# Patient Record
Sex: MALE | Race: WHITE | NOT HISPANIC OR LATINO | Employment: OTHER | URBAN - METROPOLITAN AREA
[De-identification: names, ages, dates, MRNs, and addresses within clinical notes are randomized per-mention and may not be internally consistent; named-entity substitution may affect disease eponyms.]

---

## 2017-01-09 ENCOUNTER — APPOINTMENT (OUTPATIENT)
Dept: MRI IMAGING | Facility: HOSPITAL | Age: 82
End: 2017-01-09

## 2017-01-09 ENCOUNTER — HOSPITAL ENCOUNTER (EMERGENCY)
Facility: HOSPITAL | Age: 82
Discharge: HOME | End: 2017-01-09
Attending: EMERGENCY MEDICINE | Admitting: EMERGENCY MEDICINE

## 2017-01-09 ENCOUNTER — APPOINTMENT (OUTPATIENT)
Dept: GENERAL RADIOLOGY | Facility: HOSPITAL | Age: 82
End: 2017-01-09

## 2017-01-09 VITALS
HEART RATE: 73 BPM | TEMPERATURE: 97.4 F | BODY MASS INDEX: 24.5 KG/M2 | OXYGEN SATURATION: 98 % | SYSTOLIC BLOOD PRESSURE: 161 MMHG | HEIGHT: 71 IN | RESPIRATION RATE: 18 BRPM | DIASTOLIC BLOOD PRESSURE: 90 MMHG | WEIGHT: 175 LBS

## 2017-01-09 DIAGNOSIS — R42 DIZZINESS: Primary | ICD-10-CM

## 2017-01-09 LAB
ALBUMIN SERPL-MCNC: 4.1 G/DL (ref 3.2–4.8)
ALBUMIN/GLOB SERPL: 1.2 G/DL (ref 1.5–2.5)
ALP SERPL-CCNC: 67 U/L (ref 25–100)
ALT SERPL W P-5'-P-CCNC: 15 U/L (ref 7–40)
ANION GAP SERPL CALCULATED.3IONS-SCNC: 11 MMOL/L (ref 3–11)
AST SERPL-CCNC: 16 U/L (ref 0–33)
BACTERIA UR QL AUTO: ABNORMAL /HPF
BASOPHILS # BLD AUTO: 0.04 10*3/MM3 (ref 0–0.2)
BASOPHILS NFR BLD AUTO: 0.5 % (ref 0–1)
BILIRUB SERPL-MCNC: 0.5 MG/DL (ref 0.3–1.2)
BILIRUB UR QL STRIP: NEGATIVE
BUN BLD-MCNC: 18 MG/DL (ref 9–23)
BUN/CREAT SERPL: 13.8 (ref 7–25)
CALCIUM SPEC-SCNC: 10.3 MG/DL (ref 8.7–10.4)
CHLORIDE SERPL-SCNC: 105 MMOL/L (ref 99–109)
CLARITY UR: CLEAR
CO2 SERPL-SCNC: 26 MMOL/L (ref 20–31)
COLOR UR: YELLOW
CREAT BLD-MCNC: 1.3 MG/DL (ref 0.6–1.3)
DEPRECATED RDW RBC AUTO: 47.8 FL (ref 37–54)
EOSINOPHIL # BLD AUTO: 0.14 10*3/MM3 (ref 0.1–0.3)
EOSINOPHIL NFR BLD AUTO: 1.7 % (ref 0–3)
ERYTHROCYTE [DISTWIDTH] IN BLOOD BY AUTOMATED COUNT: 13.4 % (ref 11.3–14.5)
GFR SERPL CREATININE-BSD FRML MDRD: 53 ML/MIN/1.73
GLOBULIN UR ELPH-MCNC: 3.4 GM/DL
GLUCOSE BLD-MCNC: 117 MG/DL (ref 70–100)
GLUCOSE UR STRIP-MCNC: NEGATIVE MG/DL
HCT VFR BLD AUTO: 48.3 % (ref 38.9–50.9)
HGB BLD-MCNC: 16 G/DL (ref 13.1–17.5)
HGB UR QL STRIP.AUTO: ABNORMAL
HOLD SPECIMEN: NORMAL
HOLD SPECIMEN: NORMAL
HYALINE CASTS UR QL AUTO: ABNORMAL /LPF
IMM GRANULOCYTES # BLD: 0.02 10*3/MM3 (ref 0–0.03)
IMM GRANULOCYTES NFR BLD: 0.2 % (ref 0–0.6)
KETONES UR QL STRIP: NEGATIVE
LEUKOCYTE ESTERASE UR QL STRIP.AUTO: NEGATIVE
LYMPHOCYTES # BLD AUTO: 1.5 10*3/MM3 (ref 0.6–4.8)
LYMPHOCYTES NFR BLD AUTO: 18.1 % (ref 24–44)
MAGNESIUM SERPL-MCNC: 2 MG/DL (ref 1.3–2.7)
MCH RBC QN AUTO: 32.5 PG (ref 27–31)
MCHC RBC AUTO-ENTMCNC: 33.1 G/DL (ref 32–36)
MCV RBC AUTO: 98 FL (ref 80–99)
MONOCYTES # BLD AUTO: 0.8 10*3/MM3 (ref 0–1)
MONOCYTES NFR BLD AUTO: 9.6 % (ref 0–12)
NEUTROPHILS # BLD AUTO: 5.8 10*3/MM3 (ref 1.5–8.3)
NEUTROPHILS NFR BLD AUTO: 69.9 % (ref 41–71)
NITRITE UR QL STRIP: NEGATIVE
PH UR STRIP.AUTO: 5.5 [PH] (ref 5–8)
PLATELET # BLD AUTO: 223 10*3/MM3 (ref 150–450)
PMV BLD AUTO: 9.4 FL (ref 6–12)
POTASSIUM BLD-SCNC: 3.9 MMOL/L (ref 3.5–5.5)
PROT SERPL-MCNC: 7.5 G/DL (ref 5.7–8.2)
PROT UR QL STRIP: NEGATIVE
RBC # BLD AUTO: 4.93 10*6/MM3 (ref 4.2–5.76)
RBC # UR: ABNORMAL /HPF
REF LAB TEST METHOD: ABNORMAL
SODIUM BLD-SCNC: 142 MMOL/L (ref 132–146)
SP GR UR STRIP: 1.01 (ref 1–1.03)
SQUAMOUS #/AREA URNS HPF: ABNORMAL /HPF
TROPONIN I SERPL-MCNC: 0 NG/ML (ref 0–0.07)
TROPONIN I SERPL-MCNC: 0 NG/ML (ref 0–0.07)
UROBILINOGEN UR QL STRIP: ABNORMAL
WBC NRBC COR # BLD: 8.3 10*3/MM3 (ref 3.5–10.8)
WBC UR QL AUTO: ABNORMAL /HPF
WHOLE BLOOD HOLD SPECIMEN: NORMAL
WHOLE BLOOD HOLD SPECIMEN: NORMAL

## 2017-01-09 PROCEDURE — 85025 COMPLETE CBC W/AUTO DIFF WBC: CPT | Performed by: EMERGENCY MEDICINE

## 2017-01-09 PROCEDURE — 83735 ASSAY OF MAGNESIUM: CPT | Performed by: EMERGENCY MEDICINE

## 2017-01-09 PROCEDURE — 71010 HC CHEST PA OR AP: CPT

## 2017-01-09 PROCEDURE — 99284 EMERGENCY DEPT VISIT MOD MDM: CPT

## 2017-01-09 PROCEDURE — 36415 COLL VENOUS BLD VENIPUNCTURE: CPT

## 2017-01-09 PROCEDURE — 84484 ASSAY OF TROPONIN QUANT: CPT

## 2017-01-09 PROCEDURE — 70551 MRI BRAIN STEM W/O DYE: CPT

## 2017-01-09 PROCEDURE — 80053 COMPREHEN METABOLIC PANEL: CPT | Performed by: EMERGENCY MEDICINE

## 2017-01-09 PROCEDURE — 81001 URINALYSIS AUTO W/SCOPE: CPT | Performed by: EMERGENCY MEDICINE

## 2017-01-09 PROCEDURE — 93005 ELECTROCARDIOGRAM TRACING: CPT

## 2017-01-09 RX ORDER — LISINOPRIL 40 MG/1
40 TABLET ORAL NIGHTLY
COMMUNITY
End: 2018-10-04 | Stop reason: SDUPTHER

## 2017-01-09 RX ORDER — SODIUM CHLORIDE 0.9 % (FLUSH) 0.9 %
10 SYRINGE (ML) INJECTION AS NEEDED
Status: DISCONTINUED | OUTPATIENT
Start: 2017-01-09 | End: 2017-01-09 | Stop reason: HOSPADM

## 2017-01-09 RX ORDER — MECLIZINE HYDROCHLORIDE 25 MG/1
25 TABLET ORAL EVERY 6 HOURS PRN
Qty: 24 TABLET | Refills: 0 | Status: SHIPPED | OUTPATIENT
Start: 2017-01-09 | End: 2017-03-01

## 2017-01-09 RX ORDER — SIMVASTATIN 40 MG
40 TABLET ORAL NIGHTLY
COMMUNITY
End: 2017-10-02 | Stop reason: SDUPTHER

## 2017-01-09 RX ORDER — ASPIRIN 81 MG/1
81 TABLET ORAL NIGHTLY
Status: ON HOLD | COMMUNITY

## 2017-01-10 NOTE — DISCHARGE INSTRUCTIONS
Rest.  Meclizine for dizziness.  Adequate fluids.  Call Dr. Gage for follow up appointment.  Go slow when going from sitting to standing.

## 2017-01-10 NOTE — ED PROVIDER NOTES
Subjective   HPI Comments: 82-year-old male complains of progressive dizziness and recurrent falls for the past year.  The patient states that his dizziness is now affected his ability to walk.  The dizziness is present primarily when he stands.  He has no dizziness on sitting or lying.  The dizziness is not affected by head movement. He's had no palpitations.  The patient was seen by his PCP, Dr. Eliseo Rodriguez, in [place] today and advised to go to the emergency department for further evaluation and imaging.  The patient has a past medical history of hypertension.      Patient is a 82 y.o. male presenting with dizziness.   History provided by:  Patient  Dizziness   Quality:  Imbalance  Severity:  Moderate  Onset quality:  Gradual  Duration: Over the past year.  Timing:  Intermittent  Progression:  Worsening  Chronicity:  Chronic  Context: standing up    Relieved by: Relieved with sitting down or lying down.  Worsened by:  Standing up  Ineffective treatments:  None tried  Associated symptoms: no blood in stool, no chest pain, no diarrhea, no headaches, no hearing loss, no nausea, no palpitations, no shortness of breath, no syncope, no tinnitus, no vision changes, no vomiting and no weakness        Review of Systems   Constitutional: Negative for chills and fever.   HENT: Negative for congestion, ear pain, hearing loss, nosebleeds, rhinorrhea, sinus pressure, sore throat and tinnitus.    Eyes: Negative for pain, discharge and visual disturbance.   Respiratory: Negative for shortness of breath and wheezing.    Cardiovascular: Negative for chest pain, palpitations, leg swelling and syncope.   Gastrointestinal: Negative for abdominal pain, blood in stool, diarrhea, nausea and vomiting.   Endocrine: Negative.    Genitourinary: Negative for dysuria, hematuria and urgency.   Musculoskeletal: Positive for gait problem (secondary to dizziness). Negative for arthralgias and back pain.   Skin: Negative for pallor and rash.    Allergic/Immunologic: Negative for immunocompromised state.   Neurological: Positive for dizziness and light-headedness. Negative for seizures, syncope, speech difficulty, weakness and headaches.   Hematological: Negative for adenopathy. Does not bruise/bleed easily.   Psychiatric/Behavioral: Negative.  Negative for confusion.       Past Medical History   Diagnosis Date   • Hypertension        No Known Allergies    Past Surgical History   Procedure Laterality Date   • Cholecystectomy         History reviewed. No pertinent family history.    Social History     Social History   • Marital status:      Spouse name: N/A   • Number of children: N/A   • Years of education: N/A     Social History Main Topics   • Smoking status: Never Smoker   • Smokeless tobacco: None   • Alcohol use No   • Drug use: No   • Sexual activity: Not Asked     Other Topics Concern   • None     Social History Narrative   • None           Objective   Physical Exam   Constitutional: He is oriented to person, place, and time. He appears well-developed and well-nourished. No distress.   HENT:   Head: Normocephalic and atraumatic.   Nose: Nose normal.   Mouth/Throat: Oropharynx is clear and moist.   Normal TMs   Eyes: EOM are normal. Pupils are equal, round, and reactive to light. Left eye exhibits no discharge. No scleral icterus.   No nystagmus   Neck: Normal range of motion. Neck supple.   No bruits   Cardiovascular: Normal rate, regular rhythm, normal heart sounds and intact distal pulses.    No murmur heard.  Pulmonary/Chest: Effort normal and breath sounds normal. No respiratory distress. He has no wheezes. He has no rales. He exhibits no tenderness.   Abdominal: Soft. Bowel sounds are normal. There is no tenderness.   Musculoskeletal: Normal range of motion. He exhibits no edema or tenderness.   Neurological: He is alert and oriented to person, place, and time.   Normal facial symmetry.  Normal speech.  Equal  bilaterally.  No  drift.   Skin: Skin is warm and dry. No rash noted. He is not diaphoretic.   Psychiatric: He has a normal mood and affect.   Nursing note and vitals reviewed.      Procedures         ED Course  ED Course    9:20 PM  MRI of the brain shows no acute abnormality.  There is some microvascular disease present.  There are no concerning labs.  The patient is quite upset with the amount of time that he's been here.  He wants discharge.  I spoke to the patient about his results andlab findings.  I will refer him to neurology for follow-up.  I'll also give him a prescription for meclizine for dizziness.    Course of Care      Lab Results (last 24 hours)     Procedure Component Value Units Date/Time    CBC & Differential [[id number]] Collected:  01/09/17 1354    Specimen:  Blood Updated:  01/09/17 1501    Narrative:       The following orders were created for panel order CBC & Differential.  Procedure                               Abnormality         Status                     ---------                               -----------         ------                     CBC Auto Differential[[id number]]         Abnormal            Final result                 Please view results for these tests on the individual orders.    Comprehensive Metabolic Panel [[id number]]  (Abnormal) Collected:  01/09/17 1354    Specimen:  Blood from Arm, Right Updated:  01/09/17 1437     Glucose 117 (H) mg/dL      BUN 18 mg/dL      Creatinine 1.30 mg/dL      Sodium 142 mmol/L      Potassium 3.9 mmol/L      Chloride 105 mmol/L      CO2 26.0 mmol/L      Calcium 10.3 mg/dL      Total Protein 7.5 g/dL      Albumin 4.10 g/dL      ALT (SGPT) 15 U/L      AST (SGOT) 16 U/L      Alkaline Phosphatase 67 U/L      Total Bilirubin 0.5 mg/dL      eGFR Non African Amer 53 (L) mL/min/1.73      Globulin 3.4 gm/dL      A/G Ratio 1.2 (L) g/dL      BUN/Creatinine Ratio 13.8      Anion Gap 11.0 mmol/L     Narrative:       [employer] Guidelines    Stage                            Description                             GFR                      1                               Normal or High                          90+  2                               Mild decrease                            60-89  3                               Moderate decrease                   30-59  4                               Severe decrease                       15-29  5                               Kidney failure                             <15    Magnesium [[id number]]  (Normal) Collected:  01/09/17 1354    Specimen:  Blood from Arm, Right Updated:  01/09/17 1437     Magnesium 2.0 mg/dL     CBC Auto Differential [[id number]]  (Abnormal) Collected:  01/09/17 1354    Specimen:  Blood from Arm, Right Updated:  01/09/17 1501     WBC 8.30 10*3/mm3      RBC 4.93 10*6/mm3      Hemoglobin 16.0 g/dL      Hematocrit 48.3 %      MCV 98.0 fL      MCH 32.5 (H) pg      MCHC 33.1 g/dL      RDW 13.4 %      RDW-SD 47.8 fl      MPV 9.4 fL      Platelets 223 10*3/mm3      Neutrophil % 69.9 %      Lymphocyte % 18.1 (L) %      Monocyte % 9.6 %      Eosinophil % 1.7 %      Basophil % 0.5 %      Immature Grans % 0.2 %      Neutrophils, Absolute 5.80 10*3/mm3      Lymphocytes, Absolute 1.50 10*3/mm3      Monocytes, Absolute 0.80 10*3/mm3      Eosinophils, Absolute 0.14 10*3/mm3      Basophils, Absolute 0.04 10*3/mm3      Immature Grans, Absolute 0.02 10*3/mm3     POC Troponin, Rapid [[id number]]  (Normal) Collected:  01/09/17 1406    Specimen:  Blood Updated:  01/09/17 1425     Troponin I 0.00 ng/mL       Serial Number: [id number]    : [id number]       Urinalysis With / Culture If Indicated [[id number]]  (Abnormal) Collected:  01/09/17 1503    Specimen:  Urine from Urine, Clean Catch Updated:  01/09/17 1534     Color, UA Yellow      Appearance, UA Clear      pH, UA 5.5      Specific Gravity, UA 1.010      Glucose, UA Negative      Ketones, UA Negative      Bilirubin, UA Negative      Blood, UA Small (1+) (A)      Protein, UA  Negative      Leuk Esterase, UA Negative      Nitrite, UA Negative      Urobilinogen, UA 0.2 E.U./dL     Urinalysis, Microscopic Only [[id number]]  (Abnormal) Collected:  01/09/17 1503    Specimen:  Urine from Urine, Clean Catch Updated:  01/09/17 1534     RBC, UA 3-6 (A) /HPF      WBC, UA 0-2 (A) /HPF      Bacteria, UA None Seen /HPF      Squamous Epithelial Cells, UA None Seen /HPF      Hyaline Casts, UA 0-6 /LPF      Methodology Automated Microscopy     POC Troponin, Rapid [[id number]]  (Normal) Collected:  01/09/17 1651    Specimen:  Blood Updated:  01/09/17 1705     Troponin I 0.00 ng/mL       Serial Number: [id number]    : [id number]             Note: In addition to lab results from this visit, the labs listed above may include labs taken at another facility or during a different encounter within the last 24 hours. Please correlate lab times with ED admission and discharge times for further clarification of the services performed during this visit.    MRI Brain Without Contrast   Final Result   Abnormal     No acute intracranial process is demonstrated.  Probable chronic small vessel    ischemic disease.         THIS DOCUMENT HAS BEEN ELECTRONICALLY SIGNED BY LAYNE GARIBAY MD      XR Chest 1 View    (Results Pending)       Vitals:    01/09/17 1945 01/09/17 1947 01/09/17 1949 01/09/17 2057   BP: (!) 161/117 160/96 158/89 161/90   BP Location: Left arm Left arm Left arm    Patient Position: Lying Sitting Standing    Pulse: 70 72 80 73   Resp:       Temp:       TempSrc:       SpO2: 98%   98%   Weight:       Height:           Medications   sodium chloride 0.9 % flush 10 mL (not administered)       ECG/EMG Results (last 24 hours)     Procedure Component Value Units Date/Time    ECG 12 Lead [[id number]] Collected:  01/09/17 1348     Updated:  01/09/17 1746    Narrative:       Test Reason : Weak/Dizzy/AMS protocol  Blood Pressure : **/** mmHG  Vent. Rate : 074 BPM     Atrial Rate : 074 BPM     P-R Int : 182 ms          QRS  Dur : 094 ms      QT Int : 418 ms       P-R-T Axes : 034 -41 046 degrees     QTc Int : 463 ms    Sinus rhythm  Left axis deviation  Abnormal ECG  When compared with ECG of 23-APR-2012 13:29,  Ectopy has resolved  Confirmed by JOHN MATOS MD ([id number]) on 1/9/2017 5:46:38 PM    Referred By:  NATALY           Confirmed By:JOHN MATOS MD    ECG 12 Lead [[id number]] Collected:  01/09/17 1635     Updated:  01/09/17 1951    Narrative:       Test Reason : CP  Blood Pressure : **/** mmHG  Vent. Rate : 070 BPM     Atrial Rate : 070 BPM     P-R Int : 176 ms          QRS Dur : 086 ms      QT Int : 400 ms       P-R-T Axes : -04 -38 002 degrees     QTc Int : 432 ms    Sinus rhythm with premature atrial complexes  Left axis deviation  Abnormal ECG  When compared with ECG of  No significant change was found    Confirmed by JOHN MATOS MD ([id number]) on 1/9/2017 7:51:12 PM    Referred By:  MOISÉS HUYNH           Confirmed By:JOHN MATOS MD                      [facility]    Final diagnoses:   Dizziness            LINDA Lala  01/09/17 2120

## 2017-03-01 ENCOUNTER — OFFICE VISIT (OUTPATIENT)
Dept: NEUROLOGY | Facility: CLINIC | Age: 82
End: 2017-03-01

## 2017-03-01 VITALS
HEIGHT: 71 IN | HEART RATE: 56 BPM | OXYGEN SATURATION: 98 % | WEIGHT: 177 LBS | RESPIRATION RATE: 16 BRPM | SYSTOLIC BLOOD PRESSURE: 118 MMHG | DIASTOLIC BLOOD PRESSURE: 64 MMHG | BODY MASS INDEX: 24.78 KG/M2

## 2017-03-01 DIAGNOSIS — H81.02 MENIERE DISEASE, LEFT: Primary | ICD-10-CM

## 2017-03-01 PROCEDURE — 99203 OFFICE O/P NEW LOW 30 MIN: CPT | Performed by: PSYCHIATRY & NEUROLOGY

## 2017-03-01 RX ORDER — OMEPRAZOLE 20 MG/1
20 CAPSULE, DELAYED RELEASE ORAL NIGHTLY
COMMUNITY
End: 2021-09-14 | Stop reason: ALTCHOICE

## 2017-03-01 NOTE — PROGRESS NOTES
[facility] NEUROLOGY [place] CONSULTATION   History of Present Illness     Date: 3/1/2017    Patient Identification  Darren Mccracken is a 82 y.o. male.    Patient information was obtained from patient.  History/Exam limitations: none.    CONSULTATION requested by: Eliseo Mitchell MD      Chief Complaint   Dizziness (Patient is in the office today for dizziness and states it started about a year ago.  He states it is getting worse to where he is not stable on his feet.) and Establish Care      Dizziness   This is a chronic problem. Episode onset: One year ago. The problem occurs intermittently. The problem has been gradually worsening (Patient reported to symptoms associated with ringing in ear on the left decrease in hearing on the left). Associated symptoms include vertigo. Pertinent negatives include no abdominal pain, chest pain, chills, congestion, coughing, fever, headaches, joint swelling, myalgias, nausea, neck pain, numbness, rash, sore throat, vomiting or weakness. The symptoms are aggravated by walking and standing. The treatment provided no relief.      Patient does admit that his wife to use a lot of salt with her cooking.  He should also put additional salt in his food.      PMH:   Past Medical History   Diagnosis Date   • Glaucoma    • Hyperlipidemia    • Hypertension        Past Surgical History:   Past Surgical History   Procedure Laterality Date   • Cholecystectomy     • Eye surgery Right        Family Hisotry: History reviewed. No pertinent family history.    Social History:   Social History     Social History   • Marital status:      Spouse name: N/A   • Number of children: N/A   • Years of education: N/A     Occupational History   • Not on file.     Social History Main Topics   • Smoking status: Never Smoker   • Smokeless tobacco: Not on file   • Alcohol use No   • Drug use: No   • Sexual activity: Defer     Other Topics Concern   • Not on file     Social History Narrative        Medications:   Current Outpatient Prescriptions   Medication Sig Dispense Refill   • omeprazole (priLOSEC) 20 MG capsule Take 20 mg by mouth Daily.     • aspirin 81 MG EC tablet Take 81 mg by mouth Daily.     • lisinopril (PRINIVIL,ZESTRIL) 40 MG tablet Take 40 mg by mouth Daily.     • simvastatin (ZOCOR) 40 MG tablet Take 20 mg by mouth Every Night.       No current facility-administered medications for this visit.        Allergy: No Known Allergies    Review of Systems:  Review of Systems   Constitutional: Negative for chills and fever.   HENT: Negative for congestion, ear pain, hearing loss, rhinorrhea and sore throat.         Hearing loss and tinnitus in his left ear   Eyes: Negative for pain, discharge and redness.   Respiratory: Negative for cough, shortness of breath, wheezing and stridor.    Cardiovascular: Negative for chest pain, palpitations and leg swelling.   Gastrointestinal: Negative for abdominal pain, constipation, nausea and vomiting.   Endocrine: Negative for cold intolerance, heat intolerance and polyphagia.   Genitourinary: Negative for dysuria, flank pain, frequency and urgency.   Musculoskeletal: Negative for joint swelling, myalgias, neck pain and neck stiffness.   Skin: Negative for pallor, rash and wound.   Allergic/Immunologic: Negative for environmental allergies.   Neurological: Positive for dizziness, vertigo and light-headedness. Negative for tremors, seizures, syncope, facial asymmetry, speech difficulty, weakness, numbness and headaches.   Hematological: Negative for adenopathy.   Psychiatric/Behavioral: Negative for confusion and hallucinations. The patient is not nervous/anxious.        Physical Exam     Vitals:    03/01/17 1541 03/01/17 1542 03/01/17 1543 03/01/17 1544   BP: 122/66 120/60 116/66 118/64   BP Location: Left arm Right arm Left arm Right arm   Patient Position: Sitting Sitting Standing Standing   Pulse:       Resp:       SpO2:       Weight:       Height:          GENERAL: Patient is pleasant, cooperative, appears to be stated age.  Body habitus is endomorphic.  SKIN AND EXTREMITIES:  No skin rashes or lesions are noted.  No cyanosis, clubbing or edema of the extremities.    HEAD:  Head is normocephalic and atraumatic.    NECK: Neck are non-tender without thyromegaly or adenopathy.  Carotic upstrokes are 1+/4.  No cranial or cervical bruits.  The neck is supple with a full range of motion.   ENT: palate elevate symmetrically, no evidence of high arch palate, tongue midline erythema in posterior pharynx, Mallampati Classification Class III   CARDIOVASCULAR:  Regular rate and rhythm with normal S1 and S2 without rub or gallop.  RESPIRATORY:  Clear to auscultation without wheezes or crackle   ABDOMEN:  Soft and non-tender, positive bowel sound without hepatosplenomegaly  BACK:  Back is straight without midline defect.    PSYCH:  Higher cortical function/mental status:  The patient is alert.  She is oriented x3 to time, place and person.  Recent and the remote memory appear normal.  The patient has a good fund of knowledge.  There is no visual or auditory hallucination or suicidal or homicidal ideation.  SPEECH:There is no gross evidence of aphasia, dysarthria or agnosia.      CRANIAL NERVES: Decreased hearing on his left ear.   Pupils are 4mm, equal round reactive to light, reacting briskly to 2mm without afferent pupillary defect.  Visual fields are intact to confrontation testing.  Fundoscopic examination reveals sharp disk margins with normal vasculature.  No papilledema, hemorrhages or exudates.  Extraocular movements are full and smooth with normal pursuits and saccades.  No nystagmus noted.  The face is symmetric. palate elevate symmetrically, Tongue midline, positive gag reflex. The remainder of the cranial nerves are intact and symmetrical.    MOTOR: Strength is 5/5 throughout with normal tone and bulk with the following exceptions, 4/5 intrinsic muscles of the hands  and feet.  No involuntary movements noted.    Deep Tendon Reflexes: are 2/4 and symmetrical in the upper extremities, 2/4 and symmetrical at the knees and 1/4 and symmetrical at the Achilles tendon.  Plantar responses were down-going bilaterally.    SENSATION:  Intact to pinprick, light touch, vibration and proprioception.  Coordination:  The patient normally performs finger-nose-finger, heel-to-knee-to-shin and rapid alternating movements in symmetrical fashion.    COORDINATION AND GAIT:  The patient walks with a narrow-based gait.  MUSCULOSKELETAL: Range of motion normal, no clubbing, cyanosis, or edema.   joint swelling noted in both hands.            Studies: I have personally reviewed the following and discussed with the patient.  Results for orders placed or performed during the hospital encounter of 01/09/17   Comprehensive Metabolic Panel   Result Value Ref Range    Glucose 117 (H) 70 - 100 mg/dL    BUN 18 9 - 23 mg/dL    Creatinine 1.30 0.60 - 1.30 mg/dL    Sodium 142 132 - 146 mmol/L    Potassium 3.9 3.5 - 5.5 mmol/L    Chloride 105 99 - 109 mmol/L    CO2 26.0 20.0 - 31.0 mmol/L    Calcium 10.3 8.7 - 10.4 mg/dL    Total Protein 7.5 5.7 - 8.2 g/dL    Albumin 4.10 3.20 - 4.80 g/dL    ALT (SGPT) 15 7 - 40 U/L    AST (SGOT) 16 0 - 33 U/L    Alkaline Phosphatase 67 25 - 100 U/L    Total Bilirubin 0.5 0.3 - 1.2 mg/dL    eGFR Non African Amer 53 (L) >60 mL/min/1.73    Globulin 3.4 gm/dL    A/G Ratio 1.2 (L) 1.5 - 2.5 g/dL    BUN/Creatinine Ratio 13.8 7.0 - 25.0    Anion Gap 11.0 3.0 - 11.0 mmol/L   Magnesium   Result Value Ref Range    Magnesium 2.0 1.3 - 2.7 mg/dL   Urinalysis With / Culture If Indicated   Result Value Ref Range    Color, UA Yellow Yellow, Straw    Appearance, UA Clear Clear    pH, UA 5.5 5.0 - 8.0    Specific Gravity, UA 1.010 1.005 - 1.030    Glucose, UA Negative Negative    Ketones, UA Negative Negative    Bilirubin, UA Negative Negative    Blood, UA Small (1+) (A) Negative    Protein, UA  Negative Negative    Leuk Esterase, UA Negative Negative    Nitrite, UA Negative Negative    Urobilinogen, UA 0.2 E.U./dL 0.2 - 1.0 E.U./dL   CBC Auto Differential   Result Value Ref Range    WBC 8.30 3.50 - 10.80 10*3/mm3    RBC 4.93 4.20 - 5.76 10*6/mm3    Hemoglobin 16.0 13.1 - 17.5 g/dL    Hematocrit 48.3 38.9 - 50.9 %    MCV 98.0 80.0 - 99.0 fL    MCH 32.5 (H) 27.0 - 31.0 pg    MCHC 33.1 32.0 - 36.0 g/dL    RDW 13.4 11.3 - 14.5 %    RDW-SD 47.8 37.0 - 54.0 fl    MPV 9.4 6.0 - 12.0 fL    Platelets 223 150 - 450 10*3/mm3    Neutrophil % 69.9 41.0 - 71.0 %    Lymphocyte % 18.1 (L) 24.0 - 44.0 %    Monocyte % 9.6 0.0 - 12.0 %    Eosinophil % 1.7 0.0 - 3.0 %    Basophil % 0.5 0.0 - 1.0 %    Immature Grans % 0.2 0.0 - 0.6 %    Neutrophils, Absolute 5.80 1.50 - 8.30 10*3/mm3    Lymphocytes, Absolute 1.50 0.60 - 4.80 10*3/mm3    Monocytes, Absolute 0.80 0.00 - 1.00 10*3/mm3    Eosinophils, Absolute 0.14 0.10 - 0.30 10*3/mm3    Basophils, Absolute 0.04 0.00 - 0.20 10*3/mm3    Immature Grans, Absolute 0.02 0.00 - 0.03 10*3/mm3   Urinalysis, Microscopic Only   Result Value Ref Range    RBC, UA 3-6 (A) None Seen, 0-2 /HPF    WBC, UA 0-2 (A) None Seen /HPF    Bacteria, UA None Seen None Seen, Trace /HPF    Squamous Epithelial Cells, UA None Seen None Seen, 0-2 /HPF    Hyaline Casts, UA 0-6 0 - 6 /LPF    Methodology Automated Microscopy    POC Troponin, Rapid   Result Value Ref Range    Troponin I 0.00 0.00 - 0.07 ng/mL   POC Troponin, Rapid   Result Value Ref Range    Troponin I 0.00 0.00 - 0.07 ng/mL   Light Blue Top   Result Value Ref Range    Extra Tube hold for add-on    Green Top (Gel)   Result Value Ref Range    Extra Tube Hold for add-ons.    Lavender Top   Result Value Ref Range    Extra Tube hold for add-on    Gold Top - SST   Result Value Ref Range    Extra Tube Hold for add-ons.        Records Reviewed: I have personally reviewed his previous medical record.    Darren was seen today for dizziness and establish  care.    Diagnoses and all orders for this visit:    Meniere disease, left      Treatments:  1.  I've counseled patient extensively about the pathophysiology of Ménière disease  2.  I've advised patient should reduce salt intake to no more than 2 g a day    This Document is signed by Eligio Cool MD, FAAN, FAASM March 1, 20179:05 PM

## 2017-03-22 PROBLEM — I10 HTN (HYPERTENSION): Status: ACTIVE | Noted: 2017-03-22

## 2017-03-22 PROBLEM — E78.5 HLD (HYPERLIPIDEMIA): Status: ACTIVE | Noted: 2017-03-22

## 2017-03-22 PROBLEM — K21.9 GERD (GASTROESOPHAGEAL REFLUX DISEASE): Status: ACTIVE | Noted: 2017-03-22

## 2017-03-22 PROBLEM — H40.9 GLAUCOMA: Status: ACTIVE | Noted: 2017-03-22

## 2017-03-22 PROBLEM — N28.9 RENAL INSUFFICIENCY: Status: ACTIVE | Noted: 2017-03-22

## 2017-03-22 PROBLEM — I25.10 CAD (CORONARY ARTERY DISEASE): Status: ACTIVE | Noted: 2017-03-22

## 2017-04-26 ENCOUNTER — APPOINTMENT (OUTPATIENT)
Dept: CT IMAGING | Facility: HOSPITAL | Age: 82
End: 2017-04-26

## 2017-04-26 ENCOUNTER — APPOINTMENT (OUTPATIENT)
Dept: CARDIOLOGY | Facility: HOSPITAL | Age: 82
End: 2017-04-26
Attending: INTERNAL MEDICINE

## 2017-04-26 ENCOUNTER — APPOINTMENT (OUTPATIENT)
Dept: GENERAL RADIOLOGY | Facility: HOSPITAL | Age: 82
End: 2017-04-26

## 2017-04-26 ENCOUNTER — HOSPITAL ENCOUNTER (EMERGENCY)
Facility: HOSPITAL | Age: 82
Discharge: HOME | End: 2017-04-26
Attending: EMERGENCY MEDICINE | Admitting: EMERGENCY MEDICINE

## 2017-04-26 VITALS
BODY MASS INDEX: 24.5 KG/M2 | TEMPERATURE: 97.7 F | HEIGHT: 71 IN | HEART RATE: 74 BPM | WEIGHT: 175 LBS | DIASTOLIC BLOOD PRESSURE: 92 MMHG | OXYGEN SATURATION: 98 % | RESPIRATION RATE: 17 BRPM | SYSTOLIC BLOOD PRESSURE: 163 MMHG

## 2017-04-26 DIAGNOSIS — R07.9 CHEST PAIN, UNSPECIFIED TYPE: Primary | ICD-10-CM

## 2017-04-26 LAB
ALBUMIN SERPL-MCNC: 3.9 G/DL (ref 3.2–4.8)
ALBUMIN/GLOB SERPL: 1.2 G/DL (ref 1.5–2.5)
ALP SERPL-CCNC: 55 U/L (ref 25–100)
ALT SERPL W P-5'-P-CCNC: 16 U/L (ref 7–40)
ANION GAP SERPL CALCULATED.3IONS-SCNC: -1 MMOL/L (ref 3–11)
AST SERPL-CCNC: 16 U/L (ref 0–33)
BASOPHILS # BLD AUTO: 0.05 10*3/MM3 (ref 0–0.2)
BASOPHILS NFR BLD AUTO: 0.4 % (ref 0–1)
BILIRUB SERPL-MCNC: 0.7 MG/DL (ref 0.3–1.2)
BNP SERPL-MCNC: 17 PG/ML (ref 0–100)
BUN BLD-MCNC: 20 MG/DL (ref 9–23)
BUN/CREAT SERPL: 15.4 (ref 7–25)
CALCIUM SPEC-SCNC: 9.7 MG/DL (ref 8.7–10.4)
CHLORIDE SERPL-SCNC: 106 MMOL/L (ref 99–109)
CO2 SERPL-SCNC: 32 MMOL/L (ref 20–31)
CREAT BLD-MCNC: 1.3 MG/DL (ref 0.6–1.3)
DEPRECATED RDW RBC AUTO: 49.9 FL (ref 37–54)
EOSINOPHIL # BLD AUTO: 0.36 10*3/MM3 (ref 0.1–0.3)
EOSINOPHIL NFR BLD AUTO: 3 % (ref 0–3)
ERYTHROCYTE [DISTWIDTH] IN BLOOD BY AUTOMATED COUNT: 13.6 % (ref 11.3–14.5)
GFR SERPL CREATININE-BSD FRML MDRD: 53 ML/MIN/1.73
GLOBULIN UR ELPH-MCNC: 3.3 GM/DL
GLUCOSE BLD-MCNC: 109 MG/DL (ref 70–100)
HCT VFR BLD AUTO: 46.3 % (ref 38.9–50.9)
HGB BLD-MCNC: 15.1 G/DL (ref 13.1–17.5)
HOLD SPECIMEN: NORMAL
HOLD SPECIMEN: NORMAL
IMM GRANULOCYTES # BLD: 0.02 10*3/MM3 (ref 0–0.03)
IMM GRANULOCYTES NFR BLD: 0.2 % (ref 0–0.6)
LIPASE SERPL-CCNC: 27 U/L (ref 6–51)
LV EF NUC BP: 62 %
LYMPHOCYTES # BLD AUTO: 1.74 10*3/MM3 (ref 0.6–4.8)
LYMPHOCYTES NFR BLD AUTO: 14.4 % (ref 24–44)
MCH RBC QN AUTO: 32.5 PG (ref 27–31)
MCHC RBC AUTO-ENTMCNC: 32.6 G/DL (ref 32–36)
MCV RBC AUTO: 99.8 FL (ref 80–99)
MONOCYTES # BLD AUTO: 0.94 10*3/MM3 (ref 0–1)
MONOCYTES NFR BLD AUTO: 7.8 % (ref 0–12)
NEUTROPHILS # BLD AUTO: 8.99 10*3/MM3 (ref 1.5–8.3)
NEUTROPHILS NFR BLD AUTO: 74.2 % (ref 41–71)
PLATELET # BLD AUTO: 191 10*3/MM3 (ref 150–450)
PMV BLD AUTO: 10.2 FL (ref 6–12)
POTASSIUM BLD-SCNC: 4.6 MMOL/L (ref 3.5–5.5)
PROT SERPL-MCNC: 7.2 G/DL (ref 5.7–8.2)
RBC # BLD AUTO: 4.64 10*6/MM3 (ref 4.2–5.76)
SODIUM BLD-SCNC: 137 MMOL/L (ref 132–146)
TROPONIN I SERPL-MCNC: 0 NG/ML (ref 0–0.07)
TROPONIN I SERPL-MCNC: 0 NG/ML (ref 0–0.07)
WBC NRBC COR # BLD: 12.1 10*3/MM3 (ref 3.5–10.8)
WHOLE BLOOD HOLD SPECIMEN: NORMAL
WHOLE BLOOD HOLD SPECIMEN: NORMAL

## 2017-04-26 PROCEDURE — 0 RUBIDIUM CHLORIDE: Performed by: EMERGENCY MEDICINE

## 2017-04-26 PROCEDURE — 83690 ASSAY OF LIPASE: CPT | Performed by: EMERGENCY MEDICINE

## 2017-04-26 PROCEDURE — 71275 CT ANGIOGRAPHY CHEST: CPT

## 2017-04-26 PROCEDURE — 93017 CV STRESS TEST TRACING ONLY: CPT

## 2017-04-26 PROCEDURE — A9555 RB82 RUBIDIUM: HCPCS | Performed by: EMERGENCY MEDICINE

## 2017-04-26 PROCEDURE — 99283 EMERGENCY DEPT VISIT LOW MDM: CPT | Performed by: INTERNAL MEDICINE

## 2017-04-26 PROCEDURE — 71010 HC CHEST PA OR AP: CPT

## 2017-04-26 PROCEDURE — 78492 MYOCRD IMG PET MLT RST&STRS: CPT | Performed by: INTERNAL MEDICINE

## 2017-04-26 PROCEDURE — 80053 COMPREHEN METABOLIC PANEL: CPT | Performed by: EMERGENCY MEDICINE

## 2017-04-26 PROCEDURE — 25010000002 REGADENOSON 0.4 MG/5ML SOLUTION: Performed by: INTERNAL MEDICINE

## 2017-04-26 PROCEDURE — 78491 MYOCRD IMG PET 1STD RST/STRS: CPT

## 2017-04-26 PROCEDURE — 93005 ELECTROCARDIOGRAM TRACING: CPT | Performed by: EMERGENCY MEDICINE

## 2017-04-26 PROCEDURE — 85025 COMPLETE CBC W/AUTO DIFF WBC: CPT | Performed by: EMERGENCY MEDICINE

## 2017-04-26 PROCEDURE — 0 IOPAMIDOL PER 1 ML: Performed by: EMERGENCY MEDICINE

## 2017-04-26 PROCEDURE — 83880 ASSAY OF NATRIURETIC PEPTIDE: CPT | Performed by: EMERGENCY MEDICINE

## 2017-04-26 PROCEDURE — 36415 COLL VENOUS BLD VENIPUNCTURE: CPT

## 2017-04-26 PROCEDURE — 93018 CV STRESS TEST I&R ONLY: CPT | Performed by: INTERNAL MEDICINE

## 2017-04-26 PROCEDURE — 84484 ASSAY OF TROPONIN QUANT: CPT

## 2017-04-26 PROCEDURE — 99284 EMERGENCY DEPT VISIT MOD MDM: CPT

## 2017-04-26 RX ORDER — SODIUM CHLORIDE 0.9 % (FLUSH) 0.9 %
10 SYRINGE (ML) INJECTION AS NEEDED
Status: DISCONTINUED | OUTPATIENT
Start: 2017-04-26 | End: 2017-04-26 | Stop reason: HOSPADM

## 2017-04-26 RX ORDER — AMLODIPINE BESYLATE 5 MG/1
5 TABLET ORAL DAILY
Qty: 30 TABLET | Refills: 11 | Status: ON HOLD | OUTPATIENT
Start: 2017-04-26 | End: 2017-06-21

## 2017-04-26 RX ORDER — ASPIRIN 81 MG/1
324 TABLET, CHEWABLE ORAL ONCE
Status: DISCONTINUED | OUTPATIENT
Start: 2017-04-26 | End: 2017-04-26 | Stop reason: HOSPADM

## 2017-04-26 RX ADMIN — IOPAMIDOL 65 ML: 755 INJECTION, SOLUTION INTRAVENOUS at 10:55

## 2017-04-26 RX ADMIN — REGADENOSON 0.4 MG: 0.08 INJECTION, SOLUTION INTRAVENOUS at 13:48

## 2017-04-26 RX ADMIN — NITROGLYCERIN 0.5 INCH: 20 OINTMENT TOPICAL at 09:28

## 2017-04-26 RX ADMIN — RUBIDIUM CHLORIDE RB-82 1 DOSE: 150 INJECTION, SOLUTION INTRAVENOUS at 13:35

## 2017-04-26 RX ADMIN — RUBIDIUM CHLORIDE RB-82 1 DOSE: 150 INJECTION, SOLUTION INTRAVENOUS at 13:50

## 2017-04-26 NOTE — ED PROVIDER NOTES
Subjective   HPI Comments: 82-year-old white male with history of CAD status post stent installation ×3 complaining of anterior chest pain that woke him up this morning at 0 5:30.  Patient describes pain in his anterior chest pressure radiating to both shoulders.  He denies any referred pain otherwise and denies any nausea or vomiting.  He states that he is short of breath but he has been having shortness of breath episodes that is approximately the same for the past 6 months.  It is much worse on exertion.  He feels like the shortness of breath he's having today is no different from any other day for the last 6 months.  Patient denies abdominal pain or other complaints.    Patient is a 82 y.o. male presenting with chest pain.   History provided by:  Patient  Chest Pain   Pain location:  Substernal area  Pain quality: dull    Pain radiates to:  Does not radiate  Onset quality:  Gradual  Timing:  Constant  Progression:  Worsening  Chronicity:  New  Context: not breathing    Relieved by:  Nothing  Worsened by:  Nothing  Ineffective treatments:  None tried  Associated symptoms: no abdominal pain, no back pain, no fever, no headache and no syncope        Review of Systems   Constitutional: Negative for fever.   Cardiovascular: Positive for chest pain. Negative for syncope.   Gastrointestinal: Negative for abdominal pain.   Musculoskeletal: Negative for back pain.   Neurological: Negative for headaches.   All other systems reviewed and are negative.      Past Medical History:   Diagnosis Date   • Glaucoma    • Hyperlipidemia    • Hypertension    • Pneumonia     right upper lobe        Allergies   Allergen Reactions   • Lipitor [Atorvastatin]      Patient staTES THAT HE IS NOT ALLERGIC TO THIS       Past Surgical History:   Procedure Laterality Date   • CHOLECYSTECTOMY  2010   • CORONARY ANGIOPLASTY WITH STENT PLACEMENT     • EYE SURGERY Right    • FOOT SURGERY  1980       History reviewed. No pertinent family  history.    Social History     Social History   • Marital status:      Spouse name: N/A   • Number of children: N/A   • Years of education: N/A     Social History Main Topics   • Smoking status: Never Smoker   • Smokeless tobacco: None   • Alcohol use No   • Drug use: No   • Sexual activity: Defer     Other Topics Concern   • None     Social History Narrative    Lives with his wife           Objective   Physical Exam   Constitutional: He appears well-developed and well-nourished.   HENT:   Head: Normocephalic and atraumatic.   Eyes: Conjunctivae are normal.   Neck: Normal range of motion. Neck supple.   Cardiovascular: Normal rate, regular rhythm and normal heart sounds.  Exam reveals no friction rub.    No murmur heard.  Pulmonary/Chest: Effort normal and breath sounds normal.   Musculoskeletal: Normal range of motion. He exhibits no edema.   Skin: Skin is warm and dry.   Psychiatric: He has a normal mood and affect. His behavior is normal. Judgment and thought content normal.   Nursing note and vitals reviewed.      Procedures         ED Course  ED Course   Comment By Time   Spoke with [facility]. Cardiology. Consult in. LINDA Michelle 04/26 3774   Dr. WEBB called back and stated that the stress test was normal.  He called in some antihypertensive medication for him and wants him to follow-up in 2 weeks with him.  Patient agreed with this plan and had no other concerns.  Jennifer Cavazos RN in spoke with patient LINDA Michelle 04/26 8563          Recent Results (from the past 24 hour(s))   BNP    Collection Time: 04/26/17  9:14 AM   Result Value Ref Range    BNP 17.0 0.0 - 100.0 pg/mL   Light Blue Top    Collection Time: 04/26/17  9:14 AM   Result Value Ref Range    Extra Tube hold for add-on    Lavender Top    Collection Time: 04/26/17  9:14 AM   Result Value Ref Range    Extra Tube hold for add-on    CBC Auto Differential    Collection Time: 04/26/17  9:14 AM   Result Value Ref Range    WBC 12.10 (H) 3.50 -  10.80 10*3/mm3    RBC 4.64 4.20 - 5.76 10*6/mm3    Hemoglobin 15.1 13.1 - 17.5 g/dL    Hematocrit 46.3 38.9 - 50.9 %    MCV 99.8 (H) 80.0 - 99.0 fL    MCH 32.5 (H) 27.0 - 31.0 pg    MCHC 32.6 32.0 - 36.0 g/dL    RDW 13.6 11.3 - 14.5 %    RDW-SD 49.9 37.0 - 54.0 fl    MPV 10.2 6.0 - 12.0 fL    Platelets 191 150 - 450 10*3/mm3    Neutrophil % 74.2 (H) 41.0 - 71.0 %    Lymphocyte % 14.4 (L) 24.0 - 44.0 %    Monocyte % 7.8 0.0 - 12.0 %    Eosinophil % 3.0 0.0 - 3.0 %    Basophil % 0.4 0.0 - 1.0 %    Immature Grans % 0.2 0.0 - 0.6 %    Neutrophils, Absolute 8.99 (H) 1.50 - 8.30 10*3/mm3    Lymphocytes, Absolute 1.74 0.60 - 4.80 10*3/mm3    Monocytes, Absolute 0.94 0.00 - 1.00 10*3/mm3    Eosinophils, Absolute 0.36 (H) 0.10 - 0.30 10*3/mm3    Basophils, Absolute 0.05 0.00 - 0.20 10*3/mm3    Immature Grans, Absolute 0.02 0.00 - 0.03 10*3/mm3   POC Troponin, Rapid    Collection Time: 04/26/17  9:16 AM   Result Value Ref Range    Troponin I 0.00 0.00 - 0.07 ng/mL   Comprehensive Metabolic Panel    Collection Time: 04/26/17  9:54 AM   Result Value Ref Range    Glucose 109 (H) 70 - 100 mg/dL    BUN 20 9 - 23 mg/dL    Creatinine 1.30 0.60 - 1.30 mg/dL    Sodium 137 132 - 146 mmol/L    Potassium 4.6 3.5 - 5.5 mmol/L    Chloride 106 99 - 109 mmol/L    CO2 32.0 (H) 20.0 - 31.0 mmol/L    Calcium 9.7 8.7 - 10.4 mg/dL    Total Protein 7.2 5.7 - 8.2 g/dL    Albumin 3.90 3.20 - 4.80 g/dL    ALT (SGPT) 16 7 - 40 U/L    AST (SGOT) 16 0 - 33 U/L    Alkaline Phosphatase 55 25 - 100 U/L    Total Bilirubin 0.7 0.3 - 1.2 mg/dL    eGFR Non African Amer 53 (L) >60 mL/min/1.73    Globulin 3.3 gm/dL    A/G Ratio 1.2 (L) 1.5 - 2.5 g/dL    BUN/Creatinine Ratio 15.4 7.0 - 25.0    Anion Gap -1.0 (L) 3.0 - 11.0 mmol/L   Lipase    Collection Time: 04/26/17  9:54 AM   Result Value Ref Range    Lipase 27 6 - 51 U/L   Green Top (Gel)    Collection Time: 04/26/17  9:54 AM   Result Value Ref Range    Extra Tube Hold for add-ons.    Gold Top - SST     Collection Time: 04/26/17  9:54 AM   Result Value Ref Range    Extra Tube Hold for add-ons.    POC Troponin, Rapid    Collection Time: 04/26/17 12:08 PM   Result Value Ref Range    Troponin I 0.00 0.00 - 0.07 ng/mL   Stress Test With Pet Myocardial Perfusion    Collection Time: 04/26/17  1:52 PM   Result Value Ref Range    Nuc Stress EF 62 %     Note: In addition to lab results from this visit, the labs listed above may include labs taken at another facility or during a different encounter within the last 24 hours. Please correlate lab times with ED admission and discharge times for further clarification of the services performed during this visit.    CT Angiogram Chest With & Without Contrast   Final Result   There is no evidence of pulmonary embolus. There are chronic   appearing pulmonary changes.       D:  04/26/2017   E:  04/26/2017               This report was finalized on 4/26/2017 11:42 AM by Dr. Reji Mclaughlin MD.          XR Chest 1 View   Final Result   Stable chronic pulmonary findings. There are no acute   abnormalities.         D:  04/26/2017   E:  04/26/2017       This report was finalized on 4/26/2017 11:19 AM by Dr. Reji Mclaughlin MD.            Vitals:    04/26/17 1007 04/26/17 1030 04/26/17 1200 04/26/17 1600   BP:  132/74 157/87 163/87   BP Location:       Patient Position:       Pulse: 67 58 69 70   Resp:       Temp:       TempSrc:       SpO2: 98% 96% 98% 95%   Weight:       Height:         Medications   sodium chloride 0.9 % flush 10 mL (not administered)   aspirin chewable tablet 324 mg (324 mg Oral Not Given 4/26/17 0905)   nitroglycerin (NITROSTAT) ointment 0.5 inch (0.5 inches Topical Given 4/26/17 0928)   iopamidol (ISOVUE-370) 76 % injection 100 mL (65 mL Intravenous Given 4/26/17 1055)   regadenoson (LEXISCAN) injection 0.4 mg (0.4 mg Intravenous Given 4/26/17 1348)   rubidium chloride (CARDIOGEN) injection 1 dose (1 dose Intravenous Given 4/26/17 1350)   rubidium chloride (CARDIOGEN)  injection 1 dose (1 dose Intravenous Given 4/26/17 1335)     ECG/EMG Results (last 24 hours)     Procedure Component Value Units Date/Time    ECG 12 Lead [[id number]] Collected:  04/26/17 0857     Updated:  04/26/17 0905    ECG 12 Lead [[id number]] Collected:  04/26/17 1202     Updated:  04/26/17 1215              MDM    Final diagnoses:   Chest pain, unspecified type            LINDA Michelle  04/26/17 3658

## 2017-04-26 NOTE — H&P
HISTORY AND PHYSICAL                                          CC:Chest pain    Patient Care Team:  No Known Provider as PCP - General   Referring Provider:[facility] ER    Patient Active Problem List   Diagnosis   • HTN (hypertension)   • HLD (hyperlipidemia)   • CAD (coronary artery disease)   • GERD (gastroesophageal reflux disease)   • Renal insufficiency   • Glaucoma   • Chest pain       Active Ambulatory Problems     Diagnosis Date Noted   • HTN (hypertension) 03/22/2017   • HLD (hyperlipidemia) 03/22/2017   • CAD (coronary artery disease) 03/22/2017   • GERD (gastroesophageal reflux disease) 03/22/2017   • Renal insufficiency 03/22/2017   • Glaucoma 03/22/2017     Resolved Ambulatory Problems     Diagnosis Date Noted   • No Resolved Ambulatory Problems     Past Medical History:   Diagnosis Date   • Glaucoma    • Hyperlipidemia    • Hypertension    • Pneumonia        (Not in a hospital admission)    Subjective .     History of present illness:   The patient is seen in consultation in the ER at the request of Dr. Avina regarding episode of chest pain.  The patient has a known history of coronary disease including drug-eluting stents in the RCA in 2005, Taxus drug eluding stent in the LAD and most recently a first LAD diagonal drug-eluting stent in 2011.  The patient does spend a great deal time in [place] and also sees a cardiologist there but does not recall having any heart cath procedures they're in [place].  He thinks he has stress test about 5 years ago which she states was normal.  He has been in his usual state of health recently although he has noticed more shortness of exertion worse than usual.  Yesterday he did a lot of yard work.  This morning he awakened about 5 AM with onset of chest discomfort described as a pressure and tightness radiating across his chest from one shoulder to the other.  This was considered severe it  "was not associated with nausea, vomiting, diaphoresis, or weakness.  He got up out of bed and try to walk around to see this improved the symptoms persisted.  He then alert his wife and as his symptoms progressed she decided present to [facility] ER.  In the emergency room he is continued have the pain and nitroglycerin patch was placed on the patient.  This seemed to relieve the pain immediately.  An EKG revealed no acute ST-T changes.  His cardiac markers have remained negative ×2 sets.  He did have a CT scan of the chest as remotely he had a PE but the CT scan emergency room was also negative.  He is now proceeding with a pet myocardial perfusion stress test to rule out ischemia.  He is currently chest pain-free and resting comfortably.    Social History     Social History   • Marital status:      Spouse name: N/A   • Number of children: N/A   • Years of education: N/A     Occupational History   • Not on file.     Social History Main Topics   • Smoking status: Never Smoker   • Smokeless tobacco: Not on file   • Alcohol use No   • Drug use: No   • Sexual activity: Defer     Other Topics Concern   • Not on file     Social History Narrative    Lives with his wife     History reviewed. No pertinent family history.  Past Surgical History:   Procedure Laterality Date   • CHOLECYSTECTOMY  2010   • EYE SURGERY Right    • FOOT SURGERY  1980       Review of Systems:   Pertinent items are noted in HPI, all other systems reviewed and negative    Objective     Vitals:  Blood pressure 157/87, pulse 69, temperature 97.6 °F (36.4 °C), temperature source Oral, resp. rate 16, height 71\" (180.3 cm), weight 175 lb (79.4 kg), SpO2 98 %.   No intake or output data in the 24 hours ending 04/26/17 1315       Physical Exam:     General Appearance:    Alert, cooperative, in no acute distress   Head:    Normocephalic, without obvious abnormality, atraumatic   Eyes:            Lids and lashes normal, conjunctivae and sclerae " normal, no   icterus, no pallor, corneas clear, PERRLA   Ears:    Ears appear intact with no abnormalities noted   Throat:   No oral lesions, no thrush, oral mucosa moist   Neck:   No adenopathy, supple, trachea midline, no thyromegaly, no   carotid bruit, no JVD   Back:     No kyphosis present, no scoliosis present, no skin lesions,      erythema or scars, no tenderness to percussion or                   palpation,   range of motion normal   Lungs:     Clear to auscultation,respirations regular, even and                  unlabored    Heart:    Regular rhythm and normal rate, normal S1 and S2, no            murmur, no gallop, no rub, no click   Chest Wall:    No abnormalities observed   Abdomen:     Normal bowel sounds, no masses, no organomegaly, soft        non-tender, non-distended, no guarding, no rebound                tenderness   Rectal:     Deferred   Extremities:   Moves all extremities well, no edema, no cyanosis, no             redness   Pulses:   Pulses palpable and equal bilaterally   Skin:   No bleeding, bruising or rash   Lymph nodes:   No palpable adenopathy   Neurologic:   Cranial nerves 2 - 12 grossly intact, sensation intact, DTR       present and equal bilaterally        Results Review:     I reviewed the patient's new clinical results.      Results from last 7 days  Lab Units 04/26/17  0914   WBC 10*3/mm3 12.10*   HEMOGLOBIN g/dL 15.1   HEMATOCRIT % 46.3   PLATELETS 10*3/mm3 191       Results from last 7 days  Lab Units 04/26/17  0954   SODIUM mmol/L 137   POTASSIUM mmol/L 4.6   CHLORIDE mmol/L 106   TOTAL CO2 mmol/L 32.0*   BUN mg/dL 20   CREATININE mg/dL 1.30   CALCIUM mg/dL 9.7   BILIRUBIN mg/dL 0.7   ALK PHOS U/L 55   ALT (SGPT) U/L 16   AST (SGOT) U/L 16   GLUCOSE mg/dL 109*       Results from last 7 days  Lab Units 04/26/17  0954   SODIUM mmol/L 137   POTASSIUM mmol/L 4.6   CHLORIDE mmol/L 106   TOTAL CO2 mmol/L 32.0*   BUN mg/dL 20   CREATININE mg/dL 1.30   GLUCOSE mg/dL 109*   CALCIUM  mg/dL 9.7         No components found for: TROPONIN            Invalid input(s): TOTAL LDL    Results from last 7 days  Lab Units 04/26/17  0914   BNP pg/mL 17.0     Tele:  Sinus alfred    ASSESSMENT:  Hospital Problem List     * (Principal)Chest pain    Overview Signed 4/26/2017  1:01 PM by LINDA Hebert     · Admit to [facility] ER with Chest pain  · Negative markers, negative EKG         CAD (coronary artery disease)    Overview Signed 3/22/2017  9:24 AM by Twyla Benson     A. Unstable Angina: (01/07/05).   i. LHC: Dr. Magdaleno   Ii. JOSE CARLOS to RCA. Normal LVEF  B. Echo: 06/07: LVEF 50%.  C. MPS: 11/07: Normal   D. LHC: 01/07, in [place]   I. Taxus JOSE CARLOS to LAD   Ii. LVEF 35%  E. Echo: 07/26/10   I. Mild TR   Ii. LVEF 50-60%  F. LHC: unstable angina (09/817/11):   I. LVEF 30%; diffuse LV hypokinesis.   Ii. Atom 2.25 x 60 mm JOSE CARLOS to lonf 1st LAD diagonal.       No other obstructive disease  G. Echocardiogram, April 22, 2012:   I. LVEF: 65%   Ii. LV diastolic impaired relaxation.    Iii. RSVP 38mmHg; mild AR                 PLAN:  · PET stress test negative for ischemia; low risk study; continue current cardiac medications; add amlodipine 5mg daily for BP   · Follow up in cardiology clinic in 4-6 weeks  · No further cardiac testing recommended at this time    Scribe for LINDA Perla  04/26/17   1:15 PM    IToy MD, personally performed the services as scribed by the above named individual. I have made any necessary edits and it is both accurate and complete.     Toy Queen MD, MSc, FACC  Interventional Cardiology  [facility]

## 2017-06-15 ENCOUNTER — APPOINTMENT (OUTPATIENT)
Dept: GENERAL RADIOLOGY | Facility: HOSPITAL | Age: 82
End: 2017-06-15

## 2017-06-15 ENCOUNTER — HOSPITAL ENCOUNTER (EMERGENCY)
Facility: HOSPITAL | Age: 82
Discharge: HOME | End: 2017-06-15
Attending: EMERGENCY MEDICINE | Admitting: EMERGENCY MEDICINE

## 2017-06-15 VITALS
WEIGHT: 175 LBS | DIASTOLIC BLOOD PRESSURE: 102 MMHG | TEMPERATURE: 98 F | RESPIRATION RATE: 16 BRPM | BODY MASS INDEX: 24.5 KG/M2 | OXYGEN SATURATION: 96 % | SYSTOLIC BLOOD PRESSURE: 153 MMHG | HEART RATE: 66 BPM | HEIGHT: 71 IN

## 2017-06-15 DIAGNOSIS — I10 ESSENTIAL HYPERTENSION: ICD-10-CM

## 2017-06-15 DIAGNOSIS — I20.9 ANGINA PECTORIS (HCC): Primary | ICD-10-CM

## 2017-06-15 LAB
ALBUMIN SERPL-MCNC: 4.2 G/DL (ref 3.2–4.8)
ALBUMIN/GLOB SERPL: 1.2 G/DL (ref 1.5–2.5)
ALP SERPL-CCNC: 59 U/L (ref 25–100)
ALT SERPL W P-5'-P-CCNC: 16 U/L (ref 7–40)
ANION GAP SERPL CALCULATED.3IONS-SCNC: 4 MMOL/L (ref 3–11)
AST SERPL-CCNC: 15 U/L (ref 0–33)
BASOPHILS # BLD AUTO: 0.06 10*3/MM3 (ref 0–0.2)
BASOPHILS NFR BLD AUTO: 0.7 % (ref 0–1)
BILIRUB SERPL-MCNC: 0.7 MG/DL (ref 0.3–1.2)
BNP SERPL-MCNC: 67 PG/ML (ref 0–100)
BUN BLD-MCNC: 16 MG/DL (ref 9–23)
BUN/CREAT SERPL: 13.3 (ref 7–25)
CALCIUM SPEC-SCNC: 10.2 MG/DL (ref 8.7–10.4)
CHLORIDE SERPL-SCNC: 111 MMOL/L (ref 99–109)
CO2 SERPL-SCNC: 24 MMOL/L (ref 20–31)
CREAT BLD-MCNC: 1.2 MG/DL (ref 0.6–1.3)
DEPRECATED RDW RBC AUTO: 49 FL (ref 37–54)
EOSINOPHIL # BLD AUTO: 0.23 10*3/MM3 (ref 0.1–0.3)
EOSINOPHIL NFR BLD AUTO: 2.8 % (ref 0–3)
ERYTHROCYTE [DISTWIDTH] IN BLOOD BY AUTOMATED COUNT: 13.8 % (ref 11.3–14.5)
GFR SERPL CREATININE-BSD FRML MDRD: 58 ML/MIN/1.73
GLOBULIN UR ELPH-MCNC: 3.4 GM/DL
GLUCOSE BLD-MCNC: 95 MG/DL (ref 70–100)
HCT VFR BLD AUTO: 46 % (ref 38.9–50.9)
HGB BLD-MCNC: 14.9 G/DL (ref 13.1–17.5)
HOLD SPECIMEN: NORMAL
HOLD SPECIMEN: NORMAL
IMM GRANULOCYTES # BLD: 0.03 10*3/MM3 (ref 0–0.03)
IMM GRANULOCYTES NFR BLD: 0.4 % (ref 0–0.6)
LIPASE SERPL-CCNC: 30 U/L (ref 6–51)
LYMPHOCYTES # BLD AUTO: 1.71 10*3/MM3 (ref 0.6–4.8)
LYMPHOCYTES NFR BLD AUTO: 21.1 % (ref 24–44)
MCH RBC QN AUTO: 32 PG (ref 27–31)
MCHC RBC AUTO-ENTMCNC: 32.4 G/DL (ref 32–36)
MCV RBC AUTO: 98.7 FL (ref 80–99)
MONOCYTES # BLD AUTO: 0.9 10*3/MM3 (ref 0–1)
MONOCYTES NFR BLD AUTO: 11.1 % (ref 0–12)
NEUTROPHILS # BLD AUTO: 5.19 10*3/MM3 (ref 1.5–8.3)
NEUTROPHILS NFR BLD AUTO: 63.9 % (ref 41–71)
PLATELET # BLD AUTO: 186 10*3/MM3 (ref 150–450)
PMV BLD AUTO: 9.8 FL (ref 6–12)
POTASSIUM BLD-SCNC: 4.1 MMOL/L (ref 3.5–5.5)
PROT SERPL-MCNC: 7.6 G/DL (ref 5.7–8.2)
RBC # BLD AUTO: 4.66 10*6/MM3 (ref 4.2–5.76)
SODIUM BLD-SCNC: 139 MMOL/L (ref 132–146)
TROPONIN I SERPL-MCNC: 0 NG/ML (ref 0–0.07)
TROPONIN I SERPL-MCNC: 0 NG/ML (ref 0–0.07)
WBC NRBC COR # BLD: 8.12 10*3/MM3 (ref 3.5–10.8)
WHOLE BLOOD HOLD SPECIMEN: NORMAL
WHOLE BLOOD HOLD SPECIMEN: NORMAL

## 2017-06-15 PROCEDURE — 80053 COMPREHEN METABOLIC PANEL: CPT | Performed by: EMERGENCY MEDICINE

## 2017-06-15 PROCEDURE — 83880 ASSAY OF NATRIURETIC PEPTIDE: CPT | Performed by: EMERGENCY MEDICINE

## 2017-06-15 PROCEDURE — 93005 ELECTROCARDIOGRAM TRACING: CPT | Performed by: EMERGENCY MEDICINE

## 2017-06-15 PROCEDURE — 71020 HC CHEST PA AND LATERAL: CPT

## 2017-06-15 PROCEDURE — 83690 ASSAY OF LIPASE: CPT | Performed by: EMERGENCY MEDICINE

## 2017-06-15 PROCEDURE — 99285 EMERGENCY DEPT VISIT HI MDM: CPT

## 2017-06-15 PROCEDURE — 93005 ELECTROCARDIOGRAM TRACING: CPT

## 2017-06-15 PROCEDURE — 84484 ASSAY OF TROPONIN QUANT: CPT

## 2017-06-15 PROCEDURE — 99284 EMERGENCY DEPT VISIT MOD MDM: CPT | Performed by: INTERNAL MEDICINE

## 2017-06-15 PROCEDURE — 85025 COMPLETE CBC W/AUTO DIFF WBC: CPT | Performed by: EMERGENCY MEDICINE

## 2017-06-15 RX ORDER — ASPIRIN 81 MG/1
324 TABLET, CHEWABLE ORAL ONCE
Status: COMPLETED | OUTPATIENT
Start: 2017-06-15 | End: 2017-06-15

## 2017-06-15 RX ORDER — SODIUM CHLORIDE 0.9 % (FLUSH) 0.9 %
10 SYRINGE (ML) INJECTION AS NEEDED
Status: DISCONTINUED | OUTPATIENT
Start: 2017-06-15 | End: 2017-06-15 | Stop reason: HOSPADM

## 2017-06-15 RX ADMIN — ASPIRIN 81 MG 324 MG: 81 TABLET ORAL at 13:30

## 2017-06-15 RX ADMIN — NITROGLYCERIN 1 INCH: 20 OINTMENT TOPICAL at 15:11

## 2017-06-15 NOTE — H&P
[facility] Consult Note      Referring Provider: No ref. provider found  Primary Provider:  [unfilled]  Reason for Consultation: SOA    Patient Care Team:  No Known Provider as PCP - General    Chief complaint:  SOA    Identification:  82 year old white male; retired resident of [facility]    Problem list:    1.  Coronary Artery Disease   A.  [facility] 2005:  Stent to RCA with [place]   B.  [facility] 2011:  Stent to 95% LAD with [place]   ARGELIA.  Cardiac PET 4/26/2017:  EF 62%; normal myocardial perfusion study per imaging without evidence of ischemia  2.  Hypertension  3.  Hyperlipidemia  4.  GERD  5.  Renal insufficiency  6.  Glaucoma    Allergies:  Lipitor [atorvastatin]    Home/Current Medications:    Aspirin 81 mg daily  Amlodipine 5 mg 1 by mouth by mouth daily  Lisinopril 40 mg 1 by mouth daily  Simvastatin  40 mg 1 by mouth daily at bedtime  Prilosec 20 mg daily      History of present illness:  Patient is a pleasant 82-year-old white male presented to [facility] emergency department with complaints of shortness of breath and dizziness upon wakening this morning.  He states that once he got up and moving around that his symptoms did somewhat improve.  He denies any associated symptoms such as nausea vomiting or diaphoresis.  He states this is the same similar symptoms that he's had prior to stent placement in 2005 in 2011; as well as having similar symptoms when he was seen April of this year with a normal cardiac PET.  His cardiac markers have remained negative and there are no EKG changes noted.  He states that he has mild generalized chest pain and has been no dizziness for the past couple weeks.  However he knows that this is not a pain that is associated with his heart catheter in the past.  He has noticed that there is been an increase in shortness of breath with climbing on an incline.  Once he stops the activity his symptoms resolve almost immediately.  Denies having any recent fever  "association with an ill patient.  He suddenly underwent a cardiac PET scan on April 26, 2017 which was normal without evidence of ischemia per cardiac imaging.  He states that he has been pain-free for quite some time while sitting in the emergency room.  He prefers to go home and come back as an outpatient for a heart catheter with Dr. Toy Landers considering that his symptoms mimic previous unstable angina symptoms prior to stent placement twice.    Cardiac Risk Factors:  Hypertension, hyperlipidemia, personal history of coronary artery disease, family history of pre-CAD, advanced age male gender    Social History:  Social History     Social History   • Marital status:      Spouse name: N/A   • Number of children: N/A   • Years of education: N/A     Occupational History   • Not on file.     Social History Main Topics   • Smoking status: Never Smoker   • Smokeless tobacco: Not on file   • Alcohol use No   • Drug use: No   • Sexual activity: Defer     Other Topics Concern   • Not on file     Social History Narrative    Lives with his wife     Family History:  History reviewed. No pertinent family history.     Review of Systems  Pertinent positives are listed in the HPI.  All other systems reviewed are negative.       Objective     Vital Sign Min/Max for last 24 hours  Temp  Min: 97.7 °F (36.5 °C)  Max: 97.7 °F (36.5 °C)   BP  Min: 151/98  Max: 170/95   Pulse  Min: 54  Max: 68   Resp  Min: 16  Max: 20   SpO2  Min: 92 %  Max: 97 %   No Data Recorded   Weight  Min: 175 lb (79.4 kg)  Max: 175 lb (79.4 kg)     Flowsheet Rows         First Filed Value    Admission Height  71\" (180.3 cm) Documented at 06/15/2017 1147    Admission Weight  175 lb (79.4 kg) Documented at 06/15/2017 1147          Physical Exam:    GENERAL: well-developed, well-nourished; in no acute distress.   NECK:  There is no jugular venous distention at 30°.  Carotid upstrokes are 2+ and  symmetrical without bruits.   LUNGS: Clear to auscultation " bilaterally without wheezing, rhonchi, or rales noted.   CARDIOVASCULAR: The heart has a regular rate with a normal S1 and S2. There is no murmur, gallop, rub, or click appreciated. The PMI is nondisplaced.   ABDOMEN: Soft and nontender  NEUROLOGICAL: Nonfocal; Alert and oriented  PERIPHERAL VASCULAR:  Posterior tibial and dorsalis pedis pulses are 2+ and symmetrical. There is no peripheral edema.  Strong bilateral femoral pulses noted.  Strong right radial, weak left radial pulse  MUSCULOSKELETAL: Normal range of motion  SKIN: Warm and dry  PSYCHIATRIC: Normal mood and affect; behavior appropriate     EKG:  NSR    Labs:      Results from last 7 days  Lab Units 06/15/17  1205   SODIUM mmol/L 139   POTASSIUM mmol/L 4.1   CHLORIDE mmol/L 111*   TOTAL CO2 mmol/L 24.0   BUN mg/dL 16   CREATININE mg/dL 1.20   CALCIUM mg/dL 10.2   BILIRUBIN mg/dL 0.7   ALK PHOS U/L 59   ALT (SGPT) U/L 16   AST (SGOT) U/L 15   GLUCOSE mg/dL 95       Lab Results (last 24 hours)     Procedure Component Value Units Date/Time    CBC Auto Differential [[id number]]  (Abnormal) Collected:  06/15/17 1205    Specimen:  Blood Updated:  06/15/17 1222     WBC 8.12 10*3/mm3      RBC 4.66 10*6/mm3      Hemoglobin 14.9 g/dL      Hematocrit 46.0 %      MCV 98.7 fL      MCH 32.0 (H) pg      MCHC 32.4 g/dL      RDW 13.8 %      RDW-SD 49.0 fl      MPV 9.8 fL      Platelets 186 10*3/mm3      Neutrophil % 63.9 %      Lymphocyte % 21.1 (L) %      Monocyte % 11.1 %      Eosinophil % 2.8 %      Basophil % 0.7 %      Immature Grans % 0.4 %      Neutrophils, Absolute 5.19 10*3/mm3      Lymphocytes, Absolute 1.71 10*3/mm3      Monocytes, Absolute 0.90 10*3/mm3      Eosinophils, Absolute 0.23 10*3/mm3      Basophils, Absolute 0.06 10*3/mm3      Immature Grans, Absolute 0.03 10*3/mm3     POC Troponin, Rapid [[id number]]  (Normal) Collected:  06/15/17 1212    Specimen:  Blood Updated:  06/15/17 1230     Troponin I 0.00 ng/mL       Serial Number: [id number]    :  [id number]       Lipase [[id number]]  (Normal) Collected:  06/15/17 1205    Specimen:  Blood Updated:  06/15/17 1237     Lipase 30 U/L     BNP [[id number]]  (Normal) Collected:  06/15/17 1205    Specimen:  Blood Updated:  06/15/17 1243     BNP 67.0 pg/mL     POC Troponin, Rapid [[id number]]  (Normal) Collected:  06/15/17 1500    Specimen:  Blood Updated:  06/15/17 1515     Troponin I 0.00 ng/mL       Serial Number: [id number]    : [id number]              Ejection Fraction: Normal cardiac PET      Results Review:  I reviewed the patients new clinical results.      Assessment:  1.  Unstable angina with negative troponins ×2 and no EKG changes.  Similar to previous unstable angina with stent placement in the past; currently patient is pain-free at this time  2.  Hypertension   -Normal blood pressure 136 systolic on average patient has not had any of his blood pressure medications today.  3.  Hyperlipidemia    -on statin        Plan:  Okay to discharge home from a cardiac standpoint.  Patient is going to be set up for an outpatient left heart catheterization preferably with Dr. Toy Landers.  We will notify our office and have them call the patient to arrange.  Our office number has been given to the family so they can call to follow-up if they have not heard anything to light tomorrow afternoon.  He has strong right radial pulse and strong bilateral femoral pulses noted.  His radial pulse on the left is very difficult to find due to history of trauma to that wrist.  The risks benefits and potential complications.  And discussed with the patient and he is agreeable to proceed.  Continue all current medications as directed.    I discussed the patients findings and my recommendations with patient.    Scribed for Elizabeth Hidalgo MD by EAGLE Catalan on Katelyn 15, 2017 at 6:00 PM      EAGLE Ojeda  06/15/17  5:28 PM     The patient presents with his usual anginal symptoms despite recent negative PET.  His usual  symptoms are atypical being dyspnea and dizziness.  I think cardiac catheterization is reasonable.  He has requested that Dr. Landers do his procedure.  We will arrange following discussion with Dr. Landers.    I Elizabeth Hidalgo MD personally performed the services described in this documentation as scribed by the above individual in my presence, and it is both accurate and complete.    Elizabeth Hidalgo MD, FACC

## 2017-06-15 NOTE — DISCHARGE INSTRUCTIONS
You should receive a call from Dr. BRANDY Landers' office in the next few days to schedule your heart cath.  If you have not heard from his office staff by Monday, 6/19, please call, ask to speak with his nurse, and find out what is happening with the scheduling.    Please be sure to return to the ER if you have progressive problems with chest pain, tightness, or any other worrisome symptoms.

## 2017-06-15 NOTE — ED PROVIDER NOTES
Subjective   HPI Comments: Darren Mccracken is a 82 y.o.male with a history of stents, HLD, HTN, and PNA who presents to the ED with c/o generalized chest pain. He states that he has been feeling chest discomfort for the last couple weeks, however, this morning he awoke from sleep with a tightness in his chest. He reports that the chest pain is intermittent and radiates down his arms bilaterally. His pain worsens with exertion. Additionally he c/o SOA and dizziness but denies diaphoresis, nausea, cough, congestion, chills, post nasal drip, abdominal pain, lower extremity swelling, or any other acute complaints at this time. He was recently seen at [facility] and had a low risk perfusion study performed on 4/26/17.     Patient is a 82 y.o. male presenting with chest pain.   History provided by:  Patient  Chest Pain   Chest pain location: generalized.  Pain quality: tightness    Pain radiates to:  R arm and L arm  Pain severity:  Moderate  Onset quality:  Sudden  Duration:  2 weeks  Timing:  Intermittent  Progression:  Worsening  Chronicity:  New  Context comment:  Awoke from sleep secondary to chest discomfort  Relieved by:  Nothing  Worsened by:  Exertion  Ineffective treatments:  None tried  Associated symptoms: dizziness and shortness of breath    Associated symptoms: no abdominal pain and no cough    Risk factors: high cholesterol and hypertension        Review of Systems   Constitutional: Negative for chills.   HENT: Negative for congestion, rhinorrhea and sore throat.    Respiratory: Positive for shortness of breath. Negative for cough.    Cardiovascular: Positive for chest pain (radiating to arms bilaterally). Negative for leg swelling.   Gastrointestinal: Negative for abdominal pain.   Neurological: Positive for dizziness.   All other systems reviewed and are negative.      Past Medical History:   Diagnosis Date   • Glaucoma    • Hyperlipidemia    • Hypertension    • Pneumonia     right upper lobe        Allergies    Allergen Reactions   • Lipitor [Atorvastatin]      Patient staTES THAT HE IS NOT ALLERGIC TO THIS       Past Surgical History:   Procedure Laterality Date   • CHOLECYSTECTOMY  2010   • CORONARY ANGIOPLASTY WITH STENT PLACEMENT     • EYE SURGERY Right    • FOOT SURGERY  1980       History reviewed. No pertinent family history.    Social History     Social History   • Marital status:      Spouse name: N/A   • Number of children: N/A   • Years of education: N/A     Social History Main Topics   • Smoking status: Never Smoker   • Smokeless tobacco: None   • Alcohol use No   • Drug use: No   • Sexual activity: Defer     Other Topics Concern   • None     Social History Narrative    Lives with his wife         Objective   Physical Exam   Constitutional: He is oriented to person, place, and time. He appears well-developed and well-nourished. No distress.   HENT:   Head: Normocephalic and atraumatic.   Nose: Nose normal.   Mouth/Throat: Oropharynx is clear and moist.   Airway patent. Pharynx benign.    Eyes: Conjunctivae and EOM are normal. No scleral icterus.   Neck: Normal range of motion. Neck supple. No JVD present.   Cardiovascular: Normal rate, regular rhythm, normal heart sounds and intact distal pulses.    No murmur heard.  Pulmonary/Chest: Effort normal and breath sounds normal. No respiratory distress. He exhibits no tenderness.   Abdominal: Soft. Bowel sounds are normal. There is no tenderness. There is no rebound and no guarding.   Musculoskeletal: Normal range of motion. He exhibits no edema.   Lymphadenopathy:     He has no cervical adenopathy.   Neurological: He is alert and oriented to person, place, and time.   Skin: Skin is warm and dry.   Psychiatric: He has a normal mood and affect. His behavior is normal.   Nursing note and vitals reviewed.      Procedures         ED Course  ED Course   Comment By Time   Discussed with Dr. Queen who will evaluate him regarding need for a heart cath. Bhavin ZIEGLER  MD Reed 06/15 1510   Seen by Dr. Hidalgo with plan to go home with cardiac cath to be performed by Dr. BRANDY Landers as outpatient. Bhavin Mcbride MD 06/15 1032     Recent Results (from the past 24 hour(s))   Comprehensive Metabolic Panel    Collection Time: 06/15/17 12:05 PM   Result Value Ref Range    Glucose 95 70 - 100 mg/dL    BUN 16 9 - 23 mg/dL    Creatinine 1.20 0.60 - 1.30 mg/dL    Sodium 139 132 - 146 mmol/L    Potassium 4.1 3.5 - 5.5 mmol/L    Chloride 111 (H) 99 - 109 mmol/L    CO2 24.0 20.0 - 31.0 mmol/L    Calcium 10.2 8.7 - 10.4 mg/dL    Total Protein 7.6 5.7 - 8.2 g/dL    Albumin 4.20 3.20 - 4.80 g/dL    ALT (SGPT) 16 7 - 40 U/L    AST (SGOT) 15 0 - 33 U/L    Alkaline Phosphatase 59 25 - 100 U/L    Total Bilirubin 0.7 0.3 - 1.2 mg/dL    eGFR Non African Amer 58 (L) >60 mL/min/1.73    Globulin 3.4 gm/dL    A/G Ratio 1.2 (L) 1.5 - 2.5 g/dL    BUN/Creatinine Ratio 13.3 7.0 - 25.0    Anion Gap 4.0 3.0 - 11.0 mmol/L   Lipase    Collection Time: 06/15/17 12:05 PM   Result Value Ref Range    Lipase 30 6 - 51 U/L   BNP    Collection Time: 06/15/17 12:05 PM   Result Value Ref Range    BNP 67.0 0.0 - 100.0 pg/mL   Green Top (Gel)    Collection Time: 06/15/17 12:05 PM   Result Value Ref Range    Extra Tube Hold for add-ons.    Lavender Top    Collection Time: 06/15/17 12:05 PM   Result Value Ref Range    Extra Tube hold for add-on    Gold Top - SST    Collection Time: 06/15/17 12:05 PM   Result Value Ref Range    Extra Tube Hold for add-ons.    CBC Auto Differential    Collection Time: 06/15/17 12:05 PM   Result Value Ref Range    WBC 8.12 3.50 - 10.80 10*3/mm3    RBC 4.66 4.20 - 5.76 10*6/mm3    Hemoglobin 14.9 13.1 - 17.5 g/dL    Hematocrit 46.0 38.9 - 50.9 %    MCV 98.7 80.0 - 99.0 fL    MCH 32.0 (H) 27.0 - 31.0 pg    MCHC 32.4 32.0 - 36.0 g/dL    RDW 13.8 11.3 - 14.5 %    RDW-SD 49.0 37.0 - 54.0 fl    MPV 9.8 6.0 - 12.0 fL    Platelets 186 150 - 450 10*3/mm3    Neutrophil % 63.9 41.0 - 71.0 %     Lymphocyte % 21.1 (L) 24.0 - 44.0 %    Monocyte % 11.1 0.0 - 12.0 %    Eosinophil % 2.8 0.0 - 3.0 %    Basophil % 0.7 0.0 - 1.0 %    Immature Grans % 0.4 0.0 - 0.6 %    Neutrophils, Absolute 5.19 1.50 - 8.30 10*3/mm3    Lymphocytes, Absolute 1.71 0.60 - 4.80 10*3/mm3    Monocytes, Absolute 0.90 0.00 - 1.00 10*3/mm3    Eosinophils, Absolute 0.23 0.10 - 0.30 10*3/mm3    Basophils, Absolute 0.06 0.00 - 0.20 10*3/mm3    Immature Grans, Absolute 0.03 0.00 - 0.03 10*3/mm3   Light Blue Top    Collection Time: 06/15/17 12:06 PM   Result Value Ref Range    Extra Tube hold for add-on    POC Troponin, Rapid    Collection Time: 06/15/17 12:12 PM   Result Value Ref Range    Troponin I 0.00 0.00 - 0.07 ng/mL   POC Troponin, Rapid    Collection Time: 06/15/17  3:00 PM   Result Value Ref Range    Troponin I 0.00 0.00 - 0.07 ng/mL     Note: In addition to lab results from this visit, the labs listed above may include labs taken at another facility or during a different encounter within the last 24 hours. Please correlate lab times with ED admission and discharge times for further clarification of the services performed during this visit.    XR Chest 2 View   Final Result   No new chest disease.       D:  06/15/2017   E:  06/15/2017       This report was finalized on 6/15/2017 11:00 PM by DR. Qamar Truong MD.            Vitals:    06/15/17 1700 06/15/17 1702 06/15/17 1730 06/15/17 1817   BP: 151/98  (!) 159/107 (!) 153/102   BP Location:       Patient Position:       Pulse: 68 67 66 66   Resp: 16   16   Temp:    98 °F (36.7 °C)   TempSrc:       SpO2: 93% 92% 92% 96%   Weight:       Height:         Medications   aspirin chewable tablet 324 mg (324 mg Oral Given 6/15/17 1330)   nitroglycerin (NITROSTAT) ointment 1 inch (1 inch Topical Given 6/15/17 9710)     ECG/EMG Results (last 24 hours)     Procedure Component Value Units Date/Time    ECG 12 Lead [[id number]] Collected:  06/15/17 1153     Updated:  06/15/17 1308    Narrative:       Test  Reason : CHEST PAIN  Blood Pressure : **/** mmHG  Vent. Rate : 054 BPM     Atrial Rate : 054 BPM     P-R Int : 172 ms          QRS Dur : 090 ms      QT Int : 422 ms       P-R-T Axes : -14 -35 001 degrees     QTc Int : 400 ms    Sinus bradycardia  Left axis deviation  Minimal voltage criteria for LVH, may be normal variant  Abnormal ECG  When compared with ECG of 26-APR-2017 12:02,  No significant change was found  Confirmed by BHAVIN MCBRIDE MD ([id number]) on 6/15/2017 1:08:12 PM    Referred By:  MARQUITA           Confirmed By:BHAVIN MCBRIDE MD                       [facility]    Final diagnoses:   Angina pectoris   Essential hypertension       Documentation assistance provided by francisco j Ridley.  Information recorded by the scribe was done at my direction and has been verified and validated by me.     Cheryle Ridley  06/15/17 1350       Cheryle Ridley  06/15/17 1598       Bhavin Mcbride MD  06/16/17 [id number]

## 2017-06-16 DIAGNOSIS — R06.02 SHORTNESS OF BREATH: Primary | ICD-10-CM

## 2017-06-16 DIAGNOSIS — I25.119 CORONARY ARTERY DISEASE INVOLVING NATIVE CORONARY ARTERY OF NATIVE HEART WITH ANGINA PECTORIS (HCC): ICD-10-CM

## 2017-06-19 ENCOUNTER — PREP FOR SURGERY (OUTPATIENT)
Dept: OTHER | Facility: HOSPITAL | Age: 82
End: 2017-06-19

## 2017-06-19 DIAGNOSIS — I20.9 ANGINA, CLASS IV (HCC): Primary | ICD-10-CM

## 2017-06-19 RX ORDER — ASPIRIN 325 MG
325 TABLET ORAL ONCE
Status: CANCELLED | OUTPATIENT
Start: 2017-06-19 | End: 2017-06-19

## 2017-06-19 RX ORDER — ASPIRIN 81 MG/1
325 TABLET ORAL DAILY
Status: CANCELLED | OUTPATIENT
Start: 2017-06-20

## 2017-06-19 RX ORDER — ACETAMINOPHEN 325 MG/1
650 TABLET ORAL EVERY 4 HOURS PRN
Status: CANCELLED | OUTPATIENT
Start: 2017-06-19

## 2017-06-19 RX ORDER — SODIUM CHLORIDE 0.9 % (FLUSH) 0.9 %
1-10 SYRINGE (ML) INJECTION AS NEEDED
Status: CANCELLED | OUTPATIENT
Start: 2017-06-19

## 2017-06-19 RX ORDER — NITROGLYCERIN 0.4 MG/1
0.4 TABLET SUBLINGUAL
Status: CANCELLED | OUTPATIENT
Start: 2017-06-19

## 2017-06-19 RX ORDER — CLOPIDOGREL BISULFATE 75 MG/1
75 TABLET ORAL DAILY
Status: CANCELLED | OUTPATIENT
Start: 2017-06-20

## 2017-06-19 RX ORDER — CLOPIDOGREL BISULFATE 75 MG/1
600 TABLET ORAL ONCE
Status: CANCELLED | OUTPATIENT
Start: 2017-06-19 | End: 2017-06-19

## 2017-06-19 RX ORDER — ONDANSETRON 2 MG/ML
4 INJECTION INTRAMUSCULAR; INTRAVENOUS EVERY 6 HOURS PRN
Status: CANCELLED | OUTPATIENT
Start: 2017-06-19

## 2017-06-21 ENCOUNTER — HOSPITAL ENCOUNTER (OUTPATIENT)
Facility: HOSPITAL | Age: 82
Discharge: HOME | End: 2017-06-21
Attending: INTERNAL MEDICINE | Admitting: INTERNAL MEDICINE

## 2017-06-21 VITALS
DIASTOLIC BLOOD PRESSURE: 108 MMHG | TEMPERATURE: 97 F | HEIGHT: 71 IN | SYSTOLIC BLOOD PRESSURE: 165 MMHG | OXYGEN SATURATION: 96 % | RESPIRATION RATE: 16 BRPM | WEIGHT: 173.94 LBS | BODY MASS INDEX: 24.35 KG/M2 | HEART RATE: 66 BPM

## 2017-06-21 DIAGNOSIS — I25.119 CORONARY ARTERY DISEASE INVOLVING NATIVE CORONARY ARTERY OF NATIVE HEART WITH ANGINA PECTORIS (HCC): ICD-10-CM

## 2017-06-21 DIAGNOSIS — R06.02 SHORTNESS OF BREATH: ICD-10-CM

## 2017-06-21 LAB
ACT BLD: 235 SECONDS (ref 82–152)
ACT BLD: 268 SECONDS (ref 82–152)
ACT BLD: 279 SECONDS (ref 82–152)
ACT BLD: 290 SECONDS (ref 82–152)
ARTICHOKE IGE QN: 89 MG/DL (ref 0–130)
CHOLEST SERPL-MCNC: 148 MG/DL (ref 0–200)
HBA1C MFR BLD: 6.2 % (ref 4.8–5.6)
HDLC SERPL-MCNC: 43 MG/DL (ref 40–60)
TRIGL SERPL-MCNC: 87 MG/DL (ref 0–150)

## 2017-06-21 PROCEDURE — C1769 GUIDE WIRE: HCPCS | Performed by: INTERNAL MEDICINE

## 2017-06-21 PROCEDURE — 85347 COAGULATION TIME ACTIVATED: CPT

## 2017-06-21 PROCEDURE — 0 IOPAMIDOL PER 1 ML: Performed by: INTERNAL MEDICINE

## 2017-06-21 PROCEDURE — 25010000002 HEPARIN (PORCINE) PER 1000 UNITS: Performed by: INTERNAL MEDICINE

## 2017-06-21 PROCEDURE — C1894 INTRO/SHEATH, NON-LASER: HCPCS | Performed by: INTERNAL MEDICINE

## 2017-06-21 PROCEDURE — C1725 CATH, TRANSLUMIN NON-LASER: HCPCS | Performed by: INTERNAL MEDICINE

## 2017-06-21 PROCEDURE — 80061 LIPID PANEL: CPT | Performed by: PHYSICIAN ASSISTANT

## 2017-06-21 PROCEDURE — C1887 CATHETER, GUIDING: HCPCS | Performed by: INTERNAL MEDICINE

## 2017-06-21 PROCEDURE — 92921: CPT | Performed by: INTERNAL MEDICINE

## 2017-06-21 PROCEDURE — 36415 COLL VENOUS BLD VENIPUNCTURE: CPT

## 2017-06-21 PROCEDURE — C9600 PERC DRUG-EL COR STENT SING: HCPCS | Performed by: INTERNAL MEDICINE

## 2017-06-21 PROCEDURE — C1760 CLOSURE DEV, VASC: HCPCS | Performed by: INTERNAL MEDICINE

## 2017-06-21 PROCEDURE — C1874 STENT, COATED/COV W/DEL SYS: HCPCS | Performed by: INTERNAL MEDICINE

## 2017-06-21 PROCEDURE — 93458 L HRT ARTERY/VENTRICLE ANGIO: CPT | Performed by: INTERNAL MEDICINE

## 2017-06-21 PROCEDURE — 83036 HEMOGLOBIN GLYCOSYLATED A1C: CPT | Performed by: PHYSICIAN ASSISTANT

## 2017-06-21 PROCEDURE — G0378 HOSPITAL OBSERVATION PER HR: HCPCS

## 2017-06-21 PROCEDURE — 92928 PRQ TCAT PLMT NTRAC ST 1 LES: CPT | Performed by: INTERNAL MEDICINE

## 2017-06-21 DEVICE — IMPLANTABLE DEVICE: Type: IMPLANTABLE DEVICE | Status: FUNCTIONAL

## 2017-06-21 RX ORDER — ONDANSETRON 2 MG/ML
4 INJECTION INTRAMUSCULAR; INTRAVENOUS EVERY 6 HOURS PRN
Status: DISCONTINUED | OUTPATIENT
Start: 2017-06-21 | End: 2017-06-21 | Stop reason: HOSPADM

## 2017-06-21 RX ORDER — NITROGLYCERIN 0.4 MG/1
0.4 TABLET SUBLINGUAL
Status: DISCONTINUED | OUTPATIENT
Start: 2017-06-21 | End: 2017-06-21 | Stop reason: HOSPADM

## 2017-06-21 RX ORDER — LISINOPRIL 40 MG/1
40 TABLET ORAL NIGHTLY
Status: DISCONTINUED | OUTPATIENT
Start: 2017-06-21 | End: 2017-06-21 | Stop reason: HOSPADM

## 2017-06-21 RX ORDER — ASPIRIN 81 MG/1
81 TABLET ORAL NIGHTLY
Status: DISCONTINUED | OUTPATIENT
Start: 2017-06-21 | End: 2017-06-21 | Stop reason: HOSPADM

## 2017-06-21 RX ORDER — CLOPIDOGREL BISULFATE 75 MG/1
600 TABLET ORAL ONCE
Status: COMPLETED | OUTPATIENT
Start: 2017-06-21 | End: 2017-06-21

## 2017-06-21 RX ORDER — SODIUM CHLORIDE 0.9 % (FLUSH) 0.9 %
1-10 SYRINGE (ML) INJECTION AS NEEDED
Status: DISCONTINUED | OUTPATIENT
Start: 2017-06-21 | End: 2017-06-21 | Stop reason: HOSPADM

## 2017-06-21 RX ORDER — NITROGLYCERIN 5 MG/ML
INJECTION, SOLUTION INTRAVENOUS AS NEEDED
Status: DISCONTINUED | OUTPATIENT
Start: 2017-06-21 | End: 2017-06-21 | Stop reason: HOSPADM

## 2017-06-21 RX ORDER — LISINOPRIL 40 MG/1
40 TABLET ORAL DAILY
Status: DISCONTINUED | OUTPATIENT
Start: 2017-06-22 | End: 2017-06-21 | Stop reason: HOSPADM

## 2017-06-21 RX ORDER — CLOPIDOGREL BISULFATE 75 MG/1
75 TABLET ORAL DAILY
Status: DISCONTINUED | OUTPATIENT
Start: 2017-06-22 | End: 2017-06-21 | Stop reason: HOSPADM

## 2017-06-21 RX ORDER — ASPIRIN 325 MG
325 TABLET, DELAYED RELEASE (ENTERIC COATED) ORAL DAILY
Status: DISCONTINUED | OUTPATIENT
Start: 2017-06-22 | End: 2017-06-21

## 2017-06-21 RX ORDER — LIDOCAINE HYDROCHLORIDE 10 MG/ML
INJECTION, SOLUTION INFILTRATION; PERINEURAL AS NEEDED
Status: DISCONTINUED | OUTPATIENT
Start: 2017-06-21 | End: 2017-06-21 | Stop reason: HOSPADM

## 2017-06-21 RX ORDER — ACETAMINOPHEN 325 MG/1
650 TABLET ORAL EVERY 4 HOURS PRN
Status: DISCONTINUED | OUTPATIENT
Start: 2017-06-21 | End: 2017-06-21 | Stop reason: HOSPADM

## 2017-06-21 RX ORDER — ASPIRIN 325 MG
325 TABLET ORAL ONCE
Status: COMPLETED | OUTPATIENT
Start: 2017-06-21 | End: 2017-06-21

## 2017-06-21 RX ORDER — CLOPIDOGREL BISULFATE 75 MG/1
75 TABLET ORAL DAILY
Qty: 30 TABLET | Refills: 12 | Status: SHIPPED | OUTPATIENT
Start: 2017-06-22 | End: 2018-05-24 | Stop reason: SDUPTHER

## 2017-06-21 RX ORDER — NITROGLYCERIN 20 MG/100ML
INJECTION INTRAVENOUS CONTINUOUS PRN
Status: DISCONTINUED | OUTPATIENT
Start: 2017-06-21 | End: 2017-06-21 | Stop reason: HOSPADM

## 2017-06-21 RX ORDER — HEPARIN SODIUM 1000 [USP'U]/ML
INJECTION, SOLUTION INTRAVENOUS; SUBCUTANEOUS AS NEEDED
Status: DISCONTINUED | OUTPATIENT
Start: 2017-06-21 | End: 2017-06-21 | Stop reason: HOSPADM

## 2017-06-21 RX ADMIN — LISINOPRIL 40 MG: 40 TABLET ORAL at 19:30

## 2017-06-21 RX ADMIN — ASPIRIN 325 MG ORAL TABLET 325 MG: 325 PILL ORAL at 09:15

## 2017-06-21 RX ADMIN — CLOPIDOGREL BISULFATE 600 MG: 75 TABLET ORAL at 09:15

## 2017-06-21 NOTE — PLAN OF CARE
Problem: Patient Care Overview (Adult)  Goal: Plan of Care Review  Outcome: Ongoing (interventions implemented as appropriate)    06/21/17 0845   Coping/Psychosocial Response Interventions   Plan Of Care Reviewed With patient;spouse   Patient Care Overview   Progress no change   Outcome Evaluation   Outcome Summary/Follow up Plan PT HAVE LHC WITH DR. ROACH TODAY.

## 2017-06-21 NOTE — H&P
"9:59 AM                               History and Physical Update        CC: CAD    Subjective:  No chest pain, no sob      Vitals:    06/21/17 0845   BP: 163/91   BP Location: Left arm   Patient Position: Lying   Pulse: 54   Resp: 16   Temp: 97 °F (36.1 °C)   TempSrc: Temporal Artery    SpO2: 98%   Weight: 173 lb 15.1 oz (78.9 kg)   Height: 71\" (180.3 cm)        No intake or output data in the 24 hours ending 06/21/17 1006                               Neck:No JVD,Trachea midline, no bruits    Lungs: CTA no wheezing, equal air entry bilaterally    Cor: RRR, physiologic S12, no rubs, gallops or murmurs    Ext: No edema    Prior to Admission medications    Medication Sig Start Date End Date Taking? Authorizing Provider   aspirin 81 MG EC tablet Take 81 mg by mouth Every Night.   Yes Historical Provider, MD   lisinopril (PRINIVIL,ZESTRIL) 40 MG tablet Take 40 mg by mouth Every Night.   Yes Historical Provider, MD   omeprazole (priLOSEC) 20 MG capsule Take 20 mg by mouth Daily.   Yes Historical Provider, MD   simvastatin (ZOCOR) 40 MG tablet Take 40 mg by mouth Every Night.   Yes Historical Provider, MD   amLODIPine (NORVASC) 5 MG tablet Take 1 tablet by mouth Daily. 4/26/17 6/21/17  LINDA Hebert           Results from last 7 days  Lab Units 06/15/17  1205   WBC 10*3/mm3 8.12   HEMOGLOBIN g/dL 14.9   HEMATOCRIT % 46.0   PLATELETS 10*3/mm3 186       Results from last 7 days  Lab Units 06/15/17  1205   SODIUM mmol/L 139   POTASSIUM mmol/L 4.1   CHLORIDE mmol/L 111*   TOTAL CO2 mmol/L 24.0   BUN mg/dL 16   CREATININE mg/dL 1.20   CALCIUM mg/dL 10.2   BILIRUBIN mg/dL 0.7   ALK PHOS U/L 59   ALT (SGPT) U/L 16   AST (SGOT) U/L 15   GLUCOSE mg/dL 95       Results from last 7 days  Lab Units 06/15/17  1205   SODIUM mmol/L 139   POTASSIUM mmol/L 4.1   CHLORIDE mmol/L 111*   TOTAL CO2 mmol/L 24.0   BUN mg/dL 16   CREATININE mg/dL 1.20   GLUCOSE mg/dL 95   CALCIUM mg/dL 10.2         No results found for: TROPONINI        "     Results from last 7 days  Lab Units 06/21/17  0845   CHOLESTEROL mg/dL 148   TRIGLYCERIDES mg/dL 87   HDL CHOL mg/dL 43       Results from last 7 days  Lab Units 06/15/17  1205   BNP pg/mL 67.0         TELE: Sinus rhtym     Impression       Hospital Problem List     * (Principal)Chest pain    Overview Signed 4/26/2017  1:01 PM by LINDA Hebert     · Admit to [facility] ER with Chest pain  · Negative markers, negative EKG         CAD (coronary artery disease)    Overview Addendum 6/21/2017 10:06 AM by LINDA Hebert     · A. [facility] 2005: Stent to RCA with Rasta  · B. [facility] 2011: Stent to 95% LAD with Boliek  · C. Cardiac PET 4/26/2017: EF 62%; normal myocardial perfusion study per imaging without evidence of ischemia  ·          HTN (hypertension)    HLD (hyperlipidemia)        Plan      [facility] with possible CBI. Risk and benefits explained in detail and patient is agreeable to proceed.   David Cr PA-C  9:59 AM        H&P  Date of Service: 6/15/2017 5:28 PM  Elizabeth Hidalgo MD   Cardiology   Expand All Collapse All    []Hide copied text  []Hover for attribution information  [facility] Consult Note        Referring Provider: No ref. provider found  Primary Provider: [unfilled]  Reason for Consultation: SOA     Patient Care Team:  No Known Provider as PCP - General     Chief complaint: SOA     Identification: 82 year old white male; retired resident of [facility]     Problem list:   1. Coronary Artery Disease  A. [facility] 2005: Stent to RCA with Rasta  B. [facility] 2011: Stent to 95% LAD with Boliek  C. Cardiac PET 4/26/2017: EF 62%; normal myocardial perfusion study per imaging without evidence of ischemia  2. Hypertension  3. Hyperlipidemia  4. GERD  5. Renal insufficiency  6. Glaucoma     Allergies: Lipitor [atorvastatin]     Home/Current Medications:   Aspirin 81 mg daily  Amlodipine 5 mg 1 by mouth by mouth daily  Lisinopril 40 mg 1 by mouth daily  Simvastatin 40 mg 1 by mouth daily at  bedtime  Prilosec 20 mg daily        History of present illness: Patient is a pleasant 82-year-old white male presented to [facility] emergency department with complaints of shortness of breath and dizziness upon wakening this morning. He states that once he got up and moving around that his symptoms did somewhat improve. He denies any associated symptoms such as nausea vomiting or diaphoresis. He states this is the same similar symptoms that he's had prior to stent placement in 2005 in 2011; as well as having similar symptoms when he was seen April of this year with a normal cardiac PET. His cardiac markers have remained negative and there are no EKG changes noted. He states that he has mild generalized chest pain and has been no dizziness for the past couple weeks. However he knows that this is not a pain that is associated with his heart catheter in the past. He has noticed that there is been an increase in shortness of breath with climbing on an incline. Once he stops the activity his symptoms resolve almost immediately. Denies having any recent fever association with an ill patient. He suddenly underwent a cardiac PET scan on April 26, 2017 which was normal without evidence of ischemia per cardiac imaging. He states that he has been pain-free for quite some time while sitting in the emergency room. He prefers to go home and come back as an outpatient for a heart catheter with Dr. Toy Landers considering that his symptoms mimic previous unstable angina symptoms prior to stent placement twice.     Cardiac Risk Factors: Hypertension, hyperlipidemia, personal history of coronary artery disease, family history of pre-CAD, advanced age male gender     Social History:   Social History    Social History            Social History   • Marital status:        Spouse name: N/A   • Number of children: N/A   • Years of education: N/A          Occupational History   • Not on file.           Social  "History Main Topics   • Smoking status: Never Smoker   • Smokeless tobacco: Not on file   • Alcohol use No   • Drug use: No   • Sexual activity: Defer           Other Topics Concern   • Not on file          Social History Narrative     Lives with his wife         Family History:  History reviewed. No pertinent family history.      Review of Systems  Pertinent positives are listed in the HPI. All other systems reviewed are negative.            Objective      Vital Sign Min/Max for last 24 hours  Temp Min: 97.7 °F (36.5 °C) Max: 97.7 °F (36.5 °C)   BP Min: 151/98 Max: 170/95   Pulse Min: 54 Max: 68   Resp Min: 16 Max: 20   SpO2 Min: 92 % Max: 97 %   No Data Recorded   Weight Min: 175 lb (79.4 kg) Max: 175 lb (79.4 kg)      Flowsheet Rows           First Filed Value     Admission Height   71\" (180.3 cm) Documented at 06/15/2017 1147     Admission Weight   175 lb (79.4 kg) Documented at 06/15/2017 1147      Physical Exam:     GENERAL: well-developed, well-nourished; in no acute distress.   NECK: There is no jugular venous distention at 30°. Carotid upstrokes are 2+ and symmetrical without bruits.   LUNGS: Clear to auscultation bilaterally without wheezing, rhonchi, or rales noted.   CARDIOVASCULAR: The heart has a regular rate with a normal S1 and S2. There is no murmur, gallop, rub, or click appreciated. The PMI is nondisplaced.   ABDOMEN: Soft and nontender  NEUROLOGICAL: Nonfocal; Alert and oriented  PERIPHERAL VASCULAR: Posterior tibial and dorsalis pedis pulses are 2+ and symmetrical. There is no peripheral edema.  Strong bilateral femoral pulses noted.  Strong right radial, weak left radial pulse  MUSCULOSKELETAL: Normal range of motion  SKIN: Warm and dry  PSYCHIATRIC: Normal mood and affect; behavior appropriate  EKG: NSR     Labs:    Results from last 7 days  Lab Units 06/15/17  1205   SODIUM mmol/L 139   POTASSIUM mmol/L 4.1   CHLORIDE mmol/L 111*   TOTAL CO2 mmol/L 24.0   BUN mg/dL 16   CREATININE mg/dL 1.20 "   CALCIUM mg/dL 10.2   BILIRUBIN mg/dL 0.7   ALK PHOS U/L 59   ALT (SGPT) U/L 16   AST (SGOT) U/L 15   GLUCOSE mg/dL 95         Lab Results (last 24 hours)              Procedure Component Value Units Date/Time              CBC Auto Differential [[id number]] (Abnormal) Collected: 06/15/17 1205     Specimen: Blood Updated: 06/15/17 1222       WBC 8.12 10*3/mm3         RBC 4.66 10*6/mm3         Hemoglobin 14.9 g/dL         Hematocrit 46.0 %         MCV 98.7 fL         MCH 32.0 (H) pg         MCHC 32.4 g/dL         RDW 13.8 %         RDW-SD 49.0 fl         MPV 9.8 fL         Platelets 186 10*3/mm3         Neutrophil % 63.9 %         Lymphocyte % 21.1 (L) %         Monocyte % 11.1 %         Eosinophil % 2.8 %         Basophil % 0.7 %         Immature Grans % 0.4 %         Neutrophils, Absolute 5.19 10*3/mm3         Lymphocytes, Absolute 1.71 10*3/mm3         Monocytes, Absolute 0.90 10*3/mm3         Eosinophils, Absolute 0.23 10*3/mm3         Basophils, Absolute 0.06 10*3/mm3         Immature Grans, Absolute 0.03 10*3/mm3       POC Troponin, Rapid [[id number]] (Normal) Collected: 06/15/17 1212     Specimen: Blood Updated: 06/15/17 1230       Troponin I 0.00 ng/mL           Serial Number: [id number] : [id number]         Lipase [[id number]] (Normal) Collected: 06/15/17 1205     Specimen: Blood Updated: 06/15/17 1237       Lipase 30 U/L       BNP [[id number]] (Normal) Collected: 06/15/17 1205     Specimen: Blood Updated: 06/15/17 1243       BNP 67.0 pg/mL       POC Troponin, Rapid [[id number]] (Normal) Collected: 06/15/17 1500     Specimen: Blood Updated: 06/15/17 1515       Troponin I 0.00 ng/mL           Serial Number: [id number] : [id number]              Ejection Fraction: Normal cardiac PET        Results Review:  I reviewed the patients new clinical results.        Assessment:  1. Unstable angina with negative troponins ×2 and no EKG changes. Similar to previous unstable angina with stent placement in the past;  currently patient is pain-free at this time  2. Hypertension  -Normal blood pressure 136 systolic on average patient has not had any of his blood pressure medications today.  3. Hyperlipidemia   -on statin           Plan: Okay to discharge home from a cardiac standpoint. Patient is going to be set up for an outpatient left heart catheterization preferably with Dr. Toy Landers. We will notify our office and have them call the patient to arrange. Our office number has been given to the family so they can call to follow-up if they have not heard anything to light tomorrow afternoon. He has strong right radial pulse and strong bilateral femoral pulses noted. His radial pulse on the left is very difficult to find due to history of trauma to that wrist. The risks benefits and potential complications. And discussed with the patient and he is agreeable to proceed.  Continue all current medications as directed.     I discussed the patients findings and my recommendations with patient.     Scribed for Elizabeth Hidalgo MD by EAGLE Catalan on Katelyn 15, 2017 at 6:00 PM        EAGLE Ojeda  06/15/17  5:28 PM      The patient presents with his usual anginal symptoms despite recent negative PET. His usual symptoms are atypical being dyspnea and dizziness. I think cardiac catheterization is reasonable. He has requested that Dr. Landers do his procedure. We will arrange following discussion with Dr. Landers.     I Elizabeth Hidalgo MD personally performed the services described in this documentation as scribed by the above individual in my presence, and it is both accurate and complete.     Elizabeth Hidalgo MD, [facility]                       ED on 6/15/2017              Revision History              Detailed Report

## 2017-06-21 NOTE — PROGRESS NOTES
"I have interviewed and examined the patient.  I have reviewed his recent medical records.  The patient comes in today for the evaluation of symptoms consistent with NYHA class IV angina following a recent hospitalization admission to \"rule out MI\" (it was) and a nuclear myocardial perfusion study that was normal.  Diagnostic catheterization with intervention standby is elected in this patient with known coronary artery disease.  There are no other relevant changes in the history or physical on this patient.  "

## 2017-06-22 NOTE — PLAN OF CARE
Problem: Patient Care Overview (Adult)  Goal: Plan of Care Review  Outcome: Ongoing (interventions implemented as appropriate)    06/21/17 2030   Coping/Psychosocial Response Interventions   Plan Of Care Reviewed With patient;spouse   Patient Care Overview   Progress improving   Outcome Evaluation   Outcome Summary/Follow up Plan pt and family able to verbalize all dc instructions. no questions at this time.        Goal: Discharge Needs Assessment  Outcome: Ongoing (interventions implemented as appropriate)    Problem: Cardiac Catheterization with/without PCI (Adult)  Goal: Signs and Symptoms of Listed Potential Problems Will be Absent or Manageable (Cardiac Catheterization with/without PCI)  Outcome: Ongoing (interventions implemented as appropriate)    06/21/17 2030   Cardiac Catheterization with/without PCI   Problems Assessed (Cardiac Catheterization) all   Problems Present (Cardiac Catheterization) none

## 2017-07-10 ENCOUNTER — DOCUMENTATION (OUTPATIENT)
Dept: CARDIAC REHAB | Facility: HOSPITAL | Age: 82
End: 2017-07-10

## 2017-07-10 NOTE — PROGRESS NOTES
Pt qualifies for Phase II Cardiac Rehab related to diagnosis.  Staff left detailed voicemail instructing patient to call back for program information and scheduling.

## 2017-10-02 ENCOUNTER — OFFICE VISIT (OUTPATIENT)
Dept: CARDIOLOGY | Facility: CLINIC | Age: 82
End: 2017-10-02

## 2017-10-02 VITALS
DIASTOLIC BLOOD PRESSURE: 85 MMHG | WEIGHT: 175 LBS | SYSTOLIC BLOOD PRESSURE: 151 MMHG | BODY MASS INDEX: 24.5 KG/M2 | HEIGHT: 71 IN | HEART RATE: 73 BPM

## 2017-10-02 DIAGNOSIS — I25.10 CORONARY ARTERY DISEASE INVOLVING NATIVE CORONARY ARTERY OF NATIVE HEART WITHOUT ANGINA PECTORIS: Primary | ICD-10-CM

## 2017-10-02 DIAGNOSIS — I10 ESSENTIAL HYPERTENSION: ICD-10-CM

## 2017-10-02 DIAGNOSIS — E78.2 MIXED HYPERLIPIDEMIA: ICD-10-CM

## 2017-10-02 PROCEDURE — 99214 OFFICE O/P EST MOD 30 MIN: CPT | Performed by: INTERNAL MEDICINE

## 2017-10-02 RX ORDER — CHLORTHALIDONE 25 MG/1
12.5 TABLET ORAL DAILY
Qty: 30 TABLET | Refills: 11 | Status: SHIPPED | OUTPATIENT
Start: 2017-10-02 | End: 2018-07-31

## 2017-10-02 RX ORDER — SIMVASTATIN 80 MG
80 TABLET ORAL NIGHTLY
Qty: 30 TABLET | Refills: 11 | Status: SHIPPED | OUTPATIENT
Start: 2017-10-02 | End: 2018-10-24 | Stop reason: SDUPTHER

## 2017-10-02 NOTE — PROGRESS NOTES
"  OFFICE FOLLOW UP     Date of Encounter:10/02/2017     Name: Darren Mccracken  : 1934  Address: [address] [place] [address]  [place] [address]  Phone: [phone]    PCP: Venita Vora MD  [address] [place] [id number]    Darren Mccracken is an 83 y.o. male.    Chief Complaint: Hospital follow up of CAD, HTN    Problem List:   1.  Coronary Artery Disease                        A.  [facility] :  Stent to RCA with Rasta                        B.  [facility] :  Stent to 95% LAD with Boliek                        C.  Cardiac PET 2017:  EF 62%; normal myocardial perfusion study per imaging without evidence of ischemia              D.  NYHA Class IV angina, 17     I. Normal LV function.     II. Single vessel disease, JOSE CARLOS to circumflex.  2.  Hypertension  3.  Hyperlipidemia  4.  GERD  5.  Renal insufficiency  6.  Glaucoma    Allergies   Allergen Reactions   • Lipitor [Atorvastatin] Myalgia     Current Medications:  •  aspirin 81 MG EC by mouth Every Night.  •  clopidogrel 75 MG by mouth Daily  •  lisinopril 40 MG by mouth Every Night.  •  omeprazole 20 MG by mouth Daily   •  simvastatin 40 MG by mouth Every Night.    History of Present Illness:           Mr. Mccracken presents today for follow-up post-cath. He notes his symptoms are now resolved, and denies any recurring chest pain. He checks his blood pressure once a week at home and notes it ranges 130-140's mmHg systolic. He denies any palpitations, syncope or near syncope. No CHF symptoms. He remains busy and fairly active.     The following portions of the patient's history were reviewed and updated as appropriate: allergies, current medications and problem list.    HPI: Pertinent positives as listed in the HPI.  All other systems reviewed and negative.    Objective:  Vitals:    10/02/17 1344 10/02/17 1345   BP: 173/100 151/85   BP Location: Left arm Left arm   Patient Position: Sitting Standing   Pulse: 68 73   Weight: 175 lb (79.4 kg)    Height: 71\" (180.3 cm)  "       Physical Exam:  GENERAL: Alert, cooperative, in no acute distress.   HEENT: Fundoscopic deferred, otherwise unremarkable.  NECK: No Jugular venous distention, adenopathy, or thyromegaly noted.   HEART: Regular rhythm, normal rate, and no murmurs, gallops, or rubs.   LUNGS: Clear to auscultation bilaterally. No wheezing, rales or ronchi.  ABDOMEN: Flat without evidence of organomegaly, masses, or tenderness.  NEUROLOGIC: No focal abnormalities involving strength or sensation are noted.   EXTREMITIES: No clubbing, cyanosis, or edema noted.     Diagnostic Data:    Lab Results   Component Value Date    WBC 8.12 06/15/2017    HGB 14.9 06/15/2017    HCT 46.0 06/15/2017    MCV 98.7 06/15/2017     06/15/2017     Lab Results   Component Value Date    GLUCOSE 95 06/15/2017    CALCIUM 10.2 06/15/2017     06/15/2017    K 4.1 06/15/2017    CO2 24.0 06/15/2017     (H) 06/15/2017    BUN 16 06/15/2017    CREATININE 1.20 06/15/2017    EGFRIFNONA 58 (L) 06/15/2017    BCR 13.3 06/15/2017    ANIONGAP 4.0 06/15/2017       Lab Results   Component Value Date    HGBA1C 6.20 (H) 06/21/2017 6/21/17  Total Cholesterol 0 - 200 mg/dL 148   Triglycerides 0 - 150 mg/dL 87   HDL Cholesterol 40 - 60 mg/dL 43   LDL Cholesterol  0 - 130 mg/dL 89     Procedures: N/A      Assessment and Plan:       1. CAD: stable and asymptomatic, s/p EES to circumflex in June. Continue DAPT with ASA and Plavix for at least 1 year.  2. HTN: elevated today and historically at home. Will add Chlorthalidone 12.5mg daily  3. HLD: LDL 89, will increase Simvastatin to 80 mg nightly to target LDL <70.  4. Continue current medical regimen and follow up in 1 year or sooner as needed.       Scribed for Toy Landers MD by Caity Garcia PA-C. 10/2/2017  2:42 PM     I, Toy Landers MD, [facility], [facility], personally performed the services described in this documentation as scribed by the above named individual in my presence, and it is both  accurate and complete. At 3:11 PM on 10/02/2017

## 2018-01-08 ENCOUNTER — HOSPITAL ENCOUNTER (EMERGENCY)
Facility: HOSPITAL | Age: 83
Discharge: HOME | End: 2018-01-08
Attending: EMERGENCY MEDICINE | Admitting: EMERGENCY MEDICINE

## 2018-01-08 ENCOUNTER — APPOINTMENT (OUTPATIENT)
Dept: GENERAL RADIOLOGY | Facility: HOSPITAL | Age: 83
End: 2018-01-08

## 2018-01-08 ENCOUNTER — APPOINTMENT (OUTPATIENT)
Dept: CT IMAGING | Facility: HOSPITAL | Age: 83
End: 2018-01-08

## 2018-01-08 VITALS
WEIGHT: 175 LBS | SYSTOLIC BLOOD PRESSURE: 144 MMHG | TEMPERATURE: 97.3 F | BODY MASS INDEX: 24.5 KG/M2 | HEIGHT: 71 IN | DIASTOLIC BLOOD PRESSURE: 86 MMHG | RESPIRATION RATE: 18 BRPM | HEART RATE: 61 BPM | OXYGEN SATURATION: 94 %

## 2018-01-08 DIAGNOSIS — R51.9 FACIAL PAIN: ICD-10-CM

## 2018-01-08 DIAGNOSIS — M79.602 PAIN OF LEFT UPPER EXTREMITY: Primary | ICD-10-CM

## 2018-01-08 DIAGNOSIS — I10 HYPERTENSION, UNSPECIFIED TYPE: ICD-10-CM

## 2018-01-08 LAB
ALBUMIN SERPL-MCNC: 4.3 G/DL (ref 3.5–5)
ALBUMIN/GLOB SERPL: 1.3 G/DL (ref 1–2)
ALP SERPL-CCNC: 55 U/L (ref 38–126)
ALT SERPL W P-5'-P-CCNC: 32 U/L (ref 13–69)
ANION GAP SERPL CALCULATED.3IONS-SCNC: 12.8 MMOL/L
AST SERPL-CCNC: 27 U/L (ref 15–46)
BASOPHILS # BLD AUTO: 0.06 10*3/MM3 (ref 0–0.2)
BASOPHILS NFR BLD AUTO: 0.8 % (ref 0–2.5)
BILIRUB SERPL-MCNC: 0.6 MG/DL (ref 0.2–1.3)
BUN BLD-MCNC: 16 MG/DL (ref 7–20)
BUN/CREAT SERPL: 12.3 (ref 6.3–21.9)
CALCIUM SPEC-SCNC: 9.8 MG/DL (ref 8.4–10.2)
CHLORIDE SERPL-SCNC: 105 MMOL/L (ref 98–107)
CO2 SERPL-SCNC: 25 MMOL/L (ref 26–30)
CREAT BLD-MCNC: 1.3 MG/DL (ref 0.6–1.3)
DEPRECATED RDW RBC AUTO: 49.3 FL (ref 37–54)
EOSINOPHIL # BLD AUTO: 0.2 10*3/MM3 (ref 0–0.7)
EOSINOPHIL NFR BLD AUTO: 2.7 % (ref 0–7)
ERYTHROCYTE [DISTWIDTH] IN BLOOD BY AUTOMATED COUNT: 13.6 % (ref 11.5–14.5)
GFR SERPL CREATININE-BSD FRML MDRD: 53 ML/MIN/1.73
GLOBULIN UR ELPH-MCNC: 3.2 GM/DL
GLUCOSE BLD-MCNC: 99 MG/DL (ref 74–98)
HCT VFR BLD AUTO: 45.8 % (ref 42–52)
HGB BLD-MCNC: 14.8 G/DL (ref 14–18)
HOLD SPECIMEN: NORMAL
HOLD SPECIMEN: NORMAL
IMM GRANULOCYTES # BLD: 0.03 10*3/MM3 (ref 0–0.06)
IMM GRANULOCYTES NFR BLD: 0.4 % (ref 0–0.6)
LYMPHOCYTES # BLD AUTO: 1.63 10*3/MM3 (ref 0.6–3.4)
LYMPHOCYTES NFR BLD AUTO: 22 % (ref 10–50)
MAGNESIUM SERPL-MCNC: 2 MG/DL (ref 1.6–2.3)
MCH RBC QN AUTO: 31.8 PG (ref 27–31)
MCHC RBC AUTO-ENTMCNC: 32.3 G/DL (ref 30–37)
MCV RBC AUTO: 98.3 FL (ref 80–94)
MONOCYTES # BLD AUTO: 0.93 10*3/MM3 (ref 0–0.9)
MONOCYTES NFR BLD AUTO: 12.5 % (ref 0–12)
NEUTROPHILS # BLD AUTO: 4.57 10*3/MM3 (ref 2–6.9)
NEUTROPHILS NFR BLD AUTO: 61.6 % (ref 37–80)
NRBC BLD MANUAL-RTO: 0 /100 WBC (ref 0–0)
PLATELET # BLD AUTO: 185 10*3/MM3 (ref 130–400)
PMV BLD AUTO: 9.7 FL (ref 6–12)
POTASSIUM BLD-SCNC: 3.8 MMOL/L (ref 3.5–5.1)
PROT SERPL-MCNC: 7.5 G/DL (ref 6.3–8.2)
RBC # BLD AUTO: 4.66 10*6/MM3 (ref 4.7–6.1)
SODIUM BLD-SCNC: 139 MMOL/L (ref 137–145)
TROPONIN I SERPL-MCNC: <0.012 NG/ML (ref 0–0.03)
WBC NRBC COR # BLD: 7.42 10*3/MM3 (ref 4.8–10.8)
WHOLE BLOOD HOLD SPECIMEN: NORMAL
WHOLE BLOOD HOLD SPECIMEN: NORMAL

## 2018-01-08 PROCEDURE — 99285 EMERGENCY DEPT VISIT HI MDM: CPT

## 2018-01-08 PROCEDURE — 70450 CT HEAD/BRAIN W/O DYE: CPT

## 2018-01-08 PROCEDURE — 71045 X-RAY EXAM CHEST 1 VIEW: CPT

## 2018-01-08 PROCEDURE — 85025 COMPLETE CBC W/AUTO DIFF WBC: CPT | Performed by: EMERGENCY MEDICINE

## 2018-01-08 PROCEDURE — 80053 COMPREHEN METABOLIC PANEL: CPT | Performed by: EMERGENCY MEDICINE

## 2018-01-08 PROCEDURE — 83735 ASSAY OF MAGNESIUM: CPT | Performed by: EMERGENCY MEDICINE

## 2018-01-08 PROCEDURE — 93005 ELECTROCARDIOGRAM TRACING: CPT

## 2018-01-08 PROCEDURE — 84484 ASSAY OF TROPONIN QUANT: CPT | Performed by: EMERGENCY MEDICINE

## 2018-01-08 RX ORDER — ACETAMINOPHEN 500 MG
1000 TABLET ORAL ONCE
Status: COMPLETED | OUTPATIENT
Start: 2018-01-08 | End: 2018-01-08

## 2018-01-08 RX ORDER — SODIUM CHLORIDE 0.9 % (FLUSH) 0.9 %
10 SYRINGE (ML) INJECTION AS NEEDED
Status: DISCONTINUED | OUTPATIENT
Start: 2018-01-08 | End: 2018-01-08 | Stop reason: HOSPADM

## 2018-01-08 RX ORDER — CLONIDINE HYDROCHLORIDE 0.1 MG/1
0.1 TABLET ORAL ONCE
Status: DISCONTINUED | OUTPATIENT
Start: 2018-01-08 | End: 2018-01-08 | Stop reason: HOSPADM

## 2018-01-08 RX ADMIN — ACETAMINOPHEN 1000 MG: 500 TABLET, FILM COATED ORAL at 14:56

## 2018-01-08 NOTE — ED PROVIDER NOTES
Subjective   Patient is a 83 y.o. male presenting with upper extremity pain.   Upper Extremity Issue   Location:  Arm  Injury: no    Pain details:     Quality:  Aching    Radiates to:  L fingers    Severity:  Mild    Onset quality:  Gradual    Duration:  2 weeks    Timing:  Intermittent    Progression:  Unchanged  Dislocation: no    Foreign body present:  No foreign bodies  Tetanus status:  Up to date  Prior injury to area:  No  Relieved by:  Nothing  Worsened by:  Nothing  Ineffective treatments:  None tried  Associated symptoms: no back pain, no decreased range of motion, no fatigue, no fever, no muscle weakness, no neck pain, no numbness, no stiffness, no swelling and no tingling        Review of Systems   Constitutional: Negative for chills, fatigue and fever.   HENT: Negative for congestion, rhinorrhea, sore throat and trouble swallowing.    Eyes: Negative for discharge and visual disturbance.   Respiratory: Negative for cough, chest tightness, shortness of breath and wheezing.    Cardiovascular: Negative for chest pain, palpitations and leg swelling.   Gastrointestinal: Negative for abdominal pain, constipation, diarrhea, nausea and vomiting.   Genitourinary: Negative for dysuria, flank pain and hematuria.   Musculoskeletal: Positive for arthralgias and myalgias. Negative for back pain, neck pain and stiffness.   Skin: Negative for color change and rash.   Neurological: Negative for dizziness, weakness and numbness.   Psychiatric/Behavioral: Negative for self-injury and suicidal ideas.       Past Medical History:   Diagnosis Date   • Glaucoma    • Hyperlipidemia    • Hypertension    • Pneumonia     right upper lobe    • Pulmonary embolism        Allergies   Allergen Reactions   • Lipitor [Atorvastatin] Myalgia       Past Surgical History:   Procedure Laterality Date   • CARDIAC CATHETERIZATION N/A 6/21/2017    Procedure: Left Heart Cath;  Surgeon: Toy Landers MD;  Location: [facility] CATH INVASIVE LOCATION;   Service:    • CHOLECYSTECTOMY  2010   • CORONARY ANGIOPLASTY WITH STENT PLACEMENT     • CORONARY STENT PLACEMENT      x3   • EYE SURGERY Right    • FOOT SURGERY  1980   • WRIST SURGERY         History reviewed. No pertinent family history.    Social History     Social History   • Marital status:      Spouse name: N/A   • Number of children: N/A   • Years of education: N/A     Social History Main Topics   • Smoking status: Never Smoker   • Smokeless tobacco: Never Used   • Alcohol use No   • Drug use: No   • Sexual activity: Defer     Other Topics Concern   • None     Social History Narrative    Lives with his wife           Objective   Physical Exam   Constitutional: He is oriented to person, place, and time. He appears well-developed and well-nourished.   HENT:   Head: Normocephalic and atraumatic.   Nose: Nose normal.   Mouth/Throat: Oropharynx is clear and moist.   Eyes: Conjunctivae and EOM are normal. Pupils are equal, round, and reactive to light.   Neck: Normal range of motion. Neck supple.   Cardiovascular: Normal rate, regular rhythm, normal heart sounds and intact distal pulses.    Pulmonary/Chest: Effort normal and breath sounds normal. No respiratory distress. He has no wheezes. He exhibits no tenderness.   Abdominal: Soft. Bowel sounds are normal. There is no tenderness. There is no rebound and no guarding.   Musculoskeletal: Normal range of motion. He exhibits no edema, tenderness or deformity.   Neurological: He is alert and oriented to person, place, and time. No cranial nerve deficit. Coordination normal.   Skin: Skin is warm and dry. No rash noted. No erythema. No pallor.   Psychiatric: He has a normal mood and affect. His behavior is normal. Judgment and thought content normal.   Nursing note and vitals reviewed.      Procedures         ED Course  ED Course                  MDM  Number of Diagnoses or Management Options  Facial pain:   Hypertension, unspecified type:   Pain of left upper  extremity:   Diagnosis management comments: EKG: Ventricular rate 59, TX interval 184, , QRS duration 90, sinus bradycardia.  Left axis deviation.    83-year-old male presents emergency Department with complaints of high blood pressure.  Patient states that he is seen [facility] emergency department yesterday for similar.  Patient states that his blood pressure has been elevated today.  He states that he is having pain in his left upper extremity.  It's been present for the last couple of weeks.  Patient denies any numbness in the left upper extremity.  Patient describes it as a dull mild pain.  He states that it starts in his left shoulder and radiates into his left fourth and fifth digits.  Patient states that this pain is intermittent in nature.  He denies any alleviating or exacerbating factors.  He denies any trauma or injury.  No recent falls.  He denies any neck pain.  Patient denies any blurred vision to me.  Patient states that he is having left-sided facial paresthesias and pain. Had similar sensation a week ago on the R side. Followed up with PCP for this and was started on acyclovir for possible shingles. No shingles appeared.  Patient's neuro exam is normal.  Stroke score of 0. Patient does not appreciate any numbness with palpation of the left and right sides of his face. Labs and imaging obtained. No acute process identified. I contacted Dr. Robles at [facility], neurology. He does not feel the patient is having stroke like symptoms as the patient had Right sided symptoms last week and now has left sided symptoms. Would like patient to follow up with Dr. Cool outpatient.  Patient instructed to take medication prescribed as directed, to follow up with Dr. Cool outpatient, to follow up with his primary care physician this week, and to return to emergency department for worsening symptoms.  Patient is agreeable with plan of care.      Final diagnoses:   Pain of left upper extremity   Facial pain    Hypertension, unspecified type            Grace Phillips MD  01/08/18 [id number]

## 2018-01-21 ENCOUNTER — APPOINTMENT (OUTPATIENT)
Dept: GENERAL RADIOLOGY | Facility: HOSPITAL | Age: 83
End: 2018-01-21

## 2018-01-21 ENCOUNTER — HOSPITAL ENCOUNTER (EMERGENCY)
Facility: HOSPITAL | Age: 83
Discharge: HOME | End: 2018-01-21
Attending: STUDENT IN AN ORGANIZED HEALTH CARE EDUCATION/TRAINING PROGRAM | Admitting: STUDENT IN AN ORGANIZED HEALTH CARE EDUCATION/TRAINING PROGRAM

## 2018-01-21 ENCOUNTER — APPOINTMENT (OUTPATIENT)
Dept: CT IMAGING | Facility: HOSPITAL | Age: 83
End: 2018-01-21

## 2018-01-21 VITALS
SYSTOLIC BLOOD PRESSURE: 143 MMHG | DIASTOLIC BLOOD PRESSURE: 94 MMHG | RESPIRATION RATE: 18 BRPM | WEIGHT: 175 LBS | TEMPERATURE: 97.8 F | HEIGHT: 71 IN | BODY MASS INDEX: 24.5 KG/M2 | OXYGEN SATURATION: 100 % | HEART RATE: 62 BPM

## 2018-01-21 DIAGNOSIS — R55 SYNCOPE AND COLLAPSE: Primary | ICD-10-CM

## 2018-01-21 DIAGNOSIS — E86.0 DEHYDRATION: ICD-10-CM

## 2018-01-21 DIAGNOSIS — R31.21 ASYMPTOMATIC MICROSCOPIC HEMATURIA: ICD-10-CM

## 2018-01-21 LAB
ALBUMIN SERPL-MCNC: 4.2 G/DL (ref 3.5–5)
ALBUMIN/GLOB SERPL: 1.3 G/DL (ref 1–2)
ALP SERPL-CCNC: 54 U/L (ref 38–126)
ALT SERPL W P-5'-P-CCNC: 31 U/L (ref 13–69)
ANION GAP SERPL CALCULATED.3IONS-SCNC: 14.3 MMOL/L
AST SERPL-CCNC: 25 U/L (ref 15–46)
BACTERIA UR QL AUTO: ABNORMAL /HPF
BASOPHILS # BLD AUTO: 0.07 10*3/MM3 (ref 0–0.2)
BASOPHILS NFR BLD AUTO: 0.8 % (ref 0–2.5)
BILIRUB SERPL-MCNC: 0.8 MG/DL (ref 0.2–1.3)
BILIRUB UR QL STRIP: NEGATIVE
BUN BLD-MCNC: 34 MG/DL (ref 7–20)
BUN/CREAT SERPL: 21.3 (ref 6.3–21.9)
CALCIUM SPEC-SCNC: 9.7 MG/DL (ref 8.4–10.2)
CHLORIDE SERPL-SCNC: 105 MMOL/L (ref 98–107)
CLARITY UR: CLEAR
CO2 SERPL-SCNC: 24 MMOL/L (ref 26–30)
COLOR UR: YELLOW
CREAT BLD-MCNC: 1.6 MG/DL (ref 0.6–1.3)
DEPRECATED RDW RBC AUTO: 49.5 FL (ref 37–54)
EOSINOPHIL # BLD AUTO: 0.23 10*3/MM3 (ref 0–0.7)
EOSINOPHIL NFR BLD AUTO: 2.5 % (ref 0–7)
ERYTHROCYTE [DISTWIDTH] IN BLOOD BY AUTOMATED COUNT: 13.5 % (ref 11.5–14.5)
GFR SERPL CREATININE-BSD FRML MDRD: 41 ML/MIN/1.73
GLOBULIN UR ELPH-MCNC: 3.2 GM/DL
GLUCOSE BLD-MCNC: 102 MG/DL (ref 74–98)
GLUCOSE UR STRIP-MCNC: NEGATIVE MG/DL
HCT VFR BLD AUTO: 44.7 % (ref 42–52)
HGB BLD-MCNC: 14.6 G/DL (ref 14–18)
HGB UR QL STRIP.AUTO: ABNORMAL
HOLD SPECIMEN: NORMAL
HOLD SPECIMEN: NORMAL
HYALINE CASTS UR QL AUTO: ABNORMAL /LPF
IMM GRANULOCYTES # BLD: 0.04 10*3/MM3 (ref 0–0.06)
IMM GRANULOCYTES NFR BLD: 0.4 % (ref 0–0.6)
KETONES UR QL STRIP: NEGATIVE
LEUKOCYTE ESTERASE UR QL STRIP.AUTO: NEGATIVE
LYMPHOCYTES # BLD AUTO: 1.55 10*3/MM3 (ref 0.6–3.4)
LYMPHOCYTES NFR BLD AUTO: 16.8 % (ref 10–50)
MCH RBC QN AUTO: 32.4 PG (ref 27–31)
MCHC RBC AUTO-ENTMCNC: 32.7 G/DL (ref 30–37)
MCV RBC AUTO: 99.3 FL (ref 80–94)
MONOCYTES # BLD AUTO: 0.65 10*3/MM3 (ref 0–0.9)
MONOCYTES NFR BLD AUTO: 7.1 % (ref 0–12)
MUCOUS THREADS URNS QL MICRO: ABNORMAL /HPF
NEUTROPHILS # BLD AUTO: 6.67 10*3/MM3 (ref 2–6.9)
NEUTROPHILS NFR BLD AUTO: 72.4 % (ref 37–80)
NITRITE UR QL STRIP: NEGATIVE
NRBC BLD MANUAL-RTO: 0 /100 WBC (ref 0–0)
PH UR STRIP.AUTO: 6 [PH] (ref 5–8)
PLATELET # BLD AUTO: 209 10*3/MM3 (ref 130–400)
PMV BLD AUTO: 9.8 FL (ref 6–12)
POTASSIUM BLD-SCNC: 4.3 MMOL/L (ref 3.5–5.1)
PROT SERPL-MCNC: 7.4 G/DL (ref 6.3–8.2)
PROT UR QL STRIP: NEGATIVE
RBC # BLD AUTO: 4.5 10*6/MM3 (ref 4.7–6.1)
RBC # UR: ABNORMAL /HPF
REF LAB TEST METHOD: ABNORMAL
SODIUM BLD-SCNC: 139 MMOL/L (ref 137–145)
SP GR UR STRIP: 1.02 (ref 1–1.03)
SQUAMOUS #/AREA URNS HPF: ABNORMAL /HPF
TROPONIN I SERPL-MCNC: <0.012 NG/ML (ref 0–0.03)
UROBILINOGEN UR QL STRIP: ABNORMAL
WBC NRBC COR # BLD: 9.21 10*3/MM3 (ref 4.8–10.8)
WBC UR QL AUTO: ABNORMAL /HPF
WHOLE BLOOD HOLD SPECIMEN: NORMAL
WHOLE BLOOD HOLD SPECIMEN: NORMAL

## 2018-01-21 PROCEDURE — 99284 EMERGENCY DEPT VISIT MOD MDM: CPT

## 2018-01-21 PROCEDURE — 81001 URINALYSIS AUTO W/SCOPE: CPT | Performed by: PHYSICIAN ASSISTANT

## 2018-01-21 PROCEDURE — 84484 ASSAY OF TROPONIN QUANT: CPT | Performed by: PHYSICIAN ASSISTANT

## 2018-01-21 PROCEDURE — 80053 COMPREHEN METABOLIC PANEL: CPT | Performed by: PHYSICIAN ASSISTANT

## 2018-01-21 PROCEDURE — 85025 COMPLETE CBC W/AUTO DIFF WBC: CPT | Performed by: PHYSICIAN ASSISTANT

## 2018-01-21 PROCEDURE — 93005 ELECTROCARDIOGRAM TRACING: CPT | Performed by: STUDENT IN AN ORGANIZED HEALTH CARE EDUCATION/TRAINING PROGRAM

## 2018-01-21 PROCEDURE — 71045 X-RAY EXAM CHEST 1 VIEW: CPT

## 2018-01-21 PROCEDURE — 96360 HYDRATION IV INFUSION INIT: CPT

## 2018-01-21 PROCEDURE — 70450 CT HEAD/BRAIN W/O DYE: CPT

## 2018-01-21 RX ORDER — SODIUM CHLORIDE 0.9 % (FLUSH) 0.9 %
10 SYRINGE (ML) INJECTION AS NEEDED
Status: DISCONTINUED | OUTPATIENT
Start: 2018-01-21 | End: 2018-01-21 | Stop reason: HOSPADM

## 2018-01-21 RX ADMIN — SODIUM CHLORIDE 1000 ML: 9 INJECTION, SOLUTION INTRAVENOUS at 15:18

## 2018-01-21 NOTE — DISCHARGE INSTRUCTIONS
Increase fluids at home.  Return to the ER for chest pain, sustained heart racing, recurrent passing out, new or worsening symptoms.    Follow-up with your doctor after your repeat creatinine is checked on Wednesday.  Follow-up with Dr. Landers, heart specialist, or your family doctor for further workup, treatment and evaluation to evaluate the cause of your passing out.      You will also need to have your urine rechecked at some point as there is blood in yourr urine.

## 2018-01-21 NOTE — ED PROVIDER NOTES
Subjective   HPI Comments: 83-year-old male with a history of hypertension, hyperlipidemia, pulmonary emboli and coronary stents.  The patient was sitting in a [employer] pew, felt warm and hot.  While seated, his vision faded, he became sweaty and pale and had a syncopal event.  Was out for 3 or 4 minutes according to his wife, without injury.  Bystanders checked his pulse and he he did have a pulse.  He was breathing according to his wife.  No preceding chest pain, palpitations, abdominal pain, headache.  No physical ailments currently such as URI symptoms, sore throat, earache, cough, fever, vomiting, diarrhea or urinary complaint.  He has felt fine the past few days.    Aggravating factors: None.  Alleviating factors: None.  Treatment prior to arrival: EMS.  Given nitroglycerin ×1 by bystander.  His blood pressure went down to 87/65 following this.  His blood sugar in route was 116.      History provided by:  Patient and spouse  History limited by:  Age   used: No        Review of Systems   Constitutional: Negative.    HENT: Negative.    Eyes: Negative.    Respiratory: Negative.    Cardiovascular: Negative.  Negative for chest pain.        Syncope   Gastrointestinal: Negative.  Negative for abdominal pain.   Endocrine: Negative.    Genitourinary: Negative for dysuria.   Musculoskeletal: Negative.    Skin: Negative.    Allergic/Immunologic: Negative.    Neurological: Positive for syncope.   Hematological: Negative.    Psychiatric/Behavioral: Negative.    All other systems reviewed and are negative.      Past Medical History:   Diagnosis Date   • Glaucoma    • Hyperlipidemia    • Hypertension    • Pneumonia     right upper lobe    • Pulmonary embolism        Allergies   Allergen Reactions   • Lipitor [Atorvastatin] Myalgia       Past Surgical History:   Procedure Laterality Date   • CARDIAC CATHETERIZATION N/A 6/21/2017    Procedure: Left Heart Cath;  Surgeon: Toy Landers MD;  Location: [facility]  CATH INVASIVE LOCATION;  Service:    • CHOLECYSTECTOMY  2010   • CORONARY ANGIOPLASTY WITH STENT PLACEMENT     • CORONARY STENT PLACEMENT      x3   • EYE SURGERY Right    • FOOT SURGERY  1980   • WRIST SURGERY         History reviewed. No pertinent family history.    Social History     Social History   • Marital status:      Spouse name: N/A   • Number of children: N/A   • Years of education: N/A     Social History Main Topics   • Smoking status: Never Smoker   • Smokeless tobacco: Never Used   • Alcohol use No   • Drug use: No   • Sexual activity: Defer     Other Topics Concern   • None     Social History Narrative    Lives with his wife           Objective   Physical Exam   Constitutional: He is oriented to person, place, and time. He appears well-developed and well-nourished. No distress.   HENT:   Head: Normocephalic and atraumatic.   Right Ear: External ear normal.   Left Ear: External ear normal.   Eyes: EOM are normal. Pupils are equal, round, and reactive to light.   Neck: Normal range of motion. Neck supple.   Cardiovascular: Normal rate, regular rhythm and normal heart sounds.    Pulmonary/Chest: Effort normal and breath sounds normal. No stridor. He has no wheezes. He exhibits no tenderness.   Abdominal: Soft. He exhibits no distension. There is no tenderness. There is no rebound and no guarding.   Musculoskeletal: Normal range of motion. He exhibits no edema.   Neurological: He is alert and oriented to person, place, and time. He displays normal reflexes. No cranial nerve deficit. He exhibits normal muscle tone.   Skin: Skin is warm and dry. No rash noted. He is not diaphoretic.   Psychiatric: He has a normal mood and affect. His behavior is normal. Judgment and thought content normal.   Nursing note and vitals reviewed.      ECG 12 Lead    Date/Time: 1/21/2018 2:21 PM  Performed by: CATA KING  Authorized by: CATA KING   Comments: EKG: Sinus bradycardia at a rate of 56.  Left axis  deviation.  No ischemia or infarction.  No ST elevation.  No ectopy.      ECG 12 Lead    Date/Time: 1/21/2018 2:22 PM  Performed by: CATA KING  Authorized by: NATACHA OJEDA   Comments: EKG: Sinus bradycardia at a rate of 56.  Left axis deviation.  No ischemia or infarction.  No ST elevation.  No ectopy.               ED Course  ED Course   Comment By Time   Case discussed with Dr. Ojeda.  We are aware that the patient has a history of pulmonary emboli (took Coumadin over one year ago).  He is not tachycardic or short of breath and he is not hypoxic.  Therefore pulmonary embolus felt to be unlikely.  CT head to rule out neurologic event. Cata King PA-C 01/21 1412   Patient seen and interviewed by Dr. Ojeda.  We recommended admission to look for arrhythmias and to do serial enzymes.  The patient refused.  Preferred to go home.  Likely this was a vasovagal event from getting too hot and dehydration.  He was given a liter of fluids here.  We recommended that he see his cardiologist for further workup, treatment and evaluation which may need to include a tilt table test.  He understands what to return for as we explained this to him. Cata King PA-C 01/21 1605   The patient's medications are aspirin, Plavix, simvastatin, Lipitor, lisinopril.  He is no longer on a water pill.  We will recheck his creatinine on Wednesday.  He understands he needs to have his urine rechecked through his family doctor as hematuria is present. Cata King PA-C 01/21 1611                  MDM  Number of Diagnoses or Management Options  Asymptomatic microscopic hematuria: new and requires workup  Dehydration: new and requires workup  Syncope and collapse: new and requires workup     Amount and/or Complexity of Data Reviewed  Clinical lab tests: reviewed and ordered  Tests in the radiology section of CPT®: ordered and reviewed  Tests in the medicine section of CPT®: ordered and reviewed  Decide to obtain previous medical  records or to obtain history from someone other than the patient: yes  Discuss the patient with other providers: yes  Independent visualization of images, tracings, or specimens: yes    Risk of Complications, Morbidity, and/or Mortality  Presenting problems: moderate  Diagnostic procedures: low  Management options: moderate  General comments: We feel as though this is likely a vasovagal syncopal event from the patient getting too hot.  He also has dehydration.  He refuses admission.  He understands he needs to see his family doctor for recheck of his creatinine and urine test as he has hematuria.  Understands he needs to see Dr. Landers for workup of the syncope.  He understands to return if worse.    Patient Progress  Patient progress: stable      Final diagnoses:   Syncope and collapse   Dehydration   Asymptomatic microscopic hematuria            Yamileth Martin PA-C  01/21/18 [id number]

## 2018-02-28 ENCOUNTER — RESULTS ENCOUNTER (OUTPATIENT)
Dept: INTERNAL MEDICINE | Facility: CLINIC | Age: 83
End: 2018-02-28

## 2018-02-28 ENCOUNTER — OFFICE VISIT (OUTPATIENT)
Dept: INTERNAL MEDICINE | Facility: CLINIC | Age: 83
End: 2018-02-28

## 2018-02-28 VITALS
BODY MASS INDEX: 24.78 KG/M2 | OXYGEN SATURATION: 97 % | HEIGHT: 71 IN | RESPIRATION RATE: 14 BRPM | DIASTOLIC BLOOD PRESSURE: 74 MMHG | WEIGHT: 177 LBS | HEART RATE: 68 BPM | SYSTOLIC BLOOD PRESSURE: 110 MMHG | TEMPERATURE: 99 F

## 2018-02-28 DIAGNOSIS — K21.00 GASTROESOPHAGEAL REFLUX DISEASE WITH ESOPHAGITIS: ICD-10-CM

## 2018-02-28 DIAGNOSIS — E55.9 VITAMIN D DEFICIENCY DISEASE: ICD-10-CM

## 2018-02-28 DIAGNOSIS — N28.9 RENAL INSUFFICIENCY: ICD-10-CM

## 2018-02-28 DIAGNOSIS — Z23 NEED FOR PNEUMOCOCCAL VACCINE: Primary | ICD-10-CM

## 2018-02-28 DIAGNOSIS — E78.2 MIXED HYPERLIPIDEMIA: ICD-10-CM

## 2018-02-28 DIAGNOSIS — I10 ESSENTIAL HYPERTENSION: ICD-10-CM

## 2018-02-28 DIAGNOSIS — I25.10 CORONARY ARTERY DISEASE INVOLVING NATIVE CORONARY ARTERY OF NATIVE HEART WITHOUT ANGINA PECTORIS: ICD-10-CM

## 2018-02-28 DIAGNOSIS — R73.9 HYPERGLYCEMIA: ICD-10-CM

## 2018-02-28 DIAGNOSIS — N52.9 ERECTILE DYSFUNCTION, UNSPECIFIED ERECTILE DYSFUNCTION TYPE: Primary | ICD-10-CM

## 2018-02-28 DIAGNOSIS — H40.9 GLAUCOMA, UNSPECIFIED GLAUCOMA TYPE, UNSPECIFIED LATERALITY: ICD-10-CM

## 2018-02-28 PROBLEM — R06.02 SHORTNESS OF BREATH: Status: RESOLVED | Noted: 2017-06-21 | Resolved: 2018-02-28

## 2018-02-28 PROBLEM — R07.9 CHEST PAIN: Status: RESOLVED | Noted: 2017-04-26 | Resolved: 2018-02-28

## 2018-02-28 PROCEDURE — 99204 OFFICE O/P NEW MOD 45 MIN: CPT | Performed by: INTERNAL MEDICINE

## 2018-02-28 RX ORDER — BRIMONIDINE TARTRATE 2 MG/ML
2 SOLUTION/ DROPS OPHTHALMIC 2 TIMES DAILY
COMMUNITY
Start: 2018-02-08

## 2018-02-28 RX ORDER — GENTAMICIN SULFATE 3 MG/ML
SOLUTION/ DROPS OPHTHALMIC
COMMUNITY
Start: 2018-01-04 | End: 2018-07-31

## 2018-02-28 NOTE — PROGRESS NOTES
Subjective   Darren Mccracken is a 83 y.o. male.     Chief Complaint   Patient presents with   • Establish Care     new pt        History of Present Illness   Hypertension stable medication.  Kidney stone stable now.  Hyperlipidemia stable medication.  Coronary artery disease status post stents ×2.  Patient denies any chest pain or short of breath she is seeing cardiologist.  GERD stable on medication.  History of a renal insufficiencies neither lab tests.  Glaucoma stable on medication.  Left hand mild shaking and the patient also has erectile dysfunction request treatment with trimex. Patient states he used to do it 3 years ago     Current Outpatient Prescriptions:   •  aspirin 81 MG EC tablet, Take 81 mg by mouth Every Night., Disp: , Rfl:   •  brimonidine (ALPHAGAN) 0.2 % ophthalmic solution, , Disp: , Rfl:   •  chlorthalidone (HYGROTON) 25 MG tablet, Take 0.5 tablets by mouth Daily., Disp: 30 tablet, Rfl: 11  •  clopidogrel (PLAVIX) 75 MG tablet, Take 1 tablet by mouth Daily., Disp: 30 tablet, Rfl: 12  •  gentamicin (GARAMYCIN) 0.3 % ophthalmic solution, , Disp: , Rfl:   •  lisinopril (PRINIVIL,ZESTRIL) 40 MG tablet, Take 40 mg by mouth Every Night., Disp: , Rfl:   •  omeprazole (priLOSEC) 20 MG capsule, Take 20 mg by mouth Daily., Disp: , Rfl:   •  simvastatin (ZOCOR) 80 MG tablet, Take 1 tablet by mouth Every Night., Disp: 30 tablet, Rfl: 11    The following portions of the patient's history were reviewed and updated as appropriate: allergies, current medications, past family history, past medical history, past social history, past surgical history and problem list.    Review of Systems   Constitutional: Negative.    Respiratory: Negative.    Cardiovascular: Negative.    Gastrointestinal: Negative.    Musculoskeletal: Negative.    Skin: Negative.    Neurological: Negative.    Psychiatric/Behavioral: Negative.        Objective   Physical Exam   Constitutional: He is oriented to person, place, and time. He  appears well-nourished.   Neck: Neck supple.   Cardiovascular: Normal rate, regular rhythm and normal heart sounds.    Pulmonary/Chest: Effort normal and breath sounds normal.   Abdominal: Bowel sounds are normal.   Neurological: He is alert and oriented to person, place, and time.   Skin: Skin is warm.   Psychiatric: He has a normal mood and affect.       All tests have been reviewed.    Assessment/Plan   Diagnoses and all orders for this visit:      Essential hypertension continue med    Hx of kidney stone watch     Mixed hyperlipidemia continue med    Coronary artery disease s/p stentx2 continue med and follow up with cardio    Gastroesophageal reflux disease with esophagitis continue    Renal insufficiency    Glaucoma, unspecified glaucoma type, unspecified laterality    Left hand tremor. ? parkinson    Other orders  -     brimonidine (ALPHAGAN) 0.2 % ophthalmic solution;   -     gentamicin (GARAMYCIN) 0.3 % ophthalmic solution;       ED hx of trimex use 3 years ago, I have no records . Refer patient to uro       do lab follow up in 3 weeks

## 2018-03-03 ENCOUNTER — APPOINTMENT (OUTPATIENT)
Dept: GENERAL RADIOLOGY | Facility: HOSPITAL | Age: 83
End: 2018-03-03

## 2018-03-03 ENCOUNTER — HOSPITAL ENCOUNTER (EMERGENCY)
Facility: HOSPITAL | Age: 83
Discharge: HOME | End: 2018-03-03
Attending: EMERGENCY MEDICINE | Admitting: EMERGENCY MEDICINE

## 2018-03-03 VITALS
TEMPERATURE: 97.8 F | WEIGHT: 173 LBS | BODY MASS INDEX: 24.22 KG/M2 | HEART RATE: 60 BPM | DIASTOLIC BLOOD PRESSURE: 95 MMHG | OXYGEN SATURATION: 98 % | SYSTOLIC BLOOD PRESSURE: 172 MMHG | HEIGHT: 71 IN | RESPIRATION RATE: 16 BRPM

## 2018-03-03 DIAGNOSIS — I15.9 SECONDARY HYPERTENSION: Primary | ICD-10-CM

## 2018-03-03 LAB
ALBUMIN SERPL-MCNC: 4.4 G/DL (ref 3.5–5)
ALBUMIN/GLOB SERPL: 1.3 G/DL (ref 1–2)
ALP SERPL-CCNC: 64 U/L (ref 38–126)
ALT SERPL W P-5'-P-CCNC: 28 U/L (ref 13–69)
ANION GAP SERPL CALCULATED.3IONS-SCNC: 18.6 MMOL/L
AST SERPL-CCNC: 23 U/L (ref 15–46)
BASOPHILS # BLD AUTO: 0.07 10*3/MM3 (ref 0–0.2)
BASOPHILS NFR BLD AUTO: 1 % (ref 0–2.5)
BILIRUB SERPL-MCNC: 0.7 MG/DL (ref 0.2–1.3)
BUN BLD-MCNC: 18 MG/DL (ref 7–20)
BUN/CREAT SERPL: 13.8 (ref 6.3–21.9)
CALCIUM SPEC-SCNC: 9.6 MG/DL (ref 8.4–10.2)
CHLORIDE SERPL-SCNC: 104 MMOL/L (ref 98–107)
CO2 SERPL-SCNC: 25 MMOL/L (ref 26–30)
CREAT BLD-MCNC: 1.3 MG/DL (ref 0.6–1.3)
DEPRECATED RDW RBC AUTO: 46.7 FL (ref 37–54)
EOSINOPHIL # BLD AUTO: 0.17 10*3/MM3 (ref 0–0.7)
EOSINOPHIL NFR BLD AUTO: 2.5 % (ref 0–7)
ERYTHROCYTE [DISTWIDTH] IN BLOOD BY AUTOMATED COUNT: 12.9 % (ref 11.5–14.5)
GFR SERPL CREATININE-BSD FRML MDRD: 53 ML/MIN/1.73
GLOBULIN UR ELPH-MCNC: 3.4 GM/DL
GLUCOSE BLD-MCNC: 96 MG/DL (ref 74–98)
HCT VFR BLD AUTO: 46.3 % (ref 42–52)
HGB BLD-MCNC: 15.4 G/DL (ref 14–18)
IMM GRANULOCYTES # BLD: 0.02 10*3/MM3 (ref 0–0.06)
IMM GRANULOCYTES NFR BLD: 0.3 % (ref 0–0.6)
LYMPHOCYTES # BLD AUTO: 1.65 10*3/MM3 (ref 0.6–3.4)
LYMPHOCYTES NFR BLD AUTO: 24.1 % (ref 10–50)
MCH RBC QN AUTO: 32.9 PG (ref 27–31)
MCHC RBC AUTO-ENTMCNC: 33.3 G/DL (ref 30–37)
MCV RBC AUTO: 98.9 FL (ref 80–94)
MONOCYTES # BLD AUTO: 0.64 10*3/MM3 (ref 0–0.9)
MONOCYTES NFR BLD AUTO: 9.3 % (ref 0–12)
NEUTROPHILS # BLD AUTO: 4.3 10*3/MM3 (ref 2–6.9)
NEUTROPHILS NFR BLD AUTO: 62.8 % (ref 37–80)
NRBC BLD MANUAL-RTO: 0 /100 WBC (ref 0–0)
PLATELET # BLD AUTO: 206 10*3/MM3 (ref 130–400)
PMV BLD AUTO: 9.5 FL (ref 6–12)
POTASSIUM BLD-SCNC: 4.6 MMOL/L (ref 3.5–5.1)
PROT SERPL-MCNC: 7.8 G/DL (ref 6.3–8.2)
RBC # BLD AUTO: 4.68 10*6/MM3 (ref 4.7–6.1)
SODIUM BLD-SCNC: 143 MMOL/L (ref 137–145)
TROPONIN I SERPL-MCNC: <0.012 NG/ML (ref 0–0.03)
TROPONIN I SERPL-MCNC: <0.012 NG/ML (ref 0–0.03)
WBC NRBC COR # BLD: 6.85 10*3/MM3 (ref 4.8–10.8)

## 2018-03-03 PROCEDURE — 85025 COMPLETE CBC W/AUTO DIFF WBC: CPT | Performed by: NURSE PRACTITIONER

## 2018-03-03 PROCEDURE — 96361 HYDRATE IV INFUSION ADD-ON: CPT

## 2018-03-03 PROCEDURE — 71046 X-RAY EXAM CHEST 2 VIEWS: CPT

## 2018-03-03 PROCEDURE — 99284 EMERGENCY DEPT VISIT MOD MDM: CPT

## 2018-03-03 PROCEDURE — 96360 HYDRATION IV INFUSION INIT: CPT

## 2018-03-03 PROCEDURE — 80053 COMPREHEN METABOLIC PANEL: CPT | Performed by: NURSE PRACTITIONER

## 2018-03-03 PROCEDURE — 84484 ASSAY OF TROPONIN QUANT: CPT | Performed by: NURSE PRACTITIONER

## 2018-03-03 PROCEDURE — 93005 ELECTROCARDIOGRAM TRACING: CPT | Performed by: NURSE PRACTITIONER

## 2018-03-03 RX ORDER — SODIUM CHLORIDE 0.9 % (FLUSH) 0.9 %
10 SYRINGE (ML) INJECTION AS NEEDED
Status: DISCONTINUED | OUTPATIENT
Start: 2018-03-03 | End: 2018-03-03 | Stop reason: HOSPADM

## 2018-03-03 RX ADMIN — SODIUM CHLORIDE 1000 ML: 9 INJECTION, SOLUTION INTRAVENOUS at 13:40

## 2018-03-03 NOTE — ED PROVIDER NOTES
Subjective   History of Present Illness  This 83-year-old gentleman who comes in today complaining of hypertension for the past week.  He denies any dizziness, blurred vision, numbness or tingling, nausea vomiting or diarrhea.  He reports he was at his primary care provider last week his blood pressure was 124/74.  He does report some mild shortness of breath is single and on for the past 2-3 weeks.  He states last time he did this was back in June 2017 and he underwent a left heart catheter and did receive 1 stent.  Review of Systems   Constitutional: Negative.    HENT: Negative.    Eyes: Negative.    Respiratory: Positive for shortness of breath.    Cardiovascular: Negative.    Gastrointestinal: Negative.    Genitourinary: Negative.    Skin: Negative.    Neurological: Negative.    Psychiatric/Behavioral: Negative.    All other systems reviewed and are negative.      Past Medical History:   Diagnosis Date   • Glaucoma    • Hyperlipidemia    • Hypertension    • Pneumonia     right upper lobe    • Pulmonary embolism        Allergies   Allergen Reactions   • Lipitor [Atorvastatin] Myalgia       Past Surgical History:   Procedure Laterality Date   • CARDIAC CATHETERIZATION N/A 6/21/2017    Procedure: Left Heart Cath;  Surgeon: Toy Landers MD;  Location: [facility] CATH INVASIVE LOCATION;  Service:    • CHOLECYSTECTOMY  2010   • CORONARY ANGIOPLASTY WITH STENT PLACEMENT     • CORONARY STENT PLACEMENT      x3   • EYE SURGERY Right    • WRIST SURGERY         Family History   Problem Relation Age of Onset   • Heart attack Father        Social History     Social History   • Marital status:      Social History Main Topics   • Smoking status: Never Smoker   • Smokeless tobacco: Never Used   • Alcohol use No   • Drug use: No   • Sexual activity: Defer     Social History Narrative    Lives with his wife           Objective   Physical Exam   Constitutional: He appears well-developed and well-nourished.   Nursing note and  vitals reviewed.  GEN: No acute distress  Head: Normocephalic, atraumatic  Eyes: Pupils equal round reactive to light  ENT: Posterior pharynx normal in appearance, oral mucosa is moist  Chest: Nontender to palpation  Cardiovascular: Regular rate  Lungs: Clear to auscultation bilaterally  Abdomen: Soft, nontender, nondistended, no peritoneal signs  Extremities: No edema, normal appearance  Neuro: GCS 15 NIH 0  Psych: Mood and affect are appropriate      ECG 12 Lead    Date/Time: 3/3/2018 2:51 PM  Performed by: ELISA PAGAN  Authorized by: ELISA PAGAN   Interpreted by physician  Comparison: compared with previous ECG   Similar to previous ECG  Rhythm: sinus bradycardia  Clinical impression: non-specific ECG               ED Course  ED Course   Comment By Time   I have evaluated for CAD. He continues to denies chest pain. I have advised him to follow up with his cardiologist for re-evaluation. I have also advised him to follow up with his pcp on Monday. I have given him and his wife strict return to care instructions and they are agreeable to the plan of care. Elisa Pagan, APRN 03/03 1552                HEART Score (for prediction of 6-week risk of major adverse cardiac event) reviewed and/or performed as part of the patient evaluation and treatment planning process.  The result associated with this review/performance is: 4       MDM  Number of Diagnoses or Management Options  Diagnosis management comments: After extensive interview he does report the last time he had a blockage his blood pressure was elevated as well.  We will go ahead and do a cardiac workup and assess for acute coronary syndrome.  However, the blood pressure being elevated does not make him feel bad according to him.  He does report that he has had some increasing shortness of air whenever doing his daily activities.       Amount and/or Complexity of Data Reviewed  Clinical lab tests: ordered and reviewed  Tests in the radiology  section of CPT®: ordered and reviewed  Review and summarize past medical records: yes  Discuss the patient with other providers: yes  Independent visualization of images, tracings, or specimens: yes    Risk of Complications, Morbidity, and/or Mortality  Presenting problems: moderate  Diagnostic procedures: moderate  Management options: moderate        Final diagnoses:   Secondary hypertension            Ella Pagan, [place]  03/03/18 1553

## 2018-03-03 NOTE — DISCHARGE INSTRUCTIONS
Hypertension  Hypertension is another name for high blood pressure. High blood pressure forces your heart to work harder to pump blood. This can cause problems over time.  There are two numbers in a blood pressure reading. There is a top number (systolic) over a bottom number (diastolic). It is best to have a blood pressure below 120/80. Healthy choices can help lower your blood pressure. You may need medicine to help lower your blood pressure if:  · Your blood pressure cannot be lowered with healthy choices.  · Your blood pressure is higher than 130/80.  Follow these instructions at home:  Eating and drinking   · If directed, follow the DASH eating plan. This diet includes:  ¨ Filling half of your plate at each meal with fruits and vegetables.  ¨ Filling one quarter of your plate at each meal with whole grains. Whole grains include whole wheat pasta, brown rice, and whole grain bread.  ¨ Eating or drinking low-fat dairy products, such as skim milk or low-fat yogurt.  ¨ Filling one quarter of your plate at each meal with low-fat (lean) proteins. Low-fat proteins include fish, skinless chicken, eggs, beans, and tofu.  ¨ Avoiding fatty meat, cured and processed meat, or chicken with skin.  ¨ Avoiding premade or processed food.  · Eat less than 1,500 mg of salt (sodium) a day.  · Limit alcohol use to no more than 1 drink a day for nonpregnant women and 2 drinks a day for men. One drink equals 12 oz of beer, 5 oz of wine, or 1½ oz of hard liquor.  Lifestyle   · Work with your doctor to stay at a healthy weight or to lose weight. Ask your doctor what the best weight is for you.  · Get at least 30 minutes of exercise that causes your heart to beat faster (aerobic exercise) most days of the week. This may include walking, swimming, or biking.  · Get at least 30 minutes of exercise that strengthens your muscles (resistance exercise) at least 3 days a week. This may include lifting weights or pilates.  · Do not use any  products that contain nicotine or tobacco. This includes cigarettes and e-cigarettes. If you need help quitting, ask your doctor.  · Check your blood pressure at home as told by your doctor.  · Keep all follow-up visits as told by your doctor. This is important.  Medicines   · Take over-the-counter and prescription medicines only as told by your doctor. Follow directions carefully.  · Do not skip doses of blood pressure medicine. The medicine does not work as well if you skip doses. Skipping doses also puts you at risk for problems.  · Ask your doctor about side effects or reactions to medicines that you should watch for.  Contact a doctor if:  · You think you are having a reaction to the medicine you are taking.  · You have headaches that keep coming back (recurring).  · You feel dizzy.  · You have swelling in your ankles.  · You have trouble with your vision.  Get help right away if:  · You get a very bad headache.  · You start to feel confused.  · You feel weak or numb.  · You feel faint.  · You get very bad pain in your:  ¨ Chest.  ¨ Belly (abdomen).  · You throw up (vomit) more than once.  · You have trouble breathing.  Summary  · Hypertension is another name for high blood pressure.  · Making healthy choices can help lower blood pressure. If your blood pressure cannot be controlled with healthy choices, you may need to take medicine.  This information is not intended to replace advice given to you by your health care provider. Make sure you discuss any questions you have with your health care provider.  Document Released: 06/05/2009 Document Revised: 11/15/2017 Document Reviewed: 11/15/2017  [employer] Interactive Patient Education © 2017 [employer]      Hypertension  Hypertension, commonly called high blood pressure, is when the force of blood pumping through the arteries is too strong. The arteries are the blood vessels that carry blood from the heart throughout the body. Hypertension forces the heart to  "work harder to pump blood and may cause arteries to become narrow or stiff. Having untreated or uncontrolled hypertension can cause heart attacks, strokes, kidney disease, and other problems.  A blood pressure reading consists of a higher number over a lower number. Ideally, your blood pressure should be below 120/80. The first (\"top\") number is called the systolic pressure. It is a measure of the pressure in your arteries as your heart beats. The second (\"bottom\") number is called the diastolic pressure. It is a measure of the pressure in your arteries as the heart relaxes.  What are the causes?  The cause of this condition is not known.  What increases the risk?  Some risk factors for high blood pressure are under your control. Others are not.  Factors you can change   · Smoking.  · Having type 2 diabetes mellitus, high cholesterol, or both.  · Not getting enough exercise or physical activity.  · Being overweight.  · Having too much fat, sugar, calories, or salt (sodium) in your diet.  · Drinking too much alcohol.  Factors that are difficult or impossible to change   · Having chronic kidney disease.  · Having a family history of high blood pressure.  · Age. Risk increases with age.  · Race. You may be at higher risk if you are -American.  · Gender. Men are at higher risk than women before age 45. After age 65, women are at higher risk than men.  · Having obstructive sleep apnea.  · Stress.  What are the signs or symptoms?  Extremely high blood pressure (hypertensive crisis) may cause:  · Headache.  · Anxiety.  · Shortness of breath.  · Nosebleed.  · Nausea and vomiting.  · Severe chest pain.  · Jerky movements you cannot control (seizures).  How is this diagnosed?  This condition is diagnosed by measuring your blood pressure while you are seated, with your arm resting on a surface. The cuff of the blood pressure monitor will be placed directly against the skin of your upper arm at the level of your heart. " It should be measured at least twice using the same arm. Certain conditions can cause a difference in blood pressure between your right and left arms.  Certain factors can cause blood pressure readings to be lower or higher than normal (elevated) for a short period of time:  · When your blood pressure is higher when you are in a health care provider's office than when you are at home, this is called white coat hypertension. Most people with this condition do not need medicines.  · When your blood pressure is higher at home than when you are in a health care provider's office, this is called masked hypertension. Most people with this condition may need medicines to control blood pressure.  If you have a high blood pressure reading during one visit or you have normal blood pressure with other risk factors:  · You may be asked to return on a different day to have your blood pressure checked again.  · You may be asked to monitor your blood pressure at home for 1 week or longer.  If you are diagnosed with hypertension, you may have other blood or imaging tests to help your health care provider understand your overall risk for other conditions.  How is this treated?  This condition is treated by making healthy lifestyle changes, such as eating healthy foods, exercising more, and reducing your alcohol intake. Your health care provider may prescribe medicine if lifestyle changes are not enough to get your blood pressure under control, and if:  · Your systolic blood pressure is above 130.  · Your diastolic blood pressure is above 80.  Your personal target blood pressure may vary depending on your medical conditions, your age, and other factors.  Follow these instructions at home:  Eating and drinking   · Eat a diet that is high in fiber and potassium, and low in sodium, added sugar, and fat. An example eating plan is called the DASH (Dietary Approaches to Stop Hypertension) diet. To eat this way:  ¨ Eat plenty of fresh  fruits and vegetables. Try to fill half of your plate at each meal with fruits and vegetables.  ¨ Eat whole grains, such as whole wheat pasta, brown rice, or whole grain bread. Fill about one quarter of your plate with whole grains.  ¨ Eat or drink low-fat dairy products, such as skim milk or low-fat yogurt.  ¨ Avoid fatty cuts of meat, processed or cured meats, and poultry with skin. Fill about one quarter of your plate with lean proteins, such as fish, chicken without skin, beans, eggs, and tofu.  ¨ Avoid premade and processed foods. These tend to be higher in sodium, added sugar, and fat.  · Reduce your daily sodium intake. Most people with hypertension should eat less than 1,500 mg of sodium a day.  · Limit alcohol intake to no more than 1 drink a day for nonpregnant women and 2 drinks a day for men. One drink equals 12 oz of beer, 5 oz of wine, or 1½ oz of hard liquor.  Lifestyle   · Work with your health care provider to maintain a healthy body weight or to lose weight. Ask what an ideal weight is for you.  · Get at least 30 minutes of exercise that causes your heart to beat faster (aerobic exercise) most days of the week. Activities may include walking, swimming, or biking.  · Include exercise to strengthen your muscles (resistance exercise), such as pilates or lifting weights, as part of your weekly exercise routine. Try to do these types of exercises for 30 minutes at least 3 days a week.  · Do not use any products that contain nicotine or tobacco, such as cigarettes and e-cigarettes. If you need help quitting, ask your health care provider.  · Monitor your blood pressure at home as told by your health care provider.  · Keep all follow-up visits as told by your health care provider. This is important.  Medicines   · Take over-the-counter and prescription medicines only as told by your health care provider. Follow directions carefully. Blood pressure medicines must be taken as prescribed.  · Do not skip  doses of blood pressure medicine. Doing this puts you at risk for problems and can make the medicine less effective.  · Ask your health care provider about side effects or reactions to medicines that you should watch for.  Contact a health care provider if:  · You think you are having a reaction to a medicine you are taking.  · You have headaches that keep coming back (recurring).  · You feel dizzy.  · You have swelling in your ankles.  · You have trouble with your vision.  Get help right away if:  · You develop a severe headache or confusion.  · You have unusual weakness or numbness.  · You feel faint.  · You have severe pain in your chest or abdomen.  · You vomit repeatedly.  · You have trouble breathing.  Summary  · Hypertension is when the force of blood pumping through your arteries is too strong. If this condition is not controlled, it may put you at risk for serious complications.  · Your personal target blood pressure may vary depending on your medical conditions, your age, and other factors. For most people, a normal blood pressure is less than 120/80.  · Hypertension is treated with lifestyle changes, medicines, or a combination of both. Lifestyle changes include weight loss, eating a healthy, low-sodium diet, exercising more, and limiting alcohol.  This information is not intended to replace advice given to you by your health care provider. Make sure you discuss any questions you have with your health care provider.  Document Released: 12/18/2006 Document Revised: 11/15/2017 Document Reviewed: 11/15/2017  [employer] Interactive Patient Education © 2017 [employer]

## 2018-03-28 ENCOUNTER — OFFICE VISIT (OUTPATIENT)
Dept: INTERNAL MEDICINE | Facility: CLINIC | Age: 83
End: 2018-03-28

## 2018-03-28 VITALS
WEIGHT: 177 LBS | TEMPERATURE: 98 F | HEIGHT: 71 IN | OXYGEN SATURATION: 94 % | RESPIRATION RATE: 14 BRPM | BODY MASS INDEX: 24.78 KG/M2 | HEART RATE: 70 BPM | SYSTOLIC BLOOD PRESSURE: 148 MMHG | DIASTOLIC BLOOD PRESSURE: 80 MMHG

## 2018-03-28 DIAGNOSIS — E78.2 MIXED HYPERLIPIDEMIA: ICD-10-CM

## 2018-03-28 DIAGNOSIS — K21.00 GASTROESOPHAGEAL REFLUX DISEASE WITH ESOPHAGITIS: ICD-10-CM

## 2018-03-28 DIAGNOSIS — N52.9 ERECTILE DYSFUNCTION, UNSPECIFIED ERECTILE DYSFUNCTION TYPE: ICD-10-CM

## 2018-03-28 DIAGNOSIS — I10 ESSENTIAL HYPERTENSION: Primary | ICD-10-CM

## 2018-03-28 DIAGNOSIS — E55.9 VITAMIN D DEFICIENCY: ICD-10-CM

## 2018-03-28 DIAGNOSIS — N28.9 RENAL INSUFFICIENCY: ICD-10-CM

## 2018-03-28 DIAGNOSIS — I25.10 CORONARY ARTERY DISEASE INVOLVING NATIVE CORONARY ARTERY OF NATIVE HEART WITHOUT ANGINA PECTORIS: ICD-10-CM

## 2018-03-28 PROCEDURE — 99214 OFFICE O/P EST MOD 30 MIN: CPT | Performed by: INTERNAL MEDICINE

## 2018-03-28 RX ORDER — BENZONATATE 200 MG/1
200 CAPSULE ORAL 3 TIMES DAILY PRN
Qty: 45 CAPSULE | Refills: 0 | Status: SHIPPED | OUTPATIENT
Start: 2018-03-28 | End: 2018-07-31

## 2018-03-28 RX ORDER — AZITHROMYCIN 250 MG/1
TABLET, FILM COATED ORAL
Qty: 6 TABLET | Refills: 0 | Status: SHIPPED | OUTPATIENT
Start: 2018-03-28 | End: 2018-07-31

## 2018-03-28 NOTE — PROGRESS NOTES
Subjective   Darren Mccracken is a 83 y.o. male.     No chief complaint on file.      History of Present Illness   3 weeks after labs patient here for follow-up.  Blood pressure recently elevated patient went to emergency room workup for heart disease was negative.  Blood pressure today 148/80.  Denies any symptoms.  Hyperlipidemia on medication need to blood tests.  Coronary artery disease is stable medication.  GERD on medication stable.  Renal insufficiency stable now.  Patient also complains of coughing for 10 days dry cough over-the-counter measure no help.    Current Outpatient Prescriptions:   •  aspirin 81 MG EC tablet, Take 81 mg by mouth Every Night., Disp: , Rfl:   •  brimonidine (ALPHAGAN) 0.2 % ophthalmic solution, , Disp: , Rfl:   •  chlorthalidone (HYGROTON) 25 MG tablet, Take 0.5 tablets by mouth Daily., Disp: 30 tablet, Rfl: 11  •  clopidogrel (PLAVIX) 75 MG tablet, Take 1 tablet by mouth Daily., Disp: 30 tablet, Rfl: 12  •  gentamicin (GARAMYCIN) 0.3 % ophthalmic solution, , Disp: , Rfl:   •  lisinopril (PRINIVIL,ZESTRIL) 40 MG tablet, Take 40 mg by mouth Every Night., Disp: , Rfl:   •  omeprazole (priLOSEC) 20 MG capsule, Take 20 mg by mouth Daily., Disp: , Rfl:   •  simvastatin (ZOCOR) 80 MG tablet, Take 1 tablet by mouth Every Night., Disp: 30 tablet, Rfl: 11    The following portions of the patient's history were reviewed and updated as appropriate: allergies, current medications, past family history, past medical history, past social history, past surgical history and problem list.    Review of Systems   Constitutional: Negative.    Respiratory: Negative.    Cardiovascular: Negative.    Gastrointestinal: Negative.    Musculoskeletal: Negative.    Skin: Negative.    Neurological: Negative.    Psychiatric/Behavioral: Negative.        Objective   Physical Exam   Constitutional: He is oriented to person, place, and time. He appears well-nourished.   Neck: Neck supple.   Cardiovascular: Normal  rate, regular rhythm and normal heart sounds.    Pulmonary/Chest: Effort normal and breath sounds normal.   Abdominal: Bowel sounds are normal.   Neurological: He is alert and oriented to person, place, and time.   Skin: Skin is warm.   Psychiatric: He has a normal mood and affect.       All tests have been reviewed.    Assessment/Plan   There are no diagnoses linked to this encounter.          Essential hypertension recent ER visit for bp high, CBC CMP wnl, continue med, cut down salt and home monitor for now     Hx of kidney stone watch      Mixed hyperlipidemia continue med need labs     Coronary artery disease s/p stentx2 continue med and follow up with cardio     Gastroesophageal reflux disease with esophagitis continue     Renal insufficiency stable     Glaucoma, unspecified glaucoma type, unspecified laterality     Left hand tremor. ? parkinson     Other orders  -     brimonidine (ALPHAGAN) 0.2 % ophthalmic solution;   -     gentamicin (GARAMYCIN) 0.3 % ophthalmic solution;         ED hx of trimex use 3 years ago, I have no records . Refer patient to uro--        3 week after labs

## 2018-05-24 RX ORDER — CLOPIDOGREL BISULFATE 75 MG/1
75 TABLET ORAL DAILY
Qty: 30 TABLET | Refills: 6 | Status: SHIPPED | OUTPATIENT
Start: 2018-05-24 | End: 2018-10-04 | Stop reason: SDUPTHER

## 2018-07-30 ENCOUNTER — TELEPHONE (OUTPATIENT)
Dept: CARDIOLOGY | Facility: CLINIC | Age: 83
End: 2018-07-30

## 2018-07-30 NOTE — TELEPHONE ENCOUNTER
PT calls today with c/o on going SOA that has been going on for the past 2-3 months- he notices it more if he exerts himself and in the mornings when he wakes up- he states that this is exactly like what he felt like prior to his last stent placement and really thinks that he needs to be seen - will add to clinic tomorrow to see DMITRI

## 2018-07-31 ENCOUNTER — APPOINTMENT (OUTPATIENT)
Dept: PREADMISSION TESTING | Facility: HOSPITAL | Age: 83
End: 2018-07-31

## 2018-07-31 ENCOUNTER — OFFICE VISIT (OUTPATIENT)
Dept: CARDIOLOGY | Facility: CLINIC | Age: 83
End: 2018-07-31

## 2018-07-31 ENCOUNTER — PREP FOR SURGERY (OUTPATIENT)
Dept: OTHER | Facility: HOSPITAL | Age: 83
End: 2018-07-31

## 2018-07-31 VITALS
BODY MASS INDEX: 24.33 KG/M2 | WEIGHT: 173.8 LBS | HEIGHT: 71 IN | SYSTOLIC BLOOD PRESSURE: 139 MMHG | DIASTOLIC BLOOD PRESSURE: 81 MMHG | HEART RATE: 70 BPM

## 2018-07-31 DIAGNOSIS — I10 ESSENTIAL HYPERTENSION: Primary | ICD-10-CM

## 2018-07-31 DIAGNOSIS — I10 ESSENTIAL HYPERTENSION: ICD-10-CM

## 2018-07-31 DIAGNOSIS — E78.2 MIXED HYPERLIPIDEMIA: ICD-10-CM

## 2018-07-31 DIAGNOSIS — I25.10 CORONARY ARTERY DISEASE INVOLVING NATIVE CORONARY ARTERY OF NATIVE HEART WITHOUT ANGINA PECTORIS: ICD-10-CM

## 2018-07-31 DIAGNOSIS — I25.119 CORONARY ARTERY DISEASE INVOLVING NATIVE CORONARY ARTERY OF NATIVE HEART WITH ANGINA PECTORIS (HCC): ICD-10-CM

## 2018-07-31 DIAGNOSIS — I25.119 CORONARY ARTERY DISEASE INVOLVING NATIVE CORONARY ARTERY OF NATIVE HEART WITH ANGINA PECTORIS (HCC): Primary | ICD-10-CM

## 2018-07-31 DIAGNOSIS — I25.110 CORONARY ARTERY DISEASE INVOLVING NATIVE CORONARY ARTERY OF NATIVE HEART WITH UNSTABLE ANGINA PECTORIS (HCC): ICD-10-CM

## 2018-07-31 LAB
ALBUMIN SERPL-MCNC: 4.39 G/DL (ref 3.2–4.8)
ALBUMIN/GLOB SERPL: 1.5 G/DL (ref 1.5–2.5)
ALP SERPL-CCNC: 58 U/L (ref 25–100)
ALT SERPL W P-5'-P-CCNC: 18 U/L (ref 7–40)
ANION GAP SERPL CALCULATED.3IONS-SCNC: 9 MMOL/L (ref 3–11)
ARTICHOKE IGE QN: 81 MG/DL (ref 0–130)
AST SERPL-CCNC: 25 U/L (ref 0–33)
BILIRUB SERPL-MCNC: 0.6 MG/DL (ref 0.3–1.2)
BUN BLD-MCNC: 17 MG/DL (ref 9–23)
BUN/CREAT SERPL: 13.1 (ref 7–25)
CALCIUM SPEC-SCNC: 9.4 MG/DL (ref 8.7–10.4)
CHLORIDE SERPL-SCNC: 107 MMOL/L (ref 99–109)
CHOLEST SERPL-MCNC: 142 MG/DL (ref 0–200)
CO2 SERPL-SCNC: 22 MMOL/L (ref 20–31)
CREAT BLD-MCNC: 1.3 MG/DL (ref 0.6–1.3)
DEPRECATED RDW RBC AUTO: 49 FL (ref 37–54)
ERYTHROCYTE [DISTWIDTH] IN BLOOD BY AUTOMATED COUNT: 13.5 % (ref 11.3–14.5)
GFR SERPL CREATININE-BSD FRML MDRD: 53 ML/MIN/1.73
GLOBULIN UR ELPH-MCNC: 3 GM/DL
GLUCOSE BLD-MCNC: 96 MG/DL (ref 70–100)
HBA1C MFR BLD: 6 % (ref 4.8–5.6)
HCT VFR BLD AUTO: 46.1 % (ref 38.9–50.9)
HDLC SERPL-MCNC: 49 MG/DL (ref 40–60)
HGB BLD-MCNC: 15.3 G/DL (ref 13.1–17.5)
MCH RBC QN AUTO: 33.1 PG (ref 27–31)
MCHC RBC AUTO-ENTMCNC: 33.2 G/DL (ref 32–36)
MCV RBC AUTO: 99.8 FL (ref 80–99)
PLATELET # BLD AUTO: 129 10*3/MM3 (ref 150–450)
PMV BLD AUTO: 9.8 FL (ref 6–12)
POTASSIUM BLD-SCNC: 4.7 MMOL/L (ref 3.5–5.5)
PROT SERPL-MCNC: 7.4 G/DL (ref 5.7–8.2)
RBC # BLD AUTO: 4.62 10*6/MM3 (ref 4.2–5.76)
SODIUM BLD-SCNC: 138 MMOL/L (ref 132–146)
TRIGL SERPL-MCNC: 94 MG/DL (ref 0–150)
WBC NRBC COR # BLD: 6.04 10*3/MM3 (ref 3.5–10.8)

## 2018-07-31 PROCEDURE — 80053 COMPREHEN METABOLIC PANEL: CPT | Performed by: PHYSICIAN ASSISTANT

## 2018-07-31 PROCEDURE — 36415 COLL VENOUS BLD VENIPUNCTURE: CPT

## 2018-07-31 PROCEDURE — 83036 HEMOGLOBIN GLYCOSYLATED A1C: CPT | Performed by: PHYSICIAN ASSISTANT

## 2018-07-31 PROCEDURE — 93005 ELECTROCARDIOGRAM TRACING: CPT

## 2018-07-31 PROCEDURE — 99214 OFFICE O/P EST MOD 30 MIN: CPT | Performed by: INTERNAL MEDICINE

## 2018-07-31 PROCEDURE — 80061 LIPID PANEL: CPT | Performed by: PHYSICIAN ASSISTANT

## 2018-07-31 PROCEDURE — 93010 ELECTROCARDIOGRAM REPORT: CPT | Performed by: INTERNAL MEDICINE

## 2018-07-31 PROCEDURE — 85027 COMPLETE CBC AUTOMATED: CPT | Performed by: PHYSICIAN ASSISTANT

## 2018-07-31 RX ORDER — NITROGLYCERIN 0.4 MG/1
0.4 TABLET SUBLINGUAL
Status: CANCELLED | OUTPATIENT
Start: 2018-07-31 | End: 2019-07-31

## 2018-07-31 RX ORDER — ACETAMINOPHEN 325 MG/1
650 TABLET ORAL EVERY 4 HOURS PRN
Status: CANCELLED | OUTPATIENT
Start: 2018-07-31 | End: 2019-07-31

## 2018-07-31 RX ORDER — DORZOLAMIDE HCL 20 MG/ML
1 SOLUTION/ DROPS OPHTHALMIC 2 TIMES DAILY
COMMUNITY

## 2018-08-01 ENCOUNTER — HOSPITAL ENCOUNTER (OUTPATIENT)
Facility: HOSPITAL | Age: 83
Setting detail: HOSPITAL OUTPATIENT SURGERY
Discharge: HOME | End: 2018-08-01
Attending: INTERNAL MEDICINE | Admitting: INTERNAL MEDICINE

## 2018-08-01 ENCOUNTER — APPOINTMENT (OUTPATIENT)
Dept: CARDIOLOGY | Facility: HOSPITAL | Age: 83
End: 2018-08-01
Attending: INTERNAL MEDICINE

## 2018-08-01 VITALS
OXYGEN SATURATION: 96 % | HEART RATE: 71 BPM | RESPIRATION RATE: 18 BRPM | WEIGHT: 171.96 LBS | BODY MASS INDEX: 24.07 KG/M2 | DIASTOLIC BLOOD PRESSURE: 78 MMHG | TEMPERATURE: 97.2 F | HEIGHT: 71 IN | SYSTOLIC BLOOD PRESSURE: 141 MMHG

## 2018-08-01 DIAGNOSIS — R06.02 SHORTNESS OF BREATH: Primary | ICD-10-CM

## 2018-08-01 DIAGNOSIS — I25.110 CORONARY ARTERY DISEASE INVOLVING NATIVE CORONARY ARTERY OF NATIVE HEART WITH UNSTABLE ANGINA PECTORIS (HCC): ICD-10-CM

## 2018-08-01 DIAGNOSIS — I10 ESSENTIAL HYPERTENSION: ICD-10-CM

## 2018-08-01 DIAGNOSIS — I25.10 CORONARY ARTERY DISEASE INVOLVING NATIVE CORONARY ARTERY OF NATIVE HEART, ANGINA PRESENCE UNSPECIFIED: ICD-10-CM

## 2018-08-01 LAB
BH CV ECHO MEAS - AI DEC SLOPE: 147.2 CM/SEC^2
BH CV ECHO MEAS - AI MAX PG: 46.5 MMHG
BH CV ECHO MEAS - AI MAX VEL: 341 CM/SEC
BH CV ECHO MEAS - AI P1/2T: 678.4 MSEC
BH CV ECHO MEAS - AO ROOT AREA (BSA CORRECTED): 1.8
BH CV ECHO MEAS - AO ROOT AREA: 9.7 CM^2
BH CV ECHO MEAS - AO ROOT DIAM: 3.5 CM
BH CV ECHO MEAS - BSA(HAYCOCK): 2 M^2
BH CV ECHO MEAS - BSA: 2 M^2
BH CV ECHO MEAS - BZI_BMI: 23.8 KILOGRAMS/M^2
BH CV ECHO MEAS - BZI_METRIC_HEIGHT: 180.3 CM
BH CV ECHO MEAS - BZI_METRIC_WEIGHT: 77.6 KG
BH CV ECHO MEAS - CONTRAST EF (2CH): 47.4 ML/M^2
BH CV ECHO MEAS - CONTRAST EF 4CH: 59.6 ML/M^2
BH CV ECHO MEAS - EDV(CUBED): 84.8 ML
BH CV ECHO MEAS - EDV(MOD-SP2): 152 ML
BH CV ECHO MEAS - EDV(MOD-SP4): 99 ML
BH CV ECHO MEAS - EDV(TEICH): 87.4 ML
BH CV ECHO MEAS - EF(CUBED): 55.1 %
BH CV ECHO MEAS - EF(MOD-SP2): 47.4 %
BH CV ECHO MEAS - EF(MOD-SP4): 59.6 %
BH CV ECHO MEAS - EF(TEICH): 47.1 %
BH CV ECHO MEAS - ESV(CUBED): 38.1 ML
BH CV ECHO MEAS - ESV(MOD-SP2): 80 ML
BH CV ECHO MEAS - ESV(MOD-SP4): 40 ML
BH CV ECHO MEAS - ESV(TEICH): 46.3 ML
BH CV ECHO MEAS - FS: 23.4 %
BH CV ECHO MEAS - IVS/LVPW: 0.94
BH CV ECHO MEAS - IVSD: 1 CM
BH CV ECHO MEAS - LA DIMENSION: 4.9 CM
BH CV ECHO MEAS - LA/AO: 1.4
BH CV ECHO MEAS - LAT PEAK E' VEL: 4.9 CM/SEC
BH CV ECHO MEAS - LV DIASTOLIC VOL/BSA (35-75): 50.2 ML/M^2
BH CV ECHO MEAS - LV MASS(C)D: 155.1 GRAMS
BH CV ECHO MEAS - LV MASS(C)DI: 78.6 GRAMS/M^2
BH CV ECHO MEAS - LV SYSTOLIC VOL/BSA (12-30): 20.3 ML/M^2
BH CV ECHO MEAS - LVIDD: 4.4 CM
BH CV ECHO MEAS - LVIDS: 3.4 CM
BH CV ECHO MEAS - LVLD AP2: 8.7 CM
BH CV ECHO MEAS - LVLD AP4: 7.1 CM
BH CV ECHO MEAS - LVLS AP2: 8.1 CM
BH CV ECHO MEAS - LVLS AP4: 5.4 CM
BH CV ECHO MEAS - LVOT AREA (M): 3.1 CM^2
BH CV ECHO MEAS - LVOT AREA: 3.3 CM^2
BH CV ECHO MEAS - LVOT DIAM: 2 CM
BH CV ECHO MEAS - LVPWD: 1.1 CM
BH CV ECHO MEAS - MED PEAK E' VEL: 5.5 CM/SEC
BH CV ECHO MEAS - MV A MAX VEL: 86.9 CM/SEC
BH CV ECHO MEAS - MV E MAX VEL: 53.3 CM/SEC
BH CV ECHO MEAS - MV E/A: 0.61
BH CV ECHO MEAS - PA ACC SLOPE: 476.6 CM/SEC^2
BH CV ECHO MEAS - PA ACC TIME: 0.11 SEC
BH CV ECHO MEAS - PA PR(ACCEL): 31.5 MMHG
BH CV ECHO MEAS - RAP SYSTOLE: 3 MMHG
BH CV ECHO MEAS - RVSP: 24 MMHG
BH CV ECHO MEAS - SI(CUBED): 23.7 ML/M^2
BH CV ECHO MEAS - SI(MOD-SP2): 36.5 ML/M^2
BH CV ECHO MEAS - SI(MOD-SP4): 29.9 ML/M^2
BH CV ECHO MEAS - SI(TEICH): 20.9 ML/M^2
BH CV ECHO MEAS - SV(CUBED): 46.8 ML
BH CV ECHO MEAS - SV(MOD-SP2): 72 ML
BH CV ECHO MEAS - SV(MOD-SP4): 59 ML
BH CV ECHO MEAS - SV(TEICH): 41.2 ML
BH CV ECHO MEAS - TAPSE (>1.6): 1.7 CM2
BH CV ECHO MEAS - TR MAX V: 21 MMHG
BH CV ECHO MEAS - TR MAX VEL: 228.6 CM/SEC
BH CV ECHO MEASUREMENTS AVERAGE E/E' RATIO: 10.25
BH CV VAS BP RIGHT ARM: NORMAL MMHG
BH CV XLRA - RV BASE: 4.1 CM
BH CV XLRA - RV LENGTH: 7.5 CM
BH CV XLRA - RV MID: 4.1 CM
BH CV XLRA - TDI S': 7.9 CM/SEC
BNP SERPL-MCNC: 41 PG/ML (ref 0–100)
LEFT ATRIUM VOLUME INDEX: 25.4 ML/M2
MAXIMAL PREDICTED HEART RATE: 136 BPM
STRESS TARGET HR: 116 BPM

## 2018-08-01 PROCEDURE — 25010000002 SULFUR HEXAFLUORIDE MICROSPH 60.7-25 MG RECONSTITUTED SUSPENSION: Performed by: INTERNAL MEDICINE

## 2018-08-01 PROCEDURE — 93458 L HRT ARTERY/VENTRICLE ANGIO: CPT | Performed by: INTERNAL MEDICINE

## 2018-08-01 PROCEDURE — 83880 ASSAY OF NATRIURETIC PEPTIDE: CPT | Performed by: PHYSICIAN ASSISTANT

## 2018-08-01 PROCEDURE — C1760 CLOSURE DEV, VASC: HCPCS | Performed by: INTERNAL MEDICINE

## 2018-08-01 PROCEDURE — 25010000002 MIDAZOLAM PER 1 MG: Performed by: INTERNAL MEDICINE

## 2018-08-01 PROCEDURE — 25010000002 FENTANYL CITRATE (PF) 100 MCG/2ML SOLUTION: Performed by: INTERNAL MEDICINE

## 2018-08-01 PROCEDURE — 93306 TTE W/DOPPLER COMPLETE: CPT | Performed by: INTERNAL MEDICINE

## 2018-08-01 PROCEDURE — C1769 GUIDE WIRE: HCPCS | Performed by: INTERNAL MEDICINE

## 2018-08-01 PROCEDURE — C1894 INTRO/SHEATH, NON-LASER: HCPCS | Performed by: INTERNAL MEDICINE

## 2018-08-01 PROCEDURE — 0 IOPAMIDOL PER 1 ML: Performed by: INTERNAL MEDICINE

## 2018-08-01 PROCEDURE — 93306 TTE W/DOPPLER COMPLETE: CPT

## 2018-08-01 RX ORDER — AMLODIPINE BESYLATE 5 MG/1
5 TABLET ORAL DAILY
Qty: 30 TABLET | Refills: 11 | Status: SHIPPED | OUTPATIENT
Start: 2018-08-01 | End: 2018-10-04 | Stop reason: SDUPTHER

## 2018-08-01 RX ORDER — CLOPIDOGREL BISULFATE 75 MG/1
75 TABLET ORAL DAILY
Status: DISCONTINUED | OUTPATIENT
Start: 2018-08-01 | End: 2018-08-01

## 2018-08-01 RX ORDER — SODIUM CHLORIDE 9 MG/ML
1.5 INJECTION, SOLUTION INTRAVENOUS ONCE
Status: DISCONTINUED | OUTPATIENT
Start: 2018-08-01 | End: 2018-08-01

## 2018-08-01 RX ORDER — CHLORTHALIDONE 25 MG/1
12.5 TABLET ORAL DAILY
Qty: 30 TABLET | Refills: 11 | Status: SHIPPED | OUTPATIENT
Start: 2018-08-01 | End: 2018-10-04 | Stop reason: SDUPTHER

## 2018-08-01 RX ORDER — PANTOPRAZOLE SODIUM 40 MG/1
40 TABLET, DELAYED RELEASE ORAL EVERY MORNING
Status: DISCONTINUED | OUTPATIENT
Start: 2018-08-02 | End: 2018-08-01 | Stop reason: HOSPADM

## 2018-08-01 RX ORDER — CLOPIDOGREL BISULFATE 75 MG/1
75 TABLET ORAL DAILY
Status: DISCONTINUED | OUTPATIENT
Start: 2018-08-01 | End: 2018-08-01 | Stop reason: HOSPADM

## 2018-08-01 RX ORDER — NITROGLYCERIN 0.4 MG/1
0.4 TABLET SUBLINGUAL
Status: DISCONTINUED | OUTPATIENT
Start: 2018-08-01 | End: 2018-08-01 | Stop reason: HOSPADM

## 2018-08-01 RX ORDER — SODIUM CHLORIDE 9 MG/ML
3 INJECTION, SOLUTION INTRAVENOUS CONTINUOUS
Status: DISCONTINUED | OUTPATIENT
Start: 2018-08-01 | End: 2018-08-01

## 2018-08-01 RX ORDER — MIDAZOLAM HYDROCHLORIDE 1 MG/ML
INJECTION INTRAMUSCULAR; INTRAVENOUS AS NEEDED
Status: DISCONTINUED | OUTPATIENT
Start: 2018-08-01 | End: 2018-08-01 | Stop reason: HOSPADM

## 2018-08-01 RX ORDER — ASPIRIN 81 MG/1
81 TABLET ORAL NIGHTLY
Status: DISCONTINUED | OUTPATIENT
Start: 2018-08-01 | End: 2018-08-01 | Stop reason: HOSPADM

## 2018-08-01 RX ORDER — SODIUM CHLORIDE 9 MG/ML
3 INJECTION, SOLUTION INTRAVENOUS CONTINUOUS
Status: ACTIVE | OUTPATIENT
Start: 2018-08-01 | End: 2018-08-01

## 2018-08-01 RX ORDER — ACETAMINOPHEN 325 MG/1
650 TABLET ORAL EVERY 4 HOURS PRN
Status: DISCONTINUED | OUTPATIENT
Start: 2018-08-01 | End: 2018-08-01 | Stop reason: HOSPADM

## 2018-08-01 RX ORDER — FENTANYL CITRATE 50 UG/ML
INJECTION, SOLUTION INTRAMUSCULAR; INTRAVENOUS AS NEEDED
Status: DISCONTINUED | OUTPATIENT
Start: 2018-08-01 | End: 2018-08-01 | Stop reason: HOSPADM

## 2018-08-01 RX ORDER — LIDOCAINE HYDROCHLORIDE 10 MG/ML
INJECTION, SOLUTION EPIDURAL; INFILTRATION; INTRACAUDAL; PERINEURAL AS NEEDED
Status: DISCONTINUED | OUTPATIENT
Start: 2018-08-01 | End: 2018-08-01 | Stop reason: HOSPADM

## 2018-08-01 RX ADMIN — SULFUR HEXAFLUORIDE 2 ML: KIT at 18:15

## 2018-08-01 RX ADMIN — SODIUM CHLORIDE 3 ML/KG/HR: 9 INJECTION, SOLUTION INTRAVENOUS at 11:37

## 2018-08-01 RX ADMIN — CLOPIDOGREL BISULFATE 75 MG: 75 TABLET ORAL at 11:37

## 2018-08-01 NOTE — PLAN OF CARE
Problem: Patient Care Overview  Goal: Plan of Care Review  Outcome: Outcome(s) achieved Date Met: 08/01/18 08/01/18 1920   Coping/Psychosocial   Plan of Care Reviewed With patient   Plan of Care Review   Progress no change   OTHER   Outcome Summary Patient's site stable at time of discharge. The patient is being discharged home with family.      Goal: Individualization and Mutuality  Outcome: Outcome(s) achieved Date Met: 08/01/18    Goal: Discharge Needs Assessment  Outcome: Outcome(s) achieved Date Met: 08/01/18    Goal: Interprofessional Rounds/Family Conf  Outcome: Outcome(s) achieved Date Met: 08/01/18

## 2018-08-01 NOTE — INTERVAL H&P NOTE
"  H&P reviewed. The patient was examined and there are no changes to the H&P.       Vitals and labs today:  Vitals:    08/01/18 1102 08/01/18 1111 08/01/18 1141   BP: (!) 144/117 165/87 144/93   BP Location: Right arm Left arm Left arm   Patient Position: Lying Lying Lying   Pulse: 70 62 65   Resp: 20     Temp: 97.2 °F (36.2 °C)     TempSrc: Temporal Artery      SpO2: 95% 96% 96%   Weight: 78 kg (171 lb 15.3 oz)     Height: 180.3 cm (71\")         Lab Results   Component Value Date    WBC 6.04 07/31/2018    HGB 15.3 07/31/2018    HCT 46.1 07/31/2018    MCV 99.8 (H) 07/31/2018     (L) 07/31/2018     Lab Results   Component Value Date    GLUCOSE 96 07/31/2018    BUN 17 07/31/2018    CREATININE 1.30 07/31/2018    EGFRIFNONA 53 (L) 07/31/2018    BCR 13.1 07/31/2018    K 4.7 07/31/2018    CO2 22.0 07/31/2018    CALCIUM 9.4 07/31/2018    ALBUMIN 4.39 07/31/2018    AST 25 07/31/2018    ALT 18 07/31/2018     Lab Results   Component Value Date    HGBA1C 6.00 (H) 07/31/2018     Lab Results   Component Value Date    CHOL 142 07/31/2018    TRIG 94 07/31/2018    HDL 49 07/31/2018    LDL 81 07/31/2018     Assessment:  · 83 y/o WM with history of CAD, HTN, HLD, and CKD with recent onset NYHA IV shortness of breath which is his anginal equivalent. He presents for cardiac catheterization with intervention standby.    Plan:  · LHC +/- CBI. Risks, benefits and alternatives to the procedure explained to the patient and he understands and wishes to proceed.     I, Toy Landers MD, personally performed the services described as documented by the above named individual. I have made any necessary edits and it is both accurate and complete 8/1/2018  3:50 PM        "

## 2018-08-01 NOTE — H&P (VIEW-ONLY)
"  OFFICE FOLLOW UP     Date of Encounter:2018     Name: Darren Mccracken  : 1934  Address: [address] [place] [address] [address] [place] [address]  Phone:     PCP: Pritesh Pagan, APRN  [address] [facility] [id number]    Darren Mccracken is a 84 y.o. male.      Chief Complaint: Follow up of CAD, HTN, HLD - dyspnea on exertion    Problem List:   1.  Coronary Artery Disease                        A.  [facility] :  Stent to RCA with Rasta                        B.  [facility] :  Stent to 95% LAD with Boliek                        ARGELIA.  Cardiac PET 2017:  EF 62%; Normal perfusion.                        D.  NYHA IV \"anginal equivalent\" of SOA, 17                                      I. Normal LV function.                                      II. Single vessel disease, JOSE CARLOS to circumflex.                        E.  Recurrent   2.  Hypertension  3.  Hyperlipidemia  4.  GERD  5.  Renal insufficiency  6.  Glaucoma    Allergies   Allergen Reactions   • Lipitor [Atorvastatin] Myalgia       Current Medications:  •  aspirin 81 MG EC by mouth Every Night.  •  brimonidine 0.2 % ophthalmic solution, Administer 2 drops to both eyes.  •  clopidogrel 75 MG by mouth Daily.  •  DORZOLAMIDE HCL OP, Apply  to eye(s) as directed by provider Daily  •  lisinopril 40 mg by mouth Every Night  •  omeprazole 20 mg by mouth Daily.  •  simvastatin 80 MG by mouth Every Night.    History of Present Illness:  Mr. Mccracken asked to come to the office today to be seen earlier than his scheduled elective appointment.  Over the last 1-2 months, he has noted progressively severe exertional dyspnea.  Bouts of dyspnea also occur at rest on rare occasions (after he has gone to bed for the night).  He has had symptoms of relatively mild atypical chest discomfort and bilateral arm pain with his symptoms of shortness of air.  He has not had to take sublingual nitroglycerin for symptoms.  Importantly, the symptoms are identical to those previously noted " "prior to coronary interventions and  prior to a \"normal\" cardiac PET CT in April 2017 (two months prior to an angiogram that disclosed severe circumflex disease that was stented).  The patient is convinced that his \"blockage\" has returned and would like for testing that would confirm or eliminate this suspicion.          The following portions of the patient's history were reviewed and updated as appropriate: allergies, current medications and problem list.    ROS: Pertinent positives as listed in the HPI.  All other systems reviewed and negative.    Objective:    Vitals:    07/31/18 1409 07/31/18 1411   BP: 133/82 139/81   BP Location: Right arm Right arm   Patient Position: Sitting Standing   Pulse: 65 70   Weight: 78.8 kg (173 lb 12.8 oz)    Height: 180.3 cm (71\")        Physical Exam:  GENERAL: Alert, cooperative, in no acute distress.   HEENT: Normocephalic, no adenopathy, no jugular venous distention  HEART: No discrete PMI is noted. Regular rhythm, normal rate, and no murmurs, gallops, or rubs.   LUNGS: Clear to auscultation bilaterally. No wheezing, rales or ronchi.  ABDOMEN: Soft, bowel sounds present, non-tender   NEUROLOGIC: No focal abnormalities involving strength or sensation are noted.   EXTREMITIES: No clubbing, cyanosis, or edema noted. Peripheral pulses are present.    Diagnostic Data:      Procedures      Assessment and Plan:   1.  CAD: The patient's symptoms are really likely an \"anginal equivalent\" --- after discussion diagnostic options with the patient we have elected cardiac catheterization studies because of the probability that he has recurrent disease and the fact that he had a negative myocardial perfusion study in the past, just prior to stenting.  2.  HTN: These pressures are mildly elevated.  This can be addressed following catheterization studies.  3.  HLD:  This will be evaluated and addressed at the time of the patient's cardiac catheterization that is scheduled for the morning of " 8/1/18.    I, Toy Landers MD, [facility], [facility], personally performed the services described in this documentation as scribed by the above named individual in my presence, and it is both accurate and complete. At 7:08 PM on 07/31/2018    I will see Darren Mccracken back after heart catheterization.        Scribed for Toy Landers MD by Ann Apple RN. 07/31/2018 2:15 PM.        EMR Dragon/Transcription Disclaimer:  Much of this encounter note is an electronic transcription/translation of spoken language to printed text.  The electronic translation of spoken language may permit erroneous, or at times, nonsensical words or phrases to be inadvertently transcribed.  Although I have reviewed the note for such errors, some may still exist.

## 2018-09-27 ENCOUNTER — OFFICE VISIT (OUTPATIENT)
Dept: CARDIOLOGY | Facility: CLINIC | Age: 83
End: 2018-09-27

## 2018-09-27 VITALS
HEIGHT: 71 IN | SYSTOLIC BLOOD PRESSURE: 129 MMHG | BODY MASS INDEX: 24.36 KG/M2 | WEIGHT: 174 LBS | DIASTOLIC BLOOD PRESSURE: 75 MMHG | HEART RATE: 75 BPM

## 2018-09-27 DIAGNOSIS — I10 ESSENTIAL HYPERTENSION: Primary | ICD-10-CM

## 2018-09-27 DIAGNOSIS — I25.10 CORONARY ARTERY DISEASE INVOLVING NATIVE CORONARY ARTERY OF NATIVE HEART WITHOUT ANGINA PECTORIS: ICD-10-CM

## 2018-09-27 DIAGNOSIS — E78.2 MIXED HYPERLIPIDEMIA: ICD-10-CM

## 2018-09-27 DIAGNOSIS — R06.02 SHORTNESS OF BREATH: ICD-10-CM

## 2018-09-27 PROCEDURE — 99213 OFFICE O/P EST LOW 20 MIN: CPT | Performed by: INTERNAL MEDICINE

## 2018-09-27 RX ORDER — DOXAZOSIN MESYLATE 1 MG/1
1 TABLET ORAL NIGHTLY
COMMUNITY
End: 2018-10-04 | Stop reason: SDUPTHER

## 2018-10-04 RX ORDER — CHLORTHALIDONE 25 MG/1
12.5 TABLET ORAL DAILY
Qty: 30 TABLET | Refills: 11 | Status: SHIPPED | OUTPATIENT
Start: 2018-10-04 | End: 2019-04-23

## 2018-10-04 RX ORDER — AMLODIPINE BESYLATE 5 MG/1
5 TABLET ORAL DAILY
Qty: 30 TABLET | Refills: 11 | Status: SHIPPED | OUTPATIENT
Start: 2018-10-04 | End: 2019-04-23

## 2018-10-04 RX ORDER — LISINOPRIL 40 MG/1
40 TABLET ORAL NIGHTLY
Qty: 30 TABLET | Refills: 11 | Status: SHIPPED | OUTPATIENT
Start: 2018-10-04 | End: 2019-04-23 | Stop reason: SDUPTHER

## 2018-10-04 RX ORDER — CLOPIDOGREL BISULFATE 75 MG/1
75 TABLET ORAL DAILY
Qty: 30 TABLET | Refills: 6 | Status: SHIPPED | OUTPATIENT
Start: 2018-10-04 | End: 2018-10-29 | Stop reason: SDUPTHER

## 2018-10-04 RX ORDER — DOXAZOSIN MESYLATE 1 MG/1
1 TABLET ORAL NIGHTLY
Qty: 30 TABLET | Refills: 11 | Status: SHIPPED | OUTPATIENT
Start: 2018-10-04 | End: 2019-04-23

## 2018-10-24 RX ORDER — SIMVASTATIN 80 MG
TABLET ORAL
Qty: 30 TABLET | Refills: 11 | Status: SHIPPED | OUTPATIENT
Start: 2018-10-24 | End: 2019-04-23 | Stop reason: SDUPTHER

## 2018-10-29 RX ORDER — CLOPIDOGREL BISULFATE 75 MG/1
75 TABLET ORAL DAILY
Qty: 90 TABLET | Refills: 3 | Status: SHIPPED | OUTPATIENT
Start: 2018-10-29 | End: 2019-04-23

## 2019-04-23 ENCOUNTER — OFFICE VISIT (OUTPATIENT)
Dept: CARDIOLOGY | Facility: CLINIC | Age: 84
End: 2019-04-23

## 2019-04-23 VITALS
BODY MASS INDEX: 24.64 KG/M2 | DIASTOLIC BLOOD PRESSURE: 63 MMHG | HEIGHT: 71 IN | HEART RATE: 75 BPM | SYSTOLIC BLOOD PRESSURE: 98 MMHG | WEIGHT: 176 LBS

## 2019-04-23 DIAGNOSIS — I25.10 CORONARY ARTERY DISEASE INVOLVING NATIVE CORONARY ARTERY OF NATIVE HEART WITHOUT ANGINA PECTORIS: ICD-10-CM

## 2019-04-23 DIAGNOSIS — I10 ESSENTIAL HYPERTENSION: Primary | ICD-10-CM

## 2019-04-23 DIAGNOSIS — E78.2 MIXED HYPERLIPIDEMIA: ICD-10-CM

## 2019-04-23 PROCEDURE — 99214 OFFICE O/P EST MOD 30 MIN: CPT | Performed by: INTERNAL MEDICINE

## 2019-04-23 RX ORDER — SIMVASTATIN 80 MG
80 TABLET ORAL
Qty: 90 TABLET | Refills: 3 | Status: SHIPPED | OUTPATIENT
Start: 2019-04-23 | End: 2020-05-12

## 2019-04-23 RX ORDER — LISINOPRIL 20 MG/1
20 TABLET ORAL NIGHTLY
Qty: 90 TABLET | Refills: 3 | Status: SHIPPED | OUTPATIENT
Start: 2019-04-23 | End: 2019-09-28 | Stop reason: SDUPTHER

## 2019-04-23 NOTE — PROGRESS NOTES
"  OFFICE FOLLOW UP     Date of Encounter:2019     Name: Darren Mccracken  : 1934  Address: [address] [place] [address] [address] [place] [place] [address]    PCP: Pritesh Pagan, APRN  [address] [facility] [address]    Darren Mccracken is a 84 y.o. male.    Chief Complaint: Follow up of CAD, HTN, HLD    Problem List:   1.  Coronary Artery Disease                        A.  [facility] :  Stent to RCA with Rasta                        B.  [facility] :  Stent to 95% LAD with Boliek                        ARGELIA.  Cardiac PET 2017:  EF 62%; Normal perfusion.                        D.  NYHA IV \"anginal equivalent\" of SOA, 17                                      I. Normal LV function.                                      II. Single vessel disease, JOSE CARLOS to circumflex.                        E.  Recurrent ANGINA, 2018                                      I.  [facility]: Minor nonobstructive CAD, widely patent stents                                      II. Echo EF: 56-60%, moderate MR, mild AI, normal RVSP  2.  Hypertension  3.  Hyperlipidemia  4.  GERD  5.  Renal insufficiency  6.  Glaucoma    Allergies:  Allergies   Allergen Reactions   • Lipitor [Atorvastatin] Myalgia     Current Medications:  •  aspirin 81 MG EC tablet, Take 81 mg by mouth Every Night.  •  brimonidine (ALPHAGAN) 0.2 % ophthalmic solution, Administer 2 drops to the right eye 2 (Two) Times a Day  •  clopidogrel (PLAVIX) 75 MG tablet, Take 1 tablet by mouth Daily.  •  DORZOLAMIDE HCL OP, Administer 1 drop to the right eye 2 (Two) Times a Day.  •  lisinopril (PRINIVIL,ZESTRIL) 40 MG tablet, Take 1 tablet by mouth Every Night  •  omeprazole (priLOSEC) 20 MG capsule, Take 20 mg by mouth Every Night.  •  simvastatin (ZOCOR) 80 MG tablet, TAKE ONE TABLET BY MOUTH EVERY NIGHT    History of Present Illness:            Mr. Mccracken returns today for scheduled follow up. He has remained asymptomatic from a cardiac perspective and denies any angina, unusual exertional " "dyspnea, heart failure or syncope. He does have dizziness when he is standing as well as walking and is wondering whether he should see a neurologist. He remains active and works on his yard with no lifestyle limiting symptoms.     The following portions of the patient's history were reviewed and updated as appropriate: allergies, current medications and problem list.    ROS: Pertinent positives as listed in the HPI.  All other systems reviewed and negative.    Objective:  Vitals:    04/23/19 1107 04/23/19 1108   BP: 108/72 98/63   BP Location: Left arm Left arm   Patient Position: Sitting Standing   Pulse: 69 75   Weight: 79.8 kg (176 lb)    Height: 180.3 cm (71\")        Physical Exam:  GENERAL: Alert, cooperative, in no acute distress.   HEENT: Normocephalic, no adenopathy, no jugular venous distention  HEART: No discrete PMI is noted. Regular rhythm, normal rate, and no murmurs, gallops, or rubs.   LUNGS: Clear to auscultation bilaterally. No wheezing, rales or ronchi.  ABDOMEN: Soft, bowel sounds present, non-tender   NEUROLOGIC: No focal abnormalities involving strength or sensation are noted.   EXTREMITIES: No clubbing, cyanosis, or edema noted.     Diagnostic Data:  No new labs available to review.     Procedures      Assessment and Plan:   1.  CAD: active and asymptomatic without angina or heart failure symptoms. At this time, he may stop his Plavix as it has been more than 18 months since his last stent. He should remain on lifelong aspirin.   2.  HTN: controlled with some orthostatic hypotension and symptoms of dizziness. We will reduce Lisinopril to 20 mg daily as his blood pressure is on the low side. We have as well discussed staying well hydrated especially in the summer to avoid orthostatic dizziness.  3.  HLD: Continue Simvastatin to target LDL <70, followed by PCP.     I will see Darren Mccracken back in one year or sooner on an as needed basis.    Scribed for Toy Landers MD by Caity SANTOS" GURDEEP Nogueira. 04/23/2019 11:05 AM.  I, Toy Landers MD, [facility], [facility], personally performed the services described in this documentation as scribed by the above named individual in my presence, and it is both accurate and complete. At 5:44 PM on 04/23/2019        EMR Dragon/Transcription Disclaimer:  Much of this encounter note is an electronic transcription/translation of spoken language to printed text.  The electronic translation of spoken language may permit erroneous, or at times, nonsensical words or phrases to be inadvertently transcribed.  Although I have reviewed the note for such errors, some may still exist.

## 2019-06-10 ENCOUNTER — APPOINTMENT (OUTPATIENT)
Dept: NUCLEAR MEDICINE | Facility: HOSPITAL | Age: 84
End: 2019-06-10

## 2019-06-10 ENCOUNTER — HOSPITAL ENCOUNTER (OUTPATIENT)
Facility: HOSPITAL | Age: 84
Setting detail: OBSERVATION
LOS: 1 days | Discharge: HOME | End: 2019-06-12
Attending: EMERGENCY MEDICINE | Admitting: INTERNAL MEDICINE

## 2019-06-10 ENCOUNTER — APPOINTMENT (OUTPATIENT)
Dept: GENERAL RADIOLOGY | Facility: HOSPITAL | Age: 84
End: 2019-06-10

## 2019-06-10 ENCOUNTER — APPOINTMENT (OUTPATIENT)
Dept: PULMONOLOGY | Facility: HOSPITAL | Age: 84
End: 2019-06-10

## 2019-06-10 DIAGNOSIS — R06.02 SHORTNESS OF BREATH: Primary | ICD-10-CM

## 2019-06-10 DIAGNOSIS — I10 ESSENTIAL HYPERTENSION: ICD-10-CM

## 2019-06-10 DIAGNOSIS — Z86.39 HISTORY OF HYPERLIPIDEMIA: ICD-10-CM

## 2019-06-10 DIAGNOSIS — Z86.79 HISTORY OF CORONARY ARTERY DISEASE: ICD-10-CM

## 2019-06-10 DIAGNOSIS — Z86.79 HISTORY OF HYPERTENSION: ICD-10-CM

## 2019-06-10 DIAGNOSIS — I20.8 ANGINAL EQUIVALENT (HCC): ICD-10-CM

## 2019-06-10 LAB
ALBUMIN SERPL-MCNC: 3.9 G/DL (ref 3.5–5.2)
ALBUMIN/GLOB SERPL: 1.3 G/DL
ALP SERPL-CCNC: 58 U/L (ref 39–117)
ALT SERPL W P-5'-P-CCNC: 12 U/L (ref 1–41)
ANION GAP SERPL CALCULATED.3IONS-SCNC: 10 MMOL/L
AST SERPL-CCNC: 12 U/L (ref 1–40)
BASOPHILS # BLD AUTO: 0.06 10*3/MM3 (ref 0–0.2)
BASOPHILS NFR BLD AUTO: 0.6 % (ref 0–1.5)
BILIRUB SERPL-MCNC: 0.7 MG/DL (ref 0.2–1.2)
BUN BLD-MCNC: 29 MG/DL (ref 8–23)
BUN/CREAT SERPL: 19 (ref 7–25)
CALCIUM SPEC-SCNC: 9.4 MG/DL (ref 8.6–10.5)
CHLORIDE SERPL-SCNC: 102 MMOL/L (ref 98–107)
CO2 SERPL-SCNC: 23 MMOL/L (ref 22–29)
CREAT BLD-MCNC: 1.53 MG/DL (ref 0.76–1.27)
D DIMER PPP FEU-MCNC: 0.68 MCGFEU/ML (ref 0–0.56)
DEPRECATED RDW RBC AUTO: 47.5 FL (ref 37–54)
EOSINOPHIL # BLD AUTO: 0.36 10*3/MM3 (ref 0–0.4)
EOSINOPHIL NFR BLD AUTO: 3.9 % (ref 0.3–6.2)
ERYTHROCYTE [DISTWIDTH] IN BLOOD BY AUTOMATED COUNT: 13.1 % (ref 12.3–15.4)
GFR SERPL CREATININE-BSD FRML MDRD: 44 ML/MIN/1.73
GLOBULIN UR ELPH-MCNC: 2.9 GM/DL
GLUCOSE BLD-MCNC: 93 MG/DL (ref 65–99)
HCT VFR BLD AUTO: 47.6 % (ref 37.5–51)
HGB BLD-MCNC: 15.9 G/DL (ref 13–17.7)
HOLD SPECIMEN: NORMAL
HOLD SPECIMEN: NORMAL
IMM GRANULOCYTES # BLD AUTO: 0.03 10*3/MM3 (ref 0–0.05)
IMM GRANULOCYTES NFR BLD AUTO: 0.3 % (ref 0–0.5)
LYMPHOCYTES # BLD AUTO: 2.2 10*3/MM3 (ref 0.7–3.1)
LYMPHOCYTES NFR BLD AUTO: 23.8 % (ref 19.6–45.3)
MCH RBC QN AUTO: 33 PG (ref 26.6–33)
MCHC RBC AUTO-ENTMCNC: 33.4 G/DL (ref 31.5–35.7)
MCV RBC AUTO: 98.8 FL (ref 79–97)
MONOCYTES # BLD AUTO: 0.94 10*3/MM3 (ref 0.1–0.9)
MONOCYTES NFR BLD AUTO: 10.2 % (ref 5–12)
NEUTROPHILS # BLD AUTO: 5.65 10*3/MM3 (ref 1.7–7)
NEUTROPHILS NFR BLD AUTO: 61.2 % (ref 42.7–76)
NRBC BLD AUTO-RTO: 0 /100 WBC (ref 0–0.2)
NT-PROBNP SERPL-MCNC: 82.1 PG/ML (ref 5–1800)
PLATELET # BLD AUTO: 203 10*3/MM3 (ref 140–450)
PMV BLD AUTO: 9.4 FL (ref 6–12)
POTASSIUM BLD-SCNC: 4.1 MMOL/L (ref 3.5–5.2)
PROT SERPL-MCNC: 6.8 G/DL (ref 6–8.5)
RBC # BLD AUTO: 4.82 10*6/MM3 (ref 4.14–5.8)
SODIUM BLD-SCNC: 135 MMOL/L (ref 136–145)
TROPONIN T SERPL-MCNC: <0.01 NG/ML (ref 0–0.03)
WBC NRBC COR # BLD: 9.24 10*3/MM3 (ref 3.4–10.8)
WHOLE BLOOD HOLD SPECIMEN: NORMAL
WHOLE BLOOD HOLD SPECIMEN: NORMAL

## 2019-06-10 PROCEDURE — 93005 ELECTROCARDIOGRAM TRACING: CPT | Performed by: EMERGENCY MEDICINE

## 2019-06-10 PROCEDURE — 96372 THER/PROPH/DIAG INJ SC/IM: CPT

## 2019-06-10 PROCEDURE — 0 XENON XE 133: Performed by: EMERGENCY MEDICINE

## 2019-06-10 PROCEDURE — 84484 ASSAY OF TROPONIN QUANT: CPT

## 2019-06-10 PROCEDURE — G0378 HOSPITAL OBSERVATION PER HR: HCPCS

## 2019-06-10 PROCEDURE — 99214 OFFICE O/P EST MOD 30 MIN: CPT | Performed by: INTERNAL MEDICINE

## 2019-06-10 PROCEDURE — 84484 ASSAY OF TROPONIN QUANT: CPT | Performed by: NURSE PRACTITIONER

## 2019-06-10 PROCEDURE — A9558 XE133 XENON 10MCI: HCPCS | Performed by: EMERGENCY MEDICINE

## 2019-06-10 PROCEDURE — 93005 ELECTROCARDIOGRAM TRACING: CPT

## 2019-06-10 PROCEDURE — 84484 ASSAY OF TROPONIN QUANT: CPT | Performed by: EMERGENCY MEDICINE

## 2019-06-10 PROCEDURE — 85379 FIBRIN DEGRADATION QUANT: CPT | Performed by: NURSE PRACTITIONER

## 2019-06-10 PROCEDURE — A9540 TC99M MAA: HCPCS | Performed by: EMERGENCY MEDICINE

## 2019-06-10 PROCEDURE — 80053 COMPREHEN METABOLIC PANEL: CPT

## 2019-06-10 PROCEDURE — 83880 ASSAY OF NATRIURETIC PEPTIDE: CPT

## 2019-06-10 PROCEDURE — 99284 EMERGENCY DEPT VISIT MOD MDM: CPT

## 2019-06-10 PROCEDURE — 93005 ELECTROCARDIOGRAM TRACING: CPT | Performed by: NURSE PRACTITIONER

## 2019-06-10 PROCEDURE — 25010000002 HEPARIN (PORCINE) PER 1000 UNITS: Performed by: NURSE PRACTITIONER

## 2019-06-10 PROCEDURE — 94010 BREATHING CAPACITY TEST: CPT | Performed by: INTERNAL MEDICINE

## 2019-06-10 PROCEDURE — 0 TECHNETIUM ALBUMIN AGGREGATED: Performed by: EMERGENCY MEDICINE

## 2019-06-10 PROCEDURE — 93010 ELECTROCARDIOGRAM REPORT: CPT | Performed by: INTERNAL MEDICINE

## 2019-06-10 PROCEDURE — 78582 LUNG VENTILAT&PERFUS IMAGING: CPT

## 2019-06-10 PROCEDURE — 71045 X-RAY EXAM CHEST 1 VIEW: CPT

## 2019-06-10 PROCEDURE — 85025 COMPLETE CBC W/AUTO DIFF WBC: CPT

## 2019-06-10 PROCEDURE — 94010 BREATHING CAPACITY TEST: CPT

## 2019-06-10 RX ORDER — PANTOPRAZOLE SODIUM 40 MG/1
40 TABLET, DELAYED RELEASE ORAL EVERY MORNING
Status: DISCONTINUED | OUTPATIENT
Start: 2019-06-11 | End: 2019-06-12 | Stop reason: HOSPADM

## 2019-06-10 RX ORDER — HEPARIN SODIUM 5000 [USP'U]/ML
5000 INJECTION, SOLUTION INTRAVENOUS; SUBCUTANEOUS EVERY 8 HOURS SCHEDULED
Status: DISCONTINUED | OUTPATIENT
Start: 2019-06-10 | End: 2019-06-12 | Stop reason: HOSPADM

## 2019-06-10 RX ORDER — SODIUM CHLORIDE 0.9 % (FLUSH) 0.9 %
3 SYRINGE (ML) INJECTION EVERY 12 HOURS SCHEDULED
Status: DISCONTINUED | OUTPATIENT
Start: 2019-06-10 | End: 2019-06-12 | Stop reason: HOSPADM

## 2019-06-10 RX ORDER — LISINOPRIL 20 MG/1
20 TABLET ORAL NIGHTLY
Status: DISCONTINUED | OUTPATIENT
Start: 2019-06-10 | End: 2019-06-12 | Stop reason: HOSPADM

## 2019-06-10 RX ORDER — CLOPIDOGREL BISULFATE 75 MG/1
75 TABLET ORAL DAILY
Status: DISCONTINUED | OUTPATIENT
Start: 2019-06-10 | End: 2019-06-12 | Stop reason: HOSPADM

## 2019-06-10 RX ORDER — SODIUM CHLORIDE 0.9 % (FLUSH) 0.9 %
10 SYRINGE (ML) INJECTION AS NEEDED
Status: DISCONTINUED | OUTPATIENT
Start: 2019-06-10 | End: 2019-06-12 | Stop reason: HOSPADM

## 2019-06-10 RX ORDER — SODIUM CHLORIDE 0.9 % (FLUSH) 0.9 %
3-10 SYRINGE (ML) INJECTION AS NEEDED
Status: DISCONTINUED | OUTPATIENT
Start: 2019-06-10 | End: 2019-06-12 | Stop reason: HOSPADM

## 2019-06-10 RX ORDER — ACETAMINOPHEN 325 MG/1
650 TABLET ORAL EVERY 4 HOURS PRN
Status: DISCONTINUED | OUTPATIENT
Start: 2019-06-10 | End: 2019-06-12 | Stop reason: HOSPADM

## 2019-06-10 RX ORDER — ATORVASTATIN CALCIUM 40 MG/1
40 TABLET, FILM COATED ORAL DAILY
Status: DISCONTINUED | OUTPATIENT
Start: 2019-06-10 | End: 2019-06-12 | Stop reason: HOSPADM

## 2019-06-10 RX ORDER — THIAMINE HCL 100 MG
2500 TABLET ORAL DAILY
COMMUNITY

## 2019-06-10 RX ORDER — ASPIRIN 81 MG/1
81 TABLET ORAL NIGHTLY
Status: DISCONTINUED | OUTPATIENT
Start: 2019-06-10 | End: 2019-06-12 | Stop reason: HOSPADM

## 2019-06-10 RX ADMIN — CLOPIDOGREL BISULFATE 75 MG: 75 TABLET ORAL at 19:01

## 2019-06-10 RX ADMIN — HEPARIN SODIUM 5000 UNITS: 5000 INJECTION, SOLUTION INTRAVENOUS; SUBCUTANEOUS at 20:51

## 2019-06-10 RX ADMIN — Medication 1 DOSE: at 16:40

## 2019-06-10 RX ADMIN — ASPIRIN 81 MG: 81 TABLET, COATED ORAL at 20:50

## 2019-06-10 RX ADMIN — SODIUM CHLORIDE, PRESERVATIVE FREE 3 ML: 5 INJECTION INTRAVENOUS at 20:51

## 2019-06-10 RX ADMIN — LISINOPRIL 20 MG: 20 TABLET ORAL at 20:50

## 2019-06-10 RX ADMIN — XENON XE-133 10.33 MILLICURIE: 10 GAS RESPIRATORY (INHALATION) at 16:30

## 2019-06-10 RX ADMIN — ATORVASTATIN CALCIUM 40 MG: 40 TABLET, FILM COATED ORAL at 19:01

## 2019-06-10 NOTE — ED PROVIDER NOTES
Subjective   The patient presents to the emergency department with progressively worsening shortness of breath and dyspnea on exertion for the last 1 or 2 months.  Patient has a history of coronary artery disease, hypertension, and hyperlipidemia.  Patient reports some aching pain in his bilateral shoulders.  Patient is unsure of his last cardiac evaluation but is concerned about potential for coronary artery disease as the source of his symptoms.  Patient is followed by Dr. Landers.  Patient denies any chest pain at this time and states when he has acute coronary syndrome, he typically does have chest pain associated.  He denies any recent long travel.  No history of DVT or PE.  No leg pain or swelling.  No fever or chills.  No cough or congestion.  Patient denies any history of smoking.  No other acute symptoms or complaints this time.        History provided by:  Patient      Review of Systems   Constitutional: Negative.    Respiratory: Positive for shortness of breath.    Cardiovascular: Negative.    Gastrointestinal: Negative.    Musculoskeletal: Negative.    Skin: Negative.    Neurological: Negative.    Psychiatric/Behavioral: Negative.    All other systems reviewed and are negative.      Past Medical History:   Diagnosis Date   • Arthritis     knees   • Cancer (CMS/HCC)    • Coronary artery disease    • Coronary artery disease involving native coronary artery of native heart without angina pectoris 7/31/2018    Added automatically from request for surgery [id number]   • GERD (gastroesophageal reflux disease)    • Glaucoma    • History of transfusion    • Hyperlipidemia    • Hypertension    • Kidney stone    • Pneumonia     right upper lobe    • Pulmonary embolism (CMS/HCC)    • Skin cancer    • Wears glasses        Allergies   Allergen Reactions   • Lipitor [Atorvastatin] Myalgia       Past Surgical History:   Procedure Laterality Date   • CARDIAC CATHETERIZATION N/A 6/21/2017    Procedure: Left Heart Cath;   Surgeon: Toy Landers MD;  Location:  [facility];  Service:    • CARDIAC CATHETERIZATION N/A 8/1/2018    Procedure: Left Heart Cath;  Surgeon: Toy Landers MD;  Location:  [facility];  Service: Cardiology   • CATARACT EXTRACTION W/ INTRAOCULAR LENS  IMPLANT, BILATERAL     • CHOLECYSTECTOMY  2010   • COLONOSCOPY     • CORONARY ANGIOPLASTY WITH STENT PLACEMENT     • CORONARY STENT PLACEMENT      x3   • EYE SURGERY Right    • SKIN CANCER EXCISION     • WRIST SURGERY         Family History   Problem Relation Age of Onset   • Heart attack Father        Social History     Socioeconomic History   • Marital status:      Spouse name: Not on file   • Number of children: Not on file   • Years of education: Not on file   • Highest education level: Not on file   Tobacco Use   • Smoking status: Never Smoker   • Smokeless tobacco: Never Used   Substance and Sexual Activity   • Alcohol use: No   • Drug use: No   • Sexual activity: Defer   Social History Narrative    Lives with his wife           Objective   Physical Exam   Constitutional: He is oriented to person, place, and time. He appears well-developed and well-nourished.   HENT:   Head: Normocephalic and atraumatic.   Cardiovascular: Normal rate, regular rhythm and intact distal pulses.   Pulmonary/Chest: Effort normal and breath sounds normal. No accessory muscle usage. No tachypnea. No respiratory distress. He exhibits no tenderness, no edema and no swelling.   Abdominal: Soft. There is no tenderness. There is no guarding.   Musculoskeletal: Normal range of motion.        Right lower leg: Normal. He exhibits no tenderness and no edema.        Left lower leg: Normal. He exhibits no tenderness and no edema.   Neurological: He is alert and oriented to person, place, and time.   Skin: Skin is warm and dry. Capillary refill takes less than 2 seconds.   Psychiatric: He has a normal mood and affect. His behavior is normal.    Nursing note and vitals reviewed.      Procedures           ED Course  ED Course as of Rainer 10 1942   Mon Rainer 10, 2019   1022 Creatinine: (!) 1.53 [RS]   1022 Sodium: (!) 135 [RS]   1100 Troponin T: <0.010 [RS]   1100 The patient has shortness of breath which has been progressively worsening.  Patient is concerned about possible cardiac etiology.  Patient does have a history of coronary artery disease but states he typically gets chest pain with that.  Patient denies any recent cardiac evaluation.  Will consult cardiology.  [RS]   1101 Cardiology consulted.  [RS]   1238 Dr. Beltran is at the patients bedside at this time, updating the patient.  [CN]   1254 Troponin T: <0.010 [RS]   1407 I reviewed the case with cardiology who plans to admit the patient for diagnostic catheterization.  [RS]   1414 I talked with the patient about the recommendations from cardiology with plan.  Patient was initially resistant but is agreeable to stay in the hospital for evaluation and diagnostic catheterization as advised.  [RS]      ED Course User Index  [CN] Saritha Larkin  [RS] Chris Beltran MD                  MDM  Number of Diagnoses or Management Options  Anginal equivalent (CMS/[facility]):   History of coronary artery disease:   History of hyperlipidemia:   History of hypertension:   Shortness of breath:   Diagnosis management comments: Recent Results (from the past 24 hour(s))  -Comprehensive Metabolic Panel  Collection Time: 06/10/19  9:15 AM       Result                      Value             Ref Range           Glucose                     93                65 - 99 mg/dL       BUN                         29 (H)            8 - 23 mg/dL        Creatinine                  1.53 (H)          0.76 - 1.27 *       Sodium                      135 (L)           136 - 145 mm*       Potassium                   4.1               3.5 - 5.2 mm*       Chloride                    102               98 - 107 mmo*       CO2                          23.0              22.0 - 29.0 *       Calcium                     9.4               8.6 - 10.5 m*       Total Protein               6.8               6.0 - 8.5 g/*       Albumin                     3.90              3.50 - 5.20 *       ALT (SGPT)                  12                1 - 41 U/L          AST (SGOT)                  12                1 - 40 U/L          Alkaline Phosphatase        58                39 - 117 U/L        Total Bilirubin             0.7               0.2 - 1.2 mg*       eGFR Non  Amer       44 (L)            >60 mL/min/1*       Globulin                    2.9               gm/dL               A/G Ratio                   1.3               g/dL                BUN/Creatinine Ratio        19.0              7.0 - 25.0          Anion Gap                   10.0              mmol/L         -BNP  Collection Time: 06/10/19  9:15 AM       Result                      Value             Ref Range           proBNP                      82.1              5.0-1,800.0 *  -Troponin  Collection Time: 06/10/19  9:15 AM       Result                      Value             Ref Range           Troponin T                  <0.010            0.000 - 0.03*  -Light Blue Top  Collection Time: 06/10/19  9:15 AM       Result                      Value             Ref Range           Extra Tube                                                    hold for add-on  -Green Top (Gel)  Collection Time: 06/10/19  9:15 AM       Result                      Value             Ref Range           Extra Tube                                                    Hold for add-ons.  -Lavender Top  Collection Time: 06/10/19  9:15 AM       Result                      Value             Ref Range           Extra Tube                                                    hold for add-on  -Gold Top - SST  Collection Time: 06/10/19  9:15 AM       Result                      Value             Ref Range           Extra Tube                                                     Hold for add-ons.  -CBC Auto Differential  Collection Time: 06/10/19  9:15 AM       Result                      Value             Ref Range           WBC                         9.24              3.40 - 10.80*       RBC                         4.82              4.14 - 5.80 *       Hemoglobin                  15.9              13.0 - 17.7 *       Hematocrit                  47.6              37.5 - 51.0 %       MCV                         98.8 (H)          79.0 - 97.0 *       MCH                         33.0              26.6 - 33.0 *       MCHC                        33.4              31.5 - 35.7 *       RDW                         13.1              12.3 - 15.4 %       RDW-SD                      47.5              37.0 - 54.0 *       MPV                         9.4               6.0 - 12.0 fL       Platelets                   203               140 - 450 10*       Neutrophil %                61.2              42.7 - 76.0 %       Lymphocyte %                23.8              19.6 - 45.3 %       Monocyte %                  10.2              5.0 - 12.0 %        Eosinophil %                3.9               0.3 - 6.2 %         Basophil %                  0.6               0.0 - 1.5 %         Immature Grans %            0.3               0.0 - 0.5 %         Neutrophils, Absolute       5.65              1.70 - 7.00 *       Lymphocytes, Absolute       2.20              0.70 - 3.10 *       Monocytes, Absolute         0.94 (H)          0.10 - 0.90 *       Eosinophils, Absolute       0.36              0.00 - 0.40 *       Basophils, Absolute         0.06              0.00 - 0.20 *       Immature Grans, Absolu*     0.03              0.00 - 0.05 *       nRBC                        0.0               0.0 - 0.2 /1*  -Troponin  Collection Time: 06/10/19 11:52 AM       Result                      Value             Ref Range           Troponin T                  <0.010            0.000 - 0.03*  -D-dimer,  Quantitative  Collection Time: 06/10/19  3:31 PM       Result                      Value             Ref Range           D-Dimer, Quantitative       0.68 (H)          0.00 - 0.56 *  -Troponin  Collection Time: 06/10/19  5:47 PM       Result                      Value             Ref Range           Troponin T                  <0.010            0.000 - 0.03*  Note: In addition to lab results from this visit, the labs listed above may include labs taken at another facility or during a different encounter within the last 24 hours. Please correlate lab times with ED admission and discharge times for further clarification of the services performed during this visit.    NM Lung Ventilation Perfusion   Preliminary Result    Mild obstructive component seen in the mid and lower lung    fields bilaterally. Low probability for pulmonary embolism.         D:  06/10/2019    E:  06/10/2019                     XR Chest 1 View   Final Result    Underlying chronic and emphysematous changes seen within the    lung fields with no evidence of acute parenchymal disease.         D:  06/10/2019    E:  06/10/2019         This report was finalized on 6/10/2019 4:23 PM by Dr. Haylie Saleh MD.          ------------------------------------------------------------------            06/10/19  06/10/19  06/10/19        06/10/19                      1400      1415      1545            1706            ------------------------------------------------------------------   BP:       123/75    127/93    160/88          149/94            BP Location:                                     Left arm           Patient Position:                                      Sitting           Pulse:      65        66        58              63              Resp:                                           20              Temp:                                    97.7 °F (36.5 °C)      TempSrc:                     "                   Oral             SpO2:      97%       95%       97%              91%             Weight:                               77.8 kg (171 lb 9.6 oz)   Height:                                  180.3 cm (70.98\")     ------------------------------------------------------------------  Medications  sodium chloride 0.9 % flush 10 mL (not administered)  aspirin EC tablet 81 mg (not administered)  lisinopril (PRINIVIL,ZESTRIL) tablet 20 mg (not administered)  pantoprazole (PROTONIX) EC tablet 40 mg (not administered)  atorvastatin (LIPITOR) tablet 40 mg (40 mg Oral Given 6/10/19 1901)  sodium chloride 0.9 % flush 3 mL (not administered)  sodium chloride 0.9 % flush 3-10 mL (not administered)  heparin (porcine) 5000 UNIT/ML injection 5,000 Units (not administered)  clopidogrel (PLAVIX) tablet 75 mg (75 mg Oral Given 6/10/19 1901)  acetaminophen (TYLENOL) tablet 650 mg (not administered)  xenon xe 133 gas 10 mCi 10.33 millicurie (10.33 millicuries Inhalation Given 6/10/19 1630)  technetium albumin aggregated (MAA) solution 1 dose (1 dose Intravenous Given 6/10/19 1640)  ECG/EMG Results (last 24 hours)     Procedure Component Value Units Date/Time    ECG 12 Lead ([id number]) Collected:  06/10/19 0911     Updated:  06/10/19 1105    Narrative:       Test Reason : SOA  Blood Pressure : **/** mmHG  Vent. Rate : 066 BPM     Atrial Rate : 066 BPM     P-R Int : 178 ms          QRS Dur : 098 ms      QT Int : 406 ms       P-R-T Axes : -05 -56 022 degrees     QTc Int : 425 ms    Normal sinus rhythm with sinus arrhythmia  Left anterior fascicular block  Minimal voltage criteria for LVH, may be normal variant  Abnormal ECG  When compared with ECG of 31-JUL-2018 15:57,  No significant change was found  Confirmed by JIGNA ABDALLA MD ([id number]) on 6/10/2019 11:05:13 AM    Referred By:  PENCE           Confirmed By:JIGNA ABDALLA MD    ECG 12 Lead ([id number]) Collected:  06/10/19 1720     Updated:  06/10/19 1840 "    Narrative:       Test Reason : chest pain  Blood Pressure : **/** mmHG  Vent. Rate : 058 BPM     Atrial Rate : 058 BPM     P-R Int : 192 ms          QRS Dur : 094 ms      QT Int : 432 ms       P-R-T Axes : 052 -43 -04 degrees     QTc Int : 424 ms    Sinus bradycardia  Left axis deviation  Minimal voltage criteria for LVH, may be normal variant  Abnormal ECG  When compared with ECG of 10-ANKUSH-2019 12:00, (Unconfirmed)  No significant change was found  Confirmed by DARIUS FLYNN MD ([id number]) on 6/10/2019 6:39:58 PM    Referred By:  MARLEY           Confirmed By:DARIUS FLYNN MD    ECG 12 Lead ([id number]) Collected:  06/10/19 1200     Updated:  06/10/19 1848    Narrative:       Test Reason : 2nd set  Blood Pressure : **/** mmHG  Vent. Rate : 056 BPM     Atrial Rate : 056 BPM     P-R Int : 184 ms          QRS Dur : 090 ms      QT Int : 434 ms       P-R-T Axes : -15 -47 -16 degrees     QTc Int : 418 ms    Sinus bradycardia  Left anterior fascicular block  Minimal voltage criteria for LVH, may be normal variant  Abnormal ECG  When compared with ECG of 10-ANKUSH-2019 09:11,  Inverted T waves have replaced nonspecific T wave abnormality in Inferior  leads  Confirmed by JIGNA ABDALLA MD ([id number]) on 6/10/2019 6:48:01 PM    Referred By:  yolanda           Confirmed By:JIGNA ABDALLA MD      ECG 12 Lead   Final Result    Test Reason : 2nd set    Blood Pressure : **/** mmHG    Vent. Rate : 056 BPM     Atrial Rate : 056 BPM       P-R Int : 184 ms          QRS Dur : 090 ms        QT Int : 434 ms       P-R-T Axes : -15 -47 -16 degrees       QTc Int : 418 ms        Sinus bradycardia    Left anterior fascicular block    Minimal voltage criteria for LVH, may be normal variant    Abnormal ECG    When compared with ECG of 10-ANKUSH-2019 09:11,    Inverted T waves have replaced nonspecific T wave abnormality in Inferior    leads    Confirmed by JIGNA ABDALLA MD ([id number]) on 6/10/2019 6:48:01 PM        Referred By:  yolanda           Confirmed By:JIGNA  RM HUYNH     ECG 12 Lead   Final Result    Test Reason : chest pain    Blood Pressure : **/** mmHG    Vent. Rate : 058 BPM     Atrial Rate : 058 BPM       P-R Int : 192 ms          QRS Dur : 094 ms        QT Int : 432 ms       P-R-T Axes : 052 -43 -04 degrees       QTc Int : 424 ms        Sinus bradycardia    Left axis deviation    Minimal voltage criteria for LVH, may be normal variant    Abnormal ECG    When compared with ECG of 10-ANKUSH-2019 12:00, (Unconfirmed)    No significant change was found    Confirmed by DARIUS FLYNN MD ([id number]) on 6/10/2019 6:39:58 PM        Referred By:  MARLEY           Confirmed By:DARIUS FLYNN MD     ECG 12 Lead   Final Result    Test Reason : SOA    Blood Pressure : **/** mmHG    Vent. Rate : 066 BPM     Atrial Rate : 066 BPM       P-R Int : 178 ms          QRS Dur : 098 ms        QT Int : 406 ms       P-R-T Axes : -05 -56 022 degrees       QTc Int : 425 ms        Normal sinus rhythm with sinus arrhythmia    Left anterior fascicular block    Minimal voltage criteria for LVH, may be normal variant    Abnormal ECG    When compared with ECG of 31-JUL-2018 15:57,    No significant change was found    Confirmed by JIGNA BELTRAN MD (162) on 6/10/2019 11:05:13 AM        Referred By:  ELMER           Confirmed By:JIGNA BELTRAN MD     ECG 12 Lead    (Results Pending)         Amount and/or Complexity of Data Reviewed  Clinical lab tests: reviewed  Tests in the radiology section of CPT®: reviewed  Decide to obtain previous medical records or to obtain history from someone other than the patient: yes  Obtain history from someone other than the patient: yes  Discuss the patient with other providers: yes  Independent visualization of images, tracings, or specimens: yes          Final diagnoses:   Shortness of breath   History of coronary artery disease   Anginal equivalent (CMS/HCC)   History of hypertension   History of hyperlipidemia            Jigna Beltran MD  06/10/19 8626

## 2019-06-10 NOTE — PROGRESS NOTES
Discharge Planning Assessment  [facility]     Patient Name: Darren Mccracken  MRN: [id number]  Today's Date: 6/10/2019    Admit Date: 6/10/2019    Discharge Needs Assessment     Row Name 06/10/19 9650       Living Environment    Lives With  spouse    Name(s) of Who Lives With Patient  Karma    Current Living Arrangements  home/apartment/condo    Primary Care Provided by  self    Provides Primary Care For  no one    Family Caregiver if Needed  spouse    Family Caregiver Names  Karma    Quality of Family Relationships  helpful;involved;supportive    Able to Return to Prior Arrangements  yes       Resource/Environmental Concerns    Resource/Environmental Concerns  none       Transition Planning    Patient/Family Anticipates Transition to  home with family    Patient/Family Anticipated Services at Transition  none    Transportation Anticipated  family or friend will provide       Discharge Needs Assessment    Readmission Within the Last 30 Days  no previous admission in last 30 days    Concerns to be Addressed  no discharge needs identified    Equipment Currently Used at Home  none    Anticipated Changes Related to Illness  none    Equipment Needed After Discharge  none        Discharge Plan     Row Name 06/10/19 4419       Plan    Plan  home    Patient/Family in Agreement with Plan  yes    Plan Comments  CM spoke with pt and wife Karma at bedside. Pt resides with his wife and is independent of ADL's. Pt denies use of DME, is not current with home health or other outpt services at this time. Pt plans to return home and denies needs at this time. CM will continue to follow.     Final Discharge Disposition Code  01 - home or self-care        Destination      No service coordination in this encounter.      Durable Medical Equipment      No service coordination in this encounter.      Dialysis/Infusion      No service coordination in this encounter.      Home Medical Care      No service coordination in this encounter.       Therapy      No service coordination in this encounter.      Community Resources      No service coordination in this encounter.          Demographic Summary     Row Name 06/10/19 1432       General Information    Admission Type  observation    Required Notices Provided  Observation Status Notice    Referral Source  admission list    Reason for Consult  discharge planning    Preferred Language  English    General Information Comments  PCP- EAGLE Mortensen       Contact Information    Permission Granted to Share Info With      Contact Information Comments  home- [phone] or cell- [phone]        Functional Status     Row Name 06/10/19 1449       Functional Status    Usual Activity Tolerance  moderate    Current Activity Tolerance  fair       Functional Status, IADL    Medications  independent    Meal Preparation  independent    Housekeeping  independent    Laundry  independent    Shopping  independent       Employment/    Employment/ Comments  Pt confirms he has Medicare and [employer], denies concerns or disruption in coverage. Pt confirms he has prescription drug coverage through [employer] and denies issues obtaninig or affording current medications.         Psychosocial    No documentation.       Abuse/Neglect    No documentation.       Legal    No documentation.       Substance Abuse    No documentation.       Patient Forms    No documentation.           Bernie Acosta

## 2019-06-10 NOTE — PLAN OF CARE
Problem: Patient Care Overview  Goal: Plan of Care Review  Outcome: Ongoing (interventions implemented as appropriate)   06/10/19 7142   Coping/Psychosocial   Plan of Care Reviewed With patient   Plan of Care Review   Progress no change   OTHER   Outcome Summary Pt come up from the ED. No c/o pain. VSS. Will cont.. to monitor.

## 2019-06-11 ENCOUNTER — APPOINTMENT (OUTPATIENT)
Dept: CARDIOLOGY | Facility: HOSPITAL | Age: 84
End: 2019-06-11

## 2019-06-11 LAB
ANION GAP SERPL CALCULATED.3IONS-SCNC: 10 MMOL/L
BUN BLD-MCNC: 26 MG/DL (ref 8–23)
BUN/CREAT SERPL: 20.2 (ref 7–25)
CALCIUM SPEC-SCNC: 9.3 MG/DL (ref 8.6–10.5)
CHLORIDE SERPL-SCNC: 104 MMOL/L (ref 98–107)
CHOLEST SERPL-MCNC: 137 MG/DL (ref 0–200)
CO2 SERPL-SCNC: 24 MMOL/L (ref 22–29)
CREAT BLD-MCNC: 1.29 MG/DL (ref 0.76–1.27)
DEPRECATED RDW RBC AUTO: 46.6 FL (ref 37–54)
ERYTHROCYTE [DISTWIDTH] IN BLOOD BY AUTOMATED COUNT: 13 % (ref 12.3–15.4)
GFR SERPL CREATININE-BSD FRML MDRD: 53 ML/MIN/1.73
GLUCOSE BLD-MCNC: 99 MG/DL (ref 65–99)
HBA1C MFR BLD: 5.7 % (ref 4.8–5.6)
HCT VFR BLD AUTO: 44.7 % (ref 37.5–51)
HDLC SERPL-MCNC: 46 MG/DL (ref 40–60)
HGB BLD-MCNC: 14.4 G/DL (ref 13–17.7)
LDLC SERPL CALC-MCNC: 68 MG/DL (ref 0–100)
LDLC/HDLC SERPL: 1.48 {RATIO}
MCH RBC QN AUTO: 31.5 PG (ref 26.6–33)
MCHC RBC AUTO-ENTMCNC: 32.2 G/DL (ref 31.5–35.7)
MCV RBC AUTO: 97.8 FL (ref 79–97)
PLATELET # BLD AUTO: 194 10*3/MM3 (ref 140–450)
PMV BLD AUTO: 9.7 FL (ref 6–12)
POTASSIUM BLD-SCNC: 4.7 MMOL/L (ref 3.5–5.2)
RBC # BLD AUTO: 4.57 10*6/MM3 (ref 4.14–5.8)
SODIUM BLD-SCNC: 138 MMOL/L (ref 136–145)
TRIGL SERPL-MCNC: 114 MG/DL (ref 0–150)
TROPONIN T SERPL-MCNC: <0.01 NG/ML (ref 0–0.03)
VLDLC SERPL-MCNC: 22.8 MG/DL
WBC NRBC COR # BLD: 8.73 10*3/MM3 (ref 3.4–10.8)

## 2019-06-11 PROCEDURE — 96372 THER/PROPH/DIAG INJ SC/IM: CPT

## 2019-06-11 PROCEDURE — 78492 MYOCRD IMG PET MLT RST&STRS: CPT

## 2019-06-11 PROCEDURE — G0378 HOSPITAL OBSERVATION PER HR: HCPCS

## 2019-06-11 PROCEDURE — 93018 CV STRESS TEST I&R ONLY: CPT | Performed by: INTERNAL MEDICINE

## 2019-06-11 PROCEDURE — A9555 RB82 RUBIDIUM: HCPCS | Performed by: PHYSICIAN ASSISTANT

## 2019-06-11 PROCEDURE — 78492 MYOCRD IMG PET MLT RST&STRS: CPT | Performed by: INTERNAL MEDICINE

## 2019-06-11 PROCEDURE — 80048 BASIC METABOLIC PNL TOTAL CA: CPT | Performed by: NURSE PRACTITIONER

## 2019-06-11 PROCEDURE — 93017 CV STRESS TEST TRACING ONLY: CPT

## 2019-06-11 PROCEDURE — 85027 COMPLETE CBC AUTOMATED: CPT | Performed by: NURSE PRACTITIONER

## 2019-06-11 PROCEDURE — 0 RUBIDIUM CHLORIDE: Performed by: PHYSICIAN ASSISTANT

## 2019-06-11 PROCEDURE — 80061 LIPID PANEL: CPT | Performed by: NURSE PRACTITIONER

## 2019-06-11 PROCEDURE — 99214 OFFICE O/P EST MOD 30 MIN: CPT | Performed by: PHYSICIAN ASSISTANT

## 2019-06-11 PROCEDURE — 83036 HEMOGLOBIN GLYCOSYLATED A1C: CPT | Performed by: NURSE PRACTITIONER

## 2019-06-11 PROCEDURE — 25010000002 HEPARIN (PORCINE) PER 1000 UNITS: Performed by: NURSE PRACTITIONER

## 2019-06-11 PROCEDURE — 25010000002 REGADENOSON 0.4 MG/5ML SOLUTION: Performed by: PHYSICIAN ASSISTANT

## 2019-06-11 RX ADMIN — HEPARIN SODIUM 5000 UNITS: 5000 INJECTION, SOLUTION INTRAVENOUS; SUBCUTANEOUS at 05:08

## 2019-06-11 RX ADMIN — ASPIRIN 81 MG: 81 TABLET, COATED ORAL at 20:42

## 2019-06-11 RX ADMIN — LISINOPRIL 20 MG: 20 TABLET ORAL at 20:42

## 2019-06-11 RX ADMIN — PANTOPRAZOLE SODIUM 40 MG: 40 TABLET, DELAYED RELEASE ORAL at 05:08

## 2019-06-11 RX ADMIN — SODIUM CHLORIDE, PRESERVATIVE FREE 3 ML: 5 INJECTION INTRAVENOUS at 20:42

## 2019-06-11 RX ADMIN — ATORVASTATIN CALCIUM 40 MG: 40 TABLET, FILM COATED ORAL at 20:41

## 2019-06-11 RX ADMIN — REGADENOSON 0.4 MG: 0.08 INJECTION, SOLUTION INTRAVENOUS at 12:45

## 2019-06-11 RX ADMIN — HEPARIN SODIUM 5000 UNITS: 5000 INJECTION, SOLUTION INTRAVENOUS; SUBCUTANEOUS at 20:42

## 2019-06-11 RX ADMIN — RUBIDIUM CHLORIDE RB-82 1 DOSE: 150 INJECTION, SOLUTION INTRAVENOUS at 12:33

## 2019-06-11 RX ADMIN — RUBIDIUM CHLORIDE RB-82 1 DOSE: 150 INJECTION, SOLUTION INTRAVENOUS at 12:46

## 2019-06-11 NOTE — PROGRESS NOTES
Continued Stay Note  [facility]     Patient Name: Darren Mccracken  MRN: [id number]  Today's Date: 6/11/2019    Admit Date: 6/10/2019    Discharge Plan     Row Name 06/11/19 1516       Plan    Plan  Home with wife    Patient/Family in Agreement with Plan  yes    Plan Comments  Met with pt at bedside. Plan is still home. Denies needs. CM will cont to follow.         Discharge Codes    No documentation.       Expected Discharge Date and Time     Expected Discharge Date Expected Discharge Time    Jun 13, 2019             Bri Kearney

## 2019-06-11 NOTE — NURSING NOTE
"Patient came to nurses station around 3:00 pm and asked to be discharged. He said that he had been waiting long enough and that the echo vascular lab told him that they would \"give it to  to read right away\". He said 2 hours was adequate time for it to be read and he wants to go home. I explained to the patient that he may be in clinic and may not have read the stress yet, but I would call the PA and check on it. Radha MCKEON called me back soon after and stated that they were out of town and that they would not be back to read the stress until late tonight. I explained this to the patient and he became very angry. The patient told me he was leaving and asked if his insurance would pay for the stay. I advised the patient that we cannot say for sure if his stay would be covered. I tried to reason with the patient that his stress was originally scheduled for tomorrow and that  spoke with them to move it to today, so the patient was not supposed to be discharged today anyway. The patient said there is no reason why he cannot go home and come back tomorrow to the office/cath lab for results or stent. I explained to the patient the process and how we probably cannot bring him in the next day for a cath if he needed it, that the inpatient and emergent patients come first. The patient then states that this is because the doctors do not want to help people and that  Ian would be more willing to help. I told the patient that we are not forcing him to stay against his will and that I will remove his IV/ heart monitor if he wishes so that he can leave. The patient told me that he \"didn't want this to be a court case\". I explained that this wouldn't be a legal issue, it is a paper for him to sign. He stated, \"No, Im telling you that if my insurance doesn't pay for this stay I am suing you\". I then explained to the patient that I was uncomfortable proceeding in this conversation and that I would call my charge " nurse to come and speak with him. Charge nurse called.

## 2019-06-11 NOTE — PLAN OF CARE
Problem: Patient Care Overview  Goal: Plan of Care Review  Outcome: Ongoing (interventions implemented as appropriate)   06/11/19 0532   Coping/Psychosocial   Plan of Care Reviewed With patient   Plan of Care Review   Progress no change   OTHER   Outcome Summary VSS. SR on monitor. RA. NPO after MN for possible heart cath. No complaints. Will continue to monitor,

## 2019-06-11 NOTE — PROGRESS NOTES
"  [facility] at [facility]  PROGRESS NOTE    Date of Admission: 6/10/2019  Length of Stay: 1  Primary Care Physician: Pritesh Pagan APRN    Chief Complaint: f/u dyspnea   Problem List:   1.  Coronary Artery Disease                        A.  [facility] 2005:  Stent to RCA with Rasta                        B.  [facility] 2011:  Stent to 95% LAD with Boliek                        ARGELIA.  Cardiac PET 4/26/2017:  EF 62%; Normal perfusion.                        D.  NYHA IV \"anginal equivalent\" of SOA, 6/21/17                                      I. Normal LV function.                                      II. Single vessel disease, JOSE CARLOS to circumflex.                        E.  Recurrent ANGINA, 8/1/2018                                      I.  [facility]: Minor nonobstructive CAD, widely patent stents                                      II. Echo EF: 56-60%, moderate MR, mild AI, normal RVSP  2.  Hypertension  3.  Hyperlipidemia  4.  History pulmonary embolism 7 years ago  5.  GERD  6.  Renal insufficiency  7.  Glaucoma      Subjective      Patient stable and asymptomatic at rest. Denies angina or dyspnea at rest. Has dyspnea with exertion only.       Objective   Vitals: /77 (BP Location: Left arm, Patient Position: Lying)   Pulse 60   Temp 97.6 °F (36.4 °C) (Oral)   Resp 16   Ht 180.3 cm (70.98\")   Wt 77.8 kg (171 lb 9.6 oz)   SpO2 94%   BMI 23.94 kg/m²     Physical Exam:  GENERAL: Alert, cooperative, in no acute distress.   HEENT: Normocephalic, no jugular venous distention  HEART: No discrete PMI is noted. Regular rhythm, normal rate, and no murmurs, gallops, or rubs.   LUNGS: Clear to auscultation bilaterally. No wheezing, rales or rhonchi.  ABDOMEN: Soft, bowel sounds present, non-tender   NEUROLOGIC: No focal abnormalities involving strength or sensation are noted.   EXTREMITIES: No clubbing, cyanosis, or edema noted.    Results:  Results from last 7 days   Lab Units 06/11/19  1656 " 06/10/19  0915   WBC 10*3/mm3 8.73 9.24   HEMOGLOBIN g/dL 14.4 15.9   HEMATOCRIT % 44.7 47.6   PLATELETS 10*3/mm3 194 203     Results from last 7 days   Lab Units 06/11/19  0506 06/10/19  0915   SODIUM mmol/L 138 135*   POTASSIUM mmol/L 4.7 4.1   CHLORIDE mmol/L 104 102   CO2 mmol/L 24.0 23.0   BUN mg/dL 26* 29*   CREATININE mg/dL 1.29* 1.53*   GLUCOSE mg/dL 99 93      Lab Results   Component Value Date    CHOL 137 06/11/2019    TRIG 114 06/11/2019    HDL 46 06/11/2019    LDL 68 06/11/2019    AST 12 06/10/2019    ALT 12 06/10/2019     Results from last 7 days   Lab Units 06/11/19  0506   HEMOGLOBIN A1C % 5.70*     Results from last 7 days   Lab Units 06/11/19  0506   CHOLESTEROL mg/dL 137   TRIGLYCERIDES mg/dL 114   HDL CHOL mg/dL 46   LDL CHOL mg/dL 68       Results from last 7 days   Lab Units 06/10/19  2314 06/10/19  1747 06/10/19  1152   TROPONIN T ng/mL <0.010 <0.010 <0.010     Results from last 7 days   Lab Units 06/10/19  0915   PROBNP pg/mL 82.1       Intake/Output Summary (Last 24 hours) at 6/11/2019 0930  Last data filed at 6/10/2019 1750  Gross per 24 hour   Intake 420 ml   Output 600 ml   Net -180 ml       I personally reviewed the patient's EKG/Telemetry data    Radiology Data:   VQ scan 6/10/19:  IMPRESSION:  Mild obstructive component seen in the mid and lower lung  fields bilaterally. Low probability for pulmonary embolism.    Current Medications:    aspirin 81 mg Oral Nightly   atorvastatin 40 mg Oral Daily   clopidogrel 75 mg Oral Daily   heparin (porcine) 5,000 Units Subcutaneous Q8H   lisinopril 20 mg Oral Nightly   pantoprazole 40 mg Oral QAM   sodium chloride 3 mL Intravenous Q12H          Assessment and Plan:     1.  Exertional dyspnea               - negative troponins ×4 and no EKG changes.                - Similar to previous anginal equivalent   - VQ scan low probability for PE              - last [facility] 8/2018 with widely patent stents              - doubt cardiac etiology; needs  non-invasive study.     2.  Hypertension              - controlled with lisinopril 20mg qhs     3.  Hyperlipidemia               -on statin       I, Toy Landers MD, personally performed the services described as documented by the above named individual. I have made any necessary edits and it is both accurate and complete 6/11/2019  10:01 AM  Electronically signed by Caity Nogeuira PA-C, 06/11/19, 9:45 AM.

## 2019-06-11 NOTE — PLAN OF CARE
Problem: Patient Care Overview  Goal: Individualization and Mutuality  Outcome: Ongoing (interventions implemented as appropriate)  Patient had stress test, awaiting results. No CP or discomfort felt today, vital signs are WNL  Goal: Discharge Needs Assessment  Outcome: Ongoing (interventions implemented as appropriate)    Goal: Interprofessional Rounds/Family Conf  Outcome: Ongoing (interventions implemented as appropriate)      Problem: Breathing Pattern Ineffective (Adult)  Goal: Identify Related Risk Factors and Signs and Symptoms  Outcome: Outcome(s) achieved Date Met: 06/11/19    Goal: Effective Oxygenation/Ventilation  Outcome: Ongoing (interventions implemented as appropriate)    Goal: Anxiety/Fear Reduction  Outcome: Ongoing (interventions implemented as appropriate)

## 2019-06-12 VITALS
BODY MASS INDEX: 24.01 KG/M2 | TEMPERATURE: 97.8 F | OXYGEN SATURATION: 90 % | HEIGHT: 71 IN | RESPIRATION RATE: 18 BRPM | DIASTOLIC BLOOD PRESSURE: 65 MMHG | SYSTOLIC BLOOD PRESSURE: 91 MMHG | WEIGHT: 171.52 LBS | HEART RATE: 85 BPM

## 2019-06-12 LAB
BH CV STRESS BP STAGE 1: NORMAL
BH CV STRESS BP STAGE 3: NORMAL
BH CV STRESS COMMENTS STAGE 1: NORMAL
BH CV STRESS DOSE REGADENOSON STAGE 1: 0.4
BH CV STRESS DURATION MIN STAGE 1: 1
BH CV STRESS DURATION MIN STAGE 2: 1
BH CV STRESS DURATION MIN STAGE 3: 1
BH CV STRESS DURATION MIN STAGE 4: 1
BH CV STRESS DURATION SEC STAGE 1: 0
BH CV STRESS DURATION SEC STAGE 2: 0
BH CV STRESS DURATION SEC STAGE 3: 0
BH CV STRESS DURATION SEC STAGE 4: 0
BH CV STRESS HR STAGE 1: 72
BH CV STRESS HR STAGE 2: 82
BH CV STRESS HR STAGE 3: 80
BH CV STRESS HR STAGE 4: 77
BH CV STRESS O2 STAGE 1: 96
BH CV STRESS PROTOCOL 1: NORMAL
BH CV STRESS RECOVERY BP: NORMAL MMHG
BH CV STRESS RECOVERY HR: 76 BPM
BH CV STRESS RECOVERY O2: 97 %
BH CV STRESS STAGE 1: 1
BH CV STRESS STAGE 2: 2
BH CV STRESS STAGE 3: 3
BH CV STRESS STAGE 4: 4
LV EF NUC BP: 58 %
MAXIMAL PREDICTED HEART RATE: 136 BPM
PERCENT MAX PREDICTED HR: 61.03 %
STRESS BASELINE BP: NORMAL MMHG
STRESS BASELINE HR: 64 BPM
STRESS O2 SAT REST: 96 %
STRESS PERCENT HR: 72 %
STRESS POST ESTIMATED WORKLOAD: 1 METS
STRESS POST EXERCISE DUR MIN: 4 MIN
STRESS POST EXERCISE DUR SEC: 0 SEC
STRESS POST O2 SAT PEAK: 99 %
STRESS POST PEAK BP: NORMAL MMHG
STRESS POST PEAK HR: 83 BPM
STRESS TARGET HR: 116 BPM

## 2019-06-12 PROCEDURE — G0378 HOSPITAL OBSERVATION PER HR: HCPCS

## 2019-06-12 PROCEDURE — 25010000002 HEPARIN (PORCINE) PER 1000 UNITS: Performed by: NURSE PRACTITIONER

## 2019-06-12 PROCEDURE — 99213 OFFICE O/P EST LOW 20 MIN: CPT | Performed by: PHYSICIAN ASSISTANT

## 2019-06-12 PROCEDURE — 96372 THER/PROPH/DIAG INJ SC/IM: CPT

## 2019-06-12 RX ADMIN — PANTOPRAZOLE SODIUM 40 MG: 40 TABLET, DELAYED RELEASE ORAL at 06:15

## 2019-06-12 RX ADMIN — HEPARIN SODIUM 5000 UNITS: 5000 INJECTION, SOLUTION INTRAVENOUS; SUBCUTANEOUS at 06:15

## 2019-06-12 RX ADMIN — CLOPIDOGREL BISULFATE 75 MG: 75 TABLET ORAL at 08:58

## 2019-06-12 NOTE — PLAN OF CARE
Problem: Patient Care Overview  Goal: Plan of Care Review  Outcome: Ongoing (interventions implemented as appropriate)   06/12/19 0650   Coping/Psychosocial   Plan of Care Reviewed With patient   Plan of Care Review   Progress no change   OTHER   Outcome Summary vss. sr on monitor. ra. patient didnt have any c/o soa or chest pain. will continue to monitor.

## 2019-06-12 NOTE — PROGRESS NOTES
"  [facility] at [facility]  PROGRESS NOTE    Date of Admission: 6/10/2019  Length of Stay: 1  Primary Care Physician: Pritesh Pagan APRN    Chief Complaint: f/u dyspnea   Problem List:   1.  Coronary Artery Disease                        A.  [facility] 2005:  Stent to RCA with Rasta                        B.  [facility] 2011:  Stent to 95% LAD with Akikoiek                        C.  Cardiac PET 4/26/2017:  EF 62%; Normal perfusion.                        D.  NYHA IV \"anginal equivalent\" of SOA, 6/21/17                                      I. Normal LV function.                                      II. Single vessel disease, JOSE CARLOS to circumflex.                        E.  Recurrent ANGINA, 8/1/2018                                      I.  [facility]: Minor nonobstructive CAD, widely patent stents                                      II. Echo EF: 56-60%, moderate MR, mild AI, normal RVSP  F. [facility] admission for exertional dyspnea June 2019:   I. Normal stress PET    II. Negative VQ scan  2.  Hypertension  3.  Hyperlipidemia  4.  History pulmonary embolism 7 years ago  5.  GERD  6.  Renal insufficiency  7.  Glaucoma    Subjective      Patient remains stable, denies chest pain, no dyspnea at rest. Eager for discharge       Objective   Vitals: BP 91/65 (BP Location: Right arm, Patient Position: Sitting)   Pulse 85   Temp 97.8 °F (36.6 °C) (Oral)   Resp 18   Ht 180.3 cm (70.98\")   Wt 77.8 kg (171 lb 8.3 oz)   SpO2 90%   BMI 23.93 kg/m²     Physical Exam:  GENERAL: Alert, cooperative, in no acute distress.   HEENT: Normocephalic, no jugular venous distention  HEART: No discrete PMI is noted. Regular rhythm, normal rate, and no murmurs, gallops, or rubs.   LUNGS: Clear to auscultation bilaterally. No wheezing, rales or rhonchi.  ABDOMEN: Soft, bowel sounds present, non-tender   NEUROLOGIC: No focal abnormalities involving strength or sensation are noted.   EXTREMITIES: No clubbing, cyanosis, or edema noted. "   Results:  Results from last 7 days   Lab Units 06/11/19  0506 06/10/19  0915   WBC 10*3/mm3 8.73 9.24   HEMOGLOBIN g/dL 14.4 15.9   HEMATOCRIT % 44.7 47.6   PLATELETS 10*3/mm3 194 203     Results from last 7 days   Lab Units 06/11/19  0506 06/10/19  0915   SODIUM mmol/L 138 135*   POTASSIUM mmol/L 4.7 4.1   CHLORIDE mmol/L 104 102   CO2 mmol/L 24.0 23.0   BUN mg/dL 26* 29*   CREATININE mg/dL 1.29* 1.53*   GLUCOSE mg/dL 99 93      Lab Results   Component Value Date    CHOL 137 06/11/2019    TRIG 114 06/11/2019    HDL 46 06/11/2019    LDL 68 06/11/2019    AST 12 06/10/2019    ALT 12 06/10/2019     Results from last 7 days   Lab Units 06/11/19  0506   HEMOGLOBIN A1C % 5.70*     Results from last 7 days   Lab Units 06/11/19  0506   CHOLESTEROL mg/dL 137   TRIGLYCERIDES mg/dL 114   HDL CHOL mg/dL 46   LDL CHOL mg/dL 68       Results from last 7 days   Lab Units 06/10/19  2314 06/10/19  1747 06/10/19  1152   TROPONIN T ng/mL <0.010 <0.010 <0.010     Results from last 7 days   Lab Units 06/10/19  0915   PROBNP pg/mL 82.1       Intake/Output Summary (Last 24 hours) at 6/12/2019 0937  Last data filed at 6/11/2019 1739  Gross per 24 hour   Intake 720 ml   Output 1000 ml   Net -280 ml       I personally reviewed the patient's EKG/Telemetry data    Radiology Data:   Stress PET 6/11/19:  Interpretation Summary     · This is a NORMAL study.        Current Medications:    aspirin 81 mg Oral Nightly   atorvastatin 40 mg Oral Daily   clopidogrel 75 mg Oral Daily   heparin (porcine) 5,000 Units Subcutaneous Q8H   lisinopril 20 mg Oral Nightly   pantoprazole 40 mg Oral QAM   sodium chloride 3 mL Intravenous Q12H          Assessment and Plan:     1.  Exertional dyspnea               - negative troponins ×4 and no EKG changes.                - Similar to previous anginal equivalent              - VQ scan low probability for PE              - last [facility] 8/2018 with widely patent stents              - doubt cardiac etiology; needs  non-invasive study.   - stress PET yesterday is normal      2.  Hypertension              - controlled with lisinopril 20mg qhs     3.  Hyperlipidemia               -on statin     Disposition: Patient stable and ready for discharge home on current medical regimen. He will follow up with Dr. Landers in 6 months in [facility].     Electronically signed by Caity Nogueira PA-C, 06/12/19, 9:50 AM.

## 2019-06-12 NOTE — NURSING NOTE
Pt d/c today. Per Caity Nogueira, Dr. Landers does not need to see pt before he goes home. AVS given with no questions. Wife at bedside.

## 2019-09-30 RX ORDER — LISINOPRIL 20 MG/1
20 TABLET ORAL NIGHTLY
Qty: 90 TABLET | Refills: 3 | Status: SHIPPED | OUTPATIENT
Start: 2019-09-30 | End: 2019-12-10 | Stop reason: SDUPTHER

## 2019-12-06 RX ORDER — DOXAZOSIN MESYLATE 1 MG/1
TABLET ORAL
Qty: 90 TABLET | Refills: 3 | OUTPATIENT
Start: 2019-12-06

## 2019-12-10 ENCOUNTER — OFFICE VISIT (OUTPATIENT)
Dept: CARDIOLOGY | Facility: CLINIC | Age: 84
End: 2019-12-10

## 2019-12-10 VITALS
HEIGHT: 71 IN | SYSTOLIC BLOOD PRESSURE: 167 MMHG | HEART RATE: 71 BPM | DIASTOLIC BLOOD PRESSURE: 86 MMHG | BODY MASS INDEX: 24.36 KG/M2 | WEIGHT: 174 LBS

## 2019-12-10 DIAGNOSIS — R55 SYNCOPE AND COLLAPSE: ICD-10-CM

## 2019-12-10 DIAGNOSIS — E78.2 MIXED HYPERLIPIDEMIA: ICD-10-CM

## 2019-12-10 DIAGNOSIS — I10 ESSENTIAL HYPERTENSION: Primary | ICD-10-CM

## 2019-12-10 DIAGNOSIS — I25.10 CORONARY ARTERY DISEASE INVOLVING NATIVE CORONARY ARTERY OF NATIVE HEART WITHOUT ANGINA PECTORIS: ICD-10-CM

## 2019-12-10 PROCEDURE — 99214 OFFICE O/P EST MOD 30 MIN: CPT | Performed by: INTERNAL MEDICINE

## 2019-12-10 RX ORDER — LISINOPRIL 20 MG/1
20 TABLET ORAL 2 TIMES DAILY
Qty: 90 TABLET | Refills: 3 | Status: SHIPPED | OUTPATIENT
Start: 2019-12-10 | End: 2020-06-05 | Stop reason: SDUPTHER

## 2019-12-10 RX ORDER — DOXAZOSIN MESYLATE 1 MG/1
1 TABLET ORAL NIGHTLY
COMMUNITY
End: 2019-12-22

## 2019-12-10 RX ORDER — CHLORTHALIDONE 25 MG/1
25 TABLET ORAL DAILY
Qty: 30 TABLET | Refills: 11 | Status: SHIPPED | OUTPATIENT
Start: 2019-12-10 | End: 2020-08-11

## 2019-12-10 NOTE — PROGRESS NOTES
"  OFFICE FOLLOW UP     Date of Encounter:12/10/2019     Name: Darren Mccracken  : 1934  Address: [address] [place] [address] [address] [place] [address]    PCP: Pritesh Pagan, APRN  [address] [facility] [id number]    Darren Mccracken is a 85 y.o. male.      Chief Complaint: Follow up of CAD, HTN, HLD    Problem List:     1.  Coronary Artery Disease                        A.  [facility] :  Stent to RCA with Rasta                        B.  [facility] :  Stent to 95% LAD with Boliek                        C.  Cardiac PET 2017:  EF 62%; Normal perfusion.                        D.  NYHA IV \"anginal equivalent\" of SOA, 17                                      I. Normal LV function.                                      II. Single vessel disease, JOSE CARLOS to circumflex.                        E.  Recurrent ANGINA, 2018                                      I.  [facility]: Minor nonobstructive CAD, widely patent stents                                      II. Echo EF: 56-60%, moderate MR, mild AI, normal RVSP  F. [facility] admission for exertional dyspnea 2019:              I. Normal stress PET               II. Negative VQ scan  2.  Hypertension  3.  Hyperlipidemia  4.  History pulmonary embolism 7 years ago  5.  GERD  6.  Renal insufficiency  7.  Glaucoma    Allergies:  Allergies   Allergen Reactions   • Lipitor [Atorvastatin] Myalgia       Current Medications:   •  aspirin 81 MG EC tablet, Take 81 mg by mouth Every Night.  •  brimonidine (ALPHAGAN) 0.2 % ophthalmic solution, Administer 2 drops to the right eye 2 (Two) Times a Day.  •  dorzolamide (TRUSOPT) 2 % ophthalmic solution, Administer 1 drop to the right eye 2 (Two) Times a Day.  •  doxazosin (CARDURA) 1 MG tablet, Take 1 mg by mouth Every Night.   •  lisinopril (PRINIVIL,ZESTRIL) 20 MG tablet, Take 40 mg by mouth Every Night.  •  omeprazole (priLOSEC) 20 MG capsule, Take 20 mg by mouth Every Night.  •  simvastatin (ZOCOR) 80 MG tablet, Take 1 tablet by mouth every " "night at bedtime.  •  vitamin B-12 (CYANOCOBALAMIN) 2500 MCG sublingual tablet tablet, Place 2,500 mcg under the tongue Daily.    History of Present Illness:  Darren Mccracken returns for scheduled follow up today.  He has not had recurrent anginal symptoms since admission in June. He had a syncopal episode in [employer] yesterday. He was sitting in the pew and felt lightheaded and weak. Vision started to narrow and he states he slumped but \"caught himself\". He was diaphoretic when he awoke. A nurse at the service put a nitroglycerin tab under his tongue. He had been sitting for about an hour prior to this episode. This has not recurred. He denies palpitations or other presyncopal/syncopal events. He reports his home blood pressures have been elevated and that he is only taking lisinopril 10 mg daily.    The following portions of the patient's history were reviewed and updated as appropriate: allergies, current medications and problem list.    ROS: Pertinent positives as listed in the HPI.  All other systems reviewed and negative.    Objective:    Vitals:    12/10/19 1329 12/10/19 1331   BP: 172/80 167/86   BP Location: Left arm Left arm   Patient Position: Sitting Standing   Pulse: 72 71   Weight: 78.9 kg (174 lb)    Height: 180.3 cm (71\")        Physical Exam:  GENERAL: Alert, cooperative, in no acute distress.   HEENT: Normocephalic, no adenopathy, no jugular venous distention  HEART: No discrete PMI is noted. Regular rhythm, normal rate, and no murmurs, gallops, or rubs.   LUNGS: Clear to auscultation bilaterally. No wheezing, rales or ronchi.  ABDOMEN: Soft, bowel sounds present, non-tender   NEUROLOGIC: No focal abnormalities involving strength or sensation are noted.   EXTREMITIES: No clubbing, cyanosis, or edema noted.       Diagnostic Data:    No new labs available to review.     Procedures      Assessment and Plan:   1.  CAD: No angina or heart failure. Continue aspirin, statin.  2.  HTN: Elevated at home and " here today. We will increase lisinopril to 20 mg by mouth twice daily and add chlorthalidone 25 mg daily. He will continue to check home blood pressures and understands AHA target is less than 130 systolic.  3.  HLD:  LDL in 6/2019 was 68. Continue high dose statin.  4.  Syncopal episode: We will place a monitor at the [facility] at his convenience to check for bradyarrhythmias. He will call should these symptoms recur. We will call him with results of monitor.    I will see Darren Mccracken back in 6 months or sooner on an as needed basis.  Scribed for Toy Landers MD by Ann Apple RN. 12/10/2019 1:34 PM.      EMR Dragon/Transcription Disclaimer:  Much of this encounter note is an electronic transcription/translation of spoken language to printed text.  The electronic translation of spoken language may permit erroneous, or at times, nonsensical words or phrases to be inadvertently transcribed.  Although I have reviewed the note for such errors, some may still exist.

## 2019-12-22 ENCOUNTER — HOSPITAL ENCOUNTER (EMERGENCY)
Facility: HOSPITAL | Age: 84
Discharge: HOME | End: 2019-12-22
Attending: EMERGENCY MEDICINE | Admitting: EMERGENCY MEDICINE

## 2019-12-22 ENCOUNTER — APPOINTMENT (OUTPATIENT)
Dept: GENERAL RADIOLOGY | Facility: HOSPITAL | Age: 84
End: 2019-12-22

## 2019-12-22 VITALS
HEIGHT: 71 IN | TEMPERATURE: 97.4 F | BODY MASS INDEX: 23.63 KG/M2 | WEIGHT: 168.8 LBS | HEART RATE: 62 BPM | DIASTOLIC BLOOD PRESSURE: 75 MMHG | RESPIRATION RATE: 18 BRPM | OXYGEN SATURATION: 97 % | SYSTOLIC BLOOD PRESSURE: 120 MMHG

## 2019-12-22 DIAGNOSIS — N39.0 URINARY TRACT INFECTION WITHOUT HEMATURIA, SITE UNSPECIFIED: ICD-10-CM

## 2019-12-22 DIAGNOSIS — R55 SYNCOPE, UNSPECIFIED SYNCOPE TYPE: Primary | ICD-10-CM

## 2019-12-22 DIAGNOSIS — N28.9 RENAL INSUFFICIENCY: ICD-10-CM

## 2019-12-22 LAB
ALBUMIN SERPL-MCNC: 4.3 G/DL (ref 3.5–5.2)
ALBUMIN/GLOB SERPL: 1.3 G/DL
ALP SERPL-CCNC: 55 U/L (ref 39–117)
ALT SERPL W P-5'-P-CCNC: 7 U/L (ref 1–41)
ANION GAP SERPL CALCULATED.3IONS-SCNC: 12.5 MMOL/L (ref 5–15)
AST SERPL-CCNC: 14 U/L (ref 1–40)
BACTERIA UR QL AUTO: ABNORMAL /HPF
BASOPHILS # BLD AUTO: 0.09 10*3/MM3 (ref 0–0.2)
BASOPHILS NFR BLD AUTO: 1.1 % (ref 0–1.5)
BILIRUB SERPL-MCNC: 0.6 MG/DL (ref 0.2–1.2)
BILIRUB UR QL STRIP: NEGATIVE
BUN BLD-MCNC: 33 MG/DL (ref 8–23)
BUN/CREAT SERPL: 16.7 (ref 7–25)
CALCIUM SPEC-SCNC: 9.9 MG/DL (ref 8.6–10.5)
CHLORIDE SERPL-SCNC: 102 MMOL/L (ref 98–107)
CLARITY UR: ABNORMAL
CO2 SERPL-SCNC: 23.5 MMOL/L (ref 22–29)
COLOR UR: ABNORMAL
CREAT BLD-MCNC: 1.98 MG/DL (ref 0.76–1.27)
DEPRECATED RDW RBC AUTO: 47.9 FL (ref 37–54)
EOSINOPHIL # BLD AUTO: 0.43 10*3/MM3 (ref 0–0.4)
EOSINOPHIL NFR BLD AUTO: 5.1 % (ref 0.3–6.2)
ERYTHROCYTE [DISTWIDTH] IN BLOOD BY AUTOMATED COUNT: 12.9 % (ref 12.3–15.4)
GFR SERPL CREATININE-BSD FRML MDRD: 32 ML/MIN/1.73
GLOBULIN UR ELPH-MCNC: 3.2 GM/DL
GLUCOSE BLD-MCNC: 128 MG/DL (ref 65–99)
GLUCOSE BLDC GLUCOMTR-MCNC: 113 MG/DL (ref 70–130)
GLUCOSE UR STRIP-MCNC: NEGATIVE MG/DL
HCT VFR BLD AUTO: 47 % (ref 37.5–51)
HGB BLD-MCNC: 15.8 G/DL (ref 13–17.7)
HGB UR QL STRIP.AUTO: NEGATIVE
HOLD SPECIMEN: NORMAL
HOLD SPECIMEN: NORMAL
HYALINE CASTS UR QL AUTO: ABNORMAL /LPF
IMM GRANULOCYTES # BLD AUTO: 0.02 10*3/MM3 (ref 0–0.05)
IMM GRANULOCYTES NFR BLD AUTO: 0.2 % (ref 0–0.5)
KETONES UR QL STRIP: ABNORMAL
LEUKOCYTE ESTERASE UR QL STRIP.AUTO: ABNORMAL
LYMPHOCYTES # BLD AUTO: 1.87 10*3/MM3 (ref 0.7–3.1)
LYMPHOCYTES NFR BLD AUTO: 22 % (ref 19.6–45.3)
MAGNESIUM SERPL-MCNC: 1.9 MG/DL (ref 1.6–2.4)
MCH RBC QN AUTO: 33.4 PG (ref 26.6–33)
MCHC RBC AUTO-ENTMCNC: 33.6 G/DL (ref 31.5–35.7)
MCV RBC AUTO: 99.4 FL (ref 79–97)
MONOCYTES # BLD AUTO: 0.7 10*3/MM3 (ref 0.1–0.9)
MONOCYTES NFR BLD AUTO: 8.2 % (ref 5–12)
NEUTROPHILS # BLD AUTO: 5.38 10*3/MM3 (ref 1.7–7)
NEUTROPHILS NFR BLD AUTO: 63.4 % (ref 42.7–76)
NITRITE UR QL STRIP: NEGATIVE
NRBC BLD AUTO-RTO: 0 /100 WBC (ref 0–0.2)
PH UR STRIP.AUTO: <=5 [PH] (ref 5–8)
PLATELET # BLD AUTO: 204 10*3/MM3 (ref 140–450)
PMV BLD AUTO: 9.3 FL (ref 6–12)
POTASSIUM BLD-SCNC: 4 MMOL/L (ref 3.5–5.2)
PROT SERPL-MCNC: 7.5 G/DL (ref 6–8.5)
PROT UR QL STRIP: ABNORMAL
RBC # BLD AUTO: 4.73 10*6/MM3 (ref 4.14–5.8)
RBC # UR: ABNORMAL /HPF
REF LAB TEST METHOD: ABNORMAL
SODIUM BLD-SCNC: 138 MMOL/L (ref 136–145)
SP GR UR STRIP: 1.02 (ref 1–1.03)
SQUAMOUS #/AREA URNS HPF: ABNORMAL /HPF
TROPONIN T SERPL-MCNC: <0.01 NG/ML (ref 0–0.03)
UROBILINOGEN UR QL STRIP: ABNORMAL
WBC NRBC COR # BLD: 8.49 10*3/MM3 (ref 3.4–10.8)
WBC UR QL AUTO: ABNORMAL /HPF
WHOLE BLOOD HOLD SPECIMEN: NORMAL
WHOLE BLOOD HOLD SPECIMEN: NORMAL

## 2019-12-22 PROCEDURE — 87086 URINE CULTURE/COLONY COUNT: CPT | Performed by: EMERGENCY MEDICINE

## 2019-12-22 PROCEDURE — 71045 X-RAY EXAM CHEST 1 VIEW: CPT

## 2019-12-22 PROCEDURE — 99284 EMERGENCY DEPT VISIT MOD MDM: CPT

## 2019-12-22 PROCEDURE — 96360 HYDRATION IV INFUSION INIT: CPT

## 2019-12-22 PROCEDURE — 93005 ELECTROCARDIOGRAM TRACING: CPT | Performed by: EMERGENCY MEDICINE

## 2019-12-22 PROCEDURE — 81001 URINALYSIS AUTO W/SCOPE: CPT | Performed by: EMERGENCY MEDICINE

## 2019-12-22 PROCEDURE — 85025 COMPLETE CBC W/AUTO DIFF WBC: CPT | Performed by: EMERGENCY MEDICINE

## 2019-12-22 PROCEDURE — 82962 GLUCOSE BLOOD TEST: CPT

## 2019-12-22 PROCEDURE — 80053 COMPREHEN METABOLIC PANEL: CPT | Performed by: EMERGENCY MEDICINE

## 2019-12-22 PROCEDURE — 83735 ASSAY OF MAGNESIUM: CPT | Performed by: EMERGENCY MEDICINE

## 2019-12-22 PROCEDURE — 84484 ASSAY OF TROPONIN QUANT: CPT | Performed by: EMERGENCY MEDICINE

## 2019-12-22 RX ORDER — SODIUM CHLORIDE 0.9 % (FLUSH) 0.9 %
10 SYRINGE (ML) INJECTION AS NEEDED
Status: DISCONTINUED | OUTPATIENT
Start: 2019-12-22 | End: 2019-12-22 | Stop reason: HOSPADM

## 2019-12-22 RX ORDER — CEFDINIR 300 MG/1
300 CAPSULE ORAL DAILY
Qty: 5 CAPSULE | Refills: 0 | Status: SHIPPED | OUTPATIENT
Start: 2019-12-22 | End: 2020-08-11

## 2019-12-22 RX ADMIN — SODIUM CHLORIDE 1000 ML: 9 INJECTION, SOLUTION INTRAVENOUS at 14:31

## 2019-12-22 NOTE — ED PROVIDER NOTES
Subjective   History of Present Illness    Chief Complaint: Dizzy, syncope  History of Present Illness: 85-year-old male with history of hypertension, coronary artery disease with 3 stents, followed by Dr. Landers at [facility], recent change in blood pressure medications, states today he stood up at home felt lightheaded and dizzy, sat down and had a brief syncopal episode.  No chest pain no palpitations no hemoptysis no fever or chills  Onset: Today single episode just prior to arrival  Duration: sIngle episode now resolved  Exacerbating / Alleviating factors: None  Associated symptoms: None      Nurses Notes reviewed and agree, including vitals, allergies, social history and prior medical history.     REVIEW OF SYSTEMS: All systems reviewed and not pertinent unless noted.    Positive for: Lightheadedness and syncopal episode without fall injury    Negative for: Chest pain, palpitations, hemoptysis, fever, abdominal pain, vomiting or diarrhea, confusion, mental status changes  Review of Systems    Past Medical History:   Diagnosis Date   • Arthritis     knees   • Cancer (CMS/HCC)    • Coronary artery disease    • Coronary artery disease involving native coronary artery of native heart without angina pectoris 7/31/2018    Added automatically from request for surgery [id number]   • GERD (gastroesophageal reflux disease)    • Glaucoma    • History of transfusion    • Hyperlipidemia    • Hypertension    • Kidney stone    • Pneumonia     right upper lobe    • Pulmonary embolism (CMS/HCC)    • Skin cancer    • Wears glasses        Allergies   Allergen Reactions   • Lipitor [Atorvastatin] Myalgia       Past Surgical History:   Procedure Laterality Date   • CARDIAC CATHETERIZATION N/A 6/21/2017    Procedure: Left Heart Cath;  Surgeon: Toy Landers MD;  Location: [facility] INVASIVE LOCATION;  Service:    • CARDIAC CATHETERIZATION N/A 8/1/2018    Procedure: Left Heart Cath;  Surgeon: Toy Landers MD;  Location:   [facility] CATH INVASIVE LOCATION;  Service: Cardiology   • CATARACT EXTRACTION W/ INTRAOCULAR LENS  IMPLANT, BILATERAL     • CHOLECYSTECTOMY  2010   • COLONOSCOPY     • CORONARY ANGIOPLASTY WITH STENT PLACEMENT     • CORONARY STENT PLACEMENT      x3   • EYE SURGERY Right    • SKIN CANCER EXCISION     • WRIST SURGERY         Family History   Problem Relation Age of Onset   • Heart attack Father        Social History     Socioeconomic History   • Marital status:      Spouse name: Not on file   • Number of children: Not on file   • Years of education: Not on file   • Highest education level: Not on file   Tobacco Use   • Smoking status: Never Smoker   • Smokeless tobacco: Never Used   Substance and Sexual Activity   • Alcohol use: No   • Drug use: No   • Sexual activity: Defer   Social History Narrative    Lives with his wife           Objective   Physical Exam      GENERAL APPEARANCE: Well developed, well nourished, in no acute distress.  VITAL SIGNS: per nursing, reviewed and noted  SKIN: no rashes, ulcerations or petechiae.  Head: Normocephalic, atraumatic.   EYES: perrla. EOMI.  ENT:  TM clear, posterior pharynx patent.  LUNGS:  normal breath sounds. No retractions.   CARDIOVASCULAR:  regular rate and rhythm, no murmurs.  Good Peripheral pulses.  ABDOMEN: Soft, nontender, normal bowel sounds. No hernia. No ascites.  MUSCULOSKELETAL:  No tenderness. Full ROM. Strength and tone normal.  No calf asymmetry, negative Homans sign, no edema  NEUROLOGIC: Alert, oriented x 3. No gross deficits.  GCS 15.  No cerebellar signs.  No facial droop.  NECK: Supple, symmetric. No tenderness, no masses. Full ROM  Back: full rom, no paraspinal spasm. No CVA tenderness.   PSYCH: appropriate affect,   : no bladder tenderness or distention, no CVA tenderness      Procedures     No attending provider procedures were performed      ED Course  ED Course as of Dec 30 2257   Sun Dec 22, 2019   1421 WBC: 8.49 [PF]   1422 Hemoglobin: 15.8  [PF]   1422 Hematocrit: 47.0 [PF]   1422 Platelets: 204 [PF]   1435 EKG interpreted by me reveals sinus rhythm at a rate of 61.  Nonspecific T wave changes.  No ectopy.    [PF]   1455 Bacteria, UA(!): Trace [PF]   1455 WBC, UA(!): 6-12 [PF]   1455 Squamous Epithelial Cells, UA: 0-2 [PF]   1456 RBC, UA(!): 0-2 [PF]   1456 Leukocytes, UA(!): Trace [PF]   1456 Blood, UA: Negative [PF]   1456 Bilirubin, UA: Negative [PF]   1456 Ketones, UA(!): Trace [PF]   1456 Glucose: Negative [PF]   1456 Glucose: 113 [PF]   1456 Magnesium: 1.9 [PF]   1456 WBC: 8.49 [PF]   1456 Hemoglobin: 15.8 [PF]   1456 Hematocrit: 47.0 [PF]   1456 Platelets: 204 [PF]   1456 Troponin T: <0.010 [PF]   1456 Glucose(!): 128 [PF]   1456 BUN(!): 33 [PF]   1456 Creatinine(!): 1.98 [PF]   1456 Sodium: 138 [PF]   1456 Potassium: 4.0 [PF]   1456 Chloride: 102 [PF]   1456 CO2: 23.5 [PF]   1456 Albumin: 4.30 [PF]   1456 ALT (SGPT): 7 [PF]   1456 AST (SGOT): 14 [PF]   1456 Alkaline Phosphatase: 55 [PF]   1456 Total Bilirubin: 0.6 [PF]   1542    COMPARISON: 03/03/2018.     FINDINGS: The heart is normal in size. The thoracic aorta is tortuous.  The lungs are clear. There is no pneumothorax.  There are no acute  osseous abnormalities. There are old left rib fractures.     IMPRESSION:  No acute cardiopulmonary process.     Continued followup is recommended.    [PF]   Sat Dec 28, 2019   1513 Urine Culture - Urine, Urine, Clean Catch(!) [PF]      ED Course User Index  [PF] Eddie Rashid,       Old record review reveals recurrent syncope for which patient had extensive work-up including stress test with PET scan within the last 6 months.  Holter monitor was completed return in 6 days ago.  Discussed with Dr. Morris, on-call for Dr. Landers, advised he will follow-up with the patient regarding the results from the Holter monitor.  In the interim we will stop the patient's doxazosin given that has the potential for orthostatic hypotension and syncope.  Patient has  evidence of mild acute on chronic renal insufficiency, blood pressure better with IV hydration.  No recurrent symptoms.  Incidentally has a soft call urinary tract infection with 6-12 white cells and trace bacteria without symptoms.  Given patient's age and comorbidities will add antibiotics.  No indications of sepsis.  Strict return precautions were provided.                No data recorded                        MDM    Final diagnoses:   Syncope, unspecified syncope type   Renal insufficiency   Urinary tract infection without hematuria, site unspecified              Eddie Rashid, DO  12/22/19 1547       Eddie Rashid, DO  12/30/19 [id number]

## 2019-12-22 NOTE — ED NOTES
Contacted [facility] at this time for consult with Dr. Landers per Dr. Rashid. Awaiting Dr. Morris to call back for Dr. Landers.     Deena Rodriguez  12/22/19 5714

## 2019-12-28 LAB — BACTERIA SPEC AEROBE CULT: ABNORMAL

## 2020-01-07 ENCOUNTER — TELEPHONE (OUTPATIENT)
Dept: CARDIOLOGY | Facility: CLINIC | Age: 85
End: 2020-01-07

## 2020-01-07 NOTE — TELEPHONE ENCOUNTER
Pt aware of monitor results. NO change in medications. Call should further symptoms arise or persist. 3 bouts of AF noted up to 185 but none last longer than 5-7 seconds. KH

## 2020-03-10 ENCOUNTER — TRANSCRIBE ORDERS (OUTPATIENT)
Dept: ADMINISTRATIVE | Facility: HOSPITAL | Age: 85
End: 2020-03-10

## 2020-03-10 DIAGNOSIS — M54.50 LOW BACK PAIN, UNSPECIFIED BACK PAIN LATERALITY, UNSPECIFIED CHRONICITY, UNSPECIFIED WHETHER SCIATICA PRESENT: Primary | ICD-10-CM

## 2020-05-12 RX ORDER — SIMVASTATIN 80 MG
80 TABLET ORAL
Qty: 90 TABLET | Refills: 1 | Status: SHIPPED | OUTPATIENT
Start: 2020-05-12

## 2020-06-05 RX ORDER — LISINOPRIL 20 MG/1
20 TABLET ORAL 2 TIMES DAILY
Qty: 180 TABLET | Refills: 0 | Status: SHIPPED | OUTPATIENT
Start: 2020-06-05 | End: 2020-08-11 | Stop reason: SDUPTHER

## 2020-08-11 ENCOUNTER — OFFICE VISIT (OUTPATIENT)
Dept: CARDIOLOGY | Facility: CLINIC | Age: 85
End: 2020-08-11

## 2020-08-11 VITALS
SYSTOLIC BLOOD PRESSURE: 105 MMHG | OXYGEN SATURATION: 95 % | BODY MASS INDEX: 23.38 KG/M2 | DIASTOLIC BLOOD PRESSURE: 64 MMHG | WEIGHT: 167 LBS | HEIGHT: 71 IN | HEART RATE: 83 BPM

## 2020-08-11 DIAGNOSIS — I10 ESSENTIAL HYPERTENSION: Primary | ICD-10-CM

## 2020-08-11 DIAGNOSIS — E78.2 MIXED HYPERLIPIDEMIA: ICD-10-CM

## 2020-08-11 DIAGNOSIS — I25.10 CORONARY ARTERY DISEASE INVOLVING NATIVE CORONARY ARTERY OF NATIVE HEART WITHOUT ANGINA PECTORIS: ICD-10-CM

## 2020-08-11 PROCEDURE — 99214 OFFICE O/P EST MOD 30 MIN: CPT | Performed by: INTERNAL MEDICINE

## 2020-08-11 RX ORDER — LISINOPRIL 20 MG/1
20 TABLET ORAL 2 TIMES DAILY
Qty: 180 TABLET | Refills: 2 | Status: SHIPPED | OUTPATIENT
Start: 2020-08-11 | End: 2020-08-20

## 2020-08-11 NOTE — PROGRESS NOTES
"  OFFICE FOLLOW UP     Date of Encounter:2020     Name: Darren Mccracken  : 1934  Address: [address] [place] [address] [address] [place] [address]    PCP: Pritesh Pagan, APRN  [address] [facility] [address]    Darren Mccracken is an 86 y.o. male.      Chief Complaint: Follow up of CAD, HTN, HLD    Problem List:   1.  Coronary Artery Disease                        A.  [facility] :  Stent to RCA with Rasta                        B.  [facility] :  Stent to 95% LAD with Boliek                        ARGELIA.  Cardiac PET 2017:  EF 62%; Normal perfusion.                        D.  NYHA IV \"anginal equivalent\" of SOA, 17                                      I. Normal LV function.                                      II. Single vessel disease, JOSE CARLOS to circumflex.                        E.  Recurrent ANGINA, 2018                                      I.  [facility]: Minor nonobstructive CAD, widely patent stents                                      II. Echo EF: 56-60%, moderate MR, mild AI, normal RVSP  F. [facility] admission for exertional dyspnea 2019:              I. Normal stress PET               II. Negative VQ scan  2.  Hypertension  3.  Hyperlipidemia  4.  History pulmonary embolism 7 years ago  5.  GERD  6.  Renal insufficiency  7.  Glaucoma  8.  Syncope 2019   A. ZIO monitor 2019: dominant rhythm is sinus; 3 bouts of AF (vs AT) - none lasting longer than 5-7 seconds    Allergies:  Allergies   Allergen Reactions   • Lipitor [Atorvastatin] Myalgia       Current Medications:  •  aspirin 81 MG EC tablet, Take 81 mg by mouth Every Night.  •  brimonidine (ALPHAGAN) 0.2 % ophthalmic solution, Administer 2 drops to the right eye 2 (Two) Times a Day.  •  chlorthalidone (HYGROTON) 25 MG tablet, Take 1 tablet by mouth Daily  •  dorzolamide (TRUSOPT) 2 % ophthalmic solution, Administer 1 drop to the right eye 2 (Two) Times a Day.   •  lisinopril (PRINIVIL,ZESTRIL) 20 MG tablet, Take 1 tablet by mouth 2 (Two) Times a " "Day  •  omeprazole (priLOSEC) 20 MG capsule, Take 20 mg by mouth Every Night  •  simvastatin (ZOCOR) 80 MG tablet, Take 1 tablet by mouth every night at bedtime.  •  vitamin B-12 (CYANOCOBALAMIN) 2500 MCG sublingual tablet tablet, Place 2,500 mcg under the tongue Daily.     History of Present Illness:          Darren Mccracken returns for scheduled follow up today. He reports occasional angina with over-exertion such as yard-work, which is resolved with rest. He does not have any rest symptoms. He does report some orthostatic dizziness with standing and walking. He drinks the recommended amount of water daily, and drinks ~2 Pepsi cans a day.     The following portions of the patient's history were reviewed and updated as appropriate: allergies, current medications and problem list.    ROS: Pertinent positives as listed in the HPI.  All other systems reviewed and negative.    Objective:  Vitals:    08/11/20 1116 08/11/20 1117   BP: 116/70 105/64   BP Location: Left arm Left arm   Patient Position: Sitting Standing   Pulse: 73 83   SpO2: 95%    Weight: 75.8 kg (167 lb)    Height: 180.3 cm (71\")      Physical Exam:  GENERAL: Alert, cooperative, in no acute distress.   HEENT: Normocephalic, no adenopathy, no jugular venous distention  HEART: No discrete PMI is noted. Regular rhythm, normal rate, and no murmurs, gallops, or rubs.   LUNGS: Clear to auscultation bilaterally. No wheezing, rales or ronchi.  ABDOMEN: Soft, bowel sounds present, non-tender   NEUROLOGIC: No focal abnormalities involving strength or sensation are noted.   EXTREMITIES: No clubbing, cyanosis, or edema noted.     Diagnostic Data:    1/14/2020  CMP: GLU 94, BUN 16, CR 1.36, , K 4.3, AST 16, ALT 13  HGB A1C: 5.8  No lipid reported. (LDL 6/2019 = 68)    Procedures      Assessment and Plan:   1.  CAD: stable with class I angina resolved with rest. We will not make any changes today, however have asked him to call us should his symptoms worsen or " become more frequent.   2.  HTN: mildly orthostatic today with symptoms of dizziness. We will stop his Chlorthalidone 25mg today, and have asked him to continue to check home blood pressures with target SBP <130mmHg.   3.  HLD:  Continue Simvastatin to target LDL  <70.     I will see Darren Mccracken back in 1 year or sooner on an as needed basis.    Scribed for Toy Landers MD by Caity Nogueira PA-C. 8/11/2020  11:47

## 2020-08-20 RX ORDER — LISINOPRIL 20 MG/1
20 TABLET ORAL 2 TIMES DAILY
Qty: 180 TABLET | Refills: 3 | Status: SHIPPED | OUTPATIENT
Start: 2020-08-20 | End: 2021-06-04 | Stop reason: SDUPTHER

## 2021-03-02 ENCOUNTER — OFFICE VISIT (OUTPATIENT)
Dept: ORTHOPEDIC SURGERY | Facility: CLINIC | Age: 86
End: 2021-03-02

## 2021-03-02 VITALS — TEMPERATURE: 96.4 F | BODY MASS INDEX: 24.33 KG/M2 | HEIGHT: 71 IN | WEIGHT: 173.8 LBS

## 2021-03-02 DIAGNOSIS — M17.10 ARTHRITIS OF KNEE: ICD-10-CM

## 2021-03-02 DIAGNOSIS — M25.562 ARTHRALGIA OF BOTH KNEES: Primary | ICD-10-CM

## 2021-03-02 DIAGNOSIS — M25.561 ARTHRALGIA OF BOTH KNEES: Primary | ICD-10-CM

## 2021-03-02 PROCEDURE — 20610 DRAIN/INJ JOINT/BURSA W/O US: CPT | Performed by: ORTHOPAEDIC SURGERY

## 2021-03-02 PROCEDURE — 99203 OFFICE O/P NEW LOW 30 MIN: CPT | Performed by: ORTHOPAEDIC SURGERY

## 2021-03-02 RX ORDER — TRIAMCINOLONE ACETONIDE 40 MG/ML
40 INJECTION, SUSPENSION INTRA-ARTICULAR; INTRAMUSCULAR
Status: COMPLETED | OUTPATIENT
Start: 2021-03-02 | End: 2021-03-02

## 2021-03-02 RX ORDER — LIDOCAINE HYDROCHLORIDE 10 MG/ML
2 INJECTION, SOLUTION INFILTRATION; PERINEURAL
Status: COMPLETED | OUTPATIENT
Start: 2021-03-02 | End: 2021-03-02

## 2021-03-02 RX ADMIN — TRIAMCINOLONE ACETONIDE 40 MG: 40 INJECTION, SUSPENSION INTRA-ARTICULAR; INTRAMUSCULAR at 11:29

## 2021-03-02 RX ADMIN — TRIAMCINOLONE ACETONIDE 40 MG: 40 INJECTION, SUSPENSION INTRA-ARTICULAR; INTRAMUSCULAR at 11:30

## 2021-03-02 RX ADMIN — LIDOCAINE HYDROCHLORIDE 2 ML: 10 INJECTION, SOLUTION INFILTRATION; PERINEURAL at 11:30

## 2021-03-02 RX ADMIN — LIDOCAINE HYDROCHLORIDE 2 ML: 10 INJECTION, SOLUTION INFILTRATION; PERINEURAL at 11:29

## 2021-03-02 NOTE — PROGRESS NOTES
Subjective   Patient ID: Darren Mccracken is a 86 y.o. male  Pain of the Left Knee and Pain of the Right Knee (Patient here today for bilateral knee pain for some time, is getting worse. No history of injury to knees, no prior treatment. Pain is worse when going from sitting to standing, slight pain when walking, more pain when standing for a while. )             History of Present Illness  86-year-old very pleasant gentleman history of right knee open meniscectomy surgery in high school and a basketball related injury no issues or further injury since that time, has developed gradual onset bilateral knee pain worse with activity better with rest the right is more severe than the left the right knee hurts medially and laterally the left seems to hurt more in the medial side.  He does have chronic low back issues has seen an orthospine person and been recommended to undergo surgery for several slipped disks in his lower back.  Currently denies any paresthesias in either lower leg.      Review of Systems   Constitutional: Negative for fever.   HENT: Negative for dental problem and voice change.    Eyes: Negative for visual disturbance.   Respiratory: Negative for shortness of breath.    Cardiovascular: Negative for chest pain.   Gastrointestinal: Negative for abdominal pain.   Genitourinary: Negative for dysuria.   Musculoskeletal: Positive for arthralgias. Negative for gait problem and joint swelling.   Skin: Negative for rash.   Neurological: Negative for speech difficulty.   Hematological: Does not bruise/bleed easily.   Psychiatric/Behavioral: Negative for confusion.       Past Medical History:   Diagnosis Date   • Arthritis     knees   • Cancer (CMS/HCC)    • Coronary artery disease    • Coronary artery disease involving native coronary artery of native heart without angina pectoris 7/31/2018    Added automatically from request for surgery [id number]   • GERD (gastroesophageal reflux disease)    • Glaucoma    •  History of transfusion    • Hyperlipidemia    • Hypertension    • Kidney stone    • Pneumonia     right upper lobe    • Pulmonary embolism (CMS/HCC)    • Skin cancer    • Wears glasses         Past Surgical History:   Procedure Laterality Date   • CARDIAC CATHETERIZATION N/A 6/21/2017    Procedure: Left Heart Cath;  Surgeon: Toy Landers MD;  Location:  [facility];  Service:    • CARDIAC CATHETERIZATION N/A 8/1/2018    Procedure: Left Heart Cath;  Surgeon: Toy Landers MD;  Location:  [facility];  Service: Cardiology   • CATARACT EXTRACTION W/ INTRAOCULAR LENS  IMPLANT, BILATERAL     • CHOLECYSTECTOMY  2010   • COLONOSCOPY     • CORONARY ANGIOPLASTY WITH STENT PLACEMENT     • CORONARY STENT PLACEMENT      x3   • EYE SURGERY Right    • SKIN CANCER EXCISION     • WRIST SURGERY         Family History   Problem Relation Age of Onset   • Heart attack Father        Social History     Socioeconomic History   • Marital status:      Spouse name: Not on file   • Number of children: Not on file   • Years of education: Not on file   • Highest education level: Not on file   Tobacco Use   • Smoking status: Never Smoker   • Smokeless tobacco: Never Used   Substance and Sexual Activity   • Alcohol use: No   • Drug use: No   • Sexual activity: Defer   Social History Narrative    Lives with his wife       I have reviewed the medical and surgical history, family history, social history, medications, and/or allergies, and the review of systems of this report.    Allergies   Allergen Reactions   • Lipitor [Atorvastatin] Myalgia         Current Outpatient Medications:   •  aspirin 81 MG EC tablet, Take 81 mg by mouth Every Night., Disp: , Rfl:   •  brimonidine (ALPHAGAN) 0.2 % ophthalmic solution, Administer 2 drops to the right eye 2 (Two) Times a Day., Disp: , Rfl:   •  dorzolamide (TRUSOPT) 2 % ophthalmic solution, Administer 1 drop to the right eye 2 (Two) Times a Day., Disp: , Rfl:  "  •  lisinopril (PRINIVIL,ZESTRIL) 20 MG tablet, Take 1 tablet by mouth 2 (two) times a day., Disp: 180 tablet, Rfl: 3  •  omeprazole (priLOSEC) 20 MG capsule, Take 20 mg by mouth Every Night., Disp: , Rfl:   •  simvastatin (ZOCOR) 80 MG tablet, Take 1 tablet by mouth every night at bedtime., Disp: 90 tablet, Rfl: 1  •  vitamin B-12 (CYANOCOBALAMIN) 2500 MCG sublingual tablet tablet, Place 2,500 mcg under the tongue Daily., Disp: , Rfl:     Objective   Temp 96.4 °F (35.8 °C)   Ht 180.3 cm (71\")   Wt 78.8 kg (173 lb 12.8 oz)   BMI 24.24 kg/m²    Physical Exam  Constitutional: Patient is oriented to person, place, and time. Patient appears well-developed and well-nourished.   HENT:Head: Normocephalic and atraumatic.   Eyes: EOM are normal. Pupils are equal, round, and reactive to light.   Neck: Normal range of motion. Neck supple.   Cardiovascular: Normal rate.    Pulmonary/Chest: Effort normal and breath sounds normal.   Abdominal: Soft.   Neurological: Patient is alert and oriented to person, place, and time.   Skin: Skin is warm and dry.   Psychiatric: Patient has a normal mood and affect.   Nursing note and vitals reviewed.       [unfilled]   Right knee: Medial scar consistent with parapatellar arthrotomy well-healed no sign of significant patellar malalignment instability or crepitus, valgus alignment 5 to 8 degrees loss of extension 5 degrees flexion to 120 pain laterally and medial at the joint line with range of motion no erythema calf tenderness negative straight leg raising.    Left knee: Slight varus alignment 3 degrees full extension flexion full minimal pain with range of motion of the lateral side slight tenderness over the posterior medial joint line with internal rotation flexion.  Ligament exam unremarkable neurovascular intact negative straight leg raising    Assessment/Plan   Review of Radiographic Studies:    Indication to evaluate joint condition, no comparison views available, shows evident " chronic advanced osteoarthritis.      Large Joint Arthrocentesis: R knee  Date/Time: 3/2/2021 11:29 AM  Consent given by: patient  Site marked: site marked  Timeout: Immediately prior to procedure a time out was called to verify the correct patient, procedure, equipment, support staff and site/side marked as required   Supporting Documentation  Indications: pain   Procedure Details  Location: knee - R knee  Preparation: Patient was prepped and draped in the usual sterile fashion  Needle size: 22 G  Medications administered: 40 mg triamcinolone acetonide 40 MG/ML; 2 mL lidocaine 1 %  Patient tolerance: patient tolerated the procedure well with no immediate complications    Large Joint Arthrocentesis: L knee  Date/Time: 3/2/2021 11:30 AM  Consent given by: patient  Site marked: site marked  Timeout: Immediately prior to procedure a time out was called to verify the correct patient, procedure, equipment, support staff and site/side marked as required   Supporting Documentation  Indications: pain   Procedure Details  Location: knee - L knee  Preparation: Patient was prepped and draped in the usual sterile fashion  Needle size: 22 G  Medications administered: 40 mg triamcinolone acetonide 40 MG/ML; 2 mL lidocaine 1 %  Patient tolerance: patient tolerated the procedure well with no immediate complications           Diagnoses and all orders for this visit:    1. Arthralgia of both knees (Primary)  -     XR Knee 3 View Bilateral; Future       Orthopedic activities reviewed and patient expressed appreciation, Risk, benefits, and merits of treatment alternatives reviewed with the patient and questions answered and Using illustrations and models, the nature of the pathology was explained to the patient      Recommendations/Plan:   Exercise, medications, injections, other patient advice, and return appointment as noted.    Patient agreeable to call or return sooner for any concerns.         I reviewed the patient's radiographs  indicating advanced osteoarthritis discussed the natural history treatment options and pros and cons and risks and complications of surgical and nonsurgical care.  I also reviewed advantages and disadvantages risks and complications of steroid injections versus viscus gel injections.  The patient had opportunity to ask questions which were answered.    Impression:  Bilateral knee osteoarthritis history of right knee arthrotomy as a high school   Plan:  Return as needed for repeat steroid injection 3 to 6 months

## 2021-03-03 RX ORDER — CHLORTHALIDONE 25 MG/1
25 TABLET ORAL DAILY
Qty: 90 TABLET | Refills: 0 | Status: SHIPPED | OUTPATIENT
Start: 2021-03-03 | End: 2021-06-04

## 2021-03-03 RX ORDER — CHLORTHALIDONE 25 MG/1
TABLET ORAL
Qty: 90 TABLET | Refills: 0 | Status: SHIPPED | OUTPATIENT
Start: 2021-03-03 | End: 2021-03-03 | Stop reason: SDUPTHER

## 2021-04-01 ENCOUNTER — APPOINTMENT (OUTPATIENT)
Dept: GENERAL RADIOLOGY | Facility: HOSPITAL | Age: 86
End: 2021-04-01

## 2021-04-01 ENCOUNTER — HOSPITAL ENCOUNTER (EMERGENCY)
Facility: HOSPITAL | Age: 86
Discharge: HOME | End: 2021-04-01
Attending: EMERGENCY MEDICINE | Admitting: EMERGENCY MEDICINE

## 2021-04-01 VITALS
DIASTOLIC BLOOD PRESSURE: 89 MMHG | OXYGEN SATURATION: 98 % | BODY MASS INDEX: 24.5 KG/M2 | RESPIRATION RATE: 16 BRPM | TEMPERATURE: 97.8 F | HEIGHT: 71 IN | SYSTOLIC BLOOD PRESSURE: 162 MMHG | HEART RATE: 71 BPM | WEIGHT: 175 LBS

## 2021-04-01 DIAGNOSIS — R06.02 SHORTNESS OF BREATH: Primary | ICD-10-CM

## 2021-04-01 DIAGNOSIS — R07.89 CHEST TIGHTNESS: ICD-10-CM

## 2021-04-01 LAB
ALBUMIN SERPL-MCNC: 3.7 G/DL (ref 3.5–5.2)
ALBUMIN/GLOB SERPL: 1.5 G/DL
ALP SERPL-CCNC: 58 U/L (ref 39–117)
ALT SERPL W P-5'-P-CCNC: 10 U/L (ref 1–41)
ANION GAP SERPL CALCULATED.3IONS-SCNC: 11.1 MMOL/L (ref 5–15)
AST SERPL-CCNC: 12 U/L (ref 1–40)
BASOPHILS # BLD AUTO: 0.08 10*3/MM3 (ref 0–0.2)
BASOPHILS NFR BLD AUTO: 0.8 % (ref 0–1.5)
BILIRUB SERPL-MCNC: 0.4 MG/DL (ref 0–1.2)
BUN SERPL-MCNC: 33 MG/DL (ref 8–23)
BUN/CREAT SERPL: 19.1 (ref 7–25)
CALCIUM SPEC-SCNC: 9.5 MG/DL (ref 8.6–10.5)
CHLORIDE SERPL-SCNC: 101 MMOL/L (ref 98–107)
CO2 SERPL-SCNC: 19.9 MMOL/L (ref 22–29)
CREAT SERPL-MCNC: 1.73 MG/DL (ref 0.76–1.27)
D-LACTATE SERPL-SCNC: 1.1 MMOL/L (ref 0.5–2)
DEPRECATED RDW RBC AUTO: 49 FL (ref 37–54)
EOSINOPHIL # BLD AUTO: 0.4 10*3/MM3 (ref 0–0.4)
EOSINOPHIL NFR BLD AUTO: 3.8 % (ref 0.3–6.2)
ERYTHROCYTE [DISTWIDTH] IN BLOOD BY AUTOMATED COUNT: 13.1 % (ref 12.3–15.4)
GFR SERPL CREATININE-BSD FRML MDRD: 38 ML/MIN/1.73
GLOBULIN UR ELPH-MCNC: 2.5 GM/DL
GLUCOSE SERPL-MCNC: 100 MG/DL (ref 65–99)
HCT VFR BLD AUTO: 50.2 % (ref 37.5–51)
HGB BLD-MCNC: 16.7 G/DL (ref 13–17.7)
HOLD SPECIMEN: NORMAL
HOLD SPECIMEN: NORMAL
IMM GRANULOCYTES # BLD AUTO: 0.04 10*3/MM3 (ref 0–0.05)
IMM GRANULOCYTES NFR BLD AUTO: 0.4 % (ref 0–0.5)
LYMPHOCYTES # BLD AUTO: 2.51 10*3/MM3 (ref 0.7–3.1)
LYMPHOCYTES NFR BLD AUTO: 24.1 % (ref 19.6–45.3)
MCH RBC QN AUTO: 33.5 PG (ref 26.6–33)
MCHC RBC AUTO-ENTMCNC: 33.3 G/DL (ref 31.5–35.7)
MCV RBC AUTO: 100.6 FL (ref 79–97)
MONOCYTES # BLD AUTO: 0.96 10*3/MM3 (ref 0.1–0.9)
MONOCYTES NFR BLD AUTO: 9.2 % (ref 5–12)
NEUTROPHILS NFR BLD AUTO: 6.43 10*3/MM3 (ref 1.7–7)
NEUTROPHILS NFR BLD AUTO: 61.7 % (ref 42.7–76)
NRBC BLD AUTO-RTO: 0 /100 WBC (ref 0–0.2)
NT-PROBNP SERPL-MCNC: 192.8 PG/ML (ref 0–1800)
PLATELET # BLD AUTO: 211 10*3/MM3 (ref 140–450)
PMV BLD AUTO: 9 FL (ref 6–12)
POTASSIUM SERPL-SCNC: 4.3 MMOL/L (ref 3.5–5.2)
PROT SERPL-MCNC: 6.2 G/DL (ref 6–8.5)
RBC # BLD AUTO: 4.99 10*6/MM3 (ref 4.14–5.8)
SARS-COV-2 RNA PNL SPEC NAA+PROBE: NOT DETECTED
SODIUM SERPL-SCNC: 132 MMOL/L (ref 136–145)
TROPONIN T SERPL-MCNC: <0.01 NG/ML (ref 0–0.03)
WBC # BLD AUTO: 10.42 10*3/MM3 (ref 3.4–10.8)
WHOLE BLOOD HOLD SPECIMEN: NORMAL
WHOLE BLOOD HOLD SPECIMEN: NORMAL

## 2021-04-01 PROCEDURE — 93005 ELECTROCARDIOGRAM TRACING: CPT | Performed by: EMERGENCY MEDICINE

## 2021-04-01 PROCEDURE — 71045 X-RAY EXAM CHEST 1 VIEW: CPT

## 2021-04-01 PROCEDURE — 80053 COMPREHEN METABOLIC PANEL: CPT | Performed by: EMERGENCY MEDICINE

## 2021-04-01 PROCEDURE — 99284 EMERGENCY DEPT VISIT MOD MDM: CPT

## 2021-04-01 PROCEDURE — 87635 SARS-COV-2 COVID-19 AMP PRB: CPT | Performed by: PHYSICIAN ASSISTANT

## 2021-04-01 PROCEDURE — 84484 ASSAY OF TROPONIN QUANT: CPT | Performed by: EMERGENCY MEDICINE

## 2021-04-01 PROCEDURE — 85025 COMPLETE CBC W/AUTO DIFF WBC: CPT | Performed by: EMERGENCY MEDICINE

## 2021-04-01 PROCEDURE — 83880 ASSAY OF NATRIURETIC PEPTIDE: CPT | Performed by: EMERGENCY MEDICINE

## 2021-04-01 PROCEDURE — 83605 ASSAY OF LACTIC ACID: CPT | Performed by: EMERGENCY MEDICINE

## 2021-04-01 RX ORDER — SODIUM CHLORIDE 0.9 % (FLUSH) 0.9 %
10 SYRINGE (ML) INJECTION AS NEEDED
Status: DISCONTINUED | OUTPATIENT
Start: 2021-04-01 | End: 2021-04-01 | Stop reason: HOSPADM

## 2021-04-01 NOTE — ED PROVIDER NOTES
"Subjective   Chief Complaint: Shortness of breath, chest tightness  History of Present Illness: Is 86-year-old male with a history of CAD with cardiac stents last cath 2007, hyperlipidemia, hypertension, PE comes in for evaluation of 3 days of chest tightness, shortness of breath and a adductive cough with \"phlegm\" he has a history of CAD last cath was 2007.  He follows with Dr. Landers at [facility].  No radiation of his tightness.  Onset: 3 days  Timing: Worsening  Exacerbating / Alleviating factors: Worse with activity, nothing makes his symptoms better  Associated symptoms: Patient states he has a cough with phlegm  Character: Tightness    Nurses Notes reviewed and agree, including vitals, allergies, social history and prior medical history.          Review of Systems   Constitutional: Negative.    HENT: Negative.    Eyes: Negative.    Respiratory: Positive for cough, chest tightness and shortness of breath.    Cardiovascular: Negative.  Negative for chest pain and leg swelling.   Gastrointestinal: Negative.    Genitourinary: Negative.    Musculoskeletal: Negative.    Skin: Negative.    Allergic/Immunologic: Negative.    Neurological: Positive for dizziness.   Psychiatric/Behavioral: Negative.    All other systems reviewed and are negative.      Past Medical History:   Diagnosis Date   • Arthritis     knees   • Cancer (CMS/HCC)    • Coronary artery disease    • Coronary artery disease involving native coronary artery of native heart without angina pectoris 7/31/2018    Added automatically from request for surgery [id number]   • GERD (gastroesophageal reflux disease)    • Glaucoma    • History of transfusion    • Hyperlipidemia    • Hypertension    • Kidney stone    • Pneumonia     right upper lobe    • Pulmonary embolism (CMS/HCC)    • Skin cancer    • Wears glasses        Allergies   Allergen Reactions   • Lipitor [Atorvastatin] Myalgia       Past Surgical History:   Procedure Laterality Date   • CARDIAC " CATHETERIZATION N/A 6/21/2017    Procedure: Left Heart Cath;  Surgeon: Toy Landers MD;  Location:  [facility];  Service:    • CARDIAC CATHETERIZATION N/A 8/1/2018    Procedure: Left Heart Cath;  Surgeon: Toy Landers MD;  Location:  [facility];  Service: Cardiology   • CATARACT EXTRACTION W/ INTRAOCULAR LENS  IMPLANT, BILATERAL     • CHOLECYSTECTOMY  2010   • COLONOSCOPY     • CORONARY ANGIOPLASTY WITH STENT PLACEMENT     • CORONARY STENT PLACEMENT      x3   • EYE SURGERY Right    • SKIN CANCER EXCISION     • WRIST SURGERY         Family History   Problem Relation Age of Onset   • Heart attack Father        Social History     Socioeconomic History   • Marital status:      Spouse name: Not on file   • Number of children: Not on file   • Years of education: Not on file   • Highest education level: Not on file   Tobacco Use   • Smoking status: Never Smoker   • Smokeless tobacco: Never Used   Substance and Sexual Activity   • Alcohol use: No   • Drug use: No   • Sexual activity: Defer           Objective   Physical Exam  Vitals and nursing note reviewed.   Constitutional:       Appearance: He is well-developed.   HENT:      Head: Normocephalic.   Eyes:      Extraocular Movements: Extraocular movements intact.   Cardiovascular:      Rate and Rhythm: Normal rate and regular rhythm.   Pulmonary:      Effort: Pulmonary effort is normal.      Breath sounds: Normal breath sounds. No decreased breath sounds, wheezing, rhonchi or rales.   Abdominal:      Palpations: Abdomen is soft.      Tenderness: There is no abdominal tenderness.   Musculoskeletal:         General: Normal range of motion.      Right lower leg: No tenderness. No edema.      Left lower leg: No tenderness. No edema.   Skin:     General: Skin is warm.   Neurological:      General: No focal deficit present.      Mental Status: He is alert.   Psychiatric:         Mood and Affect: Mood normal.         Behavior:  Behavior normal.         Procedures           ED Course  ED Course as of Apr 01 1606   Thu Apr 01, 2021   1448 EKG interpreted by me reveals sinus rhythm with rate of 70 bpm.  There is an occasional ectopic premature complex.  There are nonspecific ST-T wave changes.  This is an abnormal appearing EKG.    [TB]   1448 COVID19: Not Detected [TM]      ED Course User Index  [TB] Ashwini Lopez MD  [TM] Cristobal Landaverde PA-C      1999  Patient does have a heart score of 4-5.  He currently denies any shortness of breath or chest tightness lying in bed.  Initial troponin, EKG chest x-ray and basic labs show no acute abnormalities.  Patient does have a history of a PE back in the 1980s to 1990s of unclear cause. BP elevated upon arrival and oxygen saturation in high 90's on room air, would have lower suspicion for this. No ripping or tearing chest pain and no radiation to back lower suspicion of aortic pathology. Abdomen is soft and non tender and pt asymptomatic at this time. Did discuss with and recommend a CT scan of the chest to the patient to rule out PE or other lung issue however he declined.  Also did offer admission for further cardiac work-up the patient states with work-up reassuring at this point he wants to follow-up outpatient with his cardiologist Dr. Landers.  I did explain the patient that I do not have a clear cause of his symptoms although a normal troponin and EKG 3 days out would indicate no significant heart damage.  Did explain to him if his symptoms worsen or he wishes to have his work-up continued he is more than welcome to return to the ER at any time to resume his work-up.  He expressed understanding and wishes to go home at this time not doing any other work-up. Case labs and management discussed with Dr. Lopez.                                     [facility]    Final diagnoses:   Shortness of breath   Chest tightness       ED Disposition  ED Disposition     ED Disposition Condition  Comment    Discharge Stable           Toy Landers MD  [address]  [place] [address]  [phone]    Schedule an appointment as soon as possible for a visit       [facility] Emergency Department  [address] [facility] [address] [phone]    for any worsening shortness of breath, chest tightness or chest pain or any other concerning symptoms         Medication List      No changes were made to your prescriptions during this visit.          Cristobal Landaverde PA-C  04/01/21 1601

## 2021-04-03 ENCOUNTER — APPOINTMENT (OUTPATIENT)
Dept: GENERAL RADIOLOGY | Facility: HOSPITAL | Age: 86
End: 2021-04-03

## 2021-04-03 ENCOUNTER — APPOINTMENT (OUTPATIENT)
Dept: CT IMAGING | Facility: HOSPITAL | Age: 86
End: 2021-04-03

## 2021-04-03 ENCOUNTER — HOSPITAL ENCOUNTER (EMERGENCY)
Facility: HOSPITAL | Age: 86
Discharge: HOME | End: 2021-04-03
Attending: EMERGENCY MEDICINE | Admitting: EMERGENCY MEDICINE

## 2021-04-03 VITALS
SYSTOLIC BLOOD PRESSURE: 145 MMHG | HEART RATE: 73 BPM | TEMPERATURE: 98 F | DIASTOLIC BLOOD PRESSURE: 91 MMHG | OXYGEN SATURATION: 96 % | WEIGHT: 175 LBS | BODY MASS INDEX: 24.5 KG/M2 | RESPIRATION RATE: 20 BRPM | HEIGHT: 71 IN

## 2021-04-03 DIAGNOSIS — R07.89 CHEST DISCOMFORT: Primary | ICD-10-CM

## 2021-04-03 DIAGNOSIS — R06.00 DYSPNEA, UNSPECIFIED TYPE: ICD-10-CM

## 2021-04-03 LAB
ALBUMIN SERPL-MCNC: 3.9 G/DL (ref 3.5–5.2)
ALBUMIN/GLOB SERPL: 1.3 G/DL
ALP SERPL-CCNC: 58 U/L (ref 39–117)
ALT SERPL W P-5'-P-CCNC: 9 U/L (ref 1–41)
ANION GAP SERPL CALCULATED.3IONS-SCNC: 9 MMOL/L (ref 5–15)
AST SERPL-CCNC: 12 U/L (ref 1–40)
BASOPHILS # BLD AUTO: 0.07 10*3/MM3 (ref 0–0.2)
BASOPHILS NFR BLD AUTO: 0.8 % (ref 0–1.5)
BILIRUB SERPL-MCNC: 0.3 MG/DL (ref 0–1.2)
BUN SERPL-MCNC: 33 MG/DL (ref 8–23)
BUN/CREAT SERPL: 19.3 (ref 7–25)
CALCIUM SPEC-SCNC: 10 MG/DL (ref 8.6–10.5)
CHLORIDE SERPL-SCNC: 103 MMOL/L (ref 98–107)
CO2 SERPL-SCNC: 21 MMOL/L (ref 22–29)
CREAT SERPL-MCNC: 1.71 MG/DL (ref 0.76–1.27)
D DIMER PPP FEU-MCNC: 2.88 MCGFEU/ML (ref 0–0.56)
DEPRECATED RDW RBC AUTO: 48.2 FL (ref 37–54)
EOSINOPHIL # BLD AUTO: 0.2 10*3/MM3 (ref 0–0.4)
EOSINOPHIL NFR BLD AUTO: 2.2 % (ref 0.3–6.2)
ERYTHROCYTE [DISTWIDTH] IN BLOOD BY AUTOMATED COUNT: 13.2 % (ref 12.3–15.4)
FLUAV RNA RESP QL NAA+PROBE: NOT DETECTED
FLUBV RNA RESP QL NAA+PROBE: NOT DETECTED
GFR SERPL CREATININE-BSD FRML MDRD: 38 ML/MIN/1.73
GLOBULIN UR ELPH-MCNC: 2.9 GM/DL
GLUCOSE SERPL-MCNC: 112 MG/DL (ref 65–99)
HCT VFR BLD AUTO: 44.7 % (ref 37.5–51)
HGB BLD-MCNC: 15.1 G/DL (ref 13–17.7)
HOLD SPECIMEN: NORMAL
IMM GRANULOCYTES # BLD AUTO: 0.04 10*3/MM3 (ref 0–0.05)
IMM GRANULOCYTES NFR BLD AUTO: 0.4 % (ref 0–0.5)
LIPASE SERPL-CCNC: 38 U/L (ref 13–60)
LYMPHOCYTES # BLD AUTO: 1.62 10*3/MM3 (ref 0.7–3.1)
LYMPHOCYTES NFR BLD AUTO: 18.2 % (ref 19.6–45.3)
MCH RBC QN AUTO: 33.6 PG (ref 26.6–33)
MCHC RBC AUTO-ENTMCNC: 33.8 G/DL (ref 31.5–35.7)
MCV RBC AUTO: 99.3 FL (ref 79–97)
MONOCYTES # BLD AUTO: 0.86 10*3/MM3 (ref 0.1–0.9)
MONOCYTES NFR BLD AUTO: 9.7 % (ref 5–12)
NEUTROPHILS NFR BLD AUTO: 6.11 10*3/MM3 (ref 1.7–7)
NEUTROPHILS NFR BLD AUTO: 68.7 % (ref 42.7–76)
NRBC BLD AUTO-RTO: 0 /100 WBC (ref 0–0.2)
NT-PROBNP SERPL-MCNC: 314.2 PG/ML (ref 0–1800)
PLATELET # BLD AUTO: 211 10*3/MM3 (ref 140–450)
PMV BLD AUTO: 9.3 FL (ref 6–12)
POTASSIUM SERPL-SCNC: 4.5 MMOL/L (ref 3.5–5.2)
PROT SERPL-MCNC: 6.8 G/DL (ref 6–8.5)
RBC # BLD AUTO: 4.5 10*6/MM3 (ref 4.14–5.8)
SARS-COV-2 RNA RESP QL NAA+PROBE: NOT DETECTED
SODIUM SERPL-SCNC: 133 MMOL/L (ref 136–145)
TROPONIN T SERPL-MCNC: <0.01 NG/ML (ref 0–0.03)
TROPONIN T SERPL-MCNC: <0.01 NG/ML (ref 0–0.03)
WBC # BLD AUTO: 8.9 10*3/MM3 (ref 3.4–10.8)
WHOLE BLOOD HOLD SPECIMEN: NORMAL
WHOLE BLOOD HOLD SPECIMEN: NORMAL

## 2021-04-03 PROCEDURE — 83690 ASSAY OF LIPASE: CPT | Performed by: EMERGENCY MEDICINE

## 2021-04-03 PROCEDURE — 84484 ASSAY OF TROPONIN QUANT: CPT | Performed by: EMERGENCY MEDICINE

## 2021-04-03 PROCEDURE — 0 IOPAMIDOL PER 1 ML: Performed by: EMERGENCY MEDICINE

## 2021-04-03 PROCEDURE — 71045 X-RAY EXAM CHEST 1 VIEW: CPT

## 2021-04-03 PROCEDURE — 94640 AIRWAY INHALATION TREATMENT: CPT

## 2021-04-03 PROCEDURE — 85025 COMPLETE CBC W/AUTO DIFF WBC: CPT | Performed by: EMERGENCY MEDICINE

## 2021-04-03 PROCEDURE — 71275 CT ANGIOGRAPHY CHEST: CPT

## 2021-04-03 PROCEDURE — 85379 FIBRIN DEGRADATION QUANT: CPT | Performed by: EMERGENCY MEDICINE

## 2021-04-03 PROCEDURE — 87636 SARSCOV2 & INF A&B AMP PRB: CPT | Performed by: EMERGENCY MEDICINE

## 2021-04-03 PROCEDURE — 83880 ASSAY OF NATRIURETIC PEPTIDE: CPT | Performed by: EMERGENCY MEDICINE

## 2021-04-03 PROCEDURE — 80053 COMPREHEN METABOLIC PANEL: CPT | Performed by: EMERGENCY MEDICINE

## 2021-04-03 PROCEDURE — 99284 EMERGENCY DEPT VISIT MOD MDM: CPT

## 2021-04-03 PROCEDURE — 93005 ELECTROCARDIOGRAM TRACING: CPT | Performed by: EMERGENCY MEDICINE

## 2021-04-03 RX ORDER — SODIUM CHLORIDE 0.9 % (FLUSH) 0.9 %
10 SYRINGE (ML) INJECTION AS NEEDED
Status: DISCONTINUED | OUTPATIENT
Start: 2021-04-03 | End: 2021-04-03 | Stop reason: HOSPADM

## 2021-04-03 RX ORDER — ALBUTEROL SULFATE 90 UG/1
2 AEROSOL, METERED RESPIRATORY (INHALATION) ONCE
Status: COMPLETED | OUTPATIENT
Start: 2021-04-03 | End: 2021-04-03

## 2021-04-03 RX ADMIN — SODIUM CHLORIDE 500 ML: 9 INJECTION, SOLUTION INTRAVENOUS at 17:05

## 2021-04-03 RX ADMIN — IOPAMIDOL 70 ML: 755 INJECTION, SOLUTION INTRAVENOUS at 18:02

## 2021-04-03 RX ADMIN — ALBUTEROL SULFATE 2 PUFF: 108 AEROSOL, METERED RESPIRATORY (INHALATION) at 16:02

## 2021-04-03 NOTE — ED PROVIDER NOTES
EMERGENCY DEPARTMENT ENCOUNTER    Pt Name: Darren Mccracken  MRN: [id number]  Pt :   1934  Room Number:    Date of encounter:  4/3/2021   PCP: Angeles Huynh DO  ED Provider: Miguel Angel Avina MD    Historian: Patient      HPI:  Chief Complaint: Difficulty breathing/chest tightness        Context: Darren Mccracken is a 86 y.o. male who presents to the ED c/o symptoms ongoing for the last 2 to 3 days.  He reports a tightness/shortness of air sensation across his chest.  He denies cough, fevers chills, sick exposures, heavy lifting.  He does mow 3-1/2 acres of his own property on a regular basis but does not feel that he has been overly stressed either mentally or physically by doing so.  He does report environmental allergies and this time of bloom.  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  No underlying etiology was determined for the DVT but he has had no recurrence.  He also has history of coronary artery disease, hypertension, hyperlipidemia.        PAST MEDICAL HISTORY  Active Ambulatory Problems     Diagnosis Date Noted   • HTN (hypertension) 2017   • HLD (hyperlipidemia) 2017   • CAD (coronary artery disease) 2017   • GERD (gastroesophageal reflux disease) 2017   • Renal insufficiency 2017   • Glaucoma 2017   • Erectile dysfunction 2018   • Vitamin D deficiency 2018   • Shortness of breath 06/10/2019     Resolved Ambulatory Problems     Diagnosis Date Noted   • Chest pain 2017   • Shortness of breath 2017   • Coronary artery disease involving native coronary artery of native heart without angina pectoris 2018     Past Medical History:   Diagnosis Date   • Arthritis    • Cancer (CMS/HCC)    • Coronary artery disease    • History of transfusion    • Hyperlipidemia    • Hypertension    • Kidney stone    • Pneumonia    • Pulmonary embolism (CMS/HCC)    • Skin cancer    • Wears glasses           PAST SURGICAL HISTORY  Past Surgical History:   Procedure Laterality Date   • CARDIAC CATHETERIZATION N/A 6/21/2017    Procedure: Left Heart Cath;  Surgeon: Toy Landers MD;  Location:  [facility];  Service:    • CARDIAC CATHETERIZATION N/A 8/1/2018    Procedure: Left Heart Cath;  Surgeon: Toy Landers MD;  Location:  [facility];  Service: Cardiology   • CATARACT EXTRACTION W/ INTRAOCULAR LENS  IMPLANT, BILATERAL     • CHOLECYSTECTOMY  2010   • COLONOSCOPY     • CORONARY ANGIOPLASTY WITH STENT PLACEMENT     • CORONARY STENT PLACEMENT      x3   • EYE SURGERY Right    • SKIN CANCER EXCISION     • WRIST SURGERY           FAMILY HISTORY  Family History   Problem Relation Age of Onset   • Heart attack Father          SOCIAL HISTORY  Social History     Socioeconomic History   • Marital status:      Spouse name: Not on file   • Number of children: Not on file   • Years of education: Not on file   • Highest education level: Not on file   Tobacco Use   • Smoking status: Never Smoker   • Smokeless tobacco: Never Used   Substance and Sexual Activity   • Alcohol use: No   • Drug use: No   • Sexual activity: Defer         ALLERGIES  Lipitor [atorvastatin]        REVIEW OF SYSTEMS  Review of Systems     All systems reviewed and negative except for those discussed in HPI.       PHYSICAL EXAM    I have reviewed the triage vital signs and nursing notes.    ED Triage Vitals [04/03/21 1521]   Temp Heart Rate Resp BP SpO2   -- 82 20 130/73 97 %      Temp src Heart Rate Source Patient Position BP Location FiO2 (%)   -- -- -- -- --       Physical Exam  GENERAL:   Appears pleasant, mildly anxious, nontoxic.  HENT: Nares patent.  EYES: No scleral icterus.  CV: Regular rhythm, regular rate.  No murmurs gallops rubs.  No asymmetry to lower extremities.  RESPIRATORY: Normal effort.  No audible wheezes, rales or rhonchi.  Clear to auscultation in all fields posteriorly  ABDOMEN: Soft,  nontender.  MUSCULOSKELETAL: No deformities.  No reproduction of chest discomfort with palpation.  Homans' sign negative.  NEURO: Alert, moves all extremities, follows commands.  SKIN: Warm, dry, no rash visualized.        LAB RESULTS  No results found for this or any previous visit (from the past 24 hour(s)).    If labs were ordered, I independently reviewed the results.        RADIOLOGY  No Radiology Exams Resulted Within Past 24 Hours    I ordered and reviewed the above noted radiographic studies.    I viewed images of chest x-ray which showed no active disease per my independent interpretation.  See radiologist's dictation for official interpretation.        PROCEDURES    Procedures    ECG 12 Lead         ECG 12 Lead    (Results Pending)       MEDICATIONS GIVEN IN ER    Medications   sodium chloride 0.9 % flush 10 mL (has no administration in time range)         PROGRESS, DATA ANALYSIS, CONSULTS, AND MEDICAL DECISION MAKING    All labs have been independently reviewed by me.  All radiology studies have been reviewed by me and the radiologist dictating the report.   EKG's have been independently viewed and interpreted by me.      Differential diagnoses: ACS versus pulmonary embolus versus environmental allergies/bronchospasm, etc.      ED Course as of Apr 03 1950   Sat Apr 03, 2021   1703 Positive D-dimer with history of DVT/PE.  I have ordered CTA as well as IV hydration.    [MS]   1948 Spoke with patient about taking daily Allegra or Claritin.  He definitely felt significant improvement with the administration of albuterol.  We will send him home with albuterol and spacer.    [MS]      ED Course User Index  [MS] Miguel Angel Avina MD             AS OF 15:25 EDT VITALS:    BP - 130/73  HR - 82  TEMP -    O2 SATS - 97%        DIAGNOSIS  Final diagnoses:   Chest discomfort   Dyspnea, unspecified type         DISPOSITION  DISCHARGE    FOLLOW-UP  Toy Landers MD  [address] [address]  [place]  [place] [address]  [phone]      AS ALREADY SCHEDULED    Angeles Huynh, DO  [address] [facility] [address]  [phone]          [facility] Emergency Department  [address] [facility] [address] [phone]    IF YOU HAVE ANY CONCERN OF WORSENING CONDITION         Medication List      No changes were made to your prescriptions during this visit.                  Miguel Angel Avnia MD  04/03/21 1951       Miguel Angel Avina MD  04/08/21 6251

## 2021-04-03 NOTE — DISCHARGE INSTRUCTIONS
Follow-up with your cardiologist on Tuesday as already planned    Utilize Claritin or Allegra over-the-counter for the next 2 weeks.    Utilize the albuterol inhaler we have provided for you as follows.  Use with inhaler spacer.  2 puffs every 6 hours as needed for shortness of breath.    Return if you have any concern of worsening condition.        Please review the medications you are supposed to be taking according to prior physician recommendations. I have not changed your home medications during this visit. If your discharge instructions indicate that I have changed your home medications, this is not the case, and you should disregard. If you have any questions about the medication you should be taking at home, please call your physician.

## 2021-04-04 LAB
QT INTERVAL: 388 MS
QT INTERVAL: 396 MS
QTC INTERVAL: 433 MS
QTC INTERVAL: 467 MS

## 2021-04-06 ENCOUNTER — OFFICE VISIT (OUTPATIENT)
Dept: CARDIOLOGY | Facility: CLINIC | Age: 86
End: 2021-04-06

## 2021-04-06 VITALS
SYSTOLIC BLOOD PRESSURE: 110 MMHG | DIASTOLIC BLOOD PRESSURE: 77 MMHG | OXYGEN SATURATION: 99 % | HEIGHT: 71 IN | BODY MASS INDEX: 23.52 KG/M2 | WEIGHT: 168 LBS | HEART RATE: 82 BPM

## 2021-04-06 DIAGNOSIS — R06.2 WHEEZING: ICD-10-CM

## 2021-04-06 DIAGNOSIS — R05.8 PRODUCTIVE COUGH: ICD-10-CM

## 2021-04-06 DIAGNOSIS — R06.02 SHORTNESS OF BREATH: Primary | ICD-10-CM

## 2021-04-06 DIAGNOSIS — E78.2 MIXED HYPERLIPIDEMIA: ICD-10-CM

## 2021-04-06 DIAGNOSIS — I10 ESSENTIAL HYPERTENSION: ICD-10-CM

## 2021-04-06 DIAGNOSIS — I25.10 CORONARY ARTERY DISEASE INVOLVING NATIVE CORONARY ARTERY OF NATIVE HEART WITHOUT ANGINA PECTORIS: ICD-10-CM

## 2021-04-06 PROCEDURE — 99214 OFFICE O/P EST MOD 30 MIN: CPT | Performed by: INTERNAL MEDICINE

## 2021-04-06 NOTE — PROGRESS NOTES
"  OFFICE FOLLOW UP     Date of Encounter:2021     Name: Darren Mccracken  : 1934  Address: [address] [place] [address] [address] [place] [address]    PCP: Angeles Huynh DO  [address] [place] [address] [place] [address]    Darren Mccracken is a 86 y.o. male.      Chief Complaint: Follow up of chest discomfort, shortness of breath    Problem List:   1.  Coronary Artery Disease                        A.  [facility] :  Stent to RCA with Rasta                        B.  [facility] :  Stent to 95% LAD with Boliek                        C.  Cardiac PET 2017:  EF 62%; Normal perfusion.                        D.  NYHA IV \"anginal equivalent\" of SOA, 17                                      I. Normal LV function.                                      II. Single vessel disease, [facility] CAR[facility]OS to circumflex.                        E.  Recurrent ANGINA, 2018                                      I.  [facility]: Minor nonobstructive CAD, widely patent stents                                      II. Echo EF: 56-60%, moderate MR, mild AI, normal RVSP                         F. [facility] admission for exertional dyspnea 2019:              I. Normal stress PET               II. Negative VQ scan  G. ER visit for chest tightness, 4/3/2021   I. Negative for PE (CTA chest), negative troponins, EKG unchanged.  2.  Hypertension  3.  Hyperlipidemia  4.  History of pulmonary embolism, remote.  5.  GERD  6.  Renal insufficiency  7.  Glaucoma  8.  Syncope 2019              A. ZIO monitor 2019: dominant rhythm is sinus; 3 bouts of AF (vs AT) - none lasting longer than 5-7 seconds    Allergies:  Allergies   Allergen Reactions   • Lipitor [Atorvastatin] Myalgia     Current Medications:  Current Outpatient Medications   Medication Instructions   • aspirin 81 mg, Oral, Nightly   • brimonidine (ALPHAGAN) 0.2 % ophthalmic solution 2 drops, Right Eye, 2 Times Daily   • chlorthalidone (HYGROTON) 25 mg, Oral, Daily   • dorzolamide (TRUSOPT) 2 % ophthalmic solution 1 " "drop, Right Eye, 2 Times Daily   • lisinopril (PRINIVIL,ZESTRIL) 20 mg, Oral, 2 times daily   • omeprazole (PRILOSEC) 20 mg, Oral, Nightly   • simvastatin (ZOCOR) 80 mg, Oral, Every Night at Bedtime   • vitamin B-12 (CYANOCOBALAMIN) 2,500 mcg, Sublingual, Daily    **Presumed albuterol inhaler, not on medication list states he uses 2-3 times daily.     History of Present Illness:      Darren Mccracken returns for ER follow up. He was seen for chest discomfort and feeling unable to get a deep breath. He experienced chest tightness worse in the morning over 2-3 days. He sought ER care on the 3rd day. ER workup was negative. He reports some wheezing with seasonal allergies. He uses a rescue inhaler with some relief. He is short of breath at rest and wakes in the morning with significant sinus drainage that he coughs up. He has received both COVID vaccinations. He denies fever or chills. He tested negative for COVID in ER.      The following portions of the patient's history were reviewed and updated as appropriate: allergies, current medications and problem list.    ROS: Pertinent positives as listed in the HPI.  All other systems reviewed and negative.    Objective:    Vitals:    04/06/21 1340 04/06/21 1341   BP: 136/78 110/77   BP Location: Left arm Left arm   Patient Position: Sitting Standing   Pulse: 64 82   SpO2: 99%    Weight: 76.2 kg (168 lb)    Height: 180.3 cm (71\")      Body mass index is 23.43 kg/m².    Physical Exam:  GENERAL: Alert, cooperative, in no acute distress.   HEENT: Normocephalic, no adenopathy, no jugular venous distention  HEART: No discrete PMI is noted. Regular rhythm, normal rate, and no murmurs, gallops, or rubs.   LUNGS: Clear to auscultation bilaterally. No wheezing, rales or ronchi.  ABDOMEN: Soft, bowel sounds present, non-tender   NEUROLOGIC: No focal abnormalities involving strength or sensation are noted.   EXTREMITIES: No clubbing, cyanosis, or edema noted.      Diagnostic Data:  "   Lab Results   Component Value Date    GLUCOSE 112 (H) 04/03/2021    BUN 33 (H) 04/03/2021    CREATININE 1.71 (H) 04/03/2021    EGFRIFNONA 38 (L) 04/03/2021    BCR 19.3 04/03/2021    K 4.5 04/03/2021    CO2 21.0 (L) 04/03/2021    CALCIUM 10.0 04/03/2021    ALBUMIN 3.90 04/03/2021    AST 12 04/03/2021    ALT 9 04/03/2021      Lab Results   Component Value Date    WBC 8.90 04/03/2021    HGB 15.1 04/03/2021    HCT 44.7 04/03/2021    MCV 99.3 (H) 04/03/2021     04/03/2021      Procedures    Advance Care Planning   ACP discussion was held with the patient during this visit. Patient has an advance directive (not in EMR), copy requested.    Assessment and Plan:   1.  CAD:  I do not feel his chest tightness and shortness of breath is angina and no there are no signs of heart failure by CXR and normal proBNP. We will send him for pulmonary evaluation due to history of wheezing and mild productive cough. He would like to see pulmonologist in [place] and we will try to accommodate this.   2.  HTN:  Controlled on current regimen. We will continue current medications.   3.  HLD:  No lipid panel on file. We will ask PCP to send a copy for our records and ask that LDL be targeted to less than 70. Continue statin.      I will see Darren Mccracken back at his scheduled appointment to see us in September, sooner as needed.     Scribed for Toy Landers MD by Ann Apple RN. 04/06/2021 14:20 EDT.       EMR Dragon/Transcription Disclaimer:  Much of this encounter note is an electronic transcription/translation of spoken language to printed text.  The electronic translation of spoken language may permit erroneous, or at times, nonsensical words or phrases to be inadvertently transcribed.  Although I have reviewed the note for such errors, some may still exist.

## 2021-05-16 ENCOUNTER — APPOINTMENT (OUTPATIENT)
Dept: PREADMISSION TESTING | Facility: HOSPITAL | Age: 86
End: 2021-05-16

## 2021-05-16 PROCEDURE — C9803 HOPD COVID-19 SPEC COLLECT: HCPCS

## 2021-05-16 PROCEDURE — U0005 INFEC AGEN DETEC AMPLI PROBE: HCPCS

## 2021-05-16 PROCEDURE — U0004 COV-19 TEST NON-CDC HGH THRU: HCPCS

## 2021-05-17 LAB — SARS-COV-2 RNA NOSE QL NAA+PROBE: NOT DETECTED

## 2021-06-04 RX ORDER — LISINOPRIL 20 MG/1
20 TABLET ORAL 2 TIMES DAILY
Qty: 60 TABLET | Refills: 0 | Status: SHIPPED | OUTPATIENT
Start: 2021-06-04 | End: 2021-09-01 | Stop reason: SDUPTHER

## 2021-06-04 RX ORDER — CHLORTHALIDONE 25 MG/1
TABLET ORAL
Qty: 30 TABLET | Refills: 0 | Status: SHIPPED | OUTPATIENT
Start: 2021-06-04 | End: 2021-08-26

## 2021-06-04 NOTE — TELEPHONE ENCOUNTER
Lab Results   Component Value Date    GLUCOSE 112 (H) 04/03/2021    BUN 33 (H) 04/03/2021    CREATININE 1.71 (H) 04/03/2021    EGFRIFNONA 38 (L) 04/03/2021    BCR 19.3 04/03/2021    K 4.5 04/03/2021    CO2 21.0 (L) 04/03/2021    CALCIUM 10.0 04/03/2021    ALBUMIN 3.90 04/03/2021    AST 12 04/03/2021    ALT 9 04/03/2021    Since 2019 Cr 1.5-1.9, LM to see if pt follows with nephrology.   Will refill for one month only until we can speak with pt.  He needs refills on chlorthalidone and lisinopril. Would favor referral to nephrology if he does not follow with them already. Will await return call

## 2021-06-14 ENCOUNTER — OFFICE VISIT (OUTPATIENT)
Dept: PULMONOLOGY | Facility: CLINIC | Age: 86
End: 2021-06-14

## 2021-06-14 VITALS
DIASTOLIC BLOOD PRESSURE: 72 MMHG | SYSTOLIC BLOOD PRESSURE: 118 MMHG | BODY MASS INDEX: 23.38 KG/M2 | RESPIRATION RATE: 16 BRPM | HEIGHT: 71 IN | HEART RATE: 87 BPM | WEIGHT: 167 LBS | OXYGEN SATURATION: 96 %

## 2021-06-14 DIAGNOSIS — J41.0 CHRONIC BRONCHITIS, SIMPLE (HCC): ICD-10-CM

## 2021-06-14 DIAGNOSIS — J45.40 MODERATE PERSISTENT ASTHMA WITHOUT COMPLICATION: ICD-10-CM

## 2021-06-14 DIAGNOSIS — R06.02 SHORTNESS OF BREATH: Primary | ICD-10-CM

## 2021-06-14 PROCEDURE — 99204 OFFICE O/P NEW MOD 45 MIN: CPT | Performed by: INTERNAL MEDICINE

## 2021-06-14 PROCEDURE — 95012 NITRIC OXIDE EXP GAS DETER: CPT | Performed by: INTERNAL MEDICINE

## 2021-06-14 RX ORDER — DOXAZOSIN MESYLATE 1 MG/1
1 TABLET ORAL NIGHTLY
COMMUNITY

## 2021-06-14 RX ORDER — ALBUTEROL SULFATE 90 UG/1
2 AEROSOL, METERED RESPIRATORY (INHALATION) EVERY 4 HOURS PRN
Qty: 18 G | Refills: 5 | Status: SHIPPED | OUTPATIENT
Start: 2021-06-14 | End: 2021-09-14 | Stop reason: ALTCHOICE

## 2021-06-14 NOTE — PROGRESS NOTES
CONSULT NOTE    Requested by:   Toy Landers MD Kendrick, Casandra R, DO      Chief Complaint   Patient presents with   • Consult   • Shortness of Breath   • Cough       Subjective:  Darren Mccracken is a 86 y.o. male.     History of Present Illness   Patient comes in today for consultation because of shortness of breath for the past 6-8 years.    Symptoms are described as mild to moderate.  he also notices associated coughing and occasional chest tightness. Patient notes having progressive worsening in his ability to walk up the hill or walk up a flight of stairs.     Patient also says that he brings up phlegm in the morning along with cough. Phlegm is usually clear.     There does not seem to be a seasonal variation to the symptoms.    Patient does not have a family history of lung diseases. He reports known personal history of mild asthma.    The following portions of the patient's history were reviewed and updated as appropriate: allergies, current medications, past family history, past medical history, past social history and past surgical history.    Review of Systems   Constitutional: Negative for chills, fatigue and fever.   HENT: Negative for rhinorrhea, sinus pressure, sinus pain, sneezing and sore throat.    Respiratory: Positive for cough and shortness of breath. Negative for chest tightness and wheezing.    Cardiovascular: Positive for leg swelling. Negative for palpitations.   Psychiatric/Behavioral: Negative for sleep disturbance.   All other systems reviewed and are negative.      Past Medical History:   Diagnosis Date   • Arthritis     knees   • Cancer (CMS/HCC)    • Coronary artery disease    • Coronary artery disease involving native coronary artery of native heart without angina pectoris 7/31/2018    Added automatically from request for surgery [id number]   • GERD (gastroesophageal reflux disease)    • Glaucoma    • History of transfusion    • Hyperlipidemia    • Hypertension    • Kidney  "stone    • Pneumonia     right upper lobe    • Pulmonary embolism (CMS/HCC)    • Skin cancer    • Wears glasses        Social History     Tobacco Use   • Smoking status: Never Smoker   • Smokeless tobacco: Never Used   Substance Use Topics   • Alcohol use: No         Objective:  Visit Vitals  /72   Pulse 87   Resp 16   Ht 180.3 cm (71\")   Wt 75.8 kg (167 lb)   SpO2 96%   BMI 23.29 kg/m²       Physical Exam  Vitals reviewed.   Constitutional:       Appearance: He is well-developed.   HENT:      Head: Atraumatic.   Neck:      Thyroid: No thyromegaly.      Vascular: No JVD.   Cardiovascular:      Rate and Rhythm: Normal rate and regular rhythm.      Heart sounds: No murmur heard.     Pulmonary:      Effort: Pulmonary effort is normal. No respiratory distress.      Breath sounds: No wheezing or rales.   Musculoskeletal:      Cervical back: Neck supple.      Comments: Gait was slow.   Skin:     General: Skin is warm and dry.   Neurological:      Mental Status: He is alert and oriented to person, place, and time.   Psychiatric:         Behavior: Behavior normal.         Assessment/Plan:  Diagnoses and all orders for this visit:    1. Shortness of breath (Primary)  -     Nitric Oxide  -     Peak Flow    2. Chronic bronchitis, simple (CMS/HCC)  -     Nitric Oxide  -     Peak Flow    3. Moderate persistent asthma without complication    Other orders  -     Fluticasone Furoate-Vilanterol (Breo Ellipta) 100-25 MCG/INH inhaler; Inhale 1 puff Daily. Rinse mouth with water after use.  Dispense: 60 each; Refill: 5  -     albuterol sulfate HFA (Ventolin HFA) 108 (90 Base) MCG/ACT inhaler; Inhale 2 puffs Every 4 (Four) Hours As Needed for Wheezing or Shortness of Air.  Dispense: 18 g; Refill: 5        Return in about 3 months (around 9/14/2021) for Recheck, For Livia, ....Also 7-8 mths w/ Dr. Melendez.    DISCUSSION(if any):  Last chest x-ray was reviewed personally and the results were shared with the patient.  Images reviewed " personally.   Results for orders placed during the hospital encounter of 04/03/21    XR Chest 1 View    Narrative  EXAMINATION: XR CHEST 1 VW - 04/03/2021    INDICATION: Chest pain.    COMPARISON: 06/10/2019    FINDINGS: Heart and vasculature appear normal in size. Lungs are  well-expanded and appear clear except for calcified granuloma in the  left midlung. Old healed left-sided rib fractures are again noted. No  pneumothorax, effusion or edema is seen. Slightly prominent superior  mediastinal shadow is unchanged, likely due to vascular tortuosity in an  older patient.    Impression  No new chest disease.    DICTATED:   04/03/2021  EDITED/ls :   04/03/2021      This report was finalized on 4/3/2021 10:08 PM by Dr. Qamar Truong MD.      Last CT scan was reviewed in great detail with the patient. Images reviewed personally.   Results for orders placed during the hospital encounter of 04/03/21    CT Angiogram Chest    Narrative  EXAMINATION: CT ANGIOGRAM CHEST - 04/03/2021    INDICATION: Chest tightness.    TECHNIQUE: Spiral acquisition 3 mm post IV contrast images through the  chest with sagittal and coronal 2 mm 2D reconstructions.    The radiation dose reduction device was turned on for each scan per the  ALARA (As Low as Reasonably Achievable) protocol.    COMPARISON: Angiogram of the chest CT scan 04/26/2017.    FINDINGS: History indicates dyspnea, chest tightness and positive  D-dimer.    There is good contrast opacification of the pulmonary arteries. No  filling defects are seen to suggest pulmonary embolic disease. There is  no evidence of thoracic aortic dissection. There is ectasia of the  ascending thoracic aorta to approximately 4.1 cm, not significantly  changed. No pericardial or pleural effusion is seen. There is no  evidence of mediastinal adenopathy or mass. Lung window images show  areas of peripheral linear and vaguely nodular scarring of both lungs to  appear unchanged. There is no evidence of  pneumonia or other clearly  acute chest disease. Calcified left lower lobe granuloma is again noted.    Included images of the upper abdomen show mild fatty liver change. Clips  are seen in the gallbladder fossa. Spleen is not enlarged. No  significant abnormalities are noted of the pancreas, adrenal glands, or  right kidney except for a lower pole cyst. Left kidney is again noted to  be severely atrophic. Bony structures appear to be intact. Smooth  deformity of the proximal sternum is consistent with old healed trauma.    Impression  1. No evidence of pulmonary embolus.  2. Stable mild peripheral lung scarring compared to 04/26/2017 exam.  3. 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  4. No clearly acute chest disease is seen.  5. Atrophic left kidney again noted.    DICTATED:   04/03/2021  EDITED/ls :   04/03/2021    This report was finalized on 4/4/2021 11:20 PM by Dr. Qamar Truong MD.      I have also reviewed his ER note that mentions chest discomfort and dyspnea.  It also listed performance of CT angiogram and instructions to follow-up with cardiology along with recommendation to consider Allegra or Claritin.  The patient apparently was also prescribed albuterol 2 puffs every 6 hours as needed.    I also reviewed his last echocardiogram and shared the results with him.   Results for orders placed during the hospital encounter of 08/01/18    Adult Transthoracic Echo Complete W/ Cont if Necessary Per Protocol    Interpretation Summary  · The left atrium and right ventricle are dilated.  · Global and segmental LV systolic function is normal.  · The estimated PA pressure is normal.  · There is aortic valve sclerosis with mild aortic insufficiency.  · Mitral annular calcification with moderate mitral regurgitation is present.  · No other abnormalities are present on this study.      ===========================  ===========================    PFTs were ordered for follow up visit.     Laboratory workup was also  reviewed which showed   Lab Results   Component Value Date    HGB 15.1 04/03/2021    HGB 16.7 04/01/2021    HGB 15.8 12/22/2019   ,    Lab Results   Component Value Date    EOSABS 0.20 04/03/2021    EOSABS 0.40 04/01/2021    EOSABS 0.43 (H) 12/22/2019    & Laboratory workup also showed   Lab Results   Component Value Date    CO2 21.0 (L) 04/03/2021     ===========================  ===========================    FeNO level was 15 today.    Peak flow was 260 LPM today.    I had a detailed discussion with the patient regarding his symptoms that are very suggestive of asthma    Orders as above.    The patient was started on Breo with instructions to rinse his mouth with water after use.     Other contributing factors may also need to be treated and this will be discussed with the patient, if and when applicable    Patient was advised to use rescue inhaler for when necessary purposes    Patient was also advised to keep a log of the use of rescue inhaler.    Patient was given reading material.        Dictated utilizing Dragon dictation.    This document was electronically signed by Radha Melendez MD on 06/14/21 at 11:12 EDT

## 2021-06-30 ENCOUNTER — OFFICE VISIT (OUTPATIENT)
Dept: ORTHOPEDIC SURGERY | Facility: CLINIC | Age: 86
End: 2021-06-30

## 2021-06-30 VITALS
HEIGHT: 71 IN | WEIGHT: 167 LBS | TEMPERATURE: 97.1 F | DIASTOLIC BLOOD PRESSURE: 80 MMHG | SYSTOLIC BLOOD PRESSURE: 122 MMHG | BODY MASS INDEX: 23.38 KG/M2

## 2021-06-30 DIAGNOSIS — S89.91XA KNEE INJURY, RIGHT, INITIAL ENCOUNTER: ICD-10-CM

## 2021-06-30 DIAGNOSIS — M25.561 ARTHRALGIA OF BOTH KNEES: Primary | ICD-10-CM

## 2021-06-30 DIAGNOSIS — M25.562 ARTHRALGIA OF BOTH KNEES: Primary | ICD-10-CM

## 2021-06-30 PROCEDURE — 20610 DRAIN/INJ JOINT/BURSA W/O US: CPT | Performed by: ORTHOPAEDIC SURGERY

## 2021-06-30 PROCEDURE — 99213 OFFICE O/P EST LOW 20 MIN: CPT | Performed by: ORTHOPAEDIC SURGERY

## 2021-06-30 RX ORDER — LIDOCAINE HYDROCHLORIDE 10 MG/ML
2 INJECTION, SOLUTION INFILTRATION; PERINEURAL
Status: COMPLETED | OUTPATIENT
Start: 2021-06-30 | End: 2021-06-30

## 2021-06-30 RX ORDER — TRIAMCINOLONE ACETONIDE 40 MG/ML
40 INJECTION, SUSPENSION INTRA-ARTICULAR; INTRAMUSCULAR
Status: COMPLETED | OUTPATIENT
Start: 2021-06-30 | End: 2021-06-30

## 2021-06-30 RX ADMIN — LIDOCAINE HYDROCHLORIDE 2 ML: 10 INJECTION, SOLUTION INFILTRATION; PERINEURAL at 14:33

## 2021-06-30 RX ADMIN — TRIAMCINOLONE ACETONIDE 40 MG: 40 INJECTION, SUSPENSION INTRA-ARTICULAR; INTRAMUSCULAR at 14:33

## 2021-06-30 RX ADMIN — LIDOCAINE HYDROCHLORIDE 2 ML: 10 INJECTION, SOLUTION INFILTRATION; PERINEURAL at 14:34

## 2021-06-30 RX ADMIN — TRIAMCINOLONE ACETONIDE 40 MG: 40 INJECTION, SUSPENSION INTRA-ARTICULAR; INTRAMUSCULAR at 14:34

## 2021-06-30 NOTE — PROGRESS NOTES
Subjective   Patient ID: Darren Mccracken is a 87 y.o. male  Injections of the Right Knee (Patient states he fell at his step-son's house about 2 weeks ago. He fell going up steps, and body weight landed on knee right onto step. He said it never swelled but is still pretty sore and would like an xray today. ) and Injections of the Left Knee             History of Present Illness  He was out walking in the garage and did not see the step tripped up a step landing directly into the front of his right knee denies any hip pain back pain swelling in the ankle or calf tenderness and swelling seems to localize more along the tibial tubercle area of his right knee.  No open wounds.      Review of Systems   Constitutional: Negative for fever.   HENT: Negative for dental problem and voice change.    Eyes: Negative for visual disturbance.   Respiratory: Negative for shortness of breath.    Cardiovascular: Negative for chest pain.   Gastrointestinal: Negative for abdominal pain.   Genitourinary: Negative for dysuria.   Musculoskeletal: Positive for arthralgias. Negative for gait problem and joint swelling.   Skin: Negative for rash.   Neurological: Negative for speech difficulty.   Hematological: Does not bruise/bleed easily.   Psychiatric/Behavioral: Negative for confusion.       Past Medical History:   Diagnosis Date   • Arthritis     knees   • Cancer (CMS/HCC)    • Coronary artery disease    • Coronary artery disease involving native coronary artery of native heart without angina pectoris 7/31/2018    Added automatically from request for surgery [id number]   • GERD (gastroesophageal reflux disease)    • Glaucoma    • History of transfusion    • Hyperlipidemia    • Hypertension    • Kidney stone    • Pneumonia     right upper lobe    • Pulmonary embolism (CMS/HCC)    • Skin cancer    • Wears glasses         Past Surgical History:   Procedure Laterality Date   • CARDIAC CATHETERIZATION N/A 6/21/2017    Procedure: Left Heart Cath;   Surgeon: Toy Landers MD;  Location:  [facility];  Service:    • CARDIAC CATHETERIZATION N/A 8/1/2018    Procedure: Left Heart Cath;  Surgeon: Toy Lanedrs MD;  Location:  [facility];  Service: Cardiology   • CATARACT EXTRACTION W/ INTRAOCULAR LENS  IMPLANT, BILATERAL     • CHOLECYSTECTOMY  2010   • COLONOSCOPY     • CORONARY ANGIOPLASTY WITH STENT PLACEMENT     • CORONARY STENT PLACEMENT      x3   • EYE SURGERY Right    • SKIN CANCER EXCISION     • WRIST SURGERY         Family History   Problem Relation Age of Onset   • Heart attack Father        Social History     Socioeconomic History   • Marital status:      Spouse name: Not on file   • Number of children: Not on file   • Years of education: Not on file   • Highest education level: Not on file   Tobacco Use   • Smoking status: Never Smoker   • Smokeless tobacco: Never Used   Vaping Use   • Vaping Use: Never used   Substance and Sexual Activity   • Alcohol use: No   • Drug use: No   • Sexual activity: Defer       I have reviewed the medical and surgical history, family history, social history, medications, and/or allergies, and the review of systems of this report.    Allergies   Allergen Reactions   • Lipitor [Atorvastatin] Myalgia         Current Outpatient Medications:   •  albuterol sulfate HFA (Ventolin HFA) 108 (90 Base) MCG/ACT inhaler, Inhale 2 puffs Every 4 (Four) Hours As Needed for Wheezing or Shortness of Air., Disp: 18 g, Rfl: 5  •  aspirin 81 MG EC tablet, Take 81 mg by mouth Every Night., Disp: , Rfl:   •  brimonidine (ALPHAGAN) 0.2 % ophthalmic solution, Administer 2 drops to the right eye 2 (Two) Times a Day., Disp: , Rfl:   •  chlorthalidone (HYGROTON) 25 MG tablet, Take 1 tablet by mouth once daily, Disp: 30 tablet, Rfl: 0  •  dorzolamide (TRUSOPT) 2 % ophthalmic solution, Administer 1 drop to the right eye 2 (Two) Times a Day., Disp: , Rfl:   •  doxazosin (CARDURA) 1 MG tablet, Take 1 mg by  "mouth Every Night., Disp: , Rfl:   •  Fluticasone Furoate-Vilanterol (Breo Ellipta) 100-25 MCG/INH inhaler, Inhale 1 puff Daily. Rinse mouth with water after use., Disp: 60 each, Rfl: 5  •  lisinopril (PRINIVIL,ZESTRIL) 20 MG tablet, Take 1 tablet by mouth 2 (two) times a day., Disp: 60 tablet, Rfl: 0  •  omeprazole (priLOSEC) 20 MG capsule, Take 20 mg by mouth Every Night., Disp: , Rfl:   •  simvastatin (ZOCOR) 80 MG tablet, Take 1 tablet by mouth every night at bedtime., Disp: 90 tablet, Rfl: 1  •  vitamin B-12 (CYANOCOBALAMIN) 2500 MCG sublingual tablet tablet, Place 2,500 mcg under the tongue Daily., Disp: , Rfl:     Objective   /80   Temp 97.1 °F (36.2 °C)   Ht 180.3 cm (71\")   Wt 75.8 kg (167 lb)   BMI 23.29 kg/m²    Physical Exam  Constitutional: Patient is oriented to person, place, and time. Patient appears well-developed and well-nourished.   HENT:Head: Normocephalic and atraumatic.   Eyes: EOM are normal. Pupils are equal, round, and reactive to light.   Neck: Normal range of motion. Neck supple.   Cardiovascular: Normal rate.    Pulmonary/Chest: Effort normal and breath sounds normal.   Abdominal: Soft.   Neurological: Patient is alert and oriented to person, place, and time.   Skin: Skin is warm and dry.   Psychiatric: Patient has a normal mood and affect.   Nursing note and vitals reviewed.       [unfilled]   Right knee: Tenderness localized to the tibial tubercle no ecchymosis very slight swelling no erythema full active extension no sign of patellar tendon dysfunction.  No medial lateral tibial plateau tenderness.  Evan sign negative for posterior medial and posterior lateral joint line pain calf supple neurovascularly intact.    Left knee minimal effusion crepitus with range of motion consistent with osteoarthritis lower leg neurovascularly intact    Assessment/Plan   Review of Radiographic Studies:    Indication to evaluate joint condition, no comparison views available, shows " evident chronic advanced osteoarthritis.      Large Joint Arthrocentesis: R knee  Date/Time: 6/30/2021 2:33 PM  Consent given by: patient  Site marked: site marked  Timeout: Immediately prior to procedure a time out was called to verify the correct patient, procedure, equipment, support staff and site/side marked as required   Supporting Documentation  Indications: pain   Procedure Details  Location: knee - R knee  Preparation: Patient was prepped and draped in the usual sterile fashion  Needle size: 22 G  Medications administered: 40 mg triamcinolone acetonide 40 MG/ML; 2 mL lidocaine 1 %  Patient tolerance: patient tolerated the procedure well with no immediate complications    Large Joint Arthrocentesis: L knee  Date/Time: 6/30/2021 2:34 PM  Consent given by: patient  Site marked: site marked  Timeout: Immediately prior to procedure a time out was called to verify the correct patient, procedure, equipment, support staff and site/side marked as required   Supporting Documentation  Indications: pain   Procedure Details  Location: knee - L knee  Preparation: Patient was prepped and draped in the usual sterile fashion  Needle size: 22 G  Medications administered: 40 mg triamcinolone acetonide 40 MG/ML; 2 mL lidocaine 1 %  Patient tolerance: patient tolerated the procedure well with no immediate complications           Diagnoses and all orders for this visit:    1. Arthralgia of both knees (Primary)    2. Knee injury, right, initial encounter  -     XR Knee 1 or 2 View Right  -     XR Knee 1 or 2 View Right; Future       Orthopedic activities reviewed and patient expressed appreciation      Recommendations/Plan:   Work/Activity Status: May perform usual activities as tolerated    Patient agreeable to call or return sooner for any concerns.       I reviewed the patient's radiographs indicating advanced osteoarthritis discussed the natural history treatment options and pros and cons and risks and complications of  surgical and nonsurgical care.  I also reviewed advantages and disadvantages risks and complications of steroid injections versus viscus gel injections.  The patient had opportunity to ask questions which were answered.      Impression:  Bilateral knee osteoarthritis with acute right anterior tibial tubercle contusion  Plan:  Icing to the right tibial tubercle appropriate use of anti-inflammatories recheck with me in 3 months for repeat injection if necessary

## 2021-07-16 ENCOUNTER — HOSPITAL ENCOUNTER (EMERGENCY)
Facility: HOSPITAL | Age: 86
Discharge: HOME | End: 2021-07-16
Attending: EMERGENCY MEDICINE | Admitting: EMERGENCY MEDICINE

## 2021-07-16 VITALS
OXYGEN SATURATION: 96 % | HEIGHT: 71 IN | DIASTOLIC BLOOD PRESSURE: 81 MMHG | TEMPERATURE: 97.6 F | BODY MASS INDEX: 23.15 KG/M2 | HEART RATE: 61 BPM | RESPIRATION RATE: 17 BRPM | SYSTOLIC BLOOD PRESSURE: 146 MMHG | WEIGHT: 165.4 LBS

## 2021-07-16 DIAGNOSIS — R42 VERTIGO: Primary | ICD-10-CM

## 2021-07-16 DIAGNOSIS — R55 SYNCOPE, UNSPECIFIED SYNCOPE TYPE: ICD-10-CM

## 2021-07-16 LAB
ALBUMIN SERPL-MCNC: 4 G/DL (ref 3.5–5.2)
ALBUMIN/GLOB SERPL: 1.4 G/DL
ALP SERPL-CCNC: 84 U/L (ref 39–117)
ALT SERPL W P-5'-P-CCNC: 13 U/L (ref 1–41)
ANION GAP SERPL CALCULATED.3IONS-SCNC: 10.5 MMOL/L (ref 5–15)
AST SERPL-CCNC: 13 U/L (ref 1–40)
BASOPHILS # BLD AUTO: 0.07 10*3/MM3 (ref 0–0.2)
BASOPHILS NFR BLD AUTO: 0.6 % (ref 0–1.5)
BILIRUB SERPL-MCNC: 0.8 MG/DL (ref 0–1.2)
BUN SERPL-MCNC: 20 MG/DL (ref 8–23)
BUN/CREAT SERPL: 15 (ref 7–25)
CALCIUM SPEC-SCNC: 9.8 MG/DL (ref 8.6–10.5)
CHLORIDE SERPL-SCNC: 100 MMOL/L (ref 98–107)
CO2 SERPL-SCNC: 23.5 MMOL/L (ref 22–29)
CREAT SERPL-MCNC: 1.33 MG/DL (ref 0.76–1.27)
DEPRECATED RDW RBC AUTO: 45.8 FL (ref 37–54)
EOSINOPHIL # BLD AUTO: 0.28 10*3/MM3 (ref 0–0.4)
EOSINOPHIL NFR BLD AUTO: 2.5 % (ref 0.3–6.2)
ERYTHROCYTE [DISTWIDTH] IN BLOOD BY AUTOMATED COUNT: 12.7 % (ref 12.3–15.4)
GFR SERPL CREATININE-BSD FRML MDRD: 51 ML/MIN/1.73
GLOBULIN UR ELPH-MCNC: 2.9 GM/DL
GLUCOSE SERPL-MCNC: 95 MG/DL (ref 65–99)
HCT VFR BLD AUTO: 45.7 % (ref 37.5–51)
HGB BLD-MCNC: 15.3 G/DL (ref 13–17.7)
HOLD SPECIMEN: NORMAL
HOLD SPECIMEN: NORMAL
IMM GRANULOCYTES # BLD AUTO: 0.04 10*3/MM3 (ref 0–0.05)
IMM GRANULOCYTES NFR BLD AUTO: 0.4 % (ref 0–0.5)
LYMPHOCYTES # BLD AUTO: 1.72 10*3/MM3 (ref 0.7–3.1)
LYMPHOCYTES NFR BLD AUTO: 15.4 % (ref 19.6–45.3)
MCH RBC QN AUTO: 33 PG (ref 26.6–33)
MCHC RBC AUTO-ENTMCNC: 33.5 G/DL (ref 31.5–35.7)
MCV RBC AUTO: 98.5 FL (ref 79–97)
MONOCYTES # BLD AUTO: 1.01 10*3/MM3 (ref 0.1–0.9)
MONOCYTES NFR BLD AUTO: 9 % (ref 5–12)
NEUTROPHILS NFR BLD AUTO: 72.1 % (ref 42.7–76)
NEUTROPHILS NFR BLD AUTO: 8.06 10*3/MM3 (ref 1.7–7)
NRBC BLD AUTO-RTO: 0 /100 WBC (ref 0–0.2)
PLATELET # BLD AUTO: 192 10*3/MM3 (ref 140–450)
PMV BLD AUTO: 9.3 FL (ref 6–12)
POTASSIUM SERPL-SCNC: 4.1 MMOL/L (ref 3.5–5.2)
PROT SERPL-MCNC: 6.9 G/DL (ref 6–8.5)
RBC # BLD AUTO: 4.64 10*6/MM3 (ref 4.14–5.8)
SODIUM SERPL-SCNC: 134 MMOL/L (ref 136–145)
TROPONIN T SERPL-MCNC: <0.01 NG/ML (ref 0–0.03)
WBC # BLD AUTO: 11.18 10*3/MM3 (ref 3.4–10.8)
WHOLE BLOOD HOLD SPECIMEN: NORMAL

## 2021-07-16 PROCEDURE — 99283 EMERGENCY DEPT VISIT LOW MDM: CPT

## 2021-07-16 PROCEDURE — 63710000001 ONDANSETRON ODT 4 MG TABLET DISPERSIBLE: Performed by: EMERGENCY MEDICINE

## 2021-07-16 PROCEDURE — 84484 ASSAY OF TROPONIN QUANT: CPT | Performed by: EMERGENCY MEDICINE

## 2021-07-16 PROCEDURE — 93005 ELECTROCARDIOGRAM TRACING: CPT | Performed by: EMERGENCY MEDICINE

## 2021-07-16 PROCEDURE — 85025 COMPLETE CBC W/AUTO DIFF WBC: CPT | Performed by: EMERGENCY MEDICINE

## 2021-07-16 PROCEDURE — 80053 COMPREHEN METABOLIC PANEL: CPT | Performed by: EMERGENCY MEDICINE

## 2021-07-16 RX ORDER — ONDANSETRON 4 MG/1
4 TABLET, ORALLY DISINTEGRATING ORAL EVERY 8 HOURS PRN
Qty: 12 TABLET | Refills: 0 | Status: SHIPPED | OUTPATIENT
Start: 2021-07-16 | End: 2021-09-14 | Stop reason: ALTCHOICE

## 2021-07-16 RX ORDER — MECLIZINE HYDROCHLORIDE 25 MG/1
25 TABLET ORAL ONCE
Status: COMPLETED | OUTPATIENT
Start: 2021-07-16 | End: 2021-07-16

## 2021-07-16 RX ORDER — MECLIZINE HYDROCHLORIDE 25 MG/1
25 TABLET ORAL 3 TIMES DAILY PRN
Qty: 30 TABLET | Refills: 0 | Status: SHIPPED | OUTPATIENT
Start: 2021-07-16 | End: 2021-09-14 | Stop reason: ALTCHOICE

## 2021-07-16 RX ORDER — SODIUM CHLORIDE 0.9 % (FLUSH) 0.9 %
10 SYRINGE (ML) INJECTION AS NEEDED
Status: DISCONTINUED | OUTPATIENT
Start: 2021-07-16 | End: 2021-07-16 | Stop reason: HOSPADM

## 2021-07-16 RX ORDER — ONDANSETRON 4 MG/1
4 TABLET, ORALLY DISINTEGRATING ORAL ONCE
Status: COMPLETED | OUTPATIENT
Start: 2021-07-16 | End: 2021-07-16

## 2021-07-16 RX ADMIN — ONDANSETRON 4 MG: 4 TABLET, ORALLY DISINTEGRATING ORAL at 13:05

## 2021-07-16 RX ADMIN — MECLIZINE HYDROCHLORIDE 25 MG: 25 TABLET ORAL at 15:12

## 2021-07-16 NOTE — ED PROVIDER NOTES
Subjective     Chief Complaint: Dizziness  History of Present Illness: 87-year-old male presents with reported dizziness worse with change in position over the last several months.  Syncopal episode yesterday.  Onset: Ongoing issue.  Duration: Intermittent recurrent  Exacerbating / Alleviating factors: Worse with change in position  Associated symptoms: Syncopal episode yesterday      Nurses Notes reviewed and agree, including vitals, allergies, social history and prior medical history.     REVIEW OF SYSTEMS: All systems reviewed and not pertinent unless noted.    Positive for: Dizziness, syncope    Negative for: Fever cough hemoptysis chest pain palpitations abdominal pain back pain urinary symptoms shortness of breath  Review of Systems    Past Medical History:   Diagnosis Date   • Arthritis     knees   • Cancer (CMS/HCC)    • Coronary artery disease    • Coronary artery disease involving native coronary artery of native heart without angina pectoris 7/31/2018    Added automatically from request for surgery [id number]   • GERD (gastroesophageal reflux disease)    • Glaucoma    • History of transfusion    • Hyperlipidemia    • Hypertension    • Kidney stone    • Pneumonia     right upper lobe    • Pulmonary embolism (CMS/HCC)    • Skin cancer    • Wears glasses        Allergies   Allergen Reactions   • Lipitor [Atorvastatin] Myalgia       Past Surgical History:   Procedure Laterality Date   • CARDIAC CATHETERIZATION N/A 6/21/2017    Procedure: Left Heart Cath;  Surgeon: Toy Landers MD;  Location:  [facility];  Service:    • CARDIAC CATHETERIZATION N/A 8/1/2018    Procedure: Left Heart Cath;  Surgeon: Toy Landers MD;  Location:  [facility];  Service: Cardiology   • CATARACT EXTRACTION W/ INTRAOCULAR LENS  IMPLANT, BILATERAL     • CHOLECYSTECTOMY  2010   • COLONOSCOPY     • CORONARY ANGIOPLASTY WITH STENT PLACEMENT     • CORONARY STENT PLACEMENT      x3   • EYE SURGERY  Right    • SKIN CANCER EXCISION     • WRIST SURGERY         Family History   Problem Relation Age of Onset   • Heart attack Father        Social History     Socioeconomic History   • Marital status:      Spouse name: Not on file   • Number of children: Not on file   • Years of education: Not on file   • Highest education level: Not on file   Tobacco Use   • Smoking status: Never Smoker   • Smokeless tobacco: Never Used   Vaping Use   • Vaping Use: Never used   Substance and Sexual Activity   • Alcohol use: No   • Drug use: No   • Sexual activity: Defer           Objective   Physical Exam    CONSTITUTIONAL: Well developed, pleasant nontoxic 87-year-old  male,  in no acute distress.  VITAL SIGNS: per nursing, reviewed and noted  SKIN: exposed skin with no rashes, ulcerations or petechiae.  EYES: perrla. EOMI.  ENT: Normal voice.  Patient maintained wearing a mask throughout patient encounter due to coronavirus pandemic  RESPIRATORY:  No increased work of breathing. No retractions.   CARDIOVASCULAR:  regular rate and rhythm, no murmurs.  Good Peripheral pulses. Good cap refill to extremities.   GI: Abdomen soft, nontender, normal bowel sounds. No hernia. No ascites.  MUSCULOSKELETAL:  No tenderness. Full ROM. Strength and tone grossly normal.  no spasms. no neck or back tenderness or spasm.   NEUROLOGIC: Alert, oriented x 3. No gross deficits. GCS 15.  NIH stroke scale 0.  No meningeal signs.  PSYCH: appropriate affect.  : no bladder tenderness or distention, no CVA tenderness      Procedures     No attending physician procedures were performed on this patient.      ED Course  ED Course as of Jul 23 0318 Fri Jul 16, 2021   1405 EKG interpreted by me reveals sinus rhythm at a rate of 78 occasional PVCs.  No ischemic changes.    [PF]   Fri Jul 23, 2021 0318 Glucose: 95 [PF]   0318 BUN: 20 [PF]   0318 Creatinine(!): 1.33 [PF]   0318 Sodium(!): 134 [PF]   0318 Potassium: 4.1 [PF]   0318 Chloride: 100  [PF]   0318 CO2: 23.5 [PF]   0318 Calcium: 9.8 [PF]   0318 Total Protein: 6.9 [PF]   0318 Albumin: 4.00 [PF]   0318 ALT (SGPT): 13 [PF]   0318 AST (SGOT): 13 [PF]   0318 Alkaline Phosphatase: 84 [PF]   0318 Total Bilirubin: 0.8 [PF]   0318 eGFR Non  Am(!): 51 [PF]   0318 Troponin T: <0.010 [PF]   0318 WBC(!): 11.18 [PF]   0318 Hemoglobin: 15.3 [PF]   0318 Hematocrit: 45.7 [PF]   0318 Platelets: 192 [PF]      ED Course User Index  [PF] Eddie Rashid,                                            MDM  87-year-old male presents with dizziness, ongoing issue.  Reassuring vital signs afebrile no tachycardia no murmur sats 96%.  Reassuring work-up.  No evidence of ACS no arrhythmia.  Symptoms improved with meclizine and Zofran.  Discharged home stable condition with prescription for same.  Outpatient follow return precautions discussed.  Final diagnoses:   Vertigo   Syncope, unspecified syncope type       ED Disposition  ED Disposition     ED Disposition Condition Comment    Discharge Stable           Angeles Huynh DO  [address] [facility] [address] [phone]          [facility] Emergency Department  [address] [facility] [address] [phone]    As needed, If symptoms worsen         Medication List      New Prescriptions    meclizine 25 MG tablet  Commonly known as: ANTIVERT  Take 1 tablet by mouth 3 (Three) Times a Day As Needed for Dizziness.     ondansetron ODT 4 MG disintegrating tablet  Commonly known as: ZOFRAN-ODT  Place 1 tablet on the tongue Every 8 (Eight) Hours As Needed for Nausea or Vomiting.           Where to Get Your Medications      These medications were sent to [facility] [address] [place] [address], [place] [facility] [phone]  - [phone] FX  [address] [facility] [address]    Phone: [phone]   · meclizine 25 MG tablet  · ondansetron ODT 4 MG disintegrating tablet          Eddie Rashid DO  07/23/21 0316

## 2021-08-26 RX ORDER — CHLORTHALIDONE 25 MG/1
TABLET ORAL
Qty: 30 TABLET | Refills: 5 | Status: SHIPPED | OUTPATIENT
Start: 2021-08-26

## 2021-08-26 NOTE — TELEPHONE ENCOUNTER
Lab Results   Component Value Date    GLUCOSE 95 07/16/2021    BUN 20 07/16/2021    CREATININE 1.33 (H) 07/16/2021    EGFRIFNONA 51 (L) 07/16/2021    BCR 15.0 07/16/2021    K 4.1 07/16/2021    CO2 23.5 07/16/2021    CALCIUM 9.8 07/16/2021    ALBUMIN 4.00 07/16/2021    AST 13 07/16/2021    ALT 13 07/16/2021    NEXT APPT 9/2021

## 2021-09-01 RX ORDER — LISINOPRIL 20 MG/1
20 TABLET ORAL 2 TIMES DAILY
Qty: 180 TABLET | Refills: 0 | Status: SHIPPED | OUTPATIENT
Start: 2021-09-01

## 2021-09-01 NOTE — TELEPHONE ENCOUNTER
Pt does not see nephrologist and is not aware of CKD.   See past CMP/BMP  Cr ~1.7 with GFR 30s in April  Cr. 1.3 with GFR 51 in July    He needs to FU closely with PCP or nephrologist. Will send one refill of lisinopril and he will keep FU with [facility] later this month.

## 2021-09-14 ENCOUNTER — OFFICE VISIT (OUTPATIENT)
Dept: CARDIOLOGY | Facility: CLINIC | Age: 86
End: 2021-09-14

## 2021-09-14 VITALS
DIASTOLIC BLOOD PRESSURE: 66 MMHG | WEIGHT: 161 LBS | HEIGHT: 71 IN | SYSTOLIC BLOOD PRESSURE: 110 MMHG | OXYGEN SATURATION: 96 % | HEART RATE: 72 BPM | BODY MASS INDEX: 22.54 KG/M2

## 2021-09-14 DIAGNOSIS — I10 ESSENTIAL HYPERTENSION: Primary | ICD-10-CM

## 2021-09-14 DIAGNOSIS — I25.10 CORONARY ARTERY DISEASE INVOLVING NATIVE CORONARY ARTERY OF NATIVE HEART WITHOUT ANGINA PECTORIS: ICD-10-CM

## 2021-09-14 DIAGNOSIS — E78.2 MIXED HYPERLIPIDEMIA: ICD-10-CM

## 2021-09-14 PROCEDURE — 99214 OFFICE O/P EST MOD 30 MIN: CPT | Performed by: INTERNAL MEDICINE

## 2021-09-14 NOTE — PROGRESS NOTES
"  OFFICE FOLLOW UP     Date of Encounter:2021     Name: Darren Mccracken  : 1934  Address: [address] [place] [address] [address] [place] [place] [address]    PCP: Angeles Huynh DO  [address] [place] [address] [address] [place] [address]    Darren Mccracken is an 87 y.o. male.    Chief Complaint: Follow up of CAD, HTN, HLD    Problem List:   1.  Coronary Artery Disease                        A.  [facility] :  Stent to RCA with Rasta                        B.  [facility] :  Stent to 95% LAD with Akikoiek                        ARGELIA.  Cardiac PET 2017:  EF 62%; Normal perfusion.                        D.  NYHA IV \"anginal equivalent\" of SOA, 17                                      I. Normal LV function.                                      II. Single vessel disease, [facility] CARLOS to circumflex.                        E.  Recurrent ANGINA, 2018                                      I.  [facility]: Minor nonobstructive CAD, widely patent stents                                      II. Echo EF: 56-60%, moderate MR, mild AI, normal RVSP                        F. [facility] admission for exertional dyspnea 2019:              I. Normal stress PET               II. Negative VQ scan  G. ER visit for chest tightness, 4/3/2021              I. Negative for PE (CTA chest), negative troponins, EKG unchanged.  2.  Hypertension  3.  Hyperlipidemia  4.  History of pulmonary embolism, remote.  5.  GERD  6.  Renal insufficiency  7.  Glaucoma  8.  Dizziness, worse with position change  9.  Syncope               A. ZIO monitor 2019: dominant rhythm is sinus; 3 bouts of AF (vs AT) - none lasting longer than 5-7 seconds    Allergies:  Allergies   Allergen Reactions   • Lipitor [Atorvastatin] Myalgia     Current Medications:  Current Outpatient Medications   Medication Instructions   • aspirin 81 mg, Oral, Nightly   • brimonidine (ALPHAGAN) 0.2 % ophthalmic solution 2 drops, Right Eye, 2 Times Daily   • chlorthalidone (HYGROTON) 25 MG tablet Take 1 tablet by mouth once daily " "  • dorzolamide (TRUSOPT) 2 % ophthalmic solution 1 drop, Right Eye, 2 Times Daily   • doxazosin (CARDURA) 1 mg, Oral, Nightly   • lisinopril (PRINIVIL,ZESTRIL) 20 mg, Oral,  daily   • simvastatin (ZOCOR) 80 mg, Oral, Every Night at Bedtime   • vitamin B-12 (CYANOCOBALAMIN) 2,500 mcg, Sublingual, Daily        History of Present Illness:    Darren Mccracken returns for follow up today.   He reports constant dizziness, worse with position change, going from sitting to standing. He actually had an episode of syncope in July and presented to [facility] where his work-up was negative. He had a prodrome prior to his syncope and knew he was going to pass out however did not make it to a chair prior to passing out. He denies any associated chest pain, dyspnea or palpitations. He states he is still taking Chlorthalidone 25mg and Lisinopril 20mg daily. His orthostatics are negative today.       The following portions of the patient's history were reviewed and updated as appropriate: allergies, current medications and problem list.    ROS: Pertinent positives as listed in the HPI.  All other systems reviewed and negative.    Objective:  Vitals:    09/14/21 1122 09/14/21 1123   BP: 105/58 110/66   BP Location: Left arm Left arm   Patient Position: Sitting Standing   Pulse: 73 72   SpO2: 96%    Weight: 73 kg (161 lb)    Height: 180.3 cm (71\")      Body mass index is 22.45 kg/m².    Physical Exam:  GENERAL: Alert, cooperative, in no acute distress.   HEENT: Normocephalic, no adenopathy, no jugular venous distention  HEART: No discrete PMI is noted. Regular rhythm, normal rate, and no murmurs, gallops, or rubs.   LUNGS: Clear to auscultation bilaterally. No wheezing, rales or ronchi.  ABDOMEN: Soft, bowel sounds present, non-tender   NEUROLOGIC: No focal abnormalities involving strength or sensation are noted.   EXTREMITIES: No clubbing, cyanosis, or edema noted.     Diagnostic Data:    Lab Results   Component Value Date    " GLUCOSE 95 07/16/2021    BUN 20 07/16/2021    CREATININE 1.33 (H) 07/16/2021    EGFRIFNONA 51 (L) 07/16/2021    BCR 15.0 07/16/2021    K 4.1 07/16/2021    CO2 23.5 07/16/2021    CALCIUM 9.8 07/16/2021    ALBUMIN 4.00 07/16/2021    AST 13 07/16/2021    ALT 13 07/16/2021      Lab Results   Component Value Date    WBC 11.18 (H) 07/16/2021    HGB 15.3 07/16/2021    HCT 45.7 07/16/2021    MCV 98.5 (H) 07/16/2021     07/16/2021      Lab Results   Component Value Date    CHOL 137 06/11/2019    TRIG 114 06/11/2019    HDL 46 06/11/2019    LDL 68 06/11/2019       Procedures    Assessment and Plan:   1.  CAD: asymptomatic without angina or anginal equivalent.   2.  HTN:  Well controlled with no evidence of orthostasis today. We discussed orthostatic precautions such as making position changes slowly, staying well hydrated, and getting his head down to prevent alec syncope.   3.  HLD:  Last LDL at target. Continue Simvastatin.       I will see Darren Mccracken back in one year or sooner on an as needed basis.      Scribed for Toy Landers MD by Caity Nogueira PA-C. 9/14/2021  11:42 EDT

## 2022-08-08 PROBLEM — L97.519 RIGHT FOOT ULCER, WITH UNSPECIFIED SEVERITY (HCC): Status: ACTIVE | Noted: 2022-08-08

## 2022-08-09 ENCOUNTER — HOSPITAL ENCOUNTER (OUTPATIENT)
Dept: INFUSION THERAPY | Facility: HOSPITAL | Age: 87
Setting detail: INFUSION SERIES
Discharge: HOME | End: 2022-08-09

## 2022-08-09 VITALS
SYSTOLIC BLOOD PRESSURE: 108 MMHG | RESPIRATION RATE: 14 BRPM | DIASTOLIC BLOOD PRESSURE: 56 MMHG | TEMPERATURE: 98.2 F | OXYGEN SATURATION: 97 % | HEART RATE: 62 BPM

## 2022-08-09 DIAGNOSIS — L97.519 RIGHT FOOT ULCER, WITH UNSPECIFIED SEVERITY: Primary | ICD-10-CM

## 2022-08-09 PROCEDURE — 96365 THER/PROPH/DIAG IV INF INIT: CPT

## 2022-08-09 PROCEDURE — 25010000002 DALBAVANCIN 500 MG RECONSTITUTED SOLUTION 1 EACH VIAL: Performed by: PODIATRIST

## 2022-08-09 RX ADMIN — DALBAVANCIN 1500 MG: 500 INJECTION, POWDER, FOR SOLUTION INTRAVENOUS at 13:23

## 2023-01-01 ENCOUNTER — HOSPITAL ENCOUNTER (INPATIENT)
Facility: HOSPITAL | Age: 88
LOS: 4 days | DRG: 871 | End: 2023-08-28
Attending: STUDENT IN AN ORGANIZED HEALTH CARE EDUCATION/TRAINING PROGRAM | Admitting: INTERNAL MEDICINE
Payer: MEDICARE

## 2023-01-01 ENCOUNTER — APPOINTMENT (OUTPATIENT)
Dept: GENERAL RADIOLOGY | Facility: HOSPITAL | Age: 88
DRG: 871 | End: 2023-01-01
Payer: MEDICARE

## 2023-01-01 ENCOUNTER — APPOINTMENT (OUTPATIENT)
Dept: CT IMAGING | Facility: HOSPITAL | Age: 88
DRG: 871 | End: 2023-01-01
Payer: MEDICARE

## 2023-01-01 ENCOUNTER — NURSING HOME (OUTPATIENT)
Dept: FAMILY MEDICINE CLINIC | Facility: CLINIC | Age: 88
End: 2023-01-01
Payer: MEDICARE

## 2023-01-01 VITALS
OXYGEN SATURATION: 94 % | RESPIRATION RATE: 18 BRPM | TEMPERATURE: 97.9 F | WEIGHT: 122 LBS | DIASTOLIC BLOOD PRESSURE: 78 MMHG | BODY MASS INDEX: 17.02 KG/M2 | HEART RATE: 87 BPM | SYSTOLIC BLOOD PRESSURE: 132 MMHG

## 2023-01-01 VITALS
WEIGHT: 133 LBS | BODY MASS INDEX: 18.55 KG/M2 | RESPIRATION RATE: 20 BRPM | TEMPERATURE: 98.2 F | HEART RATE: 66 BPM | SYSTOLIC BLOOD PRESSURE: 111 MMHG | DIASTOLIC BLOOD PRESSURE: 68 MMHG | OXYGEN SATURATION: 95 %

## 2023-01-01 VITALS
HEART RATE: 100 BPM | TEMPERATURE: 97.7 F | SYSTOLIC BLOOD PRESSURE: 72 MMHG | DIASTOLIC BLOOD PRESSURE: 51 MMHG | OXYGEN SATURATION: 90 % | HEIGHT: 70 IN | BODY MASS INDEX: 16.73 KG/M2 | RESPIRATION RATE: 38 BRPM | WEIGHT: 116.84 LBS

## 2023-01-01 DIAGNOSIS — Z74.09 IMPAIRED MOBILITY AND ADLS: Primary | ICD-10-CM

## 2023-01-01 DIAGNOSIS — F01.B0 MODERATE VASCULAR DEMENTIA WITHOUT BEHAVIORAL DISTURBANCE, PSYCHOTIC DISTURBANCE, MOOD DISTURBANCE, OR ANXIETY: Chronic | ICD-10-CM

## 2023-01-01 DIAGNOSIS — Z74.09 IMPAIRED MOBILITY AND ADLS: ICD-10-CM

## 2023-01-01 DIAGNOSIS — B35.4 DERMATOPHYTOSIS OF BODY: ICD-10-CM

## 2023-01-01 DIAGNOSIS — R54 AGE-RELATED PHYSICAL DEBILITY: ICD-10-CM

## 2023-01-01 DIAGNOSIS — Z78.9 IMPAIRED MOBILITY AND ADLS: Primary | ICD-10-CM

## 2023-01-01 DIAGNOSIS — K21.9 GASTROESOPHAGEAL REFLUX DISEASE WITHOUT ESOPHAGITIS: Chronic | ICD-10-CM

## 2023-01-01 DIAGNOSIS — I10 ESSENTIAL HYPERTENSION: Chronic | ICD-10-CM

## 2023-01-01 DIAGNOSIS — J69.0 ASPIRATION PNEUMONITIS: Primary | ICD-10-CM

## 2023-01-01 DIAGNOSIS — R29.6 FALLS FREQUENTLY: ICD-10-CM

## 2023-01-01 DIAGNOSIS — Z78.9 IMPAIRED MOBILITY AND ADLS: ICD-10-CM

## 2023-01-01 LAB
A-A DO2: 522 MMHG
ACINETOBACTER SCREEN CX: NORMAL
ALBUMIN SERPL-MCNC: 1.9 G/DL (ref 3.5–5.2)
ALBUMIN SERPL-MCNC: 2.7 G/DL (ref 3.5–5.2)
ALBUMIN/GLOB SERPL: 0.7 G/DL
ALBUMIN/GLOB SERPL: 0.8 G/DL
ALP SERPL-CCNC: 82 U/L (ref 39–117)
ALP SERPL-CCNC: 93 U/L (ref 39–117)
ALT SERPL W P-5'-P-CCNC: 15 U/L (ref 1–41)
ALT SERPL W P-5'-P-CCNC: 17 U/L (ref 1–41)
ANION GAP SERPL CALCULATED.3IONS-SCNC: 11 MMOL/L (ref 5–15)
ANION GAP SERPL CALCULATED.3IONS-SCNC: 11.6 MMOL/L (ref 5–15)
ANION GAP SERPL CALCULATED.3IONS-SCNC: 11.6 MMOL/L (ref 5–15)
ANION GAP SERPL CALCULATED.3IONS-SCNC: 8.1 MMOL/L (ref 5–15)
ARTERIAL PATENCY WRIST A: ABNORMAL
AST SERPL-CCNC: 17 U/L (ref 1–40)
AST SERPL-CCNC: 23 U/L (ref 1–40)
ATMOSPHERIC PRESS: 736 MMHG
B PARAPERT DNA SPEC QL NAA+PROBE: NOT DETECTED
B PERT DNA SPEC QL NAA+PROBE: NOT DETECTED
BACTERIA SPEC RESP CULT: NORMAL
BASE EXCESS BLDA CALC-SCNC: -1.2 MMOL/L (ref 0–2)
BASOPHILS # BLD AUTO: 0.02 10*3/MM3 (ref 0–0.2)
BASOPHILS NFR BLD AUTO: 0.2 % (ref 0–1.5)
BDY SITE: ABNORMAL
BILIRUB SERPL-MCNC: 0.2 MG/DL (ref 0–1.2)
BILIRUB SERPL-MCNC: 0.5 MG/DL (ref 0–1.2)
BUN SERPL-MCNC: 47 MG/DL (ref 8–23)
BUN SERPL-MCNC: 50 MG/DL (ref 8–23)
BUN SERPL-MCNC: 53 MG/DL (ref 8–23)
BUN SERPL-MCNC: 55 MG/DL (ref 8–23)
BUN/CREAT SERPL: 31.1 (ref 7–25)
BUN/CREAT SERPL: 35.1 (ref 7–25)
BUN/CREAT SERPL: 35.1 (ref 7–25)
BUN/CREAT SERPL: 36 (ref 7–25)
C PNEUM DNA NPH QL NAA+NON-PROBE: NOT DETECTED
CALCIUM SPEC-SCNC: 8.4 MG/DL (ref 8.6–10.5)
CALCIUM SPEC-SCNC: 8.4 MG/DL (ref 8.6–10.5)
CALCIUM SPEC-SCNC: 8.8 MG/DL (ref 8.6–10.5)
CALCIUM SPEC-SCNC: 9.5 MG/DL (ref 8.6–10.5)
CHLORIDE SERPL-SCNC: 121 MMOL/L (ref 98–107)
CHLORIDE SERPL-SCNC: 124 MMOL/L (ref 98–107)
CHLORIDE SERPL-SCNC: 127 MMOL/L (ref 98–107)
CHLORIDE SERPL-SCNC: 134 MMOL/L (ref 98–107)
CO2 SERPL-SCNC: 19 MMOL/L (ref 22–29)
CO2 SERPL-SCNC: 21.4 MMOL/L (ref 22–29)
CO2 SERPL-SCNC: 21.9 MMOL/L (ref 22–29)
CO2 SERPL-SCNC: 22.4 MMOL/L (ref 22–29)
COHGB MFR BLD: 1 % (ref 0–2)
CREAT SERPL-MCNC: 1.34 MG/DL (ref 0.76–1.27)
CREAT SERPL-MCNC: 1.39 MG/DL (ref 0.76–1.27)
CREAT SERPL-MCNC: 1.47 MG/DL (ref 0.76–1.27)
CREAT SERPL-MCNC: 1.51 MG/DL (ref 0.76–1.27)
CREAT SERPL-MCNC: 1.77 MG/DL (ref 0.76–1.27)
CRP SERPL-MCNC: 9.04 MG/DL (ref 0–0.5)
D-LACTATE SERPL-SCNC: 1.8 MMOL/L (ref 0.5–2)
DEPRECATED RDW RBC AUTO: 62.7 FL (ref 37–54)
DEPRECATED RDW RBC AUTO: 64 FL (ref 37–54)
DEPRECATED RDW RBC AUTO: 64.5 FL (ref 37–54)
EGFRCR SERPLBLD CKD-EPI 2021: 36.3 ML/MIN/1.73
EGFRCR SERPLBLD CKD-EPI 2021: 43.9 ML/MIN/1.73
EGFRCR SERPLBLD CKD-EPI 2021: 45.3 ML/MIN/1.73
EGFRCR SERPLBLD CKD-EPI 2021: 48.5 ML/MIN/1.73
EGFRCR SERPLBLD CKD-EPI 2021: 50.6 ML/MIN/1.73
EOSINOPHIL # BLD AUTO: 0.03 10*3/MM3 (ref 0–0.4)
EOSINOPHIL NFR BLD AUTO: 0.2 % (ref 0.3–6.2)
ERYTHROCYTE [DISTWIDTH] IN BLOOD BY AUTOMATED COUNT: 17.3 % (ref 12.3–15.4)
ERYTHROCYTE [DISTWIDTH] IN BLOOD BY AUTOMATED COUNT: 17.5 % (ref 12.3–15.4)
ERYTHROCYTE [DISTWIDTH] IN BLOOD BY AUTOMATED COUNT: 17.6 % (ref 12.3–15.4)
FLUAV SUBTYP SPEC NAA+PROBE: NOT DETECTED
FLUBV RNA ISLT QL NAA+PROBE: NOT DETECTED
GLOBULIN UR ELPH-MCNC: 2.9 GM/DL
GLOBULIN UR ELPH-MCNC: 3.5 GM/DL
GLUCOSE BLDC GLUCOMTR-MCNC: 97 MG/DL (ref 70–130)
GLUCOSE SERPL-MCNC: 101 MG/DL (ref 65–99)
GLUCOSE SERPL-MCNC: 106 MG/DL (ref 65–99)
GLUCOSE SERPL-MCNC: 152 MG/DL (ref 65–99)
GLUCOSE SERPL-MCNC: 188 MG/DL (ref 65–99)
GRAM STN SPEC: NORMAL
HADV DNA SPEC NAA+PROBE: NOT DETECTED
HBA1C MFR BLD: 5.5 % (ref 4.8–5.6)
HCO3 BLDA-SCNC: 23.9 MMOL/L (ref 22–28)
HCOV 229E RNA SPEC QL NAA+PROBE: NOT DETECTED
HCOV HKU1 RNA SPEC QL NAA+PROBE: NOT DETECTED
HCOV NL63 RNA SPEC QL NAA+PROBE: NOT DETECTED
HCOV OC43 RNA SPEC QL NAA+PROBE: NOT DETECTED
HCT VFR BLD AUTO: 39 % (ref 37.5–51)
HCT VFR BLD AUTO: 40.6 % (ref 37.5–51)
HCT VFR BLD AUTO: 46.3 % (ref 37.5–51)
HCT VFR BLD CALC: 46 %
HGB BLD-MCNC: 11.8 G/DL (ref 13–17.7)
HGB BLD-MCNC: 12.3 G/DL (ref 13–17.7)
HGB BLD-MCNC: 14.8 G/DL (ref 13–17.7)
HMPV RNA NPH QL NAA+NON-PROBE: NOT DETECTED
HPIV1 RNA ISLT QL NAA+PROBE: NOT DETECTED
HPIV2 RNA SPEC QL NAA+PROBE: NOT DETECTED
HPIV3 RNA NPH QL NAA+PROBE: NOT DETECTED
HPIV4 P GENE NPH QL NAA+PROBE: NOT DETECTED
IMM GRANULOCYTES # BLD AUTO: 0.03 10*3/MM3 (ref 0–0.05)
IMM GRANULOCYTES NFR BLD AUTO: 0.2 % (ref 0–0.5)
INHALED O2 CONCENTRATION: 100 %
LYMPHOCYTES # BLD AUTO: 1.94 10*3/MM3 (ref 0.7–3.1)
LYMPHOCYTES NFR BLD AUTO: 15.5 % (ref 19.6–45.3)
Lab: ABNORMAL
M PNEUMO IGG SER IA-ACNC: NOT DETECTED
MAGNESIUM SERPL-MCNC: 1.9 MG/DL (ref 1.6–2.4)
MAGNESIUM SERPL-MCNC: 2 MG/DL (ref 1.6–2.4)
MAGNESIUM SERPL-MCNC: 2.1 MG/DL (ref 1.6–2.4)
MAGNESIUM SERPL-MCNC: 2.3 MG/DL (ref 1.6–2.4)
MCH RBC QN AUTO: 29.9 PG (ref 26.6–33)
MCH RBC QN AUTO: 30.1 PG (ref 26.6–33)
MCH RBC QN AUTO: 31 PG (ref 26.6–33)
MCHC RBC AUTO-ENTMCNC: 30.3 G/DL (ref 31.5–35.7)
MCHC RBC AUTO-ENTMCNC: 30.3 G/DL (ref 31.5–35.7)
MCHC RBC AUTO-ENTMCNC: 32 G/DL (ref 31.5–35.7)
MCV RBC AUTO: 97.1 FL (ref 79–97)
MCV RBC AUTO: 99 FL (ref 79–97)
MCV RBC AUTO: 99.5 FL (ref 79–97)
METHGB BLD QL: 0.3 % (ref 0–1.5)
MODALITY: ABNORMAL
MONOCYTES # BLD AUTO: 0.48 10*3/MM3 (ref 0.1–0.9)
MONOCYTES NFR BLD AUTO: 3.8 % (ref 5–12)
MRSA DNA SPEC QL NAA+PROBE: ABNORMAL
NEUTROPHILS NFR BLD AUTO: 10.03 10*3/MM3 (ref 1.7–7)
NEUTROPHILS NFR BLD AUTO: 80.1 % (ref 42.7–76)
NRBC BLD AUTO-RTO: 0 /100 WBC (ref 0–0.2)
OXYHGB MFR BLDV: 97.9 % (ref 94–99)
PCO2 BLDA: 40.5 MM HG (ref 35–45)
PCO2 TEMP ADJ BLD: ABNORMAL MM[HG]
PH BLDA: 7.38 PH UNITS (ref 7.35–7.45)
PH, TEMP CORRECTED: ABNORMAL
PHOSPHATE SERPL-MCNC: 1.7 MG/DL (ref 2.5–4.5)
PLATELET # BLD AUTO: 109 10*3/MM3 (ref 140–450)
PLATELET # BLD AUTO: 124 10*3/MM3 (ref 140–450)
PLATELET # BLD AUTO: 87 10*3/MM3 (ref 140–450)
PMV BLD AUTO: 11.6 FL (ref 6–12)
PMV BLD AUTO: 12 FL (ref 6–12)
PMV BLD AUTO: 12.2 FL (ref 6–12)
PO2 BLDA: 126 MM HG (ref 75–100)
PO2 TEMP ADJ BLD: ABNORMAL MM[HG]
POTASSIUM SERPL-SCNC: 2.8 MMOL/L (ref 3.5–5.2)
POTASSIUM SERPL-SCNC: 3.1 MMOL/L (ref 3.5–5.2)
POTASSIUM SERPL-SCNC: 3.8 MMOL/L (ref 3.5–5.2)
POTASSIUM SERPL-SCNC: 3.8 MMOL/L (ref 3.5–5.2)
PROCALCITONIN SERPL-MCNC: 0.23 NG/ML (ref 0–0.25)
PROCALCITONIN SERPL-MCNC: 2.04 NG/ML (ref 0–0.25)
PROT SERPL-MCNC: 4.8 G/DL (ref 6–8.5)
PROT SERPL-MCNC: 6.2 G/DL (ref 6–8.5)
RBC # BLD AUTO: 3.94 10*6/MM3 (ref 4.14–5.8)
RBC # BLD AUTO: 4.08 10*6/MM3 (ref 4.14–5.8)
RBC # BLD AUTO: 4.77 10*6/MM3 (ref 4.14–5.8)
RHINOVIRUS RNA SPEC NAA+PROBE: NOT DETECTED
RSV RNA NPH QL NAA+NON-PROBE: NOT DETECTED
SAO2 % BLDCOA: 99.2 % (ref 94–100)
SARS-COV-2 RNA NPH QL NAA+NON-PROBE: NOT DETECTED
SODIUM SERPL-SCNC: 148 MMOL/L (ref 136–145)
SODIUM SERPL-SCNC: 151 MMOL/L (ref 136–145)
SODIUM SERPL-SCNC: 154 MMOL/L (ref 136–145)
SODIUM SERPL-SCNC: 160 MMOL/L (ref 136–145)
SODIUM SERPL-SCNC: 166 MMOL/L (ref 136–145)
SODIUM SERPL-SCNC: 168 MMOL/L (ref 136–145)
TSH SERPL DL<=0.05 MIU/L-ACNC: 1.17 UIU/ML (ref 0.27–4.2)
VANCOMYCIN SERPL-MCNC: 10.3 MCG/ML (ref 5–40)
VENTILATOR MODE: ABNORMAL
VRE SPEC QL CULT: NORMAL
WBC NRBC COR # BLD: 12.53 10*3/MM3 (ref 3.4–10.8)
WBC NRBC COR # BLD: 14.15 10*3/MM3 (ref 3.4–10.8)
WBC NRBC COR # BLD: 16.92 10*3/MM3 (ref 3.4–10.8)

## 2023-01-01 PROCEDURE — 93005 ELECTROCARDIOGRAM TRACING: CPT | Performed by: STUDENT IN AN ORGANIZED HEALTH CARE EDUCATION/TRAINING PROGRAM

## 2023-01-01 PROCEDURE — 84100 ASSAY OF PHOSPHORUS: CPT | Performed by: INTERNAL MEDICINE

## 2023-01-01 PROCEDURE — 87040 BLOOD CULTURE FOR BACTERIA: CPT | Performed by: STUDENT IN AN ORGANIZED HEALTH CARE EDUCATION/TRAINING PROGRAM

## 2023-01-01 PROCEDURE — 85027 COMPLETE CBC AUTOMATED: CPT | Performed by: INTERNAL MEDICINE

## 2023-01-01 PROCEDURE — 84295 ASSAY OF SERUM SODIUM: CPT | Performed by: STUDENT IN AN ORGANIZED HEALTH CARE EDUCATION/TRAINING PROGRAM

## 2023-01-01 PROCEDURE — 80053 COMPREHEN METABOLIC PANEL: CPT | Performed by: INTERNAL MEDICINE

## 2023-01-01 PROCEDURE — 82375 ASSAY CARBOXYHB QUANT: CPT

## 2023-01-01 PROCEDURE — 94799 UNLISTED PULMONARY SVC/PX: CPT

## 2023-01-01 PROCEDURE — 82805 BLOOD GASES W/O2 SATURATION: CPT

## 2023-01-01 PROCEDURE — 94761 N-INVAS EAR/PLS OXIMETRY MLT: CPT

## 2023-01-01 PROCEDURE — 25010000002 PROPOFOL 10 MG/ML EMULSION: Performed by: STUDENT IN AN ORGANIZED HEALTH CARE EDUCATION/TRAINING PROGRAM

## 2023-01-01 PROCEDURE — 25010000002 VASOPRESSIN 20 UNIT/ML SOLUTION 1 ML VIAL: Performed by: STUDENT IN AN ORGANIZED HEALTH CARE EDUCATION/TRAINING PROGRAM

## 2023-01-01 PROCEDURE — 25010000002 VANCOMYCIN 1 G RECONSTITUTED SOLUTION: Performed by: STUDENT IN AN ORGANIZED HEALTH CARE EDUCATION/TRAINING PROGRAM

## 2023-01-01 PROCEDURE — 25010000002 LORAZEPAM PER 2 MG: Performed by: INTERNAL MEDICINE

## 2023-01-01 PROCEDURE — 83735 ASSAY OF MAGNESIUM: CPT | Performed by: INTERNAL MEDICINE

## 2023-01-01 PROCEDURE — 25010000002 VANCOMYCIN PER 500 MG: Performed by: STUDENT IN AN ORGANIZED HEALTH CARE EDUCATION/TRAINING PROGRAM

## 2023-01-01 PROCEDURE — 83735 ASSAY OF MAGNESIUM: CPT | Performed by: STUDENT IN AN ORGANIZED HEALTH CARE EDUCATION/TRAINING PROGRAM

## 2023-01-01 PROCEDURE — 74018 RADEX ABDOMEN 1 VIEW: CPT

## 2023-01-01 PROCEDURE — C1751 CATH, INF, PER/CENT/MIDLINE: HCPCS

## 2023-01-01 PROCEDURE — 71045 X-RAY EXAM CHEST 1 VIEW: CPT

## 2023-01-01 PROCEDURE — 87081 CULTURE SCREEN ONLY: CPT | Performed by: INTERNAL MEDICINE

## 2023-01-01 PROCEDURE — 36415 COLL VENOUS BLD VENIPUNCTURE: CPT

## 2023-01-01 PROCEDURE — 0BH17EZ INSERTION OF ENDOTRACHEAL AIRWAY INTO TRACHEA, VIA NATURAL OR ARTIFICIAL OPENING: ICD-10-PCS | Performed by: INTERNAL MEDICINE

## 2023-01-01 PROCEDURE — 25010000002 AMIODARONE IN DEXTROSE 5% 150-4.21 MG/100ML-% SOLUTION: Performed by: INTERNAL MEDICINE

## 2023-01-01 PROCEDURE — 36600 WITHDRAWAL OF ARTERIAL BLOOD: CPT

## 2023-01-01 PROCEDURE — 94003 VENT MGMT INPAT SUBQ DAY: CPT

## 2023-01-01 PROCEDURE — 25010000002 AMIODARONE IN DEXTROSE 5% 360-4.14 MG/200ML-% SOLUTION: Performed by: INTERNAL MEDICINE

## 2023-01-01 PROCEDURE — 25010000002 CEFTRIAXONE SODIUM-DEXTROSE 1-3.74 GM-%(50ML) RECONSTITUTED SOLUTION: Performed by: INTERNAL MEDICINE

## 2023-01-01 PROCEDURE — 80048 BASIC METABOLIC PNL TOTAL CA: CPT | Performed by: INTERNAL MEDICINE

## 2023-01-01 PROCEDURE — 25010000002 ENOXAPARIN PER 10 MG: Performed by: INTERNAL MEDICINE

## 2023-01-01 PROCEDURE — 31500 INSERT EMERGENCY AIRWAY: CPT

## 2023-01-01 PROCEDURE — 80053 COMPREHEN METABOLIC PANEL: CPT | Performed by: STUDENT IN AN ORGANIZED HEALTH CARE EDUCATION/TRAINING PROGRAM

## 2023-01-01 PROCEDURE — 99309 SBSQ NF CARE MODERATE MDM 30: CPT | Performed by: NURSE PRACTITIONER

## 2023-01-01 PROCEDURE — 85025 COMPLETE CBC W/AUTO DIFF WBC: CPT | Performed by: STUDENT IN AN ORGANIZED HEALTH CARE EDUCATION/TRAINING PROGRAM

## 2023-01-01 PROCEDURE — 25010000002 MORPHINE PER 10 MG: Performed by: INTERNAL MEDICINE

## 2023-01-01 PROCEDURE — 87205 SMEAR GRAM STAIN: CPT | Performed by: INTERNAL MEDICINE

## 2023-01-01 PROCEDURE — 82565 ASSAY OF CREATININE: CPT | Performed by: INTERNAL MEDICINE

## 2023-01-01 PROCEDURE — 87070 CULTURE OTHR SPECIMN AEROBIC: CPT | Performed by: INTERNAL MEDICINE

## 2023-01-01 PROCEDURE — 70450 CT HEAD/BRAIN W/O DYE: CPT

## 2023-01-01 PROCEDURE — 83050 HGB METHEMOGLOBIN QUAN: CPT

## 2023-01-01 PROCEDURE — 83605 ASSAY OF LACTIC ACID: CPT | Performed by: STUDENT IN AN ORGANIZED HEALTH CARE EDUCATION/TRAINING PROGRAM

## 2023-01-01 PROCEDURE — 84145 PROCALCITONIN (PCT): CPT | Performed by: INTERNAL MEDICINE

## 2023-01-01 PROCEDURE — 94640 AIRWAY INHALATION TREATMENT: CPT

## 2023-01-01 PROCEDURE — 84295 ASSAY OF SERUM SODIUM: CPT | Performed by: INTERNAL MEDICINE

## 2023-01-01 PROCEDURE — 84443 ASSAY THYROID STIM HORMONE: CPT | Performed by: INTERNAL MEDICINE

## 2023-01-01 PROCEDURE — 86140 C-REACTIVE PROTEIN: CPT | Performed by: STUDENT IN AN ORGANIZED HEALTH CARE EDUCATION/TRAINING PROGRAM

## 2023-01-01 PROCEDURE — 0202U NFCT DS 22 TRGT SARS-COV-2: CPT | Performed by: STUDENT IN AN ORGANIZED HEALTH CARE EDUCATION/TRAINING PROGRAM

## 2023-01-01 PROCEDURE — 71250 CT THORAX DX C-: CPT

## 2023-01-01 PROCEDURE — 80048 BASIC METABOLIC PNL TOTAL CA: CPT | Performed by: STUDENT IN AN ORGANIZED HEALTH CARE EDUCATION/TRAINING PROGRAM

## 2023-01-01 PROCEDURE — 99291 CRITICAL CARE FIRST HOUR: CPT

## 2023-01-01 PROCEDURE — 80202 ASSAY OF VANCOMYCIN: CPT | Performed by: INTERNAL MEDICINE

## 2023-01-01 PROCEDURE — 82948 REAGENT STRIP/BLOOD GLUCOSE: CPT

## 2023-01-01 PROCEDURE — 94002 VENT MGMT INPAT INIT DAY: CPT

## 2023-01-01 PROCEDURE — 84145 PROCALCITONIN (PCT): CPT | Performed by: STUDENT IN AN ORGANIZED HEALTH CARE EDUCATION/TRAINING PROGRAM

## 2023-01-01 PROCEDURE — 87641 MR-STAPH DNA AMP PROBE: CPT | Performed by: INTERNAL MEDICINE

## 2023-01-01 PROCEDURE — 5A1945Z RESPIRATORY VENTILATION, 24-96 CONSECUTIVE HOURS: ICD-10-PCS | Performed by: INTERNAL MEDICINE

## 2023-01-01 PROCEDURE — 83036 HEMOGLOBIN GLYCOSYLATED A1C: CPT | Performed by: INTERNAL MEDICINE

## 2023-01-01 RX ORDER — ACETAMINOPHEN 160 MG/5ML
650 SOLUTION ORAL EVERY 4 HOURS PRN
Status: DISCONTINUED | OUTPATIENT
Start: 2023-01-01 | End: 2023-01-01

## 2023-01-01 RX ORDER — BISACODYL 5 MG/1
5 TABLET, DELAYED RELEASE ORAL DAILY PRN
Status: DISCONTINUED | OUTPATIENT
Start: 2023-01-01 | End: 2023-01-01 | Stop reason: ALTCHOICE

## 2023-01-01 RX ORDER — MIRTAZAPINE 15 MG/1
15 TABLET, FILM COATED ORAL NIGHTLY
COMMUNITY
Start: 2023-01-01

## 2023-01-01 RX ORDER — IPRATROPIUM BROMIDE AND ALBUTEROL SULFATE 2.5; .5 MG/3ML; MG/3ML
3 SOLUTION RESPIRATORY (INHALATION) EVERY 6 HOURS PRN
Status: DISCONTINUED | OUTPATIENT
Start: 2023-01-01 | End: 2023-08-28 | Stop reason: HOSPADM

## 2023-01-01 RX ORDER — PREDNISONE 20 MG/1
20 TABLET ORAL 2 TIMES DAILY
COMMUNITY
Start: 2023-01-01

## 2023-01-01 RX ORDER — CHLORHEXIDINE GLUCONATE 0.12 MG/ML
15 RINSE ORAL EVERY 12 HOURS SCHEDULED
Status: DISCONTINUED | OUTPATIENT
Start: 2023-01-01 | End: 2023-01-01

## 2023-01-01 RX ORDER — POLYETHYLENE GLYCOL 3350 17 G/17G
17 POWDER, FOR SOLUTION ORAL DAILY PRN
Status: DISCONTINUED | OUTPATIENT
Start: 2023-01-01 | End: 2023-01-01

## 2023-01-01 RX ORDER — SODIUM CHLORIDE 0.9 % (FLUSH) 0.9 %
10 SYRINGE (ML) INJECTION EVERY 12 HOURS SCHEDULED
Status: DISCONTINUED | OUTPATIENT
Start: 2023-01-01 | End: 2023-08-28 | Stop reason: HOSPADM

## 2023-01-01 RX ORDER — POTASSIUM CHLORIDE 750 MG/1
40 CAPSULE, EXTENDED RELEASE ORAL EVERY 4 HOURS
Status: DISCONTINUED | OUTPATIENT
Start: 2023-01-01 | End: 2023-01-01

## 2023-01-01 RX ORDER — BISACODYL 10 MG
10 SUPPOSITORY, RECTAL RECTAL DAILY PRN
Status: DISCONTINUED | OUTPATIENT
Start: 2023-01-01 | End: 2023-08-28 | Stop reason: HOSPADM

## 2023-01-01 RX ORDER — ONDANSETRON 2 MG/ML
4 INJECTION INTRAMUSCULAR; INTRAVENOUS EVERY 6 HOURS PRN
Status: DISCONTINUED | OUTPATIENT
Start: 2023-01-01 | End: 2023-08-28 | Stop reason: HOSPADM

## 2023-01-01 RX ORDER — CEFDINIR 300 MG/1
CAPSULE ORAL
COMMUNITY
Start: 2023-01-01

## 2023-01-01 RX ORDER — DIPHENHYDRAMINE HCL 25 MG
12.5 TABLET ORAL 2 TIMES DAILY
COMMUNITY

## 2023-01-01 RX ORDER — SODIUM CHLORIDE 9 MG/ML
40 INJECTION, SOLUTION INTRAVENOUS AS NEEDED
Status: DISCONTINUED | OUTPATIENT
Start: 2023-01-01 | End: 2023-08-28 | Stop reason: HOSPADM

## 2023-01-01 RX ORDER — BISACODYL 5 MG/1
5 TABLET, DELAYED RELEASE ORAL DAILY PRN
Status: DISCONTINUED | OUTPATIENT
Start: 2023-01-01 | End: 2023-01-01

## 2023-01-01 RX ORDER — HALOPERIDOL 5 MG/ML
1 INJECTION INTRAMUSCULAR ONCE
Status: DISCONTINUED | OUTPATIENT
Start: 2023-01-01 | End: 2023-01-01

## 2023-01-01 RX ORDER — ACETAMINOPHEN 500 MG
500 TABLET ORAL EVERY 6 HOURS PRN
COMMUNITY

## 2023-01-01 RX ORDER — SODIUM CHLORIDE 0.9 % (FLUSH) 0.9 %
10 SYRINGE (ML) INJECTION AS NEEDED
Status: CANCELLED | OUTPATIENT
Start: 2023-01-01

## 2023-01-01 RX ORDER — ETOMIDATE 2 MG/ML
20 INJECTION INTRAVENOUS ONCE
Status: COMPLETED | OUTPATIENT
Start: 2023-01-01 | End: 2023-01-01

## 2023-01-01 RX ORDER — POTASSIUM CHLORIDE 1.5 G/1.58G
40 POWDER, FOR SOLUTION ORAL EVERY 4 HOURS
Status: COMPLETED | OUTPATIENT
Start: 2023-01-01 | End: 2023-01-01

## 2023-01-01 RX ORDER — DEXTROSE MONOHYDRATE 50 MG/ML
100 INJECTION, SOLUTION INTRAVENOUS CONTINUOUS
Status: DISCONTINUED | OUTPATIENT
Start: 2023-01-01 | End: 2023-01-01

## 2023-01-01 RX ORDER — IPRATROPIUM BROMIDE AND ALBUTEROL SULFATE 2.5; .5 MG/3ML; MG/3ML
3 SOLUTION RESPIRATORY (INHALATION) EVERY 4 HOURS PRN
Status: DISCONTINUED | OUTPATIENT
Start: 2023-01-01 | End: 2023-08-28 | Stop reason: HOSPADM

## 2023-01-01 RX ORDER — FAMOTIDINE 10 MG/ML
20 INJECTION, SOLUTION INTRAVENOUS 2 TIMES DAILY
Status: DISCONTINUED | OUTPATIENT
Start: 2023-01-01 | End: 2023-01-01

## 2023-01-01 RX ORDER — ROCURONIUM BROMIDE 10 MG/ML
80 INJECTION, SOLUTION INTRAVENOUS ONCE
Status: COMPLETED | OUTPATIENT
Start: 2023-01-01 | End: 2023-01-01

## 2023-01-01 RX ORDER — DEXTROMETHORPHAN HYDROBROMIDE 30 MG/10ML
5 SOLUTION ORAL 4 TIMES DAILY PRN
COMMUNITY

## 2023-01-01 RX ORDER — TRIAMCINOLONE ACETONIDE 1 MG/G
1 CREAM TOPICAL 2 TIMES DAILY
COMMUNITY

## 2023-01-01 RX ORDER — ATORVASTATIN CALCIUM 40 MG/1
40 TABLET, FILM COATED ORAL NIGHTLY
Status: DISCONTINUED | OUTPATIENT
Start: 2023-01-01 | End: 2023-01-01

## 2023-01-01 RX ORDER — DEXMEDETOMIDINE HYDROCHLORIDE 4 UG/ML
0.2 INJECTION, SOLUTION INTRAVENOUS
Status: DISCONTINUED | OUTPATIENT
Start: 2023-01-01 | End: 2023-01-01

## 2023-01-01 RX ORDER — AMOXICILLIN 250 MG
2 CAPSULE ORAL 2 TIMES DAILY
Status: DISCONTINUED | OUTPATIENT
Start: 2023-01-01 | End: 2023-01-01

## 2023-01-01 RX ORDER — LACTULOSE 10 G/15ML
20 SOLUTION ORAL 2 TIMES DAILY PRN
COMMUNITY

## 2023-01-01 RX ORDER — CEFTRIAXONE 1 G/50ML
1000 INJECTION, SOLUTION INTRAVENOUS EVERY 24 HOURS
Status: DISCONTINUED | OUTPATIENT
Start: 2023-01-01 | End: 2023-01-01

## 2023-01-01 RX ORDER — BREXPIPRAZOLE 1 MG/1
TABLET ORAL
COMMUNITY
Start: 2023-01-01

## 2023-01-01 RX ORDER — LORATADINE 10 MG/1
10 TABLET ORAL NIGHTLY
COMMUNITY

## 2023-01-01 RX ORDER — HYDROXYZINE PAMOATE 25 MG/1
CAPSULE ORAL
COMMUNITY

## 2023-01-01 RX ORDER — QUETIAPINE FUMARATE 50 MG/1
TABLET, FILM COATED ORAL
COMMUNITY
Start: 2023-01-01

## 2023-01-01 RX ORDER — HALOPERIDOL 5 MG/ML
5 INJECTION INTRAMUSCULAR EVERY 12 HOURS PRN
COMMUNITY

## 2023-01-01 RX ORDER — SODIUM CHLORIDE 0.9 % (FLUSH) 0.9 %
10 SYRINGE (ML) INJECTION EVERY 12 HOURS SCHEDULED
Status: CANCELLED | OUTPATIENT
Start: 2023-01-01

## 2023-01-01 RX ORDER — HALOPERIDOL 5 MG/ML
1 INJECTION INTRAMUSCULAR EVERY 4 HOURS PRN
Status: DISCONTINUED | OUTPATIENT
Start: 2023-01-01 | End: 2023-08-28 | Stop reason: HOSPADM

## 2023-01-01 RX ORDER — METOPROLOL SUCCINATE 25 MG/1
25 TABLET, EXTENDED RELEASE ORAL DAILY
COMMUNITY

## 2023-01-01 RX ORDER — ACETAMINOPHEN 325 MG/1
650 TABLET ORAL EVERY 4 HOURS PRN
Status: DISCONTINUED | OUTPATIENT
Start: 2023-01-01 | End: 2023-01-01

## 2023-01-01 RX ORDER — LORAZEPAM 2 MG/ML
0.5 INJECTION INTRAMUSCULAR
Status: DISCONTINUED | OUTPATIENT
Start: 2023-01-01 | End: 2023-08-28 | Stop reason: HOSPADM

## 2023-01-01 RX ORDER — L.ACID,PARA/B.BIFIDUM/S.THERM 8B CELL
1 CAPSULE ORAL 2 TIMES DAILY
Status: DISCONTINUED | OUTPATIENT
Start: 2023-01-01 | End: 2023-01-01

## 2023-01-01 RX ORDER — SODIUM CHLORIDE 0.9 % (FLUSH) 0.9 %
10 SYRINGE (ML) INJECTION AS NEEDED
Status: DISCONTINUED | OUTPATIENT
Start: 2023-01-01 | End: 2023-08-28 | Stop reason: HOSPADM

## 2023-01-01 RX ORDER — ACETAMINOPHEN 650 MG/1
650 SUPPOSITORY RECTAL EVERY 4 HOURS PRN
Status: DISCONTINUED | OUTPATIENT
Start: 2023-01-01 | End: 2023-01-01

## 2023-01-01 RX ORDER — SODIUM CHLORIDE 0.9 % (FLUSH) 0.9 %
20 SYRINGE (ML) INJECTION AS NEEDED
Status: CANCELLED | OUTPATIENT
Start: 2023-01-01

## 2023-01-01 RX ORDER — RISPERIDONE 0.5 MG/1
0.5 TABLET ORAL 2 TIMES DAILY
COMMUNITY
Start: 2023-01-01

## 2023-01-01 RX ORDER — MORPHINE SULFATE 2 MG/ML
2 INJECTION, SOLUTION INTRAMUSCULAR; INTRAVENOUS
Status: DISCONTINUED | OUTPATIENT
Start: 2023-01-01 | End: 2023-08-28 | Stop reason: HOSPADM

## 2023-01-01 RX ORDER — IPRATROPIUM BROMIDE AND ALBUTEROL SULFATE 2.5; .5 MG/3ML; MG/3ML
3 SOLUTION RESPIRATORY (INHALATION)
Status: DISCONTINUED | OUTPATIENT
Start: 2023-01-01 | End: 2023-01-01

## 2023-01-01 RX ORDER — BISACODYL 10 MG
10 SUPPOSITORY, RECTAL RECTAL DAILY PRN
Status: DISCONTINUED | OUTPATIENT
Start: 2023-01-01 | End: 2023-01-01

## 2023-01-01 RX ORDER — ENOXAPARIN SODIUM 100 MG/ML
30 INJECTION SUBCUTANEOUS DAILY
Status: DISCONTINUED | OUTPATIENT
Start: 2023-01-01 | End: 2023-01-01

## 2023-01-01 RX ORDER — DORZOLAMIDE HCL 20 MG/ML
1 SOLUTION/ DROPS OPHTHALMIC 2 TIMES DAILY
Status: DISCONTINUED | OUTPATIENT
Start: 2023-01-01 | End: 2023-08-28 | Stop reason: HOSPADM

## 2023-01-01 RX ORDER — BUDESONIDE 0.5 MG/2ML
0.5 INHALANT ORAL
Status: DISCONTINUED | OUTPATIENT
Start: 2023-01-01 | End: 2023-01-01

## 2023-01-01 RX ORDER — PHENYLEPHRINE HCL IN 0.9% NACL 50MG/250ML
.5-3 PLASTIC BAG, INJECTION (ML) INTRAVENOUS
Status: DISCONTINUED | OUTPATIENT
Start: 2023-01-01 | End: 2023-01-01

## 2023-01-01 RX ADMIN — MUPIROCIN: 20 OINTMENT TOPICAL at 09:51

## 2023-01-01 RX ADMIN — CEFTRIAXONE 1000 MG: 1 INJECTION, SOLUTION INTRAVENOUS at 09:51

## 2023-01-01 RX ADMIN — PROPOFOL 5 MCG/KG/MIN: 10 INJECTION, EMULSION INTRAVENOUS at 10:02

## 2023-01-01 RX ADMIN — LORAZEPAM 0.5 MG: 2 INJECTION INTRAMUSCULAR; INTRAVENOUS at 01:53

## 2023-01-01 RX ADMIN — Medication 1.6 MCG/KG/MIN: at 01:29

## 2023-01-01 RX ADMIN — IPRATROPIUM BROMIDE AND ALBUTEROL SULFATE 3 ML: .5; 3 SOLUTION RESPIRATORY (INHALATION) at 18:20

## 2023-01-01 RX ADMIN — FAMOTIDINE 20 MG: 10 INJECTION INTRAVENOUS at 08:33

## 2023-01-01 RX ADMIN — BUDESONIDE 0.5 MG: 0.5 INHALANT RESPIRATORY (INHALATION) at 18:25

## 2023-01-01 RX ADMIN — FAMOTIDINE 20 MG: 10 INJECTION INTRAVENOUS at 09:50

## 2023-01-01 RX ADMIN — BUDESONIDE 0.5 MG: 0.5 INHALANT RESPIRATORY (INHALATION) at 07:06

## 2023-01-01 RX ADMIN — DEXTROSE MONOHYDRATE 100 ML/HR: 50 INJECTION, SOLUTION INTRAVENOUS at 13:16

## 2023-01-01 RX ADMIN — POTASSIUM CHLORIDE 40 MEQ: 1.5 POWDER, FOR SOLUTION ORAL at 03:31

## 2023-01-01 RX ADMIN — VASOPRESSIN 0.03 UNITS/MIN: 20 INJECTION INTRAVENOUS at 09:51

## 2023-01-01 RX ADMIN — VASOPRESSIN 0.03 UNITS/MIN: 20 INJECTION INTRAVENOUS at 23:18

## 2023-01-01 RX ADMIN — DORZOLAMIDE HYDROCHLORIDE 1 DROP: 20 SOLUTION/ DROPS OPHTHALMIC at 20:54

## 2023-01-01 RX ADMIN — Medication 10 ML: at 09:06

## 2023-01-01 RX ADMIN — GLYCOPYRROLATE 0.2 MG: 0.2 INJECTION, SOLUTION INTRAMUSCULAR; INTRAVITREAL at 16:48

## 2023-01-01 RX ADMIN — GLYCOPYRROLATE 0.2 MG: 0.2 INJECTION, SOLUTION INTRAMUSCULAR; INTRAVITREAL at 13:17

## 2023-01-01 RX ADMIN — BUDESONIDE 0.5 MG: 0.5 INHALANT RESPIRATORY (INHALATION) at 06:38

## 2023-01-01 RX ADMIN — Medication 1 CAPSULE: at 20:53

## 2023-01-01 RX ADMIN — POTASSIUM CHLORIDE 40 MEQ: 1.5 POWDER, FOR SOLUTION ORAL at 06:02

## 2023-01-01 RX ADMIN — MORPHINE SULFATE 2 MG: 2 INJECTION, SOLUTION INTRAMUSCULAR; INTRAVENOUS at 13:30

## 2023-01-01 RX ADMIN — SODIUM CHLORIDE 500 ML: 9 INJECTION, SOLUTION INTRAVENOUS at 14:20

## 2023-01-01 RX ADMIN — POTASSIUM CHLORIDE 40 MEQ: 10 CAPSULE, COATED, EXTENDED RELEASE ORAL at 01:27

## 2023-01-01 RX ADMIN — MORPHINE SULFATE 2 MG: 2 INJECTION, SOLUTION INTRAMUSCULAR; INTRAVENOUS at 16:48

## 2023-01-01 RX ADMIN — MORPHINE SULFATE 2 MG: 2 INJECTION, SOLUTION INTRAMUSCULAR; INTRAVENOUS at 19:58

## 2023-01-01 RX ADMIN — LORAZEPAM 0.5 MG: 2 INJECTION INTRAMUSCULAR; INTRAVENOUS at 23:10

## 2023-01-01 RX ADMIN — MORPHINE SULFATE 2 MG: 2 INJECTION, SOLUTION INTRAMUSCULAR; INTRAVENOUS at 18:17

## 2023-01-01 RX ADMIN — GLYCOPYRROLATE 0.2 MG: 0.2 INJECTION, SOLUTION INTRAMUSCULAR; INTRAVITREAL at 01:56

## 2023-01-01 RX ADMIN — Medication 1 MCG/KG/MIN: at 08:55

## 2023-01-01 RX ADMIN — BUDESONIDE 0.5 MG: 0.5 INHALANT RESPIRATORY (INHALATION) at 18:20

## 2023-01-01 RX ADMIN — METOPROLOL TARTRATE 25 MG: 25 TABLET, FILM COATED ORAL at 09:50

## 2023-01-01 RX ADMIN — Medication 0.5 MCG/KG/MIN: at 16:39

## 2023-01-01 RX ADMIN — AMIODARONE HYDROCHLORIDE 150 MG: 1.5 INJECTION, SOLUTION INTRAVENOUS at 17:33

## 2023-01-01 RX ADMIN — MORPHINE SULFATE 2 MG: 2 INJECTION, SOLUTION INTRAMUSCULAR; INTRAVENOUS at 21:46

## 2023-01-01 RX ADMIN — Medication 10 ML: at 09:52

## 2023-01-01 RX ADMIN — DORZOLAMIDE HYDROCHLORIDE 1 DROP: 20 SOLUTION/ DROPS OPHTHALMIC at 20:25

## 2023-01-01 RX ADMIN — PROPOFOL 10 MCG/KG/MIN: 10 INJECTION, EMULSION INTRAVENOUS at 09:50

## 2023-01-01 RX ADMIN — SODIUM CHLORIDE 1000 MG: 900 INJECTION, SOLUTION INTRAVENOUS at 05:18

## 2023-01-01 RX ADMIN — IPRATROPIUM BROMIDE AND ALBUTEROL SULFATE 3 ML: .5; 3 SOLUTION RESPIRATORY (INHALATION) at 13:08

## 2023-01-01 RX ADMIN — DEXMEDETOMIDINE HYDROCHLORIDE 0.2 MCG/KG/HR: 400 INJECTION INTRAVENOUS at 14:57

## 2023-01-01 RX ADMIN — METOPROLOL TARTRATE 25 MG: 25 TABLET, FILM COATED ORAL at 08:33

## 2023-01-01 RX ADMIN — 0.12% CHLORHEXIDINE GLUCONATE 15 ML: 1.2 RINSE ORAL at 17:00

## 2023-01-01 RX ADMIN — DORZOLAMIDE HYDROCHLORIDE 1 DROP: 20 SOLUTION/ DROPS OPHTHALMIC at 09:51

## 2023-01-01 RX ADMIN — IPRATROPIUM BROMIDE AND ALBUTEROL SULFATE 3 ML: .5; 3 SOLUTION RESPIRATORY (INHALATION) at 07:02

## 2023-01-01 RX ADMIN — IPRATROPIUM BROMIDE AND ALBUTEROL SULFATE 3 ML: .5; 3 SOLUTION RESPIRATORY (INHALATION) at 00:29

## 2023-01-01 RX ADMIN — ATORVASTATIN CALCIUM 40 MG: 40 TABLET, FILM COATED ORAL at 20:53

## 2023-01-01 RX ADMIN — LORAZEPAM 0.5 MG: 2 INJECTION INTRAMUSCULAR; INTRAVENOUS at 20:37

## 2023-01-01 RX ADMIN — ACETAMINOPHEN 650 MG: 650 SOLUTION ORAL at 20:53

## 2023-01-01 RX ADMIN — ATORVASTATIN CALCIUM 40 MG: 40 TABLET, FILM COATED ORAL at 20:22

## 2023-01-01 RX ADMIN — PROPOFOL 10 MCG/KG/MIN: 10 INJECTION, EMULSION INTRAVENOUS at 06:01

## 2023-01-01 RX ADMIN — ACETAMINOPHEN 650 MG: 650 SOLUTION ORAL at 13:11

## 2023-01-01 RX ADMIN — IPRATROPIUM BROMIDE AND ALBUTEROL SULFATE 3 ML: .5; 3 SOLUTION RESPIRATORY (INHALATION) at 06:38

## 2023-01-01 RX ADMIN — Medication 1 CAPSULE: at 08:33

## 2023-01-01 RX ADMIN — MUPIROCIN: 20 OINTMENT TOPICAL at 20:22

## 2023-01-01 RX ADMIN — ENOXAPARIN SODIUM 30 MG: 30 INJECTION SUBCUTANEOUS at 16:40

## 2023-01-01 RX ADMIN — VANCOMYCIN HYDROCHLORIDE 500 MG: 500 INJECTION, POWDER, LYOPHILIZED, FOR SOLUTION INTRAVENOUS at 20:25

## 2023-01-01 RX ADMIN — GLYCOPYRROLATE 0.2 MG: 0.2 INJECTION, SOLUTION INTRAMUSCULAR; INTRAVITREAL at 17:27

## 2023-01-01 RX ADMIN — SENNOSIDES 5 ML: 8.8 LIQUID ORAL at 08:33

## 2023-01-01 RX ADMIN — METOPROLOL TARTRATE 25 MG: 25 TABLET, FILM COATED ORAL at 16:41

## 2023-01-01 RX ADMIN — MORPHINE SULFATE 2 MG: 2 INJECTION, SOLUTION INTRAMUSCULAR; INTRAVENOUS at 14:40

## 2023-01-01 RX ADMIN — 0.12% CHLORHEXIDINE GLUCONATE 15 ML: 1.2 RINSE ORAL at 08:33

## 2023-01-01 RX ADMIN — AMIODARONE HYDROCHLORIDE 1 MG/MIN: 1.8 INJECTION, SOLUTION INTRAVENOUS at 17:49

## 2023-01-01 RX ADMIN — 0.12% CHLORHEXIDINE GLUCONATE 15 ML: 1.2 RINSE ORAL at 20:24

## 2023-01-01 RX ADMIN — GLYCOPYRROLATE 0.2 MG: 0.2 INJECTION, SOLUTION INTRAMUSCULAR; INTRAVITREAL at 06:06

## 2023-01-01 RX ADMIN — ETOMIDATE 20 MG: 20 INJECTION, SOLUTION INTRAVENOUS at 09:57

## 2023-01-01 RX ADMIN — Medication 10 ML: at 20:26

## 2023-01-01 RX ADMIN — DORZOLAMIDE HYDROCHLORIDE 1 DROP: 20 SOLUTION/ DROPS OPHTHALMIC at 20:21

## 2023-01-01 RX ADMIN — FAMOTIDINE 20 MG: 10 INJECTION INTRAVENOUS at 20:53

## 2023-01-01 RX ADMIN — ENOXAPARIN SODIUM 30 MG: 30 INJECTION SUBCUTANEOUS at 17:00

## 2023-01-01 RX ADMIN — FAMOTIDINE 20 MG: 10 INJECTION INTRAVENOUS at 20:23

## 2023-01-01 RX ADMIN — MORPHINE SULFATE 2 MG: 2 INJECTION, SOLUTION INTRAMUSCULAR; INTRAVENOUS at 00:12

## 2023-01-01 RX ADMIN — CEFTRIAXONE 1000 MG: 1 INJECTION, SOLUTION INTRAVENOUS at 13:16

## 2023-01-01 RX ADMIN — GLYCOPYRROLATE 0.2 MG: 0.2 INJECTION, SOLUTION INTRAMUSCULAR; INTRAVITREAL at 21:44

## 2023-01-01 RX ADMIN — PROPOFOL 5 MCG/KG/MIN: 10 INJECTION, EMULSION INTRAVENOUS at 12:58

## 2023-01-01 RX ADMIN — MUPIROCIN: 20 OINTMENT TOPICAL at 08:33

## 2023-01-01 RX ADMIN — Medication 10 ML: at 08:33

## 2023-01-01 RX ADMIN — AMIODARONE HYDROCHLORIDE 0.5 MG/MIN: 1.8 INJECTION, SOLUTION INTRAVENOUS at 00:51

## 2023-01-01 RX ADMIN — GLYCOPYRROLATE 0.2 MG: 0.2 INJECTION, SOLUTION INTRAMUSCULAR; INTRAVITREAL at 09:05

## 2023-01-01 RX ADMIN — Medication 1 CAPSULE: at 20:22

## 2023-01-01 RX ADMIN — SENNOSIDES 5 ML: 8.8 LIQUID ORAL at 09:50

## 2023-01-01 RX ADMIN — DEXTROSE MONOHYDRATE 100 ML/HR: 50 INJECTION, SOLUTION INTRAVENOUS at 18:14

## 2023-01-01 RX ADMIN — MORPHINE SULFATE 2 MG: 2 INJECTION, SOLUTION INTRAMUSCULAR; INTRAVENOUS at 04:31

## 2023-01-01 RX ADMIN — GLYCOPYRROLATE 0.2 MG: 0.2 INJECTION, SOLUTION INTRAMUSCULAR; INTRAVITREAL at 13:11

## 2023-01-01 RX ADMIN — MUPIROCIN 1 APPLICATION: 20 OINTMENT TOPICAL at 20:25

## 2023-01-01 RX ADMIN — LORAZEPAM 0.5 MG: 2 INJECTION INTRAMUSCULAR; INTRAVENOUS at 13:17

## 2023-01-01 RX ADMIN — LORAZEPAM 0.5 MG: 2 INJECTION INTRAMUSCULAR; INTRAVENOUS at 09:05

## 2023-01-01 RX ADMIN — MORPHINE SULFATE 2 MG: 2 INJECTION, SOLUTION INTRAMUSCULAR; INTRAVENOUS at 09:05

## 2023-01-01 RX ADMIN — METOPROLOL TARTRATE 25 MG: 25 TABLET, FILM COATED ORAL at 20:23

## 2023-01-01 RX ADMIN — MORPHINE SULFATE 2 MG: 2 INJECTION, SOLUTION INTRAMUSCULAR; INTRAVENOUS at 15:11

## 2023-01-01 RX ADMIN — MORPHINE SULFATE 2 MG: 2 INJECTION, SOLUTION INTRAMUSCULAR; INTRAVENOUS at 13:17

## 2023-01-01 RX ADMIN — DEXMEDETOMIDINE HYDROCHLORIDE 0.6 MCG/KG/HR: 400 INJECTION INTRAVENOUS at 01:53

## 2023-01-01 RX ADMIN — POTASSIUM CHLORIDE 40 MEQ: 1.5 POWDER, FOR SOLUTION ORAL at 10:48

## 2023-01-01 RX ADMIN — FAMOTIDINE 20 MG: 10 INJECTION INTRAVENOUS at 20:25

## 2023-01-01 RX ADMIN — GLYCOPYRROLATE 0.2 MG: 0.2 INJECTION, SOLUTION INTRAMUSCULAR; INTRAVITREAL at 22:00

## 2023-01-01 RX ADMIN — DEXTROSE MONOHYDRATE 100 ML/HR: 50 INJECTION, SOLUTION INTRAVENOUS at 08:58

## 2023-01-01 RX ADMIN — 0.12% CHLORHEXIDINE GLUCONATE 15 ML: 1.2 RINSE ORAL at 20:22

## 2023-01-01 RX ADMIN — ROCURONIUM BROMIDE 80 MG: 10 INJECTION, SOLUTION INTRAVENOUS at 10:00

## 2023-01-01 RX ADMIN — DORZOLAMIDE HYDROCHLORIDE 1 DROP: 20 SOLUTION/ DROPS OPHTHALMIC at 08:33

## 2023-01-01 RX ADMIN — LORAZEPAM 0.5 MG: 2 INJECTION INTRAMUSCULAR; INTRAVENOUS at 06:06

## 2023-01-01 RX ADMIN — LORAZEPAM 0.5 MG: 2 INJECTION INTRAMUSCULAR; INTRAVENOUS at 14:27

## 2023-01-01 RX ADMIN — IPRATROPIUM BROMIDE AND ALBUTEROL SULFATE 3 ML: .5; 3 SOLUTION RESPIRATORY (INHALATION) at 18:25

## 2023-01-01 RX ADMIN — IPRATROPIUM BROMIDE AND ALBUTEROL SULFATE 3 ML: .5; 3 SOLUTION RESPIRATORY (INHALATION) at 00:07

## 2023-01-01 RX ADMIN — Medication 10 ML: at 20:54

## 2023-01-01 RX ADMIN — CEFTRIAXONE 1000 MG: 1 INJECTION, SOLUTION INTRAVENOUS at 08:35

## 2023-01-01 RX ADMIN — LORAZEPAM 0.5 MG: 2 INJECTION INTRAMUSCULAR; INTRAVENOUS at 16:48

## 2023-01-01 RX ADMIN — Medication 10 ML: at 20:24

## 2023-01-01 RX ADMIN — Medication 1 CAPSULE: at 09:51

## 2023-01-01 RX ADMIN — Medication 10 ML: at 20:22

## 2023-01-01 RX ADMIN — SENNOSIDES 5 ML: 8.8 LIQUID ORAL at 20:22

## 2023-01-13 ENCOUNTER — HOSPITAL ENCOUNTER (EMERGENCY)
Facility: HOSPITAL | Age: 88
Discharge: HOME | End: 2023-01-13
Attending: EMERGENCY MEDICINE | Admitting: EMERGENCY MEDICINE
Payer: MEDICARE

## 2023-01-13 VITALS
HEART RATE: 105 BPM | TEMPERATURE: 97.7 F | OXYGEN SATURATION: 90 % | SYSTOLIC BLOOD PRESSURE: 127 MMHG | WEIGHT: 165 LBS | RESPIRATION RATE: 20 BRPM | HEIGHT: 71 IN | BODY MASS INDEX: 23.1 KG/M2 | DIASTOLIC BLOOD PRESSURE: 62 MMHG

## 2023-01-13 DIAGNOSIS — N30.00 ACUTE CYSTITIS WITHOUT HEMATURIA: Primary | ICD-10-CM

## 2023-01-13 LAB
ALBUMIN SERPL-MCNC: 4.4 G/DL (ref 3.5–5.2)
ALBUMIN/GLOB SERPL: 1.3 G/DL
ALP SERPL-CCNC: 69 U/L (ref 39–117)
ALT SERPL W P-5'-P-CCNC: 15 U/L (ref 1–41)
ANION GAP SERPL CALCULATED.3IONS-SCNC: 13.8 MMOL/L (ref 5–15)
AST SERPL-CCNC: 24 U/L (ref 1–40)
BACTERIA UR QL AUTO: ABNORMAL /HPF
BASOPHILS # BLD AUTO: 0.09 10*3/MM3 (ref 0–0.2)
BASOPHILS NFR BLD AUTO: 0.5 % (ref 0–1.5)
BILIRUB SERPL-MCNC: 0.9 MG/DL (ref 0–1.2)
BILIRUB UR QL STRIP: ABNORMAL
BUN SERPL-MCNC: 31 MG/DL (ref 8–23)
BUN/CREAT SERPL: 15.3 (ref 7–25)
CALCIUM SPEC-SCNC: 11.2 MG/DL (ref 8.6–10.5)
CHLORIDE SERPL-SCNC: 99 MMOL/L (ref 98–107)
CLARITY UR: ABNORMAL
CO2 SERPL-SCNC: 23.2 MMOL/L (ref 22–29)
COLOR UR: ABNORMAL
CREAT SERPL-MCNC: 2.03 MG/DL (ref 0.76–1.27)
DEPRECATED RDW RBC AUTO: 47.1 FL (ref 37–54)
EGFRCR SERPLBLD CKD-EPI 2021: 31 ML/MIN/1.73
EOSINOPHIL # BLD AUTO: 0.07 10*3/MM3 (ref 0–0.4)
EOSINOPHIL NFR BLD AUTO: 0.4 % (ref 0.3–6.2)
ERYTHROCYTE [DISTWIDTH] IN BLOOD BY AUTOMATED COUNT: 13.1 % (ref 12.3–15.4)
GLOBULIN UR ELPH-MCNC: 3.3 GM/DL
GLUCOSE SERPL-MCNC: 119 MG/DL (ref 65–99)
GLUCOSE UR STRIP-MCNC: NEGATIVE MG/DL
HCT VFR BLD AUTO: 49.8 % (ref 37.5–51)
HGB BLD-MCNC: 16.8 G/DL (ref 13–17.7)
HGB UR QL STRIP.AUTO: NEGATIVE
HOLD SPECIMEN: NORMAL
HOLD SPECIMEN: NORMAL
HYALINE CASTS UR QL AUTO: ABNORMAL /LPF
IMM GRANULOCYTES # BLD AUTO: 0.1 10*3/MM3 (ref 0–0.05)
IMM GRANULOCYTES NFR BLD AUTO: 0.6 % (ref 0–0.5)
KETONES UR QL STRIP: ABNORMAL
LEUKOCYTE ESTERASE UR QL STRIP.AUTO: ABNORMAL
LYMPHOCYTES # BLD AUTO: 2.67 10*3/MM3 (ref 0.7–3.1)
LYMPHOCYTES NFR BLD AUTO: 15.9 % (ref 19.6–45.3)
MAGNESIUM SERPL-MCNC: 2.1 MG/DL (ref 1.6–2.4)
MCH RBC QN AUTO: 33.2 PG (ref 26.6–33)
MCHC RBC AUTO-ENTMCNC: 33.7 G/DL (ref 31.5–35.7)
MCV RBC AUTO: 98.4 FL (ref 79–97)
MONOCYTES # BLD AUTO: 1.31 10*3/MM3 (ref 0.1–0.9)
MONOCYTES NFR BLD AUTO: 7.8 % (ref 5–12)
MUCOUS THREADS URNS QL MICRO: ABNORMAL /HPF
NEUTROPHILS NFR BLD AUTO: 12.58 10*3/MM3 (ref 1.7–7)
NEUTROPHILS NFR BLD AUTO: 74.8 % (ref 42.7–76)
NITRITE UR QL STRIP: NEGATIVE
NRBC BLD AUTO-RTO: 0 /100 WBC (ref 0–0.2)
PH UR STRIP.AUTO: 6 [PH] (ref 5–8)
PLATELET # BLD AUTO: 270 10*3/MM3 (ref 140–450)
PMV BLD AUTO: 10.1 FL (ref 6–12)
POTASSIUM SERPL-SCNC: 4.4 MMOL/L (ref 3.5–5.2)
PROT SERPL-MCNC: 7.7 G/DL (ref 6–8.5)
PROT UR QL STRIP: ABNORMAL
RBC # BLD AUTO: 5.06 10*6/MM3 (ref 4.14–5.8)
RBC # UR STRIP: ABNORMAL /HPF
REF LAB TEST METHOD: ABNORMAL
SODIUM SERPL-SCNC: 136 MMOL/L (ref 136–145)
SP GR UR STRIP: 1.02 (ref 1–1.03)
SQUAMOUS #/AREA URNS HPF: ABNORMAL /HPF
UROBILINOGEN UR QL STRIP: ABNORMAL
WBC # UR STRIP: ABNORMAL /HPF
WBC NRBC COR # BLD: 16.82 10*3/MM3 (ref 3.4–10.8)
WHOLE BLOOD HOLD COAG: NORMAL
WHOLE BLOOD HOLD SPECIMEN: NORMAL

## 2023-01-13 PROCEDURE — 99283 EMERGENCY DEPT VISIT LOW MDM: CPT

## 2023-01-13 PROCEDURE — 80053 COMPREHEN METABOLIC PANEL: CPT | Performed by: EMERGENCY MEDICINE

## 2023-01-13 PROCEDURE — 85025 COMPLETE CBC W/AUTO DIFF WBC: CPT | Performed by: EMERGENCY MEDICINE

## 2023-01-13 PROCEDURE — 81001 URINALYSIS AUTO W/SCOPE: CPT | Performed by: EMERGENCY MEDICINE

## 2023-01-13 PROCEDURE — 83735 ASSAY OF MAGNESIUM: CPT | Performed by: EMERGENCY MEDICINE

## 2023-01-13 RX ORDER — SODIUM CHLORIDE 0.9 % (FLUSH) 0.9 %
10 SYRINGE (ML) INJECTION AS NEEDED
Status: DISCONTINUED | OUTPATIENT
Start: 2023-01-13 | End: 2023-01-13 | Stop reason: HOSPADM

## 2023-01-13 RX ORDER — CEPHALEXIN 500 MG/1
500 CAPSULE ORAL 3 TIMES DAILY
Qty: 21 CAPSULE | Refills: 0 | Status: SHIPPED | OUTPATIENT
Start: 2023-01-13 | End: 2023-01-20

## 2023-01-13 RX ADMIN — SODIUM CHLORIDE 1000 ML: 9 INJECTION, SOLUTION INTRAVENOUS at 17:15

## 2023-01-13 NOTE — ED PROVIDER NOTES
Subjective   History of Present Illness  88-year-old male presents to the ED with his wife for chief complaint of generalized weakness fatigue he has been in and out of the hospital over the last few weeks.  Had a fall recently was diagnosed with a concussion.  Wife states that since the fall over a month ago he has been having some intermittent confusion.  He was at his primary care physician's office today and they checked his blood pressure and was noted to be low so they sent him to the ED to be evaluated.  On arrival to the ED systolic blood pressure in the 90s.  Patient denies nausea vomiting diarrhea.  No chest pain or shortness of breath.  No other complaints at this time.        Review of Systems   Constitutional: Positive for fatigue.   Neurological: Positive for weakness.   All other systems reviewed and are negative.      Past Medical History:   Diagnosis Date   • Arthritis     knees   • Cancer (HCC)    • Coronary artery disease    • Coronary artery disease involving native coronary artery of native heart without angina pectoris 7/31/2018    Added automatically from request for surgery [id number]   • GERD (gastroesophageal reflux disease)    • Glaucoma    • History of transfusion    • Hyperlipidemia    • Hypertension    • Kidney stone    • Pneumonia     right upper lobe    • Pulmonary embolism (HCC)    • Skin cancer    • Wears glasses        Allergies   Allergen Reactions   • Lipitor [Atorvastatin] Myalgia       Past Surgical History:   Procedure Laterality Date   • CARDIAC CATHETERIZATION N/A 6/21/2017    Procedure: Left Heart Cath;  Surgeon: Toy Landers MD;  Location:  [facility];  Service:    • CARDIAC CATHETERIZATION N/A 8/1/2018    Procedure: Left Heart Cath;  Surgeon: Toy Landers MD;  Location:  [facility];  Service: Cardiology   • CATARACT EXTRACTION W/ INTRAOCULAR LENS  IMPLANT, BILATERAL     • CHOLECYSTECTOMY  2010   • COLONOSCOPY     • CORONARY  ANGIOPLASTY WITH STENT PLACEMENT     • CORONARY STENT PLACEMENT      x3   • EYE SURGERY Right    • SKIN CANCER EXCISION     • WRIST SURGERY         Family History   Problem Relation Age of Onset   • Heart attack Father        Social History     Socioeconomic History   • Marital status:    Tobacco Use   • Smoking status: Never   • Smokeless tobacco: Never   Vaping Use   • Vaping Use: Never used   Substance and Sexual Activity   • Alcohol use: No   • Drug use: No   • Sexual activity: Defer           Objective   Physical Exam  Vitals and nursing note reviewed.   Constitutional:       General: He is not in acute distress.     Appearance: He is well-developed. He is not diaphoretic.   HENT:      Head: Normocephalic and atraumatic.      Nose: Nose normal.   Eyes:      Conjunctiva/sclera: Conjunctivae normal.      Pupils: Pupils are equal, round, and reactive to light.   Cardiovascular:      Rate and Rhythm: Normal rate and regular rhythm.   Pulmonary:      Effort: Pulmonary effort is normal. No respiratory distress.      Breath sounds: Normal breath sounds.   Abdominal:      General: There is no distension.      Palpations: Abdomen is soft.      Tenderness: There is no abdominal tenderness.   Musculoskeletal:         General: No deformity.   Neurological:      Mental Status: He is alert and oriented to person, place, and time.      Cranial Nerves: No cranial nerve deficit.      Coordination: Coordination normal.         Procedures           ED Course                                           MDM  Chief complaint: Generalized weakness, concern for dehydration, hypotension at primary care physician's office    Comorbidities requiring consideration and/or management: Coronary artery disease, pneumonia, other    Differential diagnosis includes but not limited to: Urinary tract infection, dehydration, acute kidney injury, acute renal failure, sepsis, other    Patient arrives hemodynamically stable stable, afebrile,  without respiratory distress with initial vitals interpreted by myself.  Pertinent initial vitals include borderline hypotensive initially with a systolic in the 90s.  No tachycardia    Pertinent features from physical exam: Elderly appearing.  Abdomen soft nontender nondistended.  Heart regular rate and rhythm.    Initial diagnostic plan: CBC, CMP, urinalysis, magnesium    Results from initial plan were reviewed and interpreted by myself and include: Mildly elevated white blood cell count at 16,000 normal hemoglobin and hematocrit.  Mild acute kidney injury with a creatinine of 2.03 today with baseline creatinine around the 1.7-1.8 range.  Urinalysis shows slight infection.      Diagnostic information from other sources includes: Review of previous visits, prior labs, prior imaging, available notes from prior evaluations or visits with specialists, medication list, allergies, past medical history, past surgical history    Interventions in the ED included: IV fluid rehydration, oral antibiotics.  1 L normal saline.  On reevaluation blood pressure is normalized.  Blood pressure on reevaluation 127/62.  No lightheadedness dizziness or confusion    Reevaluation: Resting comfortably, feels better    Results/clinical rationale were discussed with patient and wife at bedside    Consultations and discussions of results with other physicians: None    Discussion of results/management/plan: Patient presents to the ED for some generalized weakness concern for dehydration and hypotension at primary care physician's office.  On arrival to the ED here the patient has some borderline hypotension with a systolic blood pressure in the 90s.  Labs show slightly elevated white blood cell count, urinalysis shows slight infection.  Minimal elevation in creatinine from baseline.  He received 1 L normal saline IV fluid rehydration feels much better.  Keflex initiated for mild urinary tract infection.  Afebrile, no signs of sepsis or severe  infection.  Shared decision making with the patient and wife.  He prefers to try oral antibiotics at home.  Strict return precautions given.  Follow-up with primary care physician.        Disposition plan: Discharge home with antibiotics.  P.o. fluid intake encouraged.  Strict return precautions given.      Final diagnoses:   Acute cystitis without hematuria       ED Disposition  ED Disposition     ED Disposition   Discharge    Condition   Stable    Comment   --             Angeles Huynh, DO  [address] [facility] [address] [phone]               Medication List      New Prescriptions    cephalexin 500 MG capsule  Commonly known as: KEFLEX  Take 1 capsule by mouth 3 (Three) Times a Day for 7 days.        Changed    lisinopril 20 MG tablet  Commonly known as: PRINIVIL,ZESTRIL  Take 1 tablet by mouth 2 (two) times a day.  What changed: when to take this           Where to Get Your Medications      These medications were sent to [facility] [address] [place] [address] - [facility] ([place], [place]) - [phone]  - [phone] [address] [address]    Phone: [phone]   · cephalexin 500 MG capsule          Hans Coto,   01/13/23 5915

## 2023-02-16 ENCOUNTER — OFFICE VISIT (OUTPATIENT)
Dept: NEUROLOGY | Facility: CLINIC | Age: 88
End: 2023-02-16
Payer: MEDICARE

## 2023-02-16 VITALS
DIASTOLIC BLOOD PRESSURE: 84 MMHG | OXYGEN SATURATION: 97 % | SYSTOLIC BLOOD PRESSURE: 118 MMHG | BODY MASS INDEX: 22.65 KG/M2 | WEIGHT: 161.8 LBS | HEIGHT: 71 IN | HEART RATE: 88 BPM | TEMPERATURE: 97.3 F

## 2023-02-16 DIAGNOSIS — F03.90 DEMENTIA WITHOUT BEHAVIORAL DISTURBANCE: Primary | ICD-10-CM

## 2023-02-16 DIAGNOSIS — Z87.820 HISTORY OF CLOSED HEAD INJURY: ICD-10-CM

## 2023-02-16 DIAGNOSIS — I49.9 IRREGULAR HEARTBEAT: ICD-10-CM

## 2023-02-16 PROCEDURE — 99204 OFFICE O/P NEW MOD 45 MIN: CPT | Performed by: NURSE PRACTITIONER

## 2023-02-16 RX ORDER — OMEPRAZOLE 20 MG/1
20 CAPSULE, DELAYED RELEASE ORAL NIGHTLY
COMMUNITY

## 2023-02-16 NOTE — PROGRESS NOTES
"     New Patient Office Visit      Patient Name: Darren Mccracken  : 1934   MRN: [id number]     Referring Physician: Angeles Huynh DO    Chief Complaint:    Chief Complaint   Patient presents with   • Memory Loss     NP/Pt states that he had a fall about the end of December. Pt states that he suffered a concussion as a result of the fall. Pt states that since this fall he has had some short term memory loss.         History of Present Illness: Darren Mccracken is a 88 y.o. male who is here today to establish care with Neurology for memory concerns.  He lives with his wife, Karma, who is with him today.  He does not drive and hasn't driven in about 3 years.  He performs his ADLs independently.  His wife says she never has to remind him to shower.  He runs the vacuum at times.  His wife does most of the cooking.  He and his wife share in paying the bills but his wife states, \"I help him\".  His wife says he will look at her and state, \"Now, who are you?\" and \"Where do we live?\".  He had a fall in 2022, went to the ED, and then was discharged home- he was taking the trash can out, fell and hit the left side of his face, sustained a laceration just below his left eye and broke his glasses.  He has had no personality changes and no extremes of mood.  His wife says he gets frustrated that he can't do the things he once could.  He is now sleeping well- going to bed and staying in bed.  After he had the fall he was quite confused during the night and would jump out of bed and say, \"We gotta get out of here\".  His wife says she has certainly seen an improvement, in his cognition, over the past month.  He mentions he has a strange feeling around his head that comes and goes and states, \"It feels like I have a bandage on my head some times\"- that sensation has improved since his initial fall.  Additional risk factors- dyslipidemia, insomnia, glaucoma, HTN, vertigo, GERD, CAD with cardiac stents in " place, palpitations, low back pain.   *He scored 14/30 on the MMSE today.     Pertinent Medical History:  CT head without contrast on 1/8/2023 showed atrophy and chronic change without acute intracranial abnormality; left supraorbital soft tissue swelling is decreased compared to the prior study with mild contusion.  MRI brain without contrast on 1/9/2017 showed no acute intracranial process; probably chronic small vessel ischemic disease.      Subjective      Review of Systems:   Review of Systems   Constitutional: Negative for fatigue, fever, unexpected weight gain and unexpected weight loss.   HENT: Negative for hearing loss, sore throat, swollen glands, tinnitus and trouble swallowing.    Eyes: Negative for blurred vision, double vision, photophobia and visual disturbance.   Respiratory: Negative for cough, chest tightness and shortness of breath.    Cardiovascular: Negative for chest pain, palpitations and leg swelling.   Gastrointestinal: Negative for constipation, diarrhea and nausea.   Endocrine: Negative for cold intolerance and heat intolerance.   Musculoskeletal: Positive for gait problem. Negative for neck pain and neck stiffness.   Skin: Negative for color change and rash.   Allergic/Immunologic: Negative for environmental allergies and food allergies.   Neurological: Positive for dizziness, memory problem and confusion. Negative for syncope, facial asymmetry, speech difficulty, weakness and headache.   Psychiatric/Behavioral: Negative for agitation, behavioral problems and depressed mood. The patient is not nervous/anxious.        Past Medical History:   Past Medical History:   Diagnosis Date   • Arthritis     knees   • Cancer (HCC)    • Coronary artery disease    • Coronary artery disease involving native coronary artery of native heart without angina pectoris 7/31/2018    Added automatically from request for surgery [id number]   • GERD (gastroesophageal reflux disease)    • Glaucoma    • History of  transfusion    • Hyperlipidemia    • Hypertension    • Kidney stone    • Pneumonia     right upper lobe    • Pulmonary embolism (HCC)    • Skin cancer    • Wears glasses        Past Surgical History:   Past Surgical History:   Procedure Laterality Date   • CARDIAC CATHETERIZATION N/A 6/21/2017    Procedure: Left Heart Cath;  Surgeon: Toy Landers MD;  Location:  [facility];  Service:    • CARDIAC CATHETERIZATION N/A 8/1/2018    Procedure: Left Heart Cath;  Surgeon: Toy Landers MD;  Location:  [facility];  Service: Cardiology   • CATARACT EXTRACTION W/ INTRAOCULAR LENS  IMPLANT, BILATERAL     • CHOLECYSTECTOMY  2010   • COLONOSCOPY     • CORONARY ANGIOPLASTY WITH STENT PLACEMENT     • CORONARY STENT PLACEMENT      x3   • EYE SURGERY Right    • SKIN CANCER EXCISION     • WRIST SURGERY         Family History:   Family History   Problem Relation Age of Onset   • Heart attack Father        Social History:   Social History     Socioeconomic History   • Marital status:    Tobacco Use   • Smoking status: Never   • Smokeless tobacco: Never   Vaping Use   • Vaping Use: Never used   Substance and Sexual Activity   • Alcohol use: No   • Drug use: No   • Sexual activity: Defer       Medications:     Current Outpatient Medications:   •  aspirin 81 MG EC tablet, Take 81 mg by mouth Every Night., Disp: , Rfl:   •  brimonidine (ALPHAGAN) 0.2 % ophthalmic solution, Administer 2 drops to the right eye 2 (Two) Times a Day., Disp: , Rfl:   •  chlorthalidone (HYGROTON) 25 MG tablet, Take 1 tablet by mouth once daily (Patient taking differently: 25 mg As Needed.), Disp: 30 tablet, Rfl: 5  •  dorzolamide (TRUSOPT) 2 % ophthalmic solution, Administer 1 drop to the right eye 2 (Two) Times a Day., Disp: , Rfl:   •  doxazosin (CARDURA) 1 MG tablet, Take 1 mg by mouth Every Night., Disp: , Rfl:   •  lisinopril (PRINIVIL,ZESTRIL) 20 MG tablet, Take 1 tablet by mouth 2 (two) times a day.  "(Patient taking differently: Take 20 mg by mouth Daily.), Disp: 180 tablet, Rfl: 0  •  simvastatin (ZOCOR) 80 MG tablet, Take 1 tablet by mouth every night at bedtime., Disp: 90 tablet, Rfl: 1  •  vitamin B-12 (CYANOCOBALAMIN) 2500 MCG sublingual tablet tablet, Place 2,500 mcg under the tongue Daily., Disp: , Rfl:   •  omeprazole (priLOSEC) 20 MG capsule, Take 20 mg by mouth Every Night., Disp: , Rfl:     Allergies:   Allergies   Allergen Reactions   • Lipitor [Atorvastatin] Myalgia       Objective     Physical Exam:  Vital Signs:   Vitals:    02/16/23 0918   BP: 118/84   Pulse: 88   Temp: 97.3 °F (36.3 °C)   SpO2: 97%   Weight: 73.4 kg (161 lb 12.8 oz)   Height: 180.3 cm (71\")   PainSc: 0-No pain     Body mass index is 22.57 kg/m².     Physical Exam  Vitals and nursing note reviewed.   Constitutional:       General: He is not in acute distress.     Appearance: He is well-developed. He is not diaphoretic.   HENT:      Head: Normocephalic and atraumatic.   Eyes:      Conjunctiva/sclera: Conjunctivae normal.      Pupils: Pupils are equal, round, and reactive to light.   Cardiovascular:      Rate and Rhythm: Normal rate. Rhythm irregular.      Heart sounds: Normal heart sounds. No murmur heard.    No friction rub. No gallop.   Pulmonary:      Effort: Pulmonary effort is normal. No respiratory distress.      Breath sounds: Normal breath sounds. No wheezing or rales.   Musculoskeletal:         General: Normal range of motion.   Skin:     General: Skin is warm and dry.      Findings: No rash.      Comments: Scar to the left side of left eye- well-healed, slightly erythematous.    Neurological:      Mental Status: He is alert. Mental status is at baseline.      Cranial Nerves: Cranial nerves 2-12 are intact.      Coordination: Finger-Nose-Finger Test abnormal.   Psychiatric:         Mood and Affect: Mood normal.         Speech: Speech normal.         Behavior: Behavior normal.         Thought Content: Thought content " normal.         Judgment: Judgment normal.         Neurologic Exam     Mental Status   Oriented to person.   Disoriented to place. Disoriented to city and area.   Disoriented to time. Disoriented to year, month and season.   Registration: recalls 3 of 3 objects. Follows 2 step commands.   Attention: normal. Concentration: decreased.   Speech: speech is normal   Level of consciousness: alert  Knowledge: good.   Unable to recall any of 3 items at 5 minutes.      Cranial Nerves   Cranial nerves II through XII intact.     CN II   Visual acuity: normal    CN III, IV, VI   Pupils are equal, round, and reactive to light.  Right pupil: Shape: regular. Reactivity: brisk.   Left pupil: Shape: regular. Reactivity: brisk.   CN III: no CN III palsy  CN VI: no CN VI palsy  Nystagmus: none     CN V   Facial sensation intact.     CN VII   Facial expression full, symmetric.     CN VIII   CN VIII normal.     CN IX, X   CN IX normal.   CN X normal.     CN XI   CN XI normal.     CN XII   CN XII normal.     Motor Exam   Muscle bulk: normal  Overall muscle tone: normal    Strength   Right biceps: 5/5  Left biceps: 5/5  Right triceps: 5/5  Left triceps: 5/5  Right quadriceps: 5/5  Left quadriceps: 5/5  Right hamstrin/5  Left hamstrin/5    Sensory Exam   Light touch normal.   Proprioception normal.     Gait, Coordination, and Reflexes     Coordination   Finger to nose coordination: abnormal    Tremor   Resting tremor: absent  Intention tremor: present (low velocity, low frequency tremor to bilateral hands upon reaching intention target)  Action tremor: absentAble to go from sitting to standing position on first attempt without assistance slowly, unsteady gait, not picking up feet well for the first 4-5 steps then that improves.        Assessment / Plan      Assessment/Plan:   Diagnoses and all orders for this visit:    1. Dementia without behavioral disturbance (HCC) (Primary)    2. History of closed head injury    3. Irregular  heartbeat    4. BMI 22.0-22.9, adult    *Patient education on Dementia and Dementia caregiver guide provided today.   *Safety precautions discussed and encouraged. I have advised the patient to use some type of assistive device to help prevent falls. He is not interested in doing physical therapy right now.   *I discussed brain injury/cognitive decline after an acute injury in the elderly in detail with the patient and his wife. I have advised them his brain function may still improve some over the next few months but may not return to his baseline. Post concussive syndrome, in the elderly, was discussed in detail with the patient and his wife.   *I have advised patient's wife to get him in to his PCP's office or call 911 for any episodes of significant, acute confusion or mental status changes.     Follow Up:   Return in about 3 months (around 5/16/2023) for Follow Up.    EAGLE Asencio, FNP-C  [facility]       Please note that portions of this note may have been completed with a voice recognition program. Efforts were made to edit the dictations, but occasionally words are mistranscribed.

## 2023-03-23 ENCOUNTER — APPOINTMENT (OUTPATIENT)
Dept: CT IMAGING | Facility: HOSPITAL | Age: 88
End: 2023-03-23
Payer: MEDICARE

## 2023-03-23 ENCOUNTER — APPOINTMENT (OUTPATIENT)
Dept: GENERAL RADIOLOGY | Facility: HOSPITAL | Age: 88
End: 2023-03-23
Payer: MEDICARE

## 2023-03-23 ENCOUNTER — HOSPITAL ENCOUNTER (OUTPATIENT)
Facility: HOSPITAL | Age: 88
LOS: 1 days | Discharge: HOME | End: 2023-03-24
Attending: EMERGENCY MEDICINE | Admitting: STUDENT IN AN ORGANIZED HEALTH CARE EDUCATION/TRAINING PROGRAM
Payer: MEDICARE

## 2023-03-23 DIAGNOSIS — R53.81 PHYSICAL DEBILITY: ICD-10-CM

## 2023-03-23 DIAGNOSIS — J18.9 PNEUMONIA OF LEFT LUNG DUE TO INFECTIOUS ORGANISM, UNSPECIFIED PART OF LUNG: ICD-10-CM

## 2023-03-23 DIAGNOSIS — R41.82 ALTERED MENTAL STATUS, UNSPECIFIED ALTERED MENTAL STATUS TYPE: Primary | ICD-10-CM

## 2023-03-23 LAB
ALBUMIN SERPL-MCNC: 3.6 G/DL (ref 3.5–5.2)
ALBUMIN/GLOB SERPL: 1.2 G/DL
ALP SERPL-CCNC: 58 U/L (ref 39–117)
ALT SERPL W P-5'-P-CCNC: 17 U/L (ref 1–41)
ANION GAP SERPL CALCULATED.3IONS-SCNC: 11 MMOL/L (ref 5–15)
AST SERPL-CCNC: 21 U/L (ref 1–40)
B PARAPERT DNA SPEC QL NAA+PROBE: NOT DETECTED
B PERT DNA SPEC QL NAA+PROBE: NOT DETECTED
BACTERIA UR QL AUTO: ABNORMAL /HPF
BASOPHILS # BLD AUTO: 0.02 10*3/MM3 (ref 0–0.2)
BASOPHILS NFR BLD AUTO: 0.1 % (ref 0–1.5)
BILIRUB SERPL-MCNC: 0.8 MG/DL (ref 0–1.2)
BILIRUB UR QL STRIP: NEGATIVE
BUN SERPL-MCNC: 45 MG/DL (ref 8–23)
BUN/CREAT SERPL: 33.6 (ref 7–25)
C PNEUM DNA NPH QL NAA+NON-PROBE: NOT DETECTED
CALCIUM SPEC-SCNC: 9.5 MG/DL (ref 8.6–10.5)
CHLORIDE SERPL-SCNC: 101 MMOL/L (ref 98–107)
CLARITY UR: CLEAR
CO2 SERPL-SCNC: 23 MMOL/L (ref 22–29)
COLOR UR: YELLOW
CREAT SERPL-MCNC: 1.34 MG/DL (ref 0.76–1.27)
DEPRECATED RDW RBC AUTO: 48.2 FL (ref 37–54)
EGFRCR SERPLBLD CKD-EPI 2021: 51 ML/MIN/1.73
EOSINOPHIL # BLD AUTO: 0.01 10*3/MM3 (ref 0–0.4)
EOSINOPHIL NFR BLD AUTO: 0.1 % (ref 0.3–6.2)
ERYTHROCYTE [DISTWIDTH] IN BLOOD BY AUTOMATED COUNT: 13.8 % (ref 12.3–15.4)
FLUAV SUBTYP SPEC NAA+PROBE: NOT DETECTED
FLUBV RNA ISLT QL NAA+PROBE: NOT DETECTED
GLOBULIN UR ELPH-MCNC: 3.1 GM/DL
GLUCOSE SERPL-MCNC: 110 MG/DL (ref 65–99)
GLUCOSE UR STRIP-MCNC: NEGATIVE MG/DL
HADV DNA SPEC NAA+PROBE: NOT DETECTED
HCOV 229E RNA SPEC QL NAA+PROBE: NOT DETECTED
HCOV HKU1 RNA SPEC QL NAA+PROBE: NOT DETECTED
HCOV NL63 RNA SPEC QL NAA+PROBE: NOT DETECTED
HCOV OC43 RNA SPEC QL NAA+PROBE: NOT DETECTED
HCT VFR BLD AUTO: 44.3 % (ref 37.5–51)
HGB BLD-MCNC: 14.9 G/DL (ref 13–17.7)
HGB UR QL STRIP.AUTO: ABNORMAL
HMPV RNA NPH QL NAA+NON-PROBE: NOT DETECTED
HOLD SPECIMEN: NORMAL
HOLD SPECIMEN: NORMAL
HPIV1 RNA ISLT QL NAA+PROBE: NOT DETECTED
HPIV2 RNA SPEC QL NAA+PROBE: NOT DETECTED
HPIV3 RNA NPH QL NAA+PROBE: NOT DETECTED
HPIV4 P GENE NPH QL NAA+PROBE: NOT DETECTED
HYALINE CASTS UR QL AUTO: ABNORMAL /LPF
IMM GRANULOCYTES # BLD AUTO: 0.08 10*3/MM3 (ref 0–0.05)
IMM GRANULOCYTES NFR BLD AUTO: 0.5 % (ref 0–0.5)
KETONES UR QL STRIP: NEGATIVE
LEUKOCYTE ESTERASE UR QL STRIP.AUTO: NEGATIVE
LYMPHOCYTES # BLD AUTO: 1.26 10*3/MM3 (ref 0.7–3.1)
LYMPHOCYTES NFR BLD AUTO: 8.1 % (ref 19.6–45.3)
M PNEUMO IGG SER IA-ACNC: NOT DETECTED
MAGNESIUM SERPL-MCNC: 2.1 MG/DL (ref 1.6–2.4)
MCH RBC QN AUTO: 32.2 PG (ref 26.6–33)
MCHC RBC AUTO-ENTMCNC: 33.6 G/DL (ref 31.5–35.7)
MCV RBC AUTO: 95.7 FL (ref 79–97)
MONOCYTES # BLD AUTO: 1.17 10*3/MM3 (ref 0.1–0.9)
MONOCYTES NFR BLD AUTO: 7.5 % (ref 5–12)
NEUTROPHILS NFR BLD AUTO: 13.09 10*3/MM3 (ref 1.7–7)
NEUTROPHILS NFR BLD AUTO: 83.7 % (ref 42.7–76)
NITRITE UR QL STRIP: NEGATIVE
NRBC BLD AUTO-RTO: 0 /100 WBC (ref 0–0.2)
NT-PROBNP SERPL-MCNC: 2887 PG/ML (ref 0–1800)
PH UR STRIP.AUTO: 6.5 [PH] (ref 5–8)
PLATELET # BLD AUTO: 187 10*3/MM3 (ref 140–450)
PMV BLD AUTO: 10.9 FL (ref 6–12)
POTASSIUM SERPL-SCNC: 4.2 MMOL/L (ref 3.5–5.2)
PROT SERPL-MCNC: 6.7 G/DL (ref 6–8.5)
PROT UR QL STRIP: NEGATIVE
RBC # BLD AUTO: 4.63 10*6/MM3 (ref 4.14–5.8)
RBC # UR STRIP: ABNORMAL /HPF
REF LAB TEST METHOD: ABNORMAL
RHINOVIRUS RNA SPEC NAA+PROBE: NOT DETECTED
RSV RNA NPH QL NAA+NON-PROBE: NOT DETECTED
SARS-COV-2 RNA NPH QL NAA+NON-PROBE: NOT DETECTED
SODIUM SERPL-SCNC: 135 MMOL/L (ref 136–145)
SP GR UR STRIP: 1.02 (ref 1–1.03)
SQUAMOUS #/AREA URNS HPF: ABNORMAL /HPF
TROPONIN T SERPL HS-MCNC: 32 NG/L
TROPONIN T SERPL HS-MCNC: 33 NG/L
UROBILINOGEN UR QL STRIP: ABNORMAL
WBC # UR STRIP: ABNORMAL /HPF
WBC NRBC COR # BLD: 15.63 10*3/MM3 (ref 3.4–10.8)
WHOLE BLOOD HOLD COAG: NORMAL
WHOLE BLOOD HOLD SPECIMEN: NORMAL

## 2023-03-23 PROCEDURE — 85025 COMPLETE CBC W/AUTO DIFF WBC: CPT | Performed by: EMERGENCY MEDICINE

## 2023-03-23 PROCEDURE — 25010000002 AZITHROMYCIN PER 500 MG: Performed by: PHYSICIAN ASSISTANT

## 2023-03-23 PROCEDURE — 36415 COLL VENOUS BLD VENIPUNCTURE: CPT

## 2023-03-23 PROCEDURE — 71250 CT THORAX DX C-: CPT

## 2023-03-23 PROCEDURE — 25010000002 CEFTRIAXONE SODIUM-DEXTROSE 1-3.74 GM-%(50ML) RECONSTITUTED SOLUTION: Performed by: PHYSICIAN ASSISTANT

## 2023-03-23 PROCEDURE — 0202U NFCT DS 22 TRGT SARS-COV-2: CPT | Performed by: PHYSICIAN ASSISTANT

## 2023-03-23 PROCEDURE — 70486 CT MAXILLOFACIAL W/O DYE: CPT

## 2023-03-23 PROCEDURE — 87040 BLOOD CULTURE FOR BACTERIA: CPT | Performed by: PHYSICIAN ASSISTANT

## 2023-03-23 PROCEDURE — 83735 ASSAY OF MAGNESIUM: CPT | Performed by: EMERGENCY MEDICINE

## 2023-03-23 PROCEDURE — 80053 COMPREHEN METABOLIC PANEL: CPT | Performed by: EMERGENCY MEDICINE

## 2023-03-23 PROCEDURE — 99285 EMERGENCY DEPT VISIT HI MDM: CPT

## 2023-03-23 PROCEDURE — 84484 ASSAY OF TROPONIN QUANT: CPT | Performed by: PHYSICIAN ASSISTANT

## 2023-03-23 PROCEDURE — 74176 CT ABD & PELVIS W/O CONTRAST: CPT

## 2023-03-23 PROCEDURE — 96361 HYDRATE IV INFUSION ADD-ON: CPT

## 2023-03-23 PROCEDURE — 70450 CT HEAD/BRAIN W/O DYE: CPT

## 2023-03-23 PROCEDURE — 84484 ASSAY OF TROPONIN QUANT: CPT | Performed by: EMERGENCY MEDICINE

## 2023-03-23 PROCEDURE — 83880 ASSAY OF NATRIURETIC PEPTIDE: CPT | Performed by: PHYSICIAN ASSISTANT

## 2023-03-23 PROCEDURE — 81001 URINALYSIS AUTO W/SCOPE: CPT | Performed by: EMERGENCY MEDICINE

## 2023-03-23 PROCEDURE — 93005 ELECTROCARDIOGRAM TRACING: CPT | Performed by: EMERGENCY MEDICINE

## 2023-03-23 PROCEDURE — 96365 THER/PROPH/DIAG IV INF INIT: CPT

## 2023-03-23 PROCEDURE — P9612 CATHETERIZE FOR URINE SPEC: HCPCS

## 2023-03-23 PROCEDURE — 71045 X-RAY EXAM CHEST 1 VIEW: CPT

## 2023-03-23 RX ORDER — SODIUM CHLORIDE 0.9 % (FLUSH) 0.9 %
10 SYRINGE (ML) INJECTION AS NEEDED
Status: DISCONTINUED | OUTPATIENT
Start: 2023-03-23 | End: 2023-03-24 | Stop reason: HOSPADM

## 2023-03-23 RX ORDER — CEFTRIAXONE 1 G/50ML
1 INJECTION, SOLUTION INTRAVENOUS ONCE
Status: COMPLETED | OUTPATIENT
Start: 2023-03-23 | End: 2023-03-23

## 2023-03-23 RX ADMIN — CEFTRIAXONE 1 G: 1 INJECTION, SOLUTION INTRAVENOUS at 22:58

## 2023-03-23 RX ADMIN — SODIUM CHLORIDE 500 MG: 900 INJECTION, SOLUTION INTRAVENOUS at 23:29

## 2023-03-23 RX ADMIN — SODIUM CHLORIDE 500 ML: 9 INJECTION, SOLUTION INTRAVENOUS at 20:48
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
## 2023-03-24 VITALS
WEIGHT: 147.05 LBS | TEMPERATURE: 97.4 F | RESPIRATION RATE: 18 BRPM | HEART RATE: 76 BPM | DIASTOLIC BLOOD PRESSURE: 59 MMHG | OXYGEN SATURATION: 94 % | BODY MASS INDEX: 20.59 KG/M2 | HEIGHT: 71 IN | SYSTOLIC BLOOD PRESSURE: 94 MMHG

## 2023-03-24 PROBLEM — J18.9 CAP (COMMUNITY ACQUIRED PNEUMONIA): Status: ACTIVE | Noted: 2023-03-24

## 2023-03-24 PROBLEM — R41.0 DELIRIUM, ACUTE: Status: ACTIVE | Noted: 2023-03-24

## 2023-03-24 PROBLEM — F05 DELIRIUM SUPERIMPOSED ON DEMENTIA: Status: ACTIVE | Noted: 2023-03-24

## 2023-03-24 PROBLEM — R29.6 FALLS FREQUENTLY: Status: ACTIVE | Noted: 2023-03-24

## 2023-03-24 LAB
ALBUMIN SERPL-MCNC: 3.4 G/DL (ref 3.5–5.2)
ALBUMIN/GLOB SERPL: 1.3 G/DL
ALP SERPL-CCNC: 53 U/L (ref 39–117)
ALT SERPL W P-5'-P-CCNC: 14 U/L (ref 1–41)
ANION GAP SERPL CALCULATED.3IONS-SCNC: 12.7 MMOL/L (ref 5–15)
AST SERPL-CCNC: 15 U/L (ref 1–40)
BILIRUB SERPL-MCNC: 0.8 MG/DL (ref 0–1.2)
BUN SERPL-MCNC: 39 MG/DL (ref 8–23)
BUN/CREAT SERPL: 32.2 (ref 7–25)
CALCIUM SPEC-SCNC: 9.2 MG/DL (ref 8.6–10.5)
CHLORIDE SERPL-SCNC: 102 MMOL/L (ref 98–107)
CO2 SERPL-SCNC: 21.3 MMOL/L (ref 22–29)
CREAT SERPL-MCNC: 1.21 MG/DL (ref 0.76–1.27)
DEPRECATED RDW RBC AUTO: 48.4 FL (ref 37–54)
EGFRCR SERPLBLD CKD-EPI 2021: 57.6 ML/MIN/1.73
ERYTHROCYTE [DISTWIDTH] IN BLOOD BY AUTOMATED COUNT: 13.4 % (ref 12.3–15.4)
GLOBULIN UR ELPH-MCNC: 2.6 GM/DL
GLUCOSE SERPL-MCNC: 108 MG/DL (ref 65–99)
HCT VFR BLD AUTO: 41.9 % (ref 37.5–51)
HGB BLD-MCNC: 14.2 G/DL (ref 13–17.7)
MCH RBC QN AUTO: 32.9 PG (ref 26.6–33)
MCHC RBC AUTO-ENTMCNC: 33.9 G/DL (ref 31.5–35.7)
MCV RBC AUTO: 97 FL (ref 79–97)
PLATELET # BLD AUTO: 177 10*3/MM3 (ref 140–450)
PMV BLD AUTO: 9.5 FL (ref 6–12)
POTASSIUM SERPL-SCNC: 4 MMOL/L (ref 3.5–5.2)
PROT SERPL-MCNC: 6 G/DL (ref 6–8.5)
RBC # BLD AUTO: 4.32 10*6/MM3 (ref 4.14–5.8)
SODIUM SERPL-SCNC: 136 MMOL/L (ref 136–145)
WBC NRBC COR # BLD: 13.24 10*3/MM3 (ref 3.4–10.8)

## 2023-03-24 PROCEDURE — G0378 HOSPITAL OBSERVATION PER HR: HCPCS

## 2023-03-24 PROCEDURE — 63710000001: Performed by: INTERNAL MEDICINE

## 2023-03-24 PROCEDURE — 63710000001 LISINOPRIL 20 MG TABLET: Performed by: INTERNAL MEDICINE

## 2023-03-24 PROCEDURE — 63710000001 DORZOLAMIDE 2 % SOLUTION 10 ML BOTTLE: Performed by: INTERNAL MEDICINE

## 2023-03-24 PROCEDURE — 63710000001 BRIMONIDINE 0.2 % SOLUTION 5 ML BOTTLE: Performed by: INTERNAL MEDICINE

## 2023-03-24 PROCEDURE — 96375 TX/PRO/DX INJ NEW DRUG ADDON: CPT

## 2023-03-24 PROCEDURE — 97162 PT EVAL MOD COMPLEX 30 MIN: CPT

## 2023-03-24 PROCEDURE — 25010000002 FUROSEMIDE PER 20 MG: Performed by: INTERNAL MEDICINE

## 2023-03-24 PROCEDURE — 97166 OT EVAL MOD COMPLEX 45 MIN: CPT

## 2023-03-24 PROCEDURE — 63710000001 MEMANTINE 5 MG TABLET: Performed by: INTERNAL MEDICINE

## 2023-03-24 PROCEDURE — A9270 NON-COVERED ITEM OR SERVICE: HCPCS | Performed by: INTERNAL MEDICINE

## 2023-03-24 PROCEDURE — 63710000001 CHLORTHALIDONE 25 MG TABLET: Performed by: INTERNAL MEDICINE

## 2023-03-24 PROCEDURE — 25010000002 ENOXAPARIN PER 10 MG: Performed by: INTERNAL MEDICINE

## 2023-03-24 PROCEDURE — 99238 HOSP IP/OBS DSCHRG MGMT 30/<: CPT | Performed by: NURSE PRACTITIONER

## 2023-03-24 PROCEDURE — 99223 1ST HOSP IP/OBS HIGH 75: CPT | Performed by: INTERNAL MEDICINE

## 2023-03-24 PROCEDURE — 80053 COMPREHEN METABOLIC PANEL: CPT | Performed by: INTERNAL MEDICINE

## 2023-03-24 PROCEDURE — 96372 THER/PROPH/DIAG INJ SC/IM: CPT

## 2023-03-24 PROCEDURE — 85027 COMPLETE CBC AUTOMATED: CPT | Performed by: INTERNAL MEDICINE

## 2023-03-24 PROCEDURE — 63710000001 SENNOSIDES-DOCUSATE 8.6-50 MG TABLET: Performed by: INTERNAL MEDICINE

## 2023-03-24 RX ORDER — SODIUM CHLORIDE 0.9 % (FLUSH) 0.9 %
10 SYRINGE (ML) INJECTION AS NEEDED
Status: DISCONTINUED | OUTPATIENT
Start: 2023-03-24 | End: 2023-03-24 | Stop reason: HOSPADM

## 2023-03-24 RX ORDER — CEFTRIAXONE 1 G/50ML
1 INJECTION, SOLUTION INTRAVENOUS EVERY 24 HOURS
Status: DISCONTINUED | OUTPATIENT
Start: 2023-03-24 | End: 2023-03-24 | Stop reason: HOSPADM

## 2023-03-24 RX ORDER — VITAMIN B COMPLEX
2500 TABLET ORAL DAILY
Status: DISCONTINUED | OUTPATIENT
Start: 2023-03-24 | End: 2023-03-24 | Stop reason: HOSPADM

## 2023-03-24 RX ORDER — AMOXICILLIN 250 MG
2 CAPSULE ORAL 2 TIMES DAILY
Status: DISCONTINUED | OUTPATIENT
Start: 2023-03-24 | End: 2023-03-24 | Stop reason: HOSPADM

## 2023-03-24 RX ORDER — AZITHROMYCIN 250 MG/1
TABLET, FILM COATED ORAL
Qty: 4 TABLET | Refills: 0 | Status: SHIPPED | OUTPATIENT
Start: 2023-03-24 | End: 2023-04-05

## 2023-03-24 RX ORDER — TERAZOSIN 1 MG/1
1 CAPSULE ORAL NIGHTLY
Status: DISCONTINUED | OUTPATIENT
Start: 2023-03-24 | End: 2023-03-24 | Stop reason: HOSPADM

## 2023-03-24 RX ORDER — ASPIRIN 81 MG/1
81 TABLET ORAL NIGHTLY
Status: DISCONTINUED | OUTPATIENT
Start: 2023-03-24 | End: 2023-03-24 | Stop reason: HOSPADM

## 2023-03-24 RX ORDER — POLYETHYLENE GLYCOL 3350 17 G/17G
17 POWDER, FOR SOLUTION ORAL DAILY PRN
Status: DISCONTINUED | OUTPATIENT
Start: 2023-03-24 | End: 2023-03-24 | Stop reason: HOSPADM

## 2023-03-24 RX ORDER — BISACODYL 5 MG/1
5 TABLET, DELAYED RELEASE ORAL DAILY PRN
Status: DISCONTINUED | OUTPATIENT
Start: 2023-03-24 | End: 2023-03-24 | Stop reason: HOSPADM

## 2023-03-24 RX ORDER — MEMANTINE HYDROCHLORIDE 5 MG/1
10 TABLET ORAL DAILY
Status: DISCONTINUED | OUTPATIENT
Start: 2023-03-24 | End: 2023-03-24 | Stop reason: HOSPADM

## 2023-03-24 RX ORDER — CHOLECALCIFEROL (VITAMIN D3) 125 MCG
5 CAPSULE ORAL NIGHTLY
Status: DISCONTINUED | OUTPATIENT
Start: 2023-03-24 | End: 2023-03-24 | Stop reason: HOSPADM

## 2023-03-24 RX ORDER — CHLORTHALIDONE 25 MG/1
25 TABLET ORAL DAILY
Status: DISCONTINUED | OUTPATIENT
Start: 2023-03-24 | End: 2023-03-24 | Stop reason: HOSPADM

## 2023-03-24 RX ORDER — SODIUM CHLORIDE 9 MG/ML
40 INJECTION, SOLUTION INTRAVENOUS AS NEEDED
Status: DISCONTINUED | OUTPATIENT
Start: 2023-03-24 | End: 2023-03-24 | Stop reason: HOSPADM

## 2023-03-24 RX ORDER — DORZOLAMIDE HCL 20 MG/ML
1 SOLUTION/ DROPS OPHTHALMIC 2 TIMES DAILY
Status: DISCONTINUED | OUTPATIENT
Start: 2023-03-24 | End: 2023-03-24 | Stop reason: HOSPADM

## 2023-03-24 RX ORDER — BRIMONIDINE TARTRATE 2 MG/ML
1 SOLUTION/ DROPS OPHTHALMIC 2 TIMES DAILY
Status: DISCONTINUED | OUTPATIENT
Start: 2023-03-24 | End: 2023-03-24 | Stop reason: HOSPADM

## 2023-03-24 RX ORDER — CALCIUM CARBONATE 200(500)MG
2 TABLET,CHEWABLE ORAL 2 TIMES DAILY PRN
Status: DISCONTINUED | OUTPATIENT
Start: 2023-03-24 | End: 2023-03-24 | Stop reason: HOSPADM

## 2023-03-24 RX ORDER — CEFDINIR 300 MG/1
300 CAPSULE ORAL 2 TIMES DAILY
Qty: 8 CAPSULE | Refills: 0 | Status: SHIPPED | OUTPATIENT
Start: 2023-03-24 | End: 2023-03-28

## 2023-03-24 RX ORDER — PANTOPRAZOLE SODIUM 40 MG/1
40 TABLET, DELAYED RELEASE ORAL
Status: DISCONTINUED | OUTPATIENT
Start: 2023-03-24 | End: 2023-03-24 | Stop reason: HOSPADM

## 2023-03-24 RX ORDER — DONEPEZIL HYDROCHLORIDE 10 MG/1
10 TABLET, FILM COATED ORAL NIGHTLY
Qty: 14 TABLET | Refills: 0 | Status: SHIPPED | OUTPATIENT
Start: 2023-03-24 | End: 2023-04-07

## 2023-03-24 RX ORDER — FUROSEMIDE 10 MG/ML
20 INJECTION INTRAMUSCULAR; INTRAVENOUS ONCE
Status: COMPLETED | OUTPATIENT
Start: 2023-03-24 | End: 2023-03-24

## 2023-03-24 RX ORDER — DONEPEZIL HYDROCHLORIDE 5 MG/1
10 TABLET, FILM COATED ORAL NIGHTLY
Status: DISCONTINUED | OUTPATIENT
Start: 2023-03-24 | End: 2023-03-24 | Stop reason: HOSPADM

## 2023-03-24 RX ORDER — ONDANSETRON 2 MG/ML
4 INJECTION INTRAMUSCULAR; INTRAVENOUS EVERY 6 HOURS PRN
Status: DISCONTINUED | OUTPATIENT
Start: 2023-03-24 | End: 2023-03-24 | Stop reason: HOSPADM

## 2023-03-24 RX ORDER — BISACODYL 10 MG
10 SUPPOSITORY, RECTAL RECTAL DAILY PRN
Status: DISCONTINUED | OUTPATIENT
Start: 2023-03-24 | End: 2023-03-24 | Stop reason: HOSPADM

## 2023-03-24 RX ORDER — ATORVASTATIN CALCIUM 40 MG/1
40 TABLET, FILM COATED ORAL DAILY
Status: DISCONTINUED | OUTPATIENT
Start: 2023-03-24 | End: 2023-03-24 | Stop reason: HOSPADM

## 2023-03-24 RX ORDER — ONDANSETRON 4 MG/1
4 TABLET, FILM COATED ORAL EVERY 6 HOURS PRN
Status: DISCONTINUED | OUTPATIENT
Start: 2023-03-24 | End: 2023-03-24 | Stop reason: HOSPADM

## 2023-03-24 RX ORDER — ACETAMINOPHEN 325 MG/1
650 TABLET ORAL EVERY 4 HOURS PRN
Status: DISCONTINUED | OUTPATIENT
Start: 2023-03-24 | End: 2023-03-24 | Stop reason: HOSPADM

## 2023-03-24 RX ORDER — SODIUM CHLORIDE 0.9 % (FLUSH) 0.9 %
10 SYRINGE (ML) INJECTION EVERY 12 HOURS SCHEDULED
Status: DISCONTINUED | OUTPATIENT
Start: 2023-03-24 | End: 2023-03-24 | Stop reason: HOSPADM

## 2023-03-24 RX ORDER — ENOXAPARIN SODIUM 100 MG/ML
40 INJECTION SUBCUTANEOUS DAILY
Status: DISCONTINUED | OUTPATIENT
Start: 2023-03-24 | End: 2023-03-24 | Stop reason: HOSPADM

## 2023-03-24 RX ORDER — LISINOPRIL 20 MG/1
20 TABLET ORAL DAILY
Status: DISCONTINUED | OUTPATIENT
Start: 2023-03-24 | End: 2023-03-24 | Stop reason: HOSPADM

## 2023-03-24 RX ORDER — MEMANTINE HYDROCHLORIDE 10 MG/1
10 TABLET ORAL DAILY
Qty: 14 TABLET | Refills: 0 | Status: SHIPPED | OUTPATIENT
Start: 2023-03-25 | End: 2023-04-08

## 2023-03-24 RX ADMIN — ASPIRIN 81 MG: 81 TABLET, COATED ORAL at 01:45

## 2023-03-24 RX ADMIN — PANTOPRAZOLE SODIUM 40 MG: 40 TABLET, DELAYED RELEASE ORAL at 06:00

## 2023-03-24 RX ADMIN — Medication 5 MG: at 01:45

## 2023-03-24 RX ADMIN — Medication 10 ML: at 10:24

## 2023-03-24 RX ADMIN — LISINOPRIL 20 MG: 20 TABLET ORAL at 09:19

## 2023-03-24 RX ADMIN — DORZOLAMIDE HYDROCHLORIDE 1 DROP: 20 SOLUTION/ DROPS OPHTHALMIC at 09:22

## 2023-03-24 RX ADMIN — Medication 2500 MCG: at 09:19

## 2023-03-24 RX ADMIN — CHLORTHALIDONE 25 MG: 25 TABLET ORAL at 09:19

## 2023-03-24 RX ADMIN — FUROSEMIDE 20 MG: 10 INJECTION, SOLUTION INTRAMUSCULAR; INTRAVENOUS at 02:15

## 2023-03-24 RX ADMIN — DONEPEZIL HYDROCHLORIDE 10 MG: 5 TABLET ORAL at 01:45

## 2023-03-24 RX ADMIN — ENOXAPARIN SODIUM 40 MG: 100 INJECTION SUBCUTANEOUS at 01:45

## 2023-03-24 RX ADMIN — Medication 10 ML: at 01:45

## 2023-03-24 RX ADMIN — TERAZOSIN HYDROCHLORIDE 1 MG: 1 CAPSULE ORAL at 02:15

## 2023-03-24 RX ADMIN — MEMANTINE 10 MG: 5 TABLET ORAL at 09:22

## 2023-03-24 RX ADMIN — SENNOSIDES AND DOCUSATE SODIUM 2 TABLET: 50; 8.6 TABLET ORAL at 09:19

## 2023-03-24 RX ADMIN — BRIMONIDINE TARTRATE 1 DROP: 2 SOLUTION OPHTHALMIC at 09:22

## 2023-03-24 NOTE — CONSULTS
This therapist received call from [facility] staff EAGLE Sweeney for a behavioral health consult. Met with patient at bedside. Patient's wife was also in room during conversation. Patient presents to Hospital following an episode of confusion at home. Patient has been admitted to [facility] since 3/23/2023, expecting to discharge home today. Patient does serve as his own guardian. Patient is alert and oriented to place, person, and situation. Patient mood is normal/conversational. There are no signs of hallucinations or delusions. At this time, the patient is not agreeable to full behavioral health assessment or consultation. The patient does not present with criteria to warrant an involuntary petition or psychiatric hold at this time as she is not actively suicidal, homicidal, or displaying symptoms of an acute psychotic episode.   Therapist offered resources for outpatient mental health providers and supprot in the area. Therapist also discussed the availability of emergency behavioral health services 24/7 at through the [facility] and [facility]. Therapist assisted the patient in identifying risk factors that would indicate the need for higher level of care, such as acute withdrawal symptoms, thoughts to harm self or others and/or self-harming behavior(s). Encouraged patient to call 911, crisis hotlines, or present to the nearest emergency department should symptoms worsen, or in any crisis/emergency. The patient is agreeable and voiced understanding. Patient and wife reported they were not interested in behavioral health services at this time. They would like to follow-up with patients PCP and have home health arranged. Therapist did provide wife with behavioral health clinic information should they want to arrange an appointment at a later date. SAIDA GUILLERMO and US updated. No further needs.       Ling Ruiz, MSW, CSW
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
## 2023-03-24 NOTE — PROGRESS NOTES
[facility]    Name:  Darren Mccracken   Age:  88 y.o.  Sex:  male  :  1934  MRN:  [id number]   Visit Number:  [id number]  Admission Date:  3/23/2023  Date Of Service:  23  Primary Care Physician:  Angeles Huynh DO    Patient was seen and examined. Pertinent laboratory and radiology data were reviewed.    Vital signs:    Vital Signs (last 24 hours)        0700   0659  0700   1141   Most Recent      Temp (°F) 97.7 -  98    97.2 -  97.6     97.2 (36.2)  1100    Heart Rate 67 -  111    76 -  86     86  1100    Resp 18 -  20      18     18  1100    /76 -  164/100    111/72 -  122/68     111/72  1100    SpO2 (%) 92 -  97      94     94  1100        Physical Exam:  Constitutional: Awake and cooperative, elderly male, no acute distress on exam  Eyes: PERRLA  Respiratory: Clear, unlabored respirations   Cardiovascular: Mostly regular with occasional extra beats, no murmurs, rubs, or gallops, palpable pedal pulses bilaterally  Gastrointestinal: Positive bowel sounds, soft, nontender, nondistended  Musculoskeletal: No bilateral ankle edema  Neurologic: Alert to self and place able to tell me year, however states he has been here for 3 days admitted overnight, speech clear  Skin: Extensive bruising to face and chest from recent falls      Delirium with possible underlying dementia  --Wife reports he has had confusion since December.  Suspect dementia.  Had worsened yesterday evening suspect delirium.    --Started Aricept and Namenda   --Consult to behavioral health        CAP (community acquired pneumonia)  --Has a left-sided chest infiltrate on CT chest.  Does have a productive cough.    --One-time dose of Lasix.  Started on Rocephin and Azithromycin with the anticipation that these can be changed to oral once the patient is otherwise medically ready for discharge.       Falls frequently  --PT and OT  consulted     Disposition: Did have some trouble with therapy related to vision issues.  Would benefit from STR with extensive bruising noted from recent falls.  Case management consulted for safe discharge plan.  Further course as clinically indicated.    Zahra Berumen, APRN  03/24/23  11:41 EDT    Dictated utilizing Dragon dictation.

## 2023-03-24 NOTE — THERAPY EVALUATION
Patient Name: Darren Mccracken  : 1934    MRN: [id number]                              Today's Date: 3/24/2023       Admit Date: 3/23/2023    Visit Dx:     ICD-10-CM ICD-9-CM   1. Altered mental status, unspecified altered mental status type  R41.82 780.97   2. Pneumonia of left lung due to infectious organism, unspecified part of lung  J18.9 486   3. Physical debility  R53.81 799.3     Patient Active Problem List   Diagnosis   • HTN (hypertension)   • HLD (hyperlipidemia)   • CAD (coronary artery disease)   • GERD (gastroesophageal reflux disease)   • Renal insufficiency   • Glaucoma   • Erectile dysfunction   • Vitamin D deficiency   • Shortness of breath   • Right foot ulcer, with unspecified severity (HCC)   • Altered mental status, unspecified altered mental status type   • Delirium, acute   • CAP (community acquired pneumonia)   • Falls frequently     Past Medical History:   Diagnosis Date   • Arthritis     knees   • Cancer (HCC)    • Coronary artery disease    • Coronary artery disease involving native coronary artery of native heart without angina pectoris 2018    Added automatically from request for surgery [id number]   • Dementia (HCC)    • GERD (gastroesophageal reflux disease)    • Glaucoma    • History of transfusion    • Hyperlipidemia    • Hypertension    • Kidney stone    • Pneumonia     right upper lobe    • Pulmonary embolism (HCC)    • Skin cancer    • Wears glasses      Past Surgical History:   Procedure Laterality Date   • CARDIAC CATHETERIZATION N/A 2017    Procedure: Left Heart Cath;  Surgeon: Toy Landers MD;  Location:  [facility];  Service:    • CARDIAC CATHETERIZATION N/A 2018    Procedure: Left Heart Cath;  Surgeon: Toy Landers MD;  Location:  [facility];  Service: Cardiology   • CATARACT EXTRACTION W/ INTRAOCULAR LENS  IMPLANT, BILATERAL     • CHOLECYSTECTOMY     • COLONOSCOPY     • CORONARY ANGIOPLASTY WITH STENT  PLACEMENT     • CORONARY STENT PLACEMENT      x3   • EYE SURGERY Right    • SKIN CANCER EXCISION     • WRIST SURGERY        General Information     Row Name 03/24/23 1136          Physical Therapy Time and Intention    Document Type evaluation  -MS     Mode of Treatment physical therapy  -MS     Row Name 03/24/23 1136          General Information    Patient Profile Reviewed yes  -MS     Prior Level of Function independent:;all household mobility;gait  -MS     Existing Precautions/Restrictions fall;other (see comments)  visual deficits  -MS     Barriers to Rehab medically complex;previous functional deficit;cognitive status;visual deficit  -MS     Row Name 03/24/23 1136          Living Environment    People in Home spouse  possibly with daughter and son in law  -MS     Row Name 03/24/23 1136          Home Main Entrance    Number of Stairs, Main Entrance none  -MS     Row Name 03/24/23 1136          Stairs Within Home, Primary    Number of Stairs, Within Home, Primary twelve  basement  -MS     Stair Railings, Within Home, Primary railing on right side (ascending)  -MS     Row Name 03/24/23 1136          Cognition    Orientation Status (Cognition) oriented x 4  -MS     Row Name 03/24/23 1136          Safety Issues, Functional Mobility    Safety Issues Affecting Function (Mobility) insight into deficits/self-awareness;awareness of need for assistance;safety precaution awareness;safety precautions follow-through/compliance;impulsivity  -MS     Impairments Affecting Function (Mobility) balance;endurance/activity tolerance;sensation/sensory awareness  -MS           User Key  (r) = Recorded By, (t) = Taken By, (c) = Cosigned By    Initials Name Provider Type    MS Devin Rodriguez PT Physical Therapist               Mobility     Row Name 03/24/23 1139          Bed Mobility    Bed Mobility bed mobility (all) activities;supine-sit  -MS     All Activities, [place] (Bed Mobility) standby assist  -MS     Supine-Sit  [place] (Bed Mobility) standby assist  -MS     Assistive Device (Bed Mobility) bed rails;draw sheet;head of bed elevated  -MS     Row Name 03/24/23 1139          Sit-Stand Transfer    Sit-Stand [place] (Transfers) minimum assist (75% patient effort)  -MS     Assistive Device (Sit-Stand Transfers) walker, front-wheeled  -MS     Row Name 03/24/23 1139          Gait/Stairs (Locomotion)    [place] Level (Gait) minimum assist (75% patient effort)  -MS     Assistive Device (Gait) walker, front-wheeled  -MS     Distance in Feet (Gait) 120  -MS     Deviations/Abnormal Patterns (Gait) iram decreased;festinating/shuffling  -MS     Bilateral Gait Deviations forward flexed posture  -MS           User Key  (r) = Recorded By, (t) = Taken By, (c) = Cosigned By    Initials Name Provider Type    Devin Constantino, PT Physical Therapist               Obj/Interventions     Row Name 03/24/23 1140          Range of Motion Comprehensive    General Range of Motion bilateral lower extremity ROM WFL  -MS     Row Name 03/24/23 1140          Strength Comprehensive (MMT)    General Manual Muscle Testing (MMT) Assessment lower extremity strength deficits identified  -MS     Comment, General Manual Muscle Testing (MMT) Assessment B LE 4-/5  -MS     Row Name 03/24/23 1140          Balance    Balance Assessment standing static balance;standing dynamic balance  -MS     Static Standing Balance minimal assist  -MS     Dynamic Standing Balance minimal assist  -MS     Position/Device Used, Standing Balance walker, front-wheeled  -MS     Row Name 03/24/23 1140          Sensory Assessment (Somatosensory)    Sensory Assessment (Somatosensory) sensation intact  -MS           User Key  (r) = Recorded By, (t) = Taken By, (c) = Cosigned By    Initials Name Provider Type    Devin Constantino PT Physical Therapist               Goals/Plan     Row Name 03/24/23 1149          Bed Mobility Goal 1 (PT)    Activity/Assistive Device (Bed  Mobility Goal 1, PT) bed mobility activities, all  -MS     [place] Level/Cues Needed (Bed Mobility Goal 1, PT) modified independence  -MS     Time Frame (Bed Mobility Goal 1, PT) long term goal (LTG);2 weeks  -MS     Row Name 03/24/23 1149          Gait Training Goal 1 (PT)    Activity/Assistive Device (Gait Training Goal 1, PT) gait (walking locomotion);assistive device use  -MS     [place] Level (Gait Training Goal 1, PT) standby assist  -MS     Time Frame (Gait Training Goal 1, PT) long term goal (LTG);2 weeks  -MS     Row Name 03/24/23 1149          Patient Education Goal (PT)    Activity (Patient Education Goal, PT) ther exer x15 reps ea  -MS     [place]/Cues/Accuracy (Memory Goal 2, PT) demonstrates adequately  -MS     Time Frame (Patient Education Goal, PT) long term goal (LTG);10 days  -MS     Row Name 03/24/23 1149          Therapy Assessment/Plan (PT)    Planned Therapy Interventions (PT) balance training;bed mobility training;home exercise program;gait training;transfer training;ROM (range of motion);stair training;strengthening;patient/family education  -MS           User Key  (r) = Recorded By, (t) = Taken By, (c) = Cosigned By    Initials Name Provider Type    MS Devin Rodriguez, PT Physical Therapist               Clinical Impression     Row Name 03/24/23 1141          Pain    Pretreatment Pain Rating 0/10 - no pain  -MS     Posttreatment Pain Rating 0/10 - no pain  -MS     Row Name 03/24/23 1141          Plan of Care Review    Plan of Care Reviewed With patient  -MS     Progress no change  -MS     Outcome Evaluation Pt participated in PT eval this date. Pt agreeable to mobility. pt transferred to EOB with SBA. At EOB, Pt was asked to place his sock on. pt was unable to visually locate the yellow socks on the bed and was reaching around them and  past them. Handed Pt the socks. Pt was able to garcia them but with difficulty. when asked about his visual deficits, pt states he normally  wears glasses, but did not have glasses with him in the room. Pt then stood with Janina and started walking towards the door, however Pt walked straight towards the wall with no intent to avoid without cues. Pt was able to ambualte 60 ft x2 with Rwx and Janina. Once back up in the chair, Pt was unable to see the large red button on the call bell without significant cues. Pt is expected to benefit from skilled PTx during [facility] inpatient stay. Pt is most limited d/t visual deficits, discussed findings with RN. Unsure if these deficits are due to lack corrective eyewear, however do have safety concerns for patient due to visual deficits.  -MS     Row Name 03/24/23 1141          Therapy Assessment/Plan (PT)    Rehab Potential (PT) good, to achieve stated therapy goals  -MS     Criteria for Skilled Interventions Met (PT) yes;meets criteria  -MS     Therapy Frequency (PT) 5 times/wk  -MS     Row Name 03/24/23 1141          Vital Signs    Intratreatment Heart Rate (beats/min) 160  -MS     Posttreatment Heart Rate (beats/min) 69  -MS     O2 Delivery Pre Treatment room air  -MS     O2 Delivery Intra Treatment room air  -MS     O2 Delivery Post Treatment room air  -MS     Pre Patient Position Supine  -MS     Intra Patient Position Standing  -MS     Post Patient Position Sitting  -MS     Row Name 03/24/23 1141          Positioning and Restraints    Pre-Treatment Position in bed  -MS     Post Treatment Position chair  -MS     In Chair call light within reach;encouraged to call for assist;reclined;exit alarm on  -MS           User Key  (r) = Recorded By, (t) = Taken By, (c) = Cosigned By    Initials Name Provider Type    Devin Constantino, PT Physical Therapist               Outcome Measures     Row Name 03/24/23 1151 03/24/23 0100       How much help from another person do you currently need...    Turning from your back to your side while in flat bed without using bedrails? 3  -MS 3  -RAFA    Moving from lying on back to sitting on  the side of a flat bed without bedrails? 3  -MS 3  -RAFA    Moving to and from a bed to a chair (including a wheelchair)? 3  -MS 3  -RAFA    Standing up from a chair using your arms (e.g., wheelchair, bedside chair)? 3  -MS 3  -RAFA    Climbing 3-5 steps with a railing? 2  -MS 2  -RAFA    To walk in hospital room? 3  -MS 2  -RAFA    AM-PAC 6 Clicks Score (PT) 17  -MS 16  -RAFA    Highest level of mobility 5 --> Static standing  -MS 5 --> Static standing  -RAFA          User Key  (r) = Recorded By, (t) = Taken By, (c) = Cosigned By    Initials Name Provider Type    Anne Alfaro RN Registered Nurse    Devin Constantino, PT Physical Therapist                             Physical Therapy Education     Title: PT OT SLP Therapies (In Progress)     Topic: Physical Therapy (In Progress)     Point: Mobility training (Done)     Learning Progress Summary           Patient Acceptance, E, VU by MS at 3/24/2023 1151    Comment: importace of mobility                   Point: Home exercise program (Not Started)     Learner Progress:  Not documented in this visit.          Point: Body mechanics (Not Started)     Learner Progress:  Not documented in this visit.          Point: Precautions (Not Started)     Learner Progress:  Not documented in this visit.                      User Key     Initials Effective Dates Name Provider Type Discipline    MS 07/01/22 -  Devin Rodriguez, PT Physical Therapist PT              PT Recommendation and Plan  Planned Therapy Interventions (PT): balance training, bed mobility training, home exercise program, gait training, transfer training, ROM (range of motion), stair training, strengthening, patient/family education  Plan of Care Reviewed With: patient  Progress: no change  Outcome Evaluation: Pt participated in PT eval this date. Pt agreeable to mobility. pt transferred to EOB with SBA. At EOB, Pt was asked to place his sock on. pt was unable to visually locate the yellow socks on the bed and was  reaching around them and  past them. Handed Pt the socks. Pt was able to garcia them but with difficulty. when asked about his visual deficits, pt states he normally wears glasses, but did not have glasses with him in the room. Pt then stood with Janina and started walking towards the door, however Pt walked straight towards the wall with no intent to avoid without cues. Pt was able to ambualte 60 ft x2 with Rwx and Janina. Once back up in the chair, Pt was unable to see the large red button on the call bell without significant cues. Pt is expected to benefit from skilled PTx during hospitals inpatient stay. Pt is most limited d/t visual deficits, discussed findings with RN. Unsure if these deficits are due to lack corrective eyewear, however do have safety concerns for patient due to visual deficits.     Time Calculation:    PT Charges     Row Name 03/24/23 1153             Time Calculation    Start Time 0959  -MS      PT Received On 03/24/23  -MS      PT Goal Re-Cert Due Date 04/03/23  -MS         Untimed Charges    PT Eval/Re-eval Minutes 38  -MS         Total Minutes    Untimed Charges Total Minutes 38  -MS       Total Minutes 38  -MS            User Key  (r) = Recorded By, (t) = Taken By, (c) = Cosigned By    Initials Name Provider Type    Devin Constantino PT Physical Therapist              Therapy Charges for Today     Code Description Service Date Service Provider Modifiers Qty    [id number] HC PT EVAL MOD COMPLEXITY 3 3/24/2023 Devin Rodriguez PT GP 1          PT G-Codes  AM-PAC 6 Clicks Score (PT): 17  PT Discharge Summary  Anticipated Discharge Disposition (PT): inpatient rehabilitation facility, skilled nursing facility    Devin Rodriguez PT  3/24/2023
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
## 2023-03-24 NOTE — PLAN OF CARE
Goal Outcome Evaluation:  Plan of Care Reviewed With: patient        Progress: no change  Outcome Evaluation: new admit from ED-Dr. Rios here to see patient-will continue to monitor

## 2023-03-24 NOTE — PLAN OF CARE
Goal Outcome Evaluation:  Plan of Care Reviewed With: patient        Progress: no change  Outcome Evaluation: Pt participated in PT eval this date. Pt agreeable to mobility. pt transferred to EOB with SBA. At EOB, Pt was asked to place his sock on. pt was unable to visually locate the yellow socks on the bed and was reaching around them and  past them. Handed Pt the socks. Pt was able to garcia them but with difficulty. when asked about his visual deficits, pt states he normally wears glasses, but did not have glasses with him in the room. Pt then stood with Janina and started walking towards the door, however Pt walked straight towards the wall with no intent to avoid without cues. Pt was able to ambualte 60 ft x2 with Rwx and Janina. Once back up in the chair, Pt was unable to see the large red button on the call bell without significant cues. Pt is expected to benefit from skilled PTx during [facility] inpatient stay. Pt is most limited d/t visual deficits, discussed findings with RN. Unsure if these deficits are due to lack corrective eyewear, however do have safety concerns for patient due to visual deficits.
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
## 2023-03-24 NOTE — THERAPY EVALUATION
Patient Name: Darren Mccracken  : 1934    MRN: [id number]                              Today's Date: 3/24/2023       Admit Date: 3/23/2023    Visit Dx:     ICD-10-CM ICD-9-CM   1. Altered mental status, unspecified altered mental status type  R41.82 780.97   2. Pneumonia of left lung due to infectious organism, unspecified part of lung  J18.9 486   3. Physical debility  R53.81 799.3     Patient Active Problem List   Diagnosis   • HTN (hypertension)   • HLD (hyperlipidemia)   • CAD (coronary artery disease)   • GERD (gastroesophageal reflux disease)   • Renal insufficiency   • Glaucoma   • Erectile dysfunction   • Vitamin D deficiency   • Shortness of breath   • Right foot ulcer, with unspecified severity (HCC)   • Altered mental status, unspecified altered mental status type   • Delirium, acute   • CAP (community acquired pneumonia)   • Falls frequently     Past Medical History:   Diagnosis Date   • Arthritis     knees   • Cancer (HCC)    • Coronary artery disease    • Coronary artery disease involving native coronary artery of native heart without angina pectoris 2018    Added automatically from request for surgery [id number]   • Dementia (HCC)    • GERD (gastroesophageal reflux disease)    • Glaucoma    • History of transfusion    • Hyperlipidemia    • Hypertension    • Kidney stone    • Pneumonia     right upper lobe    • Pulmonary embolism (HCC)    • Skin cancer    • Wears glasses      Past Surgical History:   Procedure Laterality Date   • CARDIAC CATHETERIZATION N/A 2017    Procedure: Left Heart Cath;  Surgeon: Toy Landers MD;  Location:  [facility];  Service:    • CARDIAC CATHETERIZATION N/A 2018    Procedure: Left Heart Cath;  Surgeon: Toy Landers MD;  Location:  [facility];  Service: Cardiology   • CATARACT EXTRACTION W/ INTRAOCULAR LENS  IMPLANT, BILATERAL     • CHOLECYSTECTOMY     • COLONOSCOPY     • CORONARY ANGIOPLASTY WITH STENT  PLACEMENT     • CORONARY STENT PLACEMENT      x3   • EYE SURGERY Right    • SKIN CANCER EXCISION     • WRIST SURGERY        General Information     Row Name 03/24/23 1136          Physical Therapy Time and Intention    Document Type evaluation  -MS     Mode of Treatment physical therapy  -MS     Row Name 03/24/23 1136          General Information    Patient Profile Reviewed yes  -MS     Prior Level of Function independent:;all household mobility;gait  -MS     Existing Precautions/Restrictions fall;other (see comments)  visual deficits  -MS     Barriers to Rehab medically complex;previous functional deficit;cognitive status;visual deficit  -MS     Row Name 03/24/23 1136          Living Environment    People in Home spouse  possibly with daughter and son in law  -MS     Row Name 03/24/23 1136          Home Main Entrance    Number of Stairs, Main Entrance none  -MS     Row Name 03/24/23 1136          Stairs Within Home, Primary    Number of Stairs, Within Home, Primary twelve  basement  -MS     Stair Railings, Within Home, Primary railing on right side (ascending)  -MS     Row Name 03/24/23 1136          Cognition    Orientation Status (Cognition) oriented x 4  -MS     Row Name 03/24/23 1136          Safety Issues, Functional Mobility    Safety Issues Affecting Function (Mobility) insight into deficits/self-awareness;awareness of need for assistance;safety precaution awareness;safety precautions follow-through/compliance;impulsivity  -MS     Impairments Affecting Function (Mobility) balance;endurance/activity tolerance;sensation/sensory awareness  -MS           User Key  (r) = Recorded By, (t) = Taken By, (c) = Cosigned By    Initials Name Provider Type    MS Devin Rodriguez PT Physical Therapist               Mobility     Row Name 03/24/23 1139          Bed Mobility    Bed Mobility bed mobility (all) activities;supine-sit  -MS     All Activities, [place] (Bed Mobility) standby assist  -MS     Supine-Sit  [place] (Bed Mobility) standby assist  -MS     Assistive Device (Bed Mobility) bed rails;draw sheet;head of bed elevated  -MS     Row Name 03/24/23 1139          Sit-Stand Transfer    Sit-Stand [place] (Transfers) minimum assist (75% patient effort)  -MS     Assistive Device (Sit-Stand Transfers) walker, front-wheeled  -MS     Row Name 03/24/23 1139          Gait/Stairs (Locomotion)    [place] Level (Gait) minimum assist (75% patient effort)  -MS     Assistive Device (Gait) walker, front-wheeled  -MS     Distance in Feet (Gait) 60 ft x2  -MS     Deviations/Abnormal Patterns (Gait) iram decreased;festinating/shuffling  -MS     Bilateral Gait Deviations forward flexed posture  -MS           User Key  (r) = Recorded By, (t) = Taken By, (c) = Cosigned By    Initials Name Provider Type    Devin Constantino, PT Physical Therapist               Obj/Interventions     Row Name 03/24/23 1140          Range of Motion Comprehensive    General Range of Motion bilateral lower extremity ROM WFL  -MS     Row Name 03/24/23 1140          Strength Comprehensive (MMT)    General Manual Muscle Testing (MMT) Assessment lower extremity strength deficits identified  -MS     Comment, General Manual Muscle Testing (MMT) Assessment B LE 4-/5  -MS     Row Name 03/24/23 1140          Balance    Balance Assessment standing static balance;standing dynamic balance  -MS     Static Standing Balance minimal assist  -MS     Dynamic Standing Balance minimal assist  -MS     Position/Device Used, Standing Balance walker, front-wheeled  -MS     Row Name 03/24/23 1140          Sensory Assessment (Somatosensory)    Sensory Assessment (Somatosensory) sensation intact  -MS           User Key  (r) = Recorded By, (t) = Taken By, (c) = Cosigned By    Initials Name Provider Type    Devin Constantino, PT Physical Therapist               Goals/Plan     Row Name 03/24/23 1149          Bed Mobility Goal 1 (PT)    Activity/Assistive Device (Bed  Mobility Goal 1, PT) bed mobility activities, all  -MS     [place] Level/Cues Needed (Bed Mobility Goal 1, PT) modified independence  -MS     Time Frame (Bed Mobility Goal 1, PT) long term goal (LTG);2 weeks  -MS     Row Name 03/24/23 1149          Gait Training Goal 1 (PT)    Activity/Assistive Device (Gait Training Goal 1, PT) gait (walking locomotion);assistive device use  -MS     [place] Level (Gait Training Goal 1, PT) standby assist  -MS     Time Frame (Gait Training Goal 1, PT) long term goal (LTG);2 weeks  -MS     Row Name 03/24/23 1149          Patient Education Goal (PT)    Activity (Patient Education Goal, PT) ther exer x15 reps ea  -MS     [place]/Cues/Accuracy (Memory Goal 2, PT) demonstrates adequately  -MS     Time Frame (Patient Education Goal, PT) long term goal (LTG);10 days  -MS     Row Name 03/24/23 1149          Therapy Assessment/Plan (PT)    Planned Therapy Interventions (PT) balance training;bed mobility training;home exercise program;gait training;transfer training;ROM (range of motion);stair training;strengthening;patient/family education  -MS           User Key  (r) = Recorded By, (t) = Taken By, (c) = Cosigned By    Initials Name Provider Type    MS Devin Rodriguez, PT Physical Therapist               Clinical Impression     Row Name 03/24/23 1141          Pain    Pretreatment Pain Rating 0/10 - no pain  -MS     Posttreatment Pain Rating 0/10 - no pain  -MS     Row Name 03/24/23 1141          Plan of Care Review    Plan of Care Reviewed With patient  -MS     Progress no change  -MS     Outcome Evaluation Pt participated in PT eval this date. Pt agreeable to mobility. pt transferred to EOB with SBA. At EOB, Pt was asked to place his sock on. pt was unable to visually locate the yellow socks on the bed and was reaching around them and  past them. Handed Pt the socks. Pt was able to garcia them but with difficulty. when asked about his visual deficits, pt states he normally  wears glasses, but did not have glasses with him in the room. Pt then stood with Janina and started walking towards the door, however Pt walked straight towards the wall with no intent to avoid without cues. Pt was able to ambualte 60 ft x2 with Rwx and Janina. Once back up in the chair, Pt was unable to see the large red button on the call bell without significant cues. Pt is expected to benefit from skilled PTx during \[facility]\"" inpatient stay. Pt is most limited d/t visual deficits, discussed findings with RN. Unsure if these deficits are due to lack corrective eyewear, however do have safety concerns for patient due to visual deficits.  -MS     Row Name 03/24/23 1141          Therapy Assessment/Plan (PT)    Rehab Potential (PT) good, to achieve stated therapy goals  -MS     Criteria for Skilled Interventions Met (PT) yes;meets criteria  -MS     Therapy Frequency (PT) 5 times/wk  -MS     Row Name 03/24/23 1141          Vital Signs    Intratreatment Heart Rate (beats/min) 160  -MS     Posttreatment Heart Rate (beats/min) 69  -MS     O2 Delivery Pre Treatment room air  -MS     O2 Delivery Intra Treatment room air  -MS     O2 Delivery Post Treatment room air  -MS     Pre Patient Position Supine  -MS     Intra Patient Position Standing  -MS     Post Patient Position Sitting  -MS     Row Name 03/24/23 1141          Positioning and Restraints    Pre-Treatment Position in bed  -MS     Post Treatment Position chair  -MS     In Chair call light within reach;encouraged to call for assist;reclined;exit alarm on  -MS           User Key  (r) = Recorded By, (t) = Taken By, (c) = Cosigned By    Initials Name Provider Type    Devin Constantino, PT Physical Therapist               Outcome Measures     Row Name 03/24/23 1151 03/24/23 0100       How much help from another person do you currently need...    Turning from your back to your side while in flat bed without using bedrails? 3  -MS 3  -RAFA    Moving from lying on back to sitting on  the side of a flat bed without bedrails? 3  -MS 3  -RAFA    Moving to and from a bed to a chair (including a wheelchair)? 3  -MS 3  -RAFA    Standing up from a chair using your arms (e.g., wheelchair, bedside chair)? 3  -MS 3  -RAFA    Climbing 3-5 steps with a railing? 2  -MS 2  -RAFA    To walk in hospital room? 3  -MS 2  -RAFA    AM-PAC 6 Clicks Score (PT) 17  -MS 16  -RAFA    Highest level of mobility 5 --> Static standing  -MS 5 --> Static standing  -RAFA          User Key  (r) = Recorded By, (t) = Taken By, (c) = Cosigned By    Initials Name Provider Type    Anne Alfaro RN Registered Nurse    Devin Constantino, PT Physical Therapist                             Physical Therapy Education     Title: PT OT SLP Therapies (In Progress)     Topic: Physical Therapy (In Progress)     Point: Mobility training (Done)     Learning Progress Summary           Patient Acceptance, E, VU by MS at 3/24/2023 1151    Comment: importace of mobility                   Point: Home exercise program (Not Started)     Learner Progress:  Not documented in this visit.          Point: Body mechanics (Not Started)     Learner Progress:  Not documented in this visit.          Point: Precautions (Not Started)     Learner Progress:  Not documented in this visit.                      User Key     Initials Effective Dates Name Provider Type Discipline    MS 07/01/22 -  Devin Rodriguez, PT Physical Therapist PT              PT Recommendation and Plan  Planned Therapy Interventions (PT): balance training, bed mobility training, home exercise program, gait training, transfer training, ROM (range of motion), stair training, strengthening, patient/family education  Plan of Care Reviewed With: patient  Progress: no change  Outcome Evaluation: Pt participated in PT eval this date. Pt agreeable to mobility. pt transferred to EOB with SBA. At EOB, Pt was asked to place his sock on. pt was unable to visually locate the yellow socks on the bed and was  reaching around them and  past them. Handed Pt the socks. Pt was able to garcai them but with difficulty. when asked about his visual deficits, pt states he normally wears glasses, but did not have glasses with him in the room. Pt then stood with Janina and started walking towards the door, however Pt walked straight towards the wall with no intent to avoid without cues. Pt was able to ambualte 60 ft x2 with Rwx and Janina. Once back up in the chair, Pt was unable to see the large red button on the call bell without significant cues. Pt is expected to benefit from skilled PTx during [facility] inpatient stay. Pt is most limited d/t visual deficits, discussed findings with RN. Unsure if these deficits are due to lack corrective eyewear, however do have safety concerns for patient due to visual deficits.     Time Calculation:    PT Charges     Row Name 03/24/23 1153             Time Calculation    Start Time 0959  -MS      PT Received On 03/24/23  -MS      PT Goal Re-Cert Due Date 04/03/23  -MS         Untimed Charges    PT Eval/Re-eval Minutes 38  -MS         Total Minutes    Untimed Charges Total Minutes 38  -MS       Total Minutes 38  -MS            User Key  (r) = Recorded By, (t) = Taken By, (c) = Cosigned By    Initials Name Provider Type    Devin Constantino PT Physical Therapist              Therapy Charges for Today     Code Description Service Date Service Provider Modifiers Qty    [id number] HC PT EVAL MOD COMPLEXITY 3 3/24/2023 Devin Rodriguez PT GP 1          PT G-Codes  AM-PAC 6 Clicks Score (PT): 17  PT Discharge Summary  Anticipated Discharge Disposition (PT): inpatient rehabilitation facility, skilled nursing facility    Devin Rodriguez PT  3/24/2023

## 2023-03-24 NOTE — PLAN OF CARE
Goal Outcome Evaluation:  Plan of Care Reviewed With: patient        Progress: no change  Outcome Evaluation: OT eval completed. Patient supine in bed and agreeable to participate. Patient moved to EOB with CGA. Patient's condom catheter fell off and notified RN. Patient sitting EOB required mod-max A to garcia pull-up and socks with patient having difficulty seeing the clothing and correctly orienting them prior to donning. Patient performed sit to stand with min A using RW, walked 60' x 2 with min A, increased cues for safety and sequencing and to avoid obstacles. Patient requires increased assistance for all ADLs, with visual deficits posing increased difficulty, patient reports he wears glasses normally, but he doesn't have them with him.  Patient would benefit from STR prior to returning home.

## 2023-03-24 NOTE — CASE MANAGEMENT/SOCIAL WORK
Case Management Discharge Note           Provided Post Acute Provider List?: N/A  Provided Post Acute Provider Quality & Resource List?: N/A    Selected Continued Care - Admitted Since 3/23/2023     Destination    No services have been selected for the patient.              Durable Medical Equipment    No services have been selected for the patient.              Dialysis/Infusion    No services have been selected for the patient.              Home Medical Care    No services have been selected for the patient.              Therapy    No services have been selected for the patient.              Community Resources    No services have been selected for the patient.              Community & DME    No services have been selected for the patient.                  Transportation Services  Private: Car    Final Discharge Disposition Code: 01 - home or self-care

## 2023-03-24 NOTE — THERAPY EVALUATION
Patient Name: Darren Mccracken  : 1934    MRN: [id number]                              Today's Date: 3/24/2023       Admit Date: 3/23/2023    Visit Dx:     ICD-10-CM ICD-9-CM   1. Altered mental status, unspecified altered mental status type  R41.82 780.97   2. Pneumonia of left lung due to infectious organism, unspecified part of lung  J18.9 486   3. Physical debility  R53.81 799.3     Patient Active Problem List   Diagnosis   • HTN (hypertension)   • HLD (hyperlipidemia)   • CAD (coronary artery disease)   • GERD (gastroesophageal reflux disease)   • Renal insufficiency   • Glaucoma   • Erectile dysfunction   • Vitamin D deficiency   • Shortness of breath   • Right foot ulcer, with unspecified severity (HCC)   • Altered mental status, unspecified altered mental status type   • Delirium, acute   • CAP (community acquired pneumonia)   • Falls frequently     Past Medical History:   Diagnosis Date   • Arthritis     knees   • Cancer (HCC)    • Coronary artery disease    • Coronary artery disease involving native coronary artery of native heart without angina pectoris 2018    Added automatically from request for surgery [id number]   • Dementia (HCC)    • GERD (gastroesophageal reflux disease)    • Glaucoma    • History of transfusion    • Hyperlipidemia    • Hypertension    • Kidney stone    • Pneumonia     right upper lobe    • Pulmonary embolism (HCC)    • Skin cancer    • Wears glasses      Past Surgical History:   Procedure Laterality Date   • CARDIAC CATHETERIZATION N/A 2017    Procedure: Left Heart Cath;  Surgeon: Toy Landers MD;  Location:  [facility];  Service:    • CARDIAC CATHETERIZATION N/A 2018    Procedure: Left Heart Cath;  Surgeon: Toy Landers MD;  Location:  [facility];  Service: Cardiology   • CATARACT EXTRACTION W/ INTRAOCULAR LENS  IMPLANT, BILATERAL     • CHOLECYSTECTOMY     • COLONOSCOPY     • CORONARY ANGIOPLASTY WITH STENT  PLACEMENT     • CORONARY STENT PLACEMENT      x3   • EYE SURGERY Right    • SKIN CANCER EXCISION     • WRIST SURGERY        General Information     Row Name 03/24/23 1209          OT Time and Intention    Document Type evaluation  -SD     Mode of Treatment occupational therapy  -SD     Row Name 03/24/23 1209          General Information    Patient Profile Reviewed yes  -SD     Prior Level of Function independent:;all household mobility;ADL's  patient reports he is normally ind with ADLs and mobility. Has a standard walker and shower chair  -SD     Existing Precautions/Restrictions fall;other (see comments)  visual deficits  -SD     Barriers to Rehab medically complex;previous functional deficit;cognitive status;visual deficit  -SD     Row Name 03/24/23 1209          Living Environment    People in Home spouse;other (see comments)  reports son and daughter live in same neighborhood and assist  -SD     Row Name 03/24/23 1209          Home Main Entrance    Number of Stairs, Main Entrance none;twelve  -SD     Stair Railings, Main Entrance railings on both sides of stairs  -SD     Row Name 03/24/23 1209          Stairs Within Home, Primary    Stairs, Within Home, Primary basement  -SD     Row Name 03/24/23 1209          Cognition    Orientation Status (Cognition) oriented x 4;verbal cues/prompts needed for orientation  -SD     Row Name 03/24/23 1209          Safety Issues, Functional Mobility    Safety Issues Affecting Function (Mobility) safety precautions follow-through/compliance;sequencing abilities;safety precaution awareness;insight into deficits/self-awareness;impulsivity;awareness of need for assistance;at risk behavior observed;judgment;positioning of assistive device  -SD     Impairments Affecting Function (Mobility) balance;cognition;endurance/activity tolerance;strength;visual/perceptual  -SD           User Key  (r) = Recorded By, (t) = Taken By, (c) = Cosigned By    Initials Name Provider Type    SD [place],  ROD Hogan Occupational Therapist                 Mobility/ADL's     Row Name 03/24/23 1212          Bed Mobility    Bed Mobility supine-sit  -SD     Supine-Sit [place] (Bed Mobility) contact guard  -SD     Assistive Device (Bed Mobility) bed rails;head of bed elevated  -[facility] Name 03/24/23 1212          Transfers    Transfers sit-stand transfer  -SD     Row Name 03/24/23 1212          Sit-Stand Transfer    Sit-Stand [place] (Transfers) minimum assist (75% patient effort)  -SD     Assistive Device (Sit-Stand Transfers) walker, front-wheeled  -[facility] Name 03/24/23 1212          Functional Mobility    Functional Mobility- Ind. Level minimum assist (75% patient effort)  -SD     Functional Mobility- Device walker, front-wheeled  -SD     Functional Mobility-Distance (Feet) 60  x2  -SD     Functional Mobility- Safety Issues balance decreased during turns;sequencing ability decreased;step length decreased;weight-shifting ability decreased  -[facility] Name 03/24/23 1212          Activities of Daily Living    BADL Assessment/Intervention bathing;upper body dressing;lower body dressing;grooming;feeding;toileting  -[facility] Name 03/24/23 1212          Bathing Assessment/Intervention    [place] Level (Bathing) moderate assist (50% patient effort);maximum assist (25% patient effort)  -[facility] Name 03/24/23 1212          Upper Body Dressing Assessment/Training    [place] Level (Upper Body Dressing) moderate assist (50% patient effort)  -SD     Row Name 03/24/23 1212          Lower Body Dressing Assessment/Training    [place] Level (Lower Body Dressing) maximum assist (25% patient effort)  -[facility] Name 03/24/23 1212          Grooming Assessment/Training    [place] Level (Grooming) moderate assist (50% patient effort)  -[facility] Name 03/24/23 1212          Self-Feeding Assessment/Training    [place] Level (Feeding) moderate assist (50% patient effort)  -SD     Comment,  (Feeding) d/t visual deficits  -SD     Row Name 03/24/23 1212          Toileting Assessment/Training    [place] Level (Toileting) maximum assist (25% patient effort)  -SD           User Key  (r) = Recorded By, (t) = Taken By, (c) = Cosigned By    Initials Name Provider Type    Samira Gates OT Occupational Therapist               Obj/Interventions     Row Name 03/24/23 1215          Vision Assessment/Intervention    Visual Impairment/Limitations visual/perceptual impairments present;unable/difficult to assess  -SD     Visual Field Deficit central vision impaired  -SD     Row Name 03/24/23 1215          Range of Motion Comprehensive    General Range of Motion bilateral upper extremity ROM WFL  -SD     Row Name 03/24/23 1215          Strength Comprehensive (MMT)    General Manual Muscle Testing (MMT) Assessment upper extremity strength deficits identified  -SD     Comment, General Manual Muscle Testing (MMT) Assessment 3+/5 - 4-/5  -SD           User Key  (r) = Recorded By, (t) = Taken By, (c) = Cosigned By    Initials Name Provider Type    Samira Gates OT Occupational Therapist               Goals/Plan     Row Name 03/24/23 1222          Transfer Goal 1 (OT)    Activity/Assistive Device (Transfer Goal 1, OT) sit-to-stand/stand-to-sit;walker, rolling  -SD     [place] Level/Cues Needed (Transfer Goal 1, OT) contact guard required  -SD     Time Frame (Transfer Goal 1, OT) long term goal (LTG)  -SD     Progress/Outcome (Transfer Goal 1, OT) goal ongoing  -SD     [facility] Name 03/24/23 1222          Dressing Goal 1 (OT)    Activity/Device (Dressing Goal 1, OT) lower body dressing  -SD     [place]/Cues Needed (Dressing Goal 1, OT) contact guard required  -SD     Time Frame (Dressing Goal 1, OT) 2 weeks  -SD     Progress/Outcome (Dressing Goal 1, OT) goal ongoing  -SD     [facility] Name 03/24/23 1222          Toileting Goal 1 (OT)    Activity/Device (Toileting Goal 1, OT) toileting skills, all;commode   -SD     [place] Level/Cues Needed (Toileting Goal 1, OT) contact guard required  -SD     Time Frame (Toileting Goal 1, OT) 2 weeks  -SD     Progress/Outcome (Toileting Goal 1, OT) goal ongoing  -SD     Row Name 03/24/23 1222          Strength Goal 1 (OT)    Strength Goal 1 (OT) Patient to perform UB ther ex as tolerated  -SD     Time Frame (Strength Goal 1, OT) long term goal (LTG)  -SD     Progress/Outcome (Strength Goal 1, OT) goal ongoing  -SD     Row Name 03/24/23 1222          Therapy Assessment/Plan (OT)    Planned Therapy Interventions (OT) activity tolerance training;adaptive equipment training;BADL retraining;patient/caregiver education/training;ROM/therapeutic exercise;strengthening exercise;transfer/mobility retraining  -SD           User Key  (r) = Recorded By, (t) = Taken By, (c) = Cosigned By    Initials Name Provider Type    Samira Gates OT Occupational Therapist               Clinical Impression     Row Name 03/24/23 1216          Pain Assessment    Pretreatment Pain Rating 0/10 - no pain  -SD     Posttreatment Pain Rating 0/10 - no pain  -SD     Row Name 03/24/23 1216          Plan of Care Review    Plan of Care Reviewed With patient  -SD     Progress no change  -SD     Outcome Evaluation OT eval completed. Patient supine in bed and agreeable to participate. Patient moved to EOB with CGA. Patient's condom catheter fell off and notified RN. Patient sitting EOB required mod-max A to garcia pull-up and socks with patient having difficulty seeing the clothing and correctly orienting them prior to donning. Patient performed sit to stand with min A using RW, walked 60' x 2 with min A, increased cues for safety and sequencing and to avoid obstacles. Patient requires increased assistance for all ADLs, with visual deficits posing increased difficulty, patient reports he wears glasses normally, but he doesn't have them with him.  Patient would benefit from STR prior to returning home.  -SD     Row  Name 03/24/23 1216          Therapy Assessment/Plan (OT)    Patient/Family Therapy Goal Statement (OT) home  -SD     Rehab Potential (OT) fair, will monitor progress closely  -SD     Criteria for Skilled Therapeutic Interventions Met (OT) skilled treatment is necessary  -SD     Therapy Frequency (OT) 3 times/wk  5 times if indicated  -SD     Row Name 03/24/23 1216          Therapy Plan Review/Discharge Plan (OT)    Equipment Needs Upon Discharge (OT) walker, rolling  -SD     Anticipated Discharge Disposition (OT) inpatient rehabilitation facility  -SD     Row Name 03/24/23 1216          Vital Signs    Intratreatment Heart Rate (beats/min) 160  -SD     Posttreatment Heart Rate (beats/min) 69  -SD     O2 Delivery Pre Treatment room air  -SD     O2 Delivery Intra Treatment room air  -SD     O2 Delivery Post Treatment room air  -SD     Row Name 03/24/23 1216          Positioning and Restraints    Pre-Treatment Position in bed  -SD     Post Treatment Position chair  -SD     In Chair reclined;call light within reach;encouraged to call for assist;exit alarm on;notified nsg  -SD           User Key  (r) = Recorded By, (t) = Taken By, (c) = Cosigned By    Initials Name Provider Type    Samira Gates OT Occupational Therapist               Outcome Measures     Row Name 03/24/23 1222          How much help from another is currently needed...    Putting on and taking off regular lower body clothing? 2  -SD     Bathing (including washing, rinsing, and drying) 2  -SD     Toileting (which includes using toilet bed pan or urinal) 2  -SD     Putting on and taking off regular upper body clothing 2  -SD     Taking care of personal grooming (such as brushing teeth) 2  -SD     Eating meals 2  -SD     AM-PAC 6 Clicks Score (OT) 12  -SD     Row Name 03/24/23 1151 03/24/23 0100       How much help from another person do you currently need...    Turning from your back to your side while in flat bed without using bedrails? 3  -MS 3   -RAFA    Moving from lying on back to sitting on the side of a flat bed without bedrails? 3  -MS 3  -RAFA    Moving to and from a bed to a chair (including a wheelchair)? 3  -MS 3  -RAFA    Standing up from a chair using your arms (e.g., wheelchair, bedside chair)? 3  -MS 3  -RAFA    Climbing 3-5 steps with a railing? 2  -MS 2  -RAFA    To walk in hospital room? 3  -MS 2  -RAFA    AM-PAC 6 Clicks Score (PT) 17  -MS 16  -RAFA    Highest level of mobility 5 --> Static standing  -MS 5 --> Static standing  -RAFA    Row Name 03/24/23 1222          Functional Assessment    Outcome Measure Options AM-PAC 6 Clicks Daily Activity (OT)  -SD           User Key  (r) = Recorded By, (t) = Taken By, (c) = Cosigned By    Initials Name Provider Type    Anne Alfaro RN Registered Nurse    Samira Gates, OT Occupational Therapist    Devin Constantino, PT Physical Therapist                Occupational Therapy Education     Title: PT OT SLP Therapies (In Progress)     Topic: Occupational Therapy (In Progress)     Point: ADL training (Done)     Description:   Instruct learner(s) on proper safety adaptation and remediation techniques during self care or transfers.   Instruct in proper use of assistive devices.              Learning Progress Summary           Patient Acceptance, E,TB, VU by SD at 3/24/2023 1224    Comment: OT POC                   Point: Home exercise program (Not Started)     Description:   Instruct learner(s) on appropriate technique for monitoring, assisting and/or progressing therapeutic exercises/activities.              Learner Progress:  Not documented in this visit.          Point: Precautions (Not Started)     Description:   Instruct learner(s) on prescribed precautions during self-care and functional transfers.              Learner Progress:  Not documented in this visit.          Point: Body mechanics (Not Started)     Description:   Instruct learner(s) on proper positioning and spine alignment during self-care,  functional mobility activities and/or exercises.              Learner Progress:  Not documented in this visit.                      User Key     Initials Effective Dates Name Provider Type Discipline    SD 06/16/21 -  Samira Maldonado OT Occupational Therapist OT              OT Recommendation and Plan  Planned Therapy Interventions (OT): activity tolerance training, adaptive equipment training, BADL retraining, patient/caregiver education/training, ROM/therapeutic exercise, strengthening exercise, transfer/mobility retraining  Therapy Frequency (OT): 3 times/wk (5 times if indicated)  Plan of Care Review  Plan of Care Reviewed With: patient  Progress: no change  Outcome Evaluation: OT eval completed. Patient supine in bed and agreeable to participate. Patient moved to EOB with CGA. Patient's condom catheter fell off and notified RN. Patient sitting EOB required mod-max A to garcia pull-up and socks with patient having difficulty seeing the clothing and correctly orienting them prior to donning. Patient performed sit to stand with min A using RW, walked 60' x 2 with min A, increased cues for safety and sequencing and to avoid obstacles. Patient requires increased assistance for all ADLs, with visual deficits posing increased difficulty, patient reports he wears glasses normally, but he doesn't have them with him.  Patient would benefit from STR prior to returning home.     Time Calculation:    Time Calculation- OT     Row Name 03/24/23 1226             Time Calculation- OT    OT Start Time 1001  -SD      OT Received On 03/24/23  -SD      OT Goal Re-Cert Due Date 04/05/23  -SD         Untimed Charges    OT Eval/Re-eval Minutes 45  -SD         Total Minutes    Untimed Charges Total Minutes 45  -SD       Total Minutes 45  -SD            User Key  (r) = Recorded By, (t) = Taken By, (c) = Cosigned By    Initials Name Provider Type    SD Samira Maldonado OT Occupational Therapist              Therapy Charges for Today      Code Description Service Date Service Provider Modifiers Qty    [id number] HC OT EVAL MOD COMPLEXITY 3 3/24/2023 Samira Maldonado OT GO 1               Samira Maldonado OT  3/24/2023

## 2023-03-28 LAB
BACTERIA SPEC AEROBE CULT: NORMAL
BACTERIA SPEC AEROBE CULT: NORMAL

## 2023-04-05 ENCOUNTER — APPOINTMENT (OUTPATIENT)
Dept: GENERAL RADIOLOGY | Facility: HOSPITAL | Age: 88
End: 2023-04-05
Payer: MEDICARE

## 2023-04-05 ENCOUNTER — HOSPITAL ENCOUNTER (EMERGENCY)
Facility: HOSPITAL | Age: 88
Discharge: HOME | End: 2023-04-05
Attending: EMERGENCY MEDICINE | Admitting: EMERGENCY MEDICINE
Payer: MEDICARE

## 2023-04-05 VITALS
HEART RATE: 70 BPM | DIASTOLIC BLOOD PRESSURE: 78 MMHG | SYSTOLIC BLOOD PRESSURE: 127 MMHG | BODY MASS INDEX: 20.58 KG/M2 | RESPIRATION RATE: 14 BRPM | OXYGEN SATURATION: 97 % | HEIGHT: 71 IN | WEIGHT: 147 LBS | TEMPERATURE: 97.9 F

## 2023-04-05 DIAGNOSIS — R41.0 DELIRIUM, ACUTE: ICD-10-CM

## 2023-04-05 DIAGNOSIS — R53.81 DEBILITY: ICD-10-CM

## 2023-04-05 DIAGNOSIS — R53.1 GENERALIZED WEAKNESS: Primary | ICD-10-CM

## 2023-04-05 DIAGNOSIS — R29.6 FALLS FREQUENTLY: ICD-10-CM

## 2023-04-05 DIAGNOSIS — R77.8 ELEVATED TROPONIN: ICD-10-CM

## 2023-04-05 LAB
ALBUMIN SERPL-MCNC: 3.3 G/DL (ref 3.5–5.2)
ALBUMIN/GLOB SERPL: 1.2 G/DL
ALP SERPL-CCNC: 85 U/L (ref 39–117)
ALT SERPL W P-5'-P-CCNC: 20 U/L (ref 1–41)
ANION GAP SERPL CALCULATED.3IONS-SCNC: 10.5 MMOL/L (ref 5–15)
AST SERPL-CCNC: 19 U/L (ref 1–40)
BASOPHILS # BLD AUTO: 0.08 10*3/MM3 (ref 0–0.2)
BASOPHILS NFR BLD AUTO: 0.7 % (ref 0–1.5)
BILIRUB SERPL-MCNC: 0.6 MG/DL (ref 0–1.2)
BUN SERPL-MCNC: 31 MG/DL (ref 8–23)
BUN/CREAT SERPL: 22.8 (ref 7–25)
CALCIUM SPEC-SCNC: 9.5 MG/DL (ref 8.6–10.5)
CHLORIDE SERPL-SCNC: 102 MMOL/L (ref 98–107)
CO2 SERPL-SCNC: 22.5 MMOL/L (ref 22–29)
CREAT SERPL-MCNC: 1.36 MG/DL (ref 0.76–1.27)
DEPRECATED RDW RBC AUTO: 46.2 FL (ref 37–54)
EGFRCR SERPLBLD CKD-EPI 2021: 50.1 ML/MIN/1.73
EOSINOPHIL # BLD AUTO: 0.13 10*3/MM3 (ref 0–0.4)
EOSINOPHIL NFR BLD AUTO: 1.1 % (ref 0.3–6.2)
ERYTHROCYTE [DISTWIDTH] IN BLOOD BY AUTOMATED COUNT: 13.2 % (ref 12.3–15.4)
GLOBULIN UR ELPH-MCNC: 2.8 GM/DL
GLUCOSE SERPL-MCNC: 83 MG/DL (ref 65–99)
HCT VFR BLD AUTO: 42.5 % (ref 37.5–51)
HGB BLD-MCNC: 14.6 G/DL (ref 13–17.7)
HOLD SPECIMEN: NORMAL
HOLD SPECIMEN: NORMAL
IMM GRANULOCYTES # BLD AUTO: 0.03 10*3/MM3 (ref 0–0.05)
IMM GRANULOCYTES NFR BLD AUTO: 0.2 % (ref 0–0.5)
LYMPHOCYTES # BLD AUTO: 1.73 10*3/MM3 (ref 0.7–3.1)
LYMPHOCYTES NFR BLD AUTO: 14.3 % (ref 19.6–45.3)
MCH RBC QN AUTO: 32.4 PG (ref 26.6–33)
MCHC RBC AUTO-ENTMCNC: 34.4 G/DL (ref 31.5–35.7)
MCV RBC AUTO: 94.4 FL (ref 79–97)
MONOCYTES # BLD AUTO: 1.19 10*3/MM3 (ref 0.1–0.9)
MONOCYTES NFR BLD AUTO: 9.8 % (ref 5–12)
NEUTROPHILS NFR BLD AUTO: 73.9 % (ref 42.7–76)
NEUTROPHILS NFR BLD AUTO: 8.98 10*3/MM3 (ref 1.7–7)
NRBC BLD AUTO-RTO: 0 /100 WBC (ref 0–0.2)
NT-PROBNP SERPL-MCNC: 987 PG/ML (ref 0–1800)
PLATELET # BLD AUTO: 252 10*3/MM3 (ref 140–450)
PMV BLD AUTO: 9.9 FL (ref 6–12)
POTASSIUM SERPL-SCNC: 3.8 MMOL/L (ref 3.5–5.2)
PROT SERPL-MCNC: 6.1 G/DL (ref 6–8.5)
RBC # BLD AUTO: 4.5 10*6/MM3 (ref 4.14–5.8)
SODIUM SERPL-SCNC: 135 MMOL/L (ref 136–145)
TROPONIN T SERPL HS-MCNC: 46 NG/L
TROPONIN T SERPL HS-MCNC: 58 NG/L
WBC NRBC COR # BLD: 12.14 10*3/MM3 (ref 3.4–10.8)
WHOLE BLOOD HOLD COAG: NORMAL
WHOLE BLOOD HOLD SPECIMEN: NORMAL

## 2023-04-05 PROCEDURE — 84484 ASSAY OF TROPONIN QUANT: CPT | Performed by: EMERGENCY MEDICINE

## 2023-04-05 PROCEDURE — 99285 EMERGENCY DEPT VISIT HI MDM: CPT

## 2023-04-05 PROCEDURE — 80053 COMPREHEN METABOLIC PANEL: CPT | Performed by: EMERGENCY MEDICINE

## 2023-04-05 PROCEDURE — 83880 ASSAY OF NATRIURETIC PEPTIDE: CPT | Performed by: EMERGENCY MEDICINE

## 2023-04-05 PROCEDURE — 85025 COMPLETE CBC W/AUTO DIFF WBC: CPT | Performed by: EMERGENCY MEDICINE

## 2023-04-05 PROCEDURE — 93005 ELECTROCARDIOGRAM TRACING: CPT | Performed by: EMERGENCY MEDICINE

## 2023-04-05 PROCEDURE — 71045 X-RAY EXAM CHEST 1 VIEW: CPT

## 2023-04-05 RX ORDER — SODIUM CHLORIDE 0.9 % (FLUSH) 0.9 %
10 SYRINGE (ML) INJECTION AS NEEDED
Status: DISCONTINUED | OUTPATIENT
Start: 2023-04-05 | End: 2023-04-05 | Stop reason: HOSPADM

## 2023-04-05 NOTE — ED NOTES
June 8, 2021       Tracie Rao MD  [address] [facility] [place] [address]  Via Fax: [phone]      Patient: Cee Tenorio   YOB: 1952   Date of Visit: 6/8/2021       Dear Dr. Rao:    Thank you for referring Cee Tenorio to me for evaluation. Below are my notes for this visit with her.    If you have questions, please do not hesitate to call me. I look forward to following your patient along with you.      Sincerely,        Linwood Hernandez MD        CC: No Recipients  Linwood Hernandez MD  6/8/2021  2:28 PM  Signed  [facility] Office Follow up    Total Time 20 minutes, exclusive of time spent during teaching or procedures. Including assessment, review of the available laboratory/radiographic data, multidisciplinary discussion, and the development and documentation of the above assessment and plan, attending to this patient's  care needs for the aforementioned issues with complex decision making and high risk of physiologic decline.     ASSESSMENT and PLAN:     Problem   Chronic Diastolic (Congestive) Heart Failure (Cms/Hcc)    NYHA Class II-III Compensated.  7/2020  1. Left ventricle: The cavity size is normal. Wall thickness is normal.     Systolic function is normal. The estimated ejection fraction is 65-70%.     Doppler parameters are consistent with abnormal left ventricular     relaxation (grade 1 diastolic dysfunction).  2. Left atrium: The atrium is mildly dilated.     Atherosclerosis of Native Coronary Artery of Native Heart Without Angina Pectoris    2018 cath  1. Hemodynamics: Mildly elevated LVEDP.  2. LAD: Mid-vessel lesion: There is a tubular, 50%stenosis.  3. Left ventricle: The cavity size is normal. Wall thickness is normal.     Systolic function is mildly reduced. The estimated ejection fraction is     45-50%, by visual assessment. 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 No beds available. Awaiting call back at this time.   left anterior descending coronary artery.  Continue Beta blocker's and statin Rx and ASA. Last stress test reviewed. Pt informed of notifying MD for exertional CP, SOB, fatigue.       Class 3 Severe Obesity Due to Excess Calories With Serious Comorbidity and Body Mass Index (Bmi) of 45.0 to 49.9 in Adult (Cms/Hcc)    BMI above normal and a follow-up plan is documented.    Discussed the effects obesity can have on overall health. Patients that are obese are at increased risk for type 2 diabetes, hypertension, dyslipidemia, obstructive sleep apnea, fatty liver disease, and metabolic syndrome.   BMI management, weight reduction more than 10% of total body weight, therapeutic lifestyle changes discussed  Education/advice/diet or observation      Low carb/Low Fat/Average protein Dietary education, regular exercise education done.       Geovanni (Obstructive Sleep Apnea)    Has not used CPAP since Pandemic.  Advised to restart using CPAP and F/U with Sleep specialist     Palpitations    Probably due to PSVT     Pulmonary Embolism (Cms/[facility])    1/2018 On Chronic Xarelto Rx.     Dyspnea On Exertion    NYHA Class II-III stable   Multifactorial     Varicose Veins of Both Lower Extremities With Complications    S/P EVRFA 2018 with improved VCSS score.                 Supraventricular tachycardia (CMS/HCC)    Atherosclerosis of native coronary artery of native heart without angina pectoris    Other chronic pulmonary embolism, unspecified whether acute cor pulmonale present (CMS/HCC)    Mixed hyperlipidemia    Essential hypertension    Dyspnea on exertion    Palpitations    GEOVANNI (obstructive sleep apnea)    Class 3 severe obesity due to excess calories with serious comorbidity and body mass index (BMI) of 45.0 to 49.9 in adult (CMS/HCC)        Linwood Hernandez MD, FACC, CPE    History and Physical:  Chief Complaint   Patient presents with   • Office Visit     6 month f/u      Cee Tenorio is a 68 year old female with a history of  has  a past medical history of Atherosclerosis of coronary artery, Chronic diastolic (congestive) heart failure (CMS/HCC), Dyspnea, Hypertension, Mixed hyperlipidemia, Pulmonary embolism (CMS/HCC), Supraventricular tachycardia (CMS/HCC), and Varicose veins of both lower extremities with inflammation.  C/O Intermittent  non exertional, precordial palpitations /skipped beats sensation over last 3-4 weeks. The above is not associated with dizziness or diaphoresis, chest heaviness or SOB lasting less than 5 seconds.   Above symptoms are stable in frequency, duration and intensity  No dyspnea on exertion with daily activity. No exertional typical chest pain, neck, jaw, or arm pain.  Taking medicine regularly and compliant with diet.    Review Of Symptoms:     CVS: No orthopnea, paroxysmal nocturnal dyspnea, or edema.  No palpitations, dizziness, syncope, or diaphoresis.  Peripheral Vascular disease: Denies intermittent claudications, restless leg syndrome, painful varicose veins.  GI: No history of epigastric pain, nausea, vomiting, hematemesis, or melena. No diarrhea or constipation.  Respiratory: No history of recent fever with chills, purulent expectoration, or hemoptysis.  No history of excessive snoring or day time sleepiness.  CNS: No history of headache, focal weakness, or visual disturbances.  MS: No joint pain, redness, or swelling.  Constitutional: No history of weight loss, loss of appetite, or weight gain.  : No history of flank pain or hematuria.  Endocrinology: No history of polyuria, polydipsia, polyphagia, or hot flashes.  ENT: No sore throat, difficulty swallowing, or other significant abnormalities.  Psych:  Adult Depression Screening: Result- Negative. Denies feeling down, depressed, or hopeless. Denies feeling little pleasure or interest in doing activities over last 2 weeks.   Skin: no new rashes, lesions, or pruritic.    Cholesterol Status: No recent labs done.      Social History     Tobacco Use      Smoking Status Former Smoker   Smokeless Tobacco Never Used   Tobacco Comment    QUIT 12/1987       Current Outpatient Medications   Medication Sig Dispense Refill   • dilTIAZem (TIAZAC) 180 MG 24 hr capsule Take 180 mg by mouth daily.     • potassium chloride (KLOR-CON M) 10 MEQ [place] ER tablet Take 1 tablet by mouth daily. 90 tablet 1   • atenolol (TENORMIN) 100 MG tablet Take 1 tablet (100 mg total) by mouth every morning. Need wellness visit and labs for additional refills 90 tablet 3   • atorvastatin (LIPITOR) 10 MG tablet Take 1 tablet (10 mg total) by mouth nightly. Needs fasting labs for additional refills. 90 tablet 3   • losartan-hydrochlorothiazide (HYZAAR) 100-12.5 MG per tablet Take 1 tablet by mouth daily. 90 tablet 3   • rivaroxaban (Xarelto) 20 MG Tab Take 1 tablet (20 mg total) by mouth daily with supper. 90 tablet 3   • halobetasol (ULTRAVATE) 0.05 % ointment Apply a thin layer to the affected area 1-2 times daily (Patient taking differently: Apply topically daily as needed. ) 50 g 3   • nystatin (MYCOSTATIN) 549354 UNIT/GM cream Apply topically twice a day 90 g 1   • aspirin (ECOTRIN) 81 MG EC tablet Take 81 mg by mouth daily.      • Glucosamine Sulfate 1000 MG Cap Take 4,000 mg by mouth daily.      • Cholecalciferol (VITAMIN D3) 1000 units capsule Take 2,000 Units by mouth daily.        No current facility-administered medications for this visit.       Past Medical History:   Diagnosis Date   • Atherosclerosis of coronary artery    • Chronic diastolic (congestive) heart failure (CMS/HCC)    • Dyspnea    • Hypertension    • Mixed hyperlipidemia    • Pulmonary embolism (CMS/HCC)    • Supraventricular tachycardia (CMS/HCC)    • Varicose veins of both lower extremities with inflammation        Physical Exam:    Visit Vitals  /74 (BP Location: LUE - Left upper extremity, Patient Position: Sitting, Cuff Size: Regular)   Pulse 60   Temp 97.3 °F (36.3 °C)   Resp 20   Ht 5' 4\" (1.626 m)   Wt 130 kg  (286 lb 9.6 oz)   BMI 49.19 kg/m²       HEENT: No icterus. No cyanosis. No thyromegaly. Normal carotid upstroke. No carotid bruit.  JVD 4 cm above angle of Armen in 45 degree position.  RESPIRATORY:      Air entry bilaterally equal. No rales, rub or wheezing.  CARDIO VASCULAR :     PMI in the 5th left intercostals space 9 cm from the midsternal line.  S1 N, A2 N, P2 N, S3 No, S4 No. No mid systolic click.  II/VI ESM Mitral area present, Tricuspid area present, Aortic area   -, Pulmonary area -.  No pericardial rub. No chest pain on arm Movement or deep breathing.  Abdomen:     Soft. No hepatosplenomegaly. No palpable or pulsatile mass felt. Bowel sound present.  CNS:    Alert and Oriented x3. No focal motor deficit.              Psych: Normal mood.  Musculoskeletal:    No significant Kyphosis/Scoliosis. Gait not tested.  EXTREMITIES:    NO Edema. No clubbing or cyanosis. DP/PT 1+/2 Bilat. Femoral 2/2 Bilat. No Bruit.  No Brachial femoral delay.  Skin:  No Xanthelasma.         Linwood Hernandez MD, FACC, CPE

## 2023-04-05 NOTE — CASE MANAGEMENT/SOCIAL WORK
Case Management/Social Work    Patient Name:  Darren Mccracken  YOB: 1934  MRN: [id number]  Admit Date:  4/5/2023        NIKHIL received call from MD Rios regarding pt. Pt has been assessed and medically cleared, does not meet criteria for admission. Family is reporting pt decline and has been falling more at home. NIKHIL went and spoke with pt and pts daughter in law, Alayna [phone] at bedside. Pt is needing STR. NIKHIL explained that pt would not qualify for STR from a hospital admission due to observation status. Pt would benefit from returning home and admitting to a facility from home. SW spoke with [facility] who does not have a bed until next week. Family states [facility] facilities are first choice. SW attempted to contact [facility] and [facility], no answer left message. SW advised family to follow up with admissions at facility of choice to admit from home. SW provided family with private caregiver list to help assist in home as needed. SW asked MD for HH order. Pt and family are agreeable with current plan and are appreciative of resources. SW provided family with office number to follow up with any concerns or additional resources. SW will continue to help with placement from an outpatient perspective. Pt also has a PCP appointment tomorrow. NIKHIL will follow up with pt and family when HH order is obtained and arranged for pt. Plan is to dc home today with family. MD updated.       Electronically signed by:  JOHN Torres  04/05/23 16:14 EDT

## 2023-04-05 NOTE — Clinical Note
Level of Care: Telemetry [5]   Diagnosis: Generalized weakness [312417]   Admitting Physician: LC HUTCHISON [[id number]]   Attending Physician: LC HUTCHISON [[id number]]   Certification: I Certify That Inpatient Hospital Services Are Medically Necessary For Greater Than 2 Midnights

## 2023-04-06 ENCOUNTER — HOME HEALTH ADMISSION (OUTPATIENT)
Dept: HOME HEALTH SERVICES | Facility: HOME HEALTHCARE | Age: 88
End: 2023-04-06
Payer: MEDICARE
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
## 2023-04-07 NOTE — CASE MANAGEMENT/SOCIAL WORK
Case Management/Social Work    Patient Name:  Darren Mccracken  YOB: 1934  MRN: [id number]  Admit Date:  4/5/2023      Received notification [facility] could not accept pt due to pt not having a dx to qualify him for hh services. This sw reached out to [facility] and was able to get pt's home health scheduled.  Ashutosh ([facility] Rep) states someone will contact pt's wife regarding start of care.  Pt is current with his pcp so continuing hh orders can be signed.        Electronically signed by:  Janna Davis  04/07/23 09:31 EDT

## 2023-04-08 ENCOUNTER — HOME CARE VISIT (OUTPATIENT)
Dept: HOME HEALTH SERVICES | Facility: HOME HEALTHCARE | Age: 88
End: 2023-04-08
Payer: MEDICARE

## 2023-04-08 NOTE — Clinical Note
Non-admit: Upon arrival to patients home spouse informed SN that family is working with an inpatient rehab facility for possible 04/10/23 admission.

## 2023-04-08 NOTE — HOME HEALTH
Upon arrival to patients home spouse informed SN that family is working with an inpatient rehab facility for possible 04/10/23 admission.

## 2023-04-18 ENCOUNTER — DOCUMENTATION (OUTPATIENT)
Dept: FAMILY MEDICINE CLINIC | Facility: CLINIC | Age: 88
End: 2023-04-18
Payer: MEDICARE

## 2023-04-18 RX ORDER — ERGOCALCIFEROL 1.25 MG/1
50000 CAPSULE ORAL
COMMUNITY

## 2023-04-18 RX ORDER — TRAMADOL HYDROCHLORIDE 50 MG/1
50 TABLET ORAL EVERY 6 HOURS PRN
Qty: 120 TABLET | Refills: 3 | Status: SHIPPED | OUTPATIENT
Start: 2023-04-18

## 2023-04-18 RX ORDER — MECLIZINE HCL 12.5 MG/1
12.5 TABLET ORAL 3 TIMES DAILY PRN
COMMUNITY

## 2023-04-18 RX ORDER — DONEPEZIL HYDROCHLORIDE 5 MG/1
5 TABLET, FILM COATED ORAL DAILY
COMMUNITY

## 2023-04-18 RX ORDER — TRAMADOL HYDROCHLORIDE 50 MG/1
50 TABLET ORAL EVERY 6 HOURS PRN
COMMUNITY

## 2023-04-18 NOTE — TELEPHONE ENCOUNTER
(NEW ADMIT)    ANDREA REQUESTING MED REFILL FOR TRAMADOL 50 MG.    DIRECTIONS: TRAMADOL 50 MG 1 TAB PO Q 6 HRS PRN.

## 2023-04-19 ENCOUNTER — NURSING HOME (OUTPATIENT)
Dept: FAMILY MEDICINE CLINIC | Facility: CLINIC | Age: 88
End: 2023-04-19
Payer: MEDICARE

## 2023-04-19 VITALS
WEIGHT: 146 LBS | TEMPERATURE: 97.8 F | DIASTOLIC BLOOD PRESSURE: 88 MMHG | HEART RATE: 70 BPM | BODY MASS INDEX: 20.36 KG/M2 | SYSTOLIC BLOOD PRESSURE: 128 MMHG | RESPIRATION RATE: 18 BRPM | OXYGEN SATURATION: 96 %

## 2023-04-19 DIAGNOSIS — I10 ESSENTIAL HYPERTENSION: Chronic | ICD-10-CM

## 2023-04-19 DIAGNOSIS — F01.B0 MODERATE VASCULAR DEMENTIA WITHOUT BEHAVIORAL DISTURBANCE, PSYCHOTIC DISTURBANCE, MOOD DISTURBANCE, OR ANXIETY: ICD-10-CM

## 2023-04-19 DIAGNOSIS — R54 AGE-RELATED PHYSICAL DEBILITY: ICD-10-CM

## 2023-04-19 DIAGNOSIS — K21.9 GASTROESOPHAGEAL REFLUX DISEASE WITHOUT ESOPHAGITIS: Chronic | ICD-10-CM

## 2023-04-19 DIAGNOSIS — Z74.09 IMPAIRED MOBILITY AND ADLS: Primary | ICD-10-CM

## 2023-04-19 DIAGNOSIS — Z78.9 IMPAIRED MOBILITY AND ADLS: Primary | ICD-10-CM

## 2023-04-19 DIAGNOSIS — R29.6 FALLS FREQUENTLY: ICD-10-CM

## 2023-04-19 NOTE — LETTER
Nursing Home History/Physical        Andrew DO Odalis [x]   [facility] []  [address] [place] [address]  Phone: [phone]  Fax: [phone] Seema Blount MD []  Kurt Tavarez DO []  [address] [place] [address]  Phone: [phone]  Fax: [phone]     PATIENT NAME: Darren Mccracken                                                                          YOB: 1934           DATE OF SERVICE: 04/19/2023  FACILITY:  []  [place]  []  [place]   []  [facility]   [x] [facility]    [] Other ______________________________________________________________________     CHIEF COMPLAINT:  Impaired mobility and ADLs/frequent falls/age-related physical debility/GERD/hypertension/moderate vascular dementia      HISTORY OF PRESENT ILLNESS:      [x]  Initial visit for coordination of long term care issues and chronic medical management needs.    Patient is an 88-year-old male, admission here from home secondary to numerous chronic comorbid conditions; patient has a history of frequent falls, as well as age-related physical debility rendering impairment of mobility and ADLs.  Also has moderate vascular dementia, hypertension and GERD.    Since arrival to facility, no acute behavioral changes.  No new falls or injuries.  No reports of any nutritional/hydration deficits.  Vital signs have been stable.  Diuresing well.  No reports of focal aspiration.    PAST MEDICAL & SURGICAL HISTORY:   Past Medical History:   Diagnosis Date    Allergic     Allergic rhinitis     Arthritis     knees    BPH (benign prostatic hyperplasia)     Cancer     Chronic cough     Chronic kidney disease     Coronary artery disease     Coronary artery disease involving native coronary artery of native heart without angina pectoris 07/31/2018    Added automatically from request for surgery [id number]    Dementia     Dementia     Excessive tear production     Facial basal cell cancer     GERD (gastroesophageal  reflux disease)     Glaucoma     History of transfusion     Hyperlipidemia     Hypertension     Kidney stone     Osteoarthritis of both knees     Pneumonia     right upper lobe     Pulmonary embolism     Skin cancer     Vertigo     Wears glasses       Past Surgical History:   Procedure Laterality Date    CARDIAC CATHETERIZATION N/A 6/21/2017    Procedure: Left Heart Cath;  Surgeon: Toy Landers MD;  [facility];  Service:     CARDIAC CATHETERIZATION N/A 8/1/2018    Procedure: Left Heart Cath;  Surgeon: Toy Landers MD;  Location:  [facility];  Service: Cardiology    CATARACT EXTRACTION W/ INTRAOCULAR LENS  IMPLANT, BILATERAL      CHOLECYSTECTOMY  2010    COLONOSCOPY      CORONARY ANGIOPLASTY WITH STENT PLACEMENT      CORONARY STENT PLACEMENT      x3    EYE SURGERY Right     SKIN CANCER EXCISION      WRIST SURGERY           MEDICATIONS:  I have reviewed and reconciled the patients medication list in the patients chart at the [facility] nursing [facility] today.      ALLERGIES:    Allergies   Allergen Reactions    Lipitor [Atorvastatin] Myalgia         SOCIAL HISTORY:    Social History     Socioeconomic History    Marital status:    Tobacco Use    Smoking status: Never    Smokeless tobacco: Never   Vaping Use    Vaping Use: Never used   Substance and Sexual Activity    Alcohol use: No    Drug use: No    Sexual activity: Not Currently       FAMILY HISTORY:    Family History   Problem Relation Age of Onset    Heart disease Mother     Heart attack Father        REVIEW OF SYSTEMS:    Review of Systems  Appetite: Fair []   Good [x]   Poor []   Weight Loss []  [x]  Weight Stable   Unavoidable Weight Loss []  Tolerating Tube Feeding []    Supplements Provided []   Patient is a poor historian, review of systems obtained partially by patient, as well as in discussion with his treating nurse/staff, as well as review of records.    PHYSICAL EXAMINATION:   VITAL SIGNS:    Vitals:    04/19/23 0921   BP: 128/88   Pulse: 70   Resp: 18   Temp: 97.8 øF (36.6 øC)   SpO2: 96%         Physical Exam  General Appearance:  [x]  Alert   [x]  Oriented x person  [x]  No acute distress     [x]  Confused, at times []  Disoriented   []  Comatose   Head:  Atraumatic and normocephalic, without obvious abnormality.   Eyes:         PERRLA, conjunctivae and sclerae normal, no Icterus. No pallor. Extra-occular movements are within normal limits.   Ears:  Ears appear intact with no abnormalities noted.   Throat: No oral lesions, no thrush, oral mucosa moist.   Neck: Supple, trachea midline, no thyromegaly, no carotid bruit.   Back:   No kyphoscoliosis. No tenderness to palpation.   Lungs:   Chest shape is normal. Breath sounds heard bilaterally equally.  No wheezing.  Audible air exchange noted all lung fields.   Heart:  Normal S1 and S2, no murmur, no gallop, no rub. No JVD.   Abdomen:   Normal bowel sounds, no masses, no organomegaly. Soft, non-tender, non-distended, no guarding    Extremities: Moves all extremities; without edema, cyanosis or clubbing.  Frail build.  Poor core strength/stability.   Pulses: Pulses palpable and equal bilaterally.   Skin: No bleeding or rash.  Generalized dry skin noted.  Age-related atrophy of skin.   Neurologic: [x] Normal speech []  Normal mental status    [x] Cranial nerves II through XII intact   [x]  No anosmia [x]  DTR 2+ [x]  Proprioception intact  [x] age related motor/sensory deficits      Psych/Mood:                    [x]  No acute changes []  Depressed      Urinary:      [x]  Continent  [x]  Incontinent, at times []  Retention  []  F/C     []  UTI w/treatment in progress      ASSESSMENT     Diagnoses and all orders for this visit:    1. Impaired mobility and ADLs (Primary)    2. Falls frequently    3. Age-related physical debility    4. Gastroesophageal reflux disease without esophagitis    5. Essential hypertension    6. Moderate vascular dementia without  behavioral disturbance, psychotic disturbance, mood disturbance, or anxiety        PLAN  Planned utilization of skilled nursing facility services to assist with mobilization/transfer needs, as well as any ADLs of concern.  Continue to follow overall nutritional/hydration status, no deficits reported at this time.    No acute behavioral changes, although I do suspect patient will have some episodes of confusion secondary to new living arrangement and his underlying dementia.  Plan to follow clinically.    Continue dietary/lifestyle modifications to aid in symptom management of chronic GERD.    Vital signs demonstrate hemodynamic stability, blood pressure is at goal.    Surveillance labs when needed.  [x]  Discussed Patient in detail with nursing/staff, addressed all needs today.     [x]  Plan of Care Reviewed   []  PT/OT Reviewed   []  Order Changes  []  Discharge Plans Reviewed  []  Code Status Change        I spent 45 minutes caring for Darren on this date of service. This time includes time spent by me in the following activities:preparing for the visit, performing a medically appropriate examination and/or evaluation , counseling and educating the patient/family/caregiver, ordering medications, tests, or procedures, documenting information in the medical record, and care coordination    Andrew Jacobo   04/19/2023

## 2023-04-27 ENCOUNTER — NURSING HOME (OUTPATIENT)
Dept: FAMILY MEDICINE CLINIC | Facility: CLINIC | Age: 88
End: 2023-04-27
Payer: MEDICARE

## 2023-04-27 VITALS
BODY MASS INDEX: 20.08 KG/M2 | SYSTOLIC BLOOD PRESSURE: 130 MMHG | TEMPERATURE: 97.8 F | HEART RATE: 80 BPM | OXYGEN SATURATION: 96 % | DIASTOLIC BLOOD PRESSURE: 78 MMHG | WEIGHT: 144 LBS | RESPIRATION RATE: 20 BRPM

## 2023-04-27 DIAGNOSIS — R29.6 FALLS FREQUENTLY: Primary | ICD-10-CM

## 2023-04-27 DIAGNOSIS — F03.B18 MODERATE DEMENTIA WITH OTHER BEHAVIORAL DISTURBANCE, UNSPECIFIED DEMENTIA TYPE: ICD-10-CM

## 2023-04-27 DIAGNOSIS — R53.1 GENERALIZED WEAKNESS: ICD-10-CM

## 2023-04-27 DIAGNOSIS — G47.01 INSOMNIA DUE TO MEDICAL CONDITION: ICD-10-CM

## 2023-04-27 NOTE — LETTER
"Nursing Home Follow Up Note      Andrew Jacobo DO []   [facility] [x]  [address], [place] [address]  Phone: [phone]  Fax: [phone] Seema Blount MD []    Kurt Tavarez DO []   [address] [place] [address]  Phone: [phone]  Fax: [phone]     PATIENT NAME: Darren Mccracken                                                                          YOB: 1934           DATE OF SERVICE: 04/27/2023  FACILITY:  [][place]   [] [place]   [] [facility]   [x] [facility]   [] Other ______________________________________________________________________      CHIEF COMPLAINT:    Problems with insomnia for the past several nights.    Follow up fall.        HISTORY OF PRESENT ILLNESS:     Per nursing, on 4/25, patient got out of bed and attempted to ambulate without assistance. They heard bed alarm sounding and found him in the floor. He reported that he \"was trying to get Karma and needed to lock the doors so no one stole their things\". He has also been turning off his bed and chair alarms. He did sustain skin tear to right FA. He has been encouraged to call for assistance with all ADLs but forgets to related to his dementia. He is unable to ambulate without assistance due to weakness. Wife would like him to have something to help him sleep.     PAST MEDICAL & SURGICAL HISTORY:   Past Medical History:   Diagnosis Date   • Allergic    • Allergic rhinitis    • Arthritis     knees   • BPH (benign prostatic hyperplasia)    • Cancer    • Chronic cough    • Chronic kidney disease    • Coronary artery disease    • Coronary artery disease involving native coronary artery of native heart without angina pectoris 07/31/2018    Added automatically from request for surgery [id number]   • Dementia    • Dementia    • Excessive tear production    • Facial basal cell cancer    • GERD (gastroesophageal reflux disease)    • Glaucoma    • History of transfusion    • Hyperlipidemia    • " Hypertension    • Kidney stone    • Osteoarthritis of both knees    • Pneumonia     right upper lobe    • Pulmonary embolism    • Skin cancer    • Vertigo    • Wears glasses       Past Surgical History:   Procedure Laterality Date   • CARDIAC CATHETERIZATION N/A 6/21/2017    Procedure: Left Heart Cath;  Surgeon: Toy Landers MD;  Location:  [facility];  Service:    • CARDIAC CATHETERIZATION N/A 8/1/2018    Procedure: Left Heart Cath;  Surgeon: Toy Landers MD;  Location:  [facility];  Service: Cardiology   • CATARACT EXTRACTION W/ INTRAOCULAR LENS  IMPLANT, BILATERAL     • CHOLECYSTECTOMY  2010   • COLONOSCOPY     • CORONARY ANGIOPLASTY WITH STENT PLACEMENT     • CORONARY STENT PLACEMENT      x3   • EYE SURGERY Right    • SKIN CANCER EXCISION     • WRIST SURGERY           MEDICATIONS:  I have reviewed and reconciled the patients medication list in the patients chart at the [facility] nursing [facility] today.      ALLERGIES:    Allergies   Allergen Reactions   • Lipitor [Atorvastatin] Myalgia         SOCIAL HISTORY:    Social History     Socioeconomic History   • Marital status:    Tobacco Use   • Smoking status: Never   • Smokeless tobacco: Never   Vaping Use   • Vaping Use: Never used   Substance and Sexual Activity   • Alcohol use: No   • Drug use: No   • Sexual activity: Not Currently       FAMILY HISTORY:    Family History   Problem Relation Age of Onset   • Heart disease Mother    • Heart attack Father        REVIEW OF SYSTEMS:    Review of Systems   Constitutional: Negative for activity change, appetite change, chills, diaphoresis, fatigue, fever, unexpected weight gain and unexpected weight loss.   HENT: Negative for congestion, mouth sores, nosebleeds, rhinorrhea, sore throat and trouble swallowing.    Eyes: Negative for discharge, redness and itching.   Respiratory: Negative for cough, choking, chest tightness, shortness of breath and wheezing.     Cardiovascular: Negative for chest pain, palpitations and leg swelling.   Gastrointestinal: Negative for abdominal pain, blood in stool, constipation, diarrhea, nausea and vomiting.   Endocrine: Negative for polydipsia and polyuria.   Genitourinary: Negative for decreased urine volume, difficulty urinating, dysuria, flank pain, frequency, hematuria, urgency and urinary incontinence.   Musculoskeletal: Positive for arthralgias and gait problem.   Skin: Positive for wound (skin tear right forearm). Negative for color change and rash.   Allergic/Immunologic: Negative for environmental allergies.   Neurological: Positive for weakness, memory problem and confusion. Negative for dizziness.   Psychiatric/Behavioral: Positive for behavioral problems, sleep disturbance and positive for hyperactivity. Negative for dysphoric mood, hallucinations and depressed mood. The patient is not nervous/anxious.          PHYSICAL EXAMINATION:   VITAL SIGNS:   Vitals:    04/27/23 0833   BP: 130/78   Pulse: 80   Resp: 20   Temp: 97.8 °F (36.6 °C)   SpO2: 96%   Weight: 65.3 kg (144 lb)       Physical Exam  Vitals and nursing note reviewed.   Constitutional:       Appearance: He is well-developed.   HENT:      Head: Normocephalic.      Right Ear: External ear normal.      Left Ear: External ear normal.      Nose: Nose normal.   Cardiovascular:      Rate and Rhythm: Normal rate and regular rhythm.      Heart sounds: Normal heart sounds.   Pulmonary:      Effort: Pulmonary effort is normal. No respiratory distress.      Breath sounds: Normal breath sounds. No wheezing or rales.   Abdominal:      General: Bowel sounds are normal.      Palpations: Abdomen is soft.   Musculoskeletal:      Cervical back: Normal range of motion.      Comments: NROM all major joints   Skin:     General: Skin is warm and dry.      Findings: Wound present. No rash.      Comments: Skin tear right forearm with dressing intact   Neurological:      Mental Status: He is  alert. He is disoriented and confused.      Comments: Resting in bed today   Psychiatric:         Mood and Affect: Mood normal. Affect is flat.         Behavior: Behavior normal.         Cognition and Memory: Cognition is impaired. Memory is impaired.         RECORDS REVIEW:   I have reviewed results in [facility]    ASSESSMENT     Diagnoses and all orders for this visit:    1. Falls frequently (Primary)    2. Insomnia due to medical condition    3. Moderate dementia with other behavioral disturbance, unspecified dementia type    4. Generalized weakness        PLAN    Falls frequently/insomnia/moderate dementia/weakness  -Will start Melatonin 6 mg qhs and Vistaril 25 mg po qhs. Will follow up and continue to monitor.     Nursing encouraged to keep me informed of any acute changes, lack of improvement, or any new concerning symptoms.    Will follow up and continue to monitor.     Continue supportive care for all ADLs.      [x]  Discussed Patient in detail with nursing/staff, addressed all needs today.     [x]  Plan of Care Reviewed   [x]  PT/OT Reviewed   [x]  Order Changes  [x]  Discharge Plans Reviewed  [x]  Advance Directive on file with Nursing Home.   [x]  POA on file with Nursing Home.   [x]  Code Status listed: [x]  Full Code   []  DNR              EAGLE Fernando.

## 2023-04-27 NOTE — PROGRESS NOTES
"Nursing Home Follow Up Note      Andrew Jacobo DO []   [facility] [x]  [address], [place] [address]  Phone: [phone]  Fax: [phone] Seema Blount MD []    Kurt Tavarez DO []   [address] [place] [address]  Phone: [phone]  Fax: [phone]     PATIENT NAME: Darren Mccracken                                                                          YOB: 1934           DATE OF SERVICE: 04/27/2023  FACILITY:  [][place]   [] [place]   [] [facility]   [x] [facility]   [] Other ______________________________________________________________________      CHIEF COMPLAINT:    Problems with insomnia for the past several nights.    Follow up fall.        HISTORY OF PRESENT ILLNESS:     Per nursing, on 4/25, patient got out of bed and attempted to ambulate without assistance. They heard bed alarm sounding and found him in the floor. He reported that he \"was trying to get Karma and needed to lock the doors so no one stole their things\". He has also been turning off his bed and chair alarms. He did sustain skin tear to right FA. He has been encouraged to call for assistance with all ADLs but forgets to related to his dementia. He is unable to ambulate without assistance due to weakness. Wife would like him to have something to help him sleep.     PAST MEDICAL & SURGICAL HISTORY:   Past Medical History:   Diagnosis Date   • Allergic    • Allergic rhinitis    • Arthritis     knees   • BPH (benign prostatic hyperplasia)    • Cancer    • Chronic cough    • Chronic kidney disease    • Coronary artery disease    • Coronary artery disease involving native coronary artery of native heart without angina pectoris 07/31/2018    Added automatically from request for surgery [id number]   • Dementia    • Dementia    • Excessive tear production    • Facial basal cell cancer    • GERD (gastroesophageal reflux disease)    • Glaucoma    • History of transfusion    • Hyperlipidemia    • " Hypertension    • Kidney stone    • Osteoarthritis of both knees    • Pneumonia     right upper lobe    • Pulmonary embolism    • Skin cancer    • Vertigo    • Wears glasses       Past Surgical History:   Procedure Laterality Date   • CARDIAC CATHETERIZATION N/A 6/21/2017    Procedure: Left Heart Cath;  Surgeon: Toy Landers MD;  [facility]  [facility];  Service:    • CARDIAC CATHETERIZATION N/A 8/1/2018    Procedure: Left Heart Cath;  Surgeon: Toy Landers MD;  [facility]:  [facility];  Service: Cardiology   • CATARACT EXTRACTION W/ INTRAOCULAR LENS  IMPLANT, BILATERAL     • CHOLECYSTECTOMY  2010   • COLONOSCOPY     • CORONARY ANGIOPLASTY WITH STENT PLACEMENT     • CORONARY STENT PLACEMENT      x3   • EYE SURGERY Right    • SKIN CANCER EXCISION     • WRIST SURGERY           MEDICATIONS:  I have reviewed and reconciled the patients medication list in the patients chart at the [facility] nursing [facility] today.      ALLERGIES:    Allergies   Allergen Reactions   • Lipitor [Atorvastatin] Myalgia         SOCIAL HISTORY:    Social History     Socioeconomic History   • Marital status:    Tobacco Use   • Smoking status: Never   • Smokeless tobacco: Never   Vaping Use   • Vaping Use: Never used   Substance and Sexual Activity   • Alcohol use: No   • Drug use: No   • Sexual activity: Not Currently       FAMILY HISTORY:    Family History   Problem Relation Age of Onset   • Heart disease Mother    • Heart attack Father        REVIEW OF SYSTEMS:    Review of Systems   Constitutional: Negative for activity change, appetite change, chills, diaphoresis, fatigue, fever, unexpected weight gain and unexpected weight loss.   HENT: Negative for congestion, mouth sores, nosebleeds, rhinorrhea, sore throat and trouble swallowing.    Eyes: Negative for discharge, redness and itching.   Respiratory: Negative for cough, choking, chest tightness, shortness of breath and wheezing.     Cardiovascular: Negative for chest pain, palpitations and leg swelling.   Gastrointestinal: Negative for abdominal pain, blood in stool, constipation, diarrhea, nausea and vomiting.   Endocrine: Negative for polydipsia and polyuria.   Genitourinary: Negative for decreased urine volume, difficulty urinating, dysuria, flank pain, frequency, hematuria, urgency and urinary incontinence.   Musculoskeletal: Positive for arthralgias and gait problem.   Skin: Positive for wound (skin tear right forearm). Negative for color change and rash.   Allergic/Immunologic: Negative for environmental allergies.   Neurological: Positive for weakness, memory problem and confusion. Negative for dizziness.   Psychiatric/Behavioral: Positive for behavioral problems, sleep disturbance and positive for hyperactivity. Negative for dysphoric mood, hallucinations and depressed mood. The patient is not nervous/anxious.          PHYSICAL EXAMINATION:   VITAL SIGNS:   Vitals:    04/27/23 0833   BP: 130/78   Pulse: 80   Resp: 20   Temp: 97.8 °F (36.6 °C)   SpO2: 96%   Weight: 65.3 kg (144 lb)       Physical Exam  Vitals and nursing note reviewed.   Constitutional:       Appearance: He is well-developed.   HENT:      Head: Normocephalic.      Right Ear: External ear normal.      Left Ear: External ear normal.      Nose: Nose normal.   Cardiovascular:      Rate and Rhythm: Normal rate and regular rhythm.      Heart sounds: Normal heart sounds.   Pulmonary:      Effort: Pulmonary effort is normal. No respiratory distress.      Breath sounds: Normal breath sounds. No wheezing or rales.   Abdominal:      General: Bowel sounds are normal.      Palpations: Abdomen is soft.   Musculoskeletal:      Cervical back: Normal range of motion.      Comments: NROM all major joints   Skin:     General: Skin is warm and dry.      Findings: Wound present. No rash.      Comments: Skin tear right forearm with dressing intact   Neurological:      Mental Status: He is  alert. He is disoriented and confused.      Comments: Resting in bed today   Psychiatric:         Mood and Affect: Mood normal. Affect is flat.         Behavior: Behavior normal.         Cognition and Memory: Cognition is impaired. Memory is impaired.         RECORDS REVIEW:   I have reviewed [facility]    ASSESSMENT     Diagnoses and all orders for this visit:    1. Falls frequently (Primary)    2. Insomnia due to medical condition    3. Moderate dementia with other behavioral disturbance, unspecified dementia type    4. Generalized weakness        PLAN    Falls frequently/insomnia/moderate dementia/weakness  -Will start Melatonin 6 mg qhs and Vistaril 25 mg po qhs. Will follow up and continue to monitor.     Nursing encouraged to keep me informed of any acute changes, lack of improvement, or any new concerning symptoms.    Will follow up and continue to monitor.     Continue supportive care for all ADLs.      [x]  Discussed Patient in detail with nursing/staff, addressed all needs today.     [x]  Plan of Care Reviewed   [x]  PT/OT Reviewed   [x]  Order Changes  [x]  Discharge Plans Reviewed  [x]  Advance Directive on file with Nursing Home.   [x]  POA on file with Nursing Home.   [x]  Code Status listed: [x]  Full Code   []  DNR              EAGLE Fernando.

## 2023-05-08 ENCOUNTER — NURSING HOME (OUTPATIENT)
Dept: FAMILY MEDICINE CLINIC | Facility: CLINIC | Age: 88
End: 2023-05-08
Payer: MEDICARE

## 2023-05-08 VITALS
BODY MASS INDEX: 20.5 KG/M2 | TEMPERATURE: 97.5 F | WEIGHT: 147 LBS | RESPIRATION RATE: 20 BRPM | OXYGEN SATURATION: 96 % | SYSTOLIC BLOOD PRESSURE: 130 MMHG | HEART RATE: 84 BPM | DIASTOLIC BLOOD PRESSURE: 80 MMHG

## 2023-05-08 DIAGNOSIS — Z78.9 IMPAIRED MOBILITY AND ADLS: ICD-10-CM

## 2023-05-08 DIAGNOSIS — R41.82 ACUTE ON CHRONIC ALTERATION IN MENTAL STATUS: Primary | ICD-10-CM

## 2023-05-08 DIAGNOSIS — Z74.09 IMPAIRED MOBILITY AND ADLS: ICD-10-CM

## 2023-05-08 DIAGNOSIS — R53.83 LETHARGY: ICD-10-CM

## 2023-05-08 DIAGNOSIS — F03.B18 MODERATE DEMENTIA WITH OTHER BEHAVIORAL DISTURBANCE, UNSPECIFIED DEMENTIA TYPE: ICD-10-CM

## 2023-05-08 DIAGNOSIS — D72.829 LEUKOCYTOSIS, UNSPECIFIED TYPE: ICD-10-CM

## 2023-05-08 PROBLEM — F03.B0 MODERATE DEMENTIA: Status: ACTIVE | Noted: 2023-01-01

## 2023-05-08 NOTE — LETTER
Nursing Home Follow Up Note      Andrew Jacobo DO []   EAGLE Waddell [x]  [address] [place] [address]  Phone: [phone]  Fax: [phone] Seema Blount MD []    Kurt Tavarez DO []   [address] [place] [address]  Phone: [phone]  Fax: [phone]     PATIENT NAME: Darren Mccracken                                                                          YOB: 1934           DATE OF SERVICE: 05/8/2023  FACILITY:  [][place]   [] [place]   [] [facility]   [x] [facility]   [] Other ______________________________________________________________________      CHIEF COMPLAINT:      Increased lethargy and mental status changes over the weekend.       HISTORY OF PRESENT ILLNESS:     Nursing called over the weekend to report that patient had increased mental status changes, lethargy and decreased appetite. Family concerned it was Haldol he had received for behaviors but patient hs not received Haldol since the night of 5/3.  Chest x-ray and stat labs were obtained and results are available today.  Chest x-ray reports no pneumonia or other acute findings.  BMP with no concerning findings, CBC reports leukocytosis with WBC count 15.8. He was sitting in wheelchair during visit today, wife at bedside. She reports he is doing much better today. He ate breakfast and lunch and has been able to converse today. Wife reports the past couple days he could not carry on conversation and he could not understand anything he was trying to say.  He was able to converse during visit today and appeared pleasantly confused per baseline.      PAST MEDICAL & SURGICAL HISTORY:   Past Medical History:   Diagnosis Date   • Allergic    • Allergic rhinitis    • Arthritis     knees   • BPH (benign prostatic hyperplasia)    • Cancer    • Chronic cough    • Chronic kidney disease    • Coronary artery disease    • Coronary artery disease involving native coronary artery of native heart without  angina pectoris 07/31/2018    Added automatically from request for surgery [id number]   • Dementia    • Dementia    • Excessive tear production    • Facial basal cell cancer    • GERD (gastroesophageal reflux disease)    • Glaucoma    • History of transfusion    • Hyperlipidemia    • Hypertension    • Kidney stone    • Osteoarthritis of both knees    • Pneumonia     right upper lobe    • Pulmonary embolism    • Skin cancer    • Vertigo    • Wears glasses       Past Surgical History:   Procedure Laterality Date   • CARDIAC CATHETERIZATION N/A 6/21/2017    Procedure: Left Heart Cath;  Surgeon: Toy Landers MD;  Location:  [facility];  Service:    • CARDIAC CATHETERIZATION N/A 8/1/2018    Procedure: Left Heart Cath;  Surgeon: Toy Landers MD;  Location:  [facility];  Service: Cardiology   • CATARACT EXTRACTION W/ INTRAOCULAR LENS  IMPLANT, BILATERAL     • CHOLECYSTECTOMY  2010   • COLONOSCOPY     • CORONARY ANGIOPLASTY WITH STENT PLACEMENT     • CORONARY STENT PLACEMENT      x3   • EYE SURGERY Right    • SKIN CANCER EXCISION     • WRIST SURGERY           MEDICATIONS:  I have reviewed and reconciled the patients medication list in the patients chart at the [facility] nursing [facility] today.      ALLERGIES:    Allergies   Allergen Reactions   • Lipitor [Atorvastatin] Myalgia         SOCIAL HISTORY:    Social History     Socioeconomic History   • Marital status:    Tobacco Use   • Smoking status: Never   • Smokeless tobacco: Never   Vaping Use   • Vaping Use: Never used   Substance and Sexual Activity   • Alcohol use: No   • Drug use: No   • Sexual activity: Not Currently       FAMILY HISTORY:    Family History   Problem Relation Age of Onset   • Heart disease Mother    • Heart attack Father        REVIEW OF SYSTEMS:    Review of Systems   Constitutional: Positive for activity change. Negative for appetite change, chills, diaphoresis, fatigue, fever, unexpected weight gain and  unexpected weight loss.   HENT: Negative for congestion, mouth sores, nosebleeds, rhinorrhea, sore throat and trouble swallowing.    Respiratory: Negative for cough, choking, chest tightness, shortness of breath and wheezing.    Cardiovascular: Negative for chest pain, palpitations and leg swelling.   Gastrointestinal: Negative for abdominal pain, blood in stool, constipation, diarrhea, nausea and vomiting.   Genitourinary: Positive for urinary incontinence. Negative for decreased urine volume, difficulty urinating, dysuria, frequency and hematuria.   Musculoskeletal: Positive for arthralgias and gait problem.   Skin: Negative for color change and rash.   Neurological: Positive for weakness, memory problem and confusion. Negative for dizziness.   Psychiatric/Behavioral: Positive for behavioral problems and sleep disturbance. Negative for dysphoric mood, hallucinations, negative for hyperactivity and depressed mood. The patient is not nervous/anxious.          PHYSICAL EXAMINATION:   VITAL SIGNS:   Vitals:    05/08/23 1256   BP: 130/80   Pulse: 84   Resp: 20   Temp: 97.5 °F (36.4 °C)   SpO2: 96%   Weight: 66.7 kg (147 lb)       Physical Exam  Vitals and nursing note reviewed.   Constitutional:       Appearance: He is well-developed.   Cardiovascular:      Rate and Rhythm: Normal rate and regular rhythm.      Heart sounds: Normal heart sounds.   Pulmonary:      Effort: Pulmonary effort is normal. No respiratory distress.      Breath sounds: Normal breath sounds. No wheezing or rales.   Abdominal:      General: Bowel sounds are normal.      Palpations: Abdomen is soft.   Musculoskeletal:      Cervical back: Normal range of motion.      Comments: Generalized weakness   Skin:     General: Skin is warm and dry.   Neurological:      Mental Status: He is alert. Mental status is at baseline. He is disoriented and confused.   Psychiatric:         Mood and Affect: Mood and affect normal.         Behavior: Behavior normal.          Cognition and Memory: Cognition is impaired. Memory is impaired.         RECORDS REVIEW:   I have reviewed results in [facility]    ASSESSMENT     Diagnoses and all orders for this visit:    1. Acute on chronic alteration in mental status (Primary)    2. Lethargy    3. Leukocytosis, unspecified type    4. Moderate dementia with other behavioral disturbance, unspecified dementia type    5. Impaired mobility and ADLs        PLAN    Lethargy/leukcytosis/acute on chronic mental status changes  -WBC count 15.8 with labs today. CXR negative. Invanz started this morning. Will continue Invanz 1 gm IM x 10 days. Will send UA C&S. Lethargy and mental status have improved. Will follow up and continue to monitor.     Nursing encouraged to keep me informed of any acute changes, lack of improvement, or any new concerning symptoms.    Will follow up and continue to monitor.     Continue supportive care for all ADLs.      [x]  Discussed Patient in detail with nursing/staff, addressed all needs today.     [x]  Plan of Care Reviewed   [x]  PT/OT Reviewed   [x]  Order Changes  [x]  Discharge Plans Reviewed  [x]  Advance Directive on file with Nursing Home.   [x]  POA on file with Nursing Home.   [x]  Code Status listed: [x]  Full Code   []  DNR        “I confirm accuracy of unchanged data/findings which have been carried forward from previous visit, as well as I have updated appropriately those that have changed.”        EAGLE Fernando.

## 2023-05-09 NOTE — PROGRESS NOTES
Nursing Home Follow Up Note      Andrew Jacobo DO []   EAGLE Waddell [x]  [address] [place] [address]  Phone: [phone]  Fax: [phone] Seema Blount MD []    Kurt Tavarez DO []   [address] [place] [address]  Phone: [phone]  Fax: [phone]     PATIENT NAME: Darren Mccracken                                                                          YOB: 1934           DATE OF SERVICE: 05/8/2023  FACILITY:  [][place]   [] [facility]   [] [facility]   [x] [facility]   [] Other ______________________________________________________________________      CHIEF COMPLAINT:      Increased lethargy and mental status changes over the weekend.       HISTORY OF PRESENT ILLNESS:     Nursing called over the weekend to report that patient had increased mental status changes, lethargy and decreased appetite. Family concerned it was Haldol he had received for behaviors but patient hs not received Haldol since the night of 5/3.  Chest x-ray and stat labs were obtained and results are available today.  Chest x-ray reports no pneumonia or other acute findings.  BMP with no concerning findings, CBC reports leukocytosis with WBC count 15.8. He was sitting in wheelchair during visit today, wife at bedside. She reports he is doing much better today. He ate breakfast and lunch and has been able to converse today. Wife reports the past couple days he could not carry on conversation and he could not understand anything he was trying to say.  He was able to converse during visit today and appeared pleasantly confused per baseline.      PAST MEDICAL & SURGICAL HISTORY:   Past Medical History:   Diagnosis Date   • Allergic    • Allergic rhinitis    • Arthritis     knees   • BPH (benign prostatic hyperplasia)    • Cancer    • Chronic cough    • Chronic kidney disease    • Coronary artery disease    • Coronary artery disease involving native coronary artery of native heart without  angina pectoris 07/31/2018    Added automatically from request for surgery [id number]   • Dementia    • Dementia    • Excessive tear production    • Facial basal cell cancer    • GERD (gastroesophageal reflux disease)    • Glaucoma    • History of transfusion    • Hyperlipidemia    • Hypertension    • Kidney stone    • Osteoarthritis of both knees    • Pneumonia     right upper lobe    • Pulmonary embolism    • Skin cancer    • Vertigo    • Wears glasses       Past Surgical History:   Procedure Laterality Date   • CARDIAC CATHETERIZATION N/A 6/21/2017    Procedure: Left Heart Cath;  Surgeon: Toy Landers MD;  Location:  [facility];  Service:    • CARDIAC CATHETERIZATION N/A 8/1/2018    Procedure: Left Heart Cath;  Surgeon: Toy Landers MD;  Location:  [facility];  Service: Cardiology   • CATARACT EXTRACTION W/ INTRAOCULAR LENS  IMPLANT, BILATERAL     • CHOLECYSTECTOMY  2010   • COLONOSCOPY     • CORONARY ANGIOPLASTY WITH STENT PLACEMENT     • CORONARY STENT PLACEMENT      x3   • EYE SURGERY Right    • SKIN CANCER EXCISION     • WRIST SURGERY           MEDICATIONS:  I have reviewed and reconciled the patients medication list in the patients chart at the [facility] nursing [facility] today.      ALLERGIES:    Allergies   Allergen Reactions   • Lipitor [Atorvastatin] Myalgia         SOCIAL HISTORY:    Social History     Socioeconomic History   • Marital status:    Tobacco Use   • Smoking status: Never   • Smokeless tobacco: Never   Vaping Use   • Vaping Use: Never used   Substance and Sexual Activity   • Alcohol use: No   • Drug use: No   • Sexual activity: Not Currently       FAMILY HISTORY:    Family History   Problem Relation Age of Onset   • Heart disease Mother    • Heart attack Father        REVIEW OF SYSTEMS:    Review of Systems   Constitutional: Positive for activity change. Negative for appetite change, chills, diaphoresis, fatigue, fever, unexpected weight gain and  unexpected weight loss.   HENT: Negative for congestion, mouth sores, nosebleeds, rhinorrhea, sore throat and trouble swallowing.    Respiratory: Negative for cough, choking, chest tightness, shortness of breath and wheezing.    Cardiovascular: Negative for chest pain, palpitations and leg swelling.   Gastrointestinal: Negative for abdominal pain, blood in stool, constipation, diarrhea, nausea and vomiting.   Genitourinary: Positive for urinary incontinence. Negative for decreased urine volume, difficulty urinating, dysuria, frequency and hematuria.   Musculoskeletal: Positive for arthralgias and gait problem.   Skin: Negative for color change and rash.   Neurological: Positive for weakness, memory problem and confusion. Negative for dizziness.   Psychiatric/Behavioral: Positive for behavioral problems and sleep disturbance. Negative for dysphoric mood, hallucinations, negative for hyperactivity and depressed mood. The patient is not nervous/anxious.          PHYSICAL EXAMINATION:   VITAL SIGNS:   Vitals:    05/08/23 1256   BP: 130/80   Pulse: 84   Resp: 20   Temp: 97.5 °F (36.4 °C)   SpO2: 96%   Weight: 66.7 kg (147 lb)       Physical Exam  Vitals and nursing note reviewed.   Constitutional:       Appearance: He is well-developed.   Cardiovascular:      Rate and Rhythm: Normal rate and regular rhythm.      Heart sounds: Normal heart sounds.   Pulmonary:      Effort: Pulmonary effort is normal. No respiratory distress.      Breath sounds: Normal breath sounds. No wheezing or rales.   Abdominal:      General: Bowel sounds are normal.      Palpations: Abdomen is soft.   Musculoskeletal:      Cervical back: Normal range of motion.      Comments: Generalized weakness   Skin:     General: Skin is warm and dry.   Neurological:      Mental Status: He is alert. Mental status is at baseline. He is disoriented and confused.   Psychiatric:         Mood and Affect: Mood and affect normal.         Behavior: Behavior normal.          Cognition and Memory: Cognition is impaired. Memory is impaired.         RECORDS REVIEW:   I have reviewed [facility] in [facility]    ASSESSMENT     Diagnoses and all orders for this visit:    1. Acute on chronic alteration in mental status (Primary)    2. Lethargy    3. Leukocytosis, unspecified type    4. Moderate dementia with other behavioral disturbance, unspecified dementia type    5. Impaired mobility and ADLs        PLAN    Lethargy/leukcytosis/acute on chronic mental status changes  -WBC count 15.8 with labs today. CXR negative. Invanz started this morning. Will continue Invanz 1 gm IM x 10 days. Will send UA C&S. Lethargy and mental status have improved. Will follow up and continue to monitor.     Nursing encouraged to keep me informed of any acute changes, lack of improvement, or any new concerning symptoms.    Will follow up and continue to monitor.     Continue supportive care for all ADLs.      [x]  Discussed Patient in detail with nursing/staff, addressed all needs today.     [x]  Plan of Care Reviewed   [x]  PT/OT Reviewed   [x]  Order Changes  [x]  Discharge Plans Reviewed  [x]  Advance Directive on file with Nursing Home.   [x]  POA on file with Nursing Home.   [x]  Code Status listed: [x]  Full Code   []  DNR        “I confirm accuracy of unchanged data/findings which have been carried forward from previous visit, as well as I have updated appropriately those that have changed.”        EAGLE Fernando.

## 2023-05-10 ENCOUNTER — NURSING HOME (OUTPATIENT)
Dept: FAMILY MEDICINE CLINIC | Facility: CLINIC | Age: 88
End: 2023-05-10
Payer: MEDICARE

## 2023-05-10 VITALS
TEMPERATURE: 97.8 F | BODY MASS INDEX: 19.8 KG/M2 | SYSTOLIC BLOOD PRESSURE: 132 MMHG | RESPIRATION RATE: 18 BRPM | DIASTOLIC BLOOD PRESSURE: 76 MMHG | HEART RATE: 80 BPM | WEIGHT: 142 LBS | OXYGEN SATURATION: 96 %

## 2023-05-10 DIAGNOSIS — Z78.9 IMPAIRED MOBILITY AND ADLS: ICD-10-CM

## 2023-05-10 DIAGNOSIS — R41.82: ICD-10-CM

## 2023-05-10 DIAGNOSIS — F03.B18 MODERATE DEMENTIA WITH OTHER BEHAVIORAL DISTURBANCE, UNSPECIFIED DEMENTIA TYPE: ICD-10-CM

## 2023-05-10 DIAGNOSIS — Z79.899 MEDICATION THERAPY CHANGED: Primary | ICD-10-CM

## 2023-05-10 DIAGNOSIS — Z74.09 IMPAIRED MOBILITY AND ADLS: ICD-10-CM

## 2023-05-10 DIAGNOSIS — D72.829 LEUKOCYTOSIS, UNSPECIFIED TYPE: ICD-10-CM

## 2023-05-10 NOTE — LETTER
Nursing Home Follow Up Note      Andrew Jacobo DO []   EAGLE Waddell [x]  [address] [place] [address]  Phone: [phone]  Fax: [phone] Seema Blount MD []    Kurt Tavarez DO []   [address] [place] [address]  Phone: [phone]  Fax: [phone]     PATIENT NAME: Darren Mccracken                                                                          YOB: 1934           DATE OF SERVICE: 05/10/2023  FACILITY:  [][place]   [] [place]   [] [facility]   [x] [facility]   [] Other ______________________________________________________________________      CHIEF COMPLAINT:      Medication review for changes.       HISTORY OF PRESENT ILLNESS:     Nursing reports that wife has requested that patient's antibiotic be changed to something less expensive because she can not afford it. He is private pay and the Invanz he is on is going to cost $600. He was started on Invanz Monday for coverage of possible pneumonia and UTI due to mental status changes and leukocytosis. His CXR was negative for pneumonia and UA C&S is pending. Patient does appear to be doing better and wife reports he is as well. He is moving about his room in his wheelchair and is very pleasant. He is confused but his wife feels it is baseline for him. He has no signs and symptoms of distress and wife has no other concerns.       PAST MEDICAL & SURGICAL HISTORY:   Past Medical History:   Diagnosis Date   • Allergic    • Allergic rhinitis    • Arthritis     knees   • BPH (benign prostatic hyperplasia)    • Cancer    • Chronic cough    • Chronic kidney disease    • Coronary artery disease    • Coronary artery disease involving native coronary artery of native heart without angina pectoris 07/31/2018    Added automatically from request for surgery [id number]   • Dementia    • Dementia    • Excessive tear production    • Facial basal cell cancer    • GERD (gastroesophageal reflux disease)    • Glaucoma    •  History of transfusion    • Hyperlipidemia    • Hypertension    • Kidney stone    • Osteoarthritis of both knees    • Pneumonia     right upper lobe    • Pulmonary embolism    • Skin cancer    • Vertigo    • Wears glasses       Past Surgical History:   Procedure Laterality Date   • CARDIAC CATHETERIZATION N/A 6/21/2017    Procedure: Left Heart Cath;  Surgeon: Toy Landers MD;  Location:  [facility];  Service:    • CARDIAC CATHETERIZATION N/A 8/1/2018    Procedure: Left Heart Cath;  Surgeon: Toy Landers MD;  Location:  [facility];  Service: Cardiology   • CATARACT EXTRACTION W/ INTRAOCULAR LENS  IMPLANT, BILATERAL     • CHOLECYSTECTOMY  2010   • COLONOSCOPY     • CORONARY ANGIOPLASTY WITH STENT PLACEMENT     • CORONARY STENT PLACEMENT      x3   • EYE SURGERY Right    • SKIN CANCER EXCISION     • WRIST SURGERY           MEDICATIONS:  I have reviewed and reconciled the patients medication list in the patients chart at the [facility] nursing [facility] today.      ALLERGIES:    Allergies   Allergen Reactions   • Lipitor [Atorvastatin] Myalgia         SOCIAL HISTORY:    Social History     Socioeconomic History   • Marital status:    Tobacco Use   • Smoking status: Never   • Smokeless tobacco: Never   Vaping Use   • Vaping Use: Never used   Substance and Sexual Activity   • Alcohol use: No   • Drug use: No   • Sexual activity: Not Currently       FAMILY HISTORY:    Family History   Problem Relation Age of Onset   • Heart disease Mother    • Heart attack Father        REVIEW OF SYSTEMS:    Review of Systems   Constitutional: Negative for activity change, appetite change, chills, diaphoresis, fatigue, fever, unexpected weight gain and unexpected weight loss.   HENT: Negative for congestion, mouth sores, nosebleeds, rhinorrhea, sore throat and trouble swallowing.    Respiratory: Negative for cough, choking, chest tightness, shortness of breath and wheezing.    Cardiovascular:  Negative for chest pain, palpitations and leg swelling.   Gastrointestinal: Negative for abdominal pain, blood in stool, constipation, diarrhea, nausea and vomiting.   Genitourinary: Positive for urinary incontinence. Negative for decreased urine volume, difficulty urinating, dysuria, frequency and hematuria.   Musculoskeletal: Positive for arthralgias and gait problem.   Skin: Negative for color change and rash.   Neurological: Positive for weakness, memory problem and confusion. Negative for dizziness.   Psychiatric/Behavioral: Positive for behavioral problems and sleep disturbance. Negative for dysphoric mood, hallucinations, negative for hyperactivity and depressed mood. The patient is not nervous/anxious.          PHYSICAL EXAMINATION:   VITAL SIGNS:   Vitals:    05/10/23 1237   BP: 132/76   Pulse: 80   Resp: 18   Temp: 97.8 °F (36.6 °C)   SpO2: 96%   Weight: 64.4 kg (142 lb)       Physical Exam  Vitals and nursing note reviewed.   Constitutional:       Appearance: He is well-developed.   Cardiovascular:      Rate and Rhythm: Normal rate and regular rhythm.      Heart sounds: Normal heart sounds.   Pulmonary:      Effort: Pulmonary effort is normal. No respiratory distress.      Breath sounds: Normal breath sounds. No wheezing or rales.   Abdominal:      General: Bowel sounds are normal.      Palpations: Abdomen is soft.   Musculoskeletal:      Cervical back: Normal range of motion.      Comments: Generalized weakness   Skin:     General: Skin is warm and dry.   Neurological:      Mental Status: He is alert. Mental status is at baseline. He is disoriented and confused.   Psychiatric:         Mood and Affect: Mood and affect normal.         Behavior: Behavior normal.         Cognition and Memory: Cognition is impaired. Memory is impaired.         RECORDS REVIEW:   I have reviewed results in [facility]    ASSESSMENT     Diagnoses and all orders for this visit:    1. Medication therapy changed (Primary)    2.  Leukocytosis, unspecified type    3. Mental status near baseline    4. Moderate dementia with other behavioral disturbance, unspecified dementia type    5. Impaired mobility and ADLs        PLAN    Medication change/ Leukocytosis/Mental status near baseline/dementia  -Will stop Invanz due to cost and start Cefdinir 300 mg po bid while awaiting UA C&S results. Did discuss this with wife. Will follow up.    Nursing encouraged to keep me informed of any acute changes, lack of improvement, or any new concerning symptoms.    Will follow up and continue to monitor.     Continue supportive care for all ADLs.      [x]  Discussed Patient in detail with nursing/staff, addressed all needs today.     [x]  Plan of Care Reviewed   [x]  PT/OT Reviewed   [x]  Order Changes  [x]  Discharge Plans Reviewed  [x]  Advance Directive on file with Nursing Home.   [x]  POA on file with Nursing Home.   [x]  Code Status listed: [x]  Full Code   []  DNR     “I confirm accuracy of unchanged data/findings which have been carried forward from previous visit, as well as I have updated appropriately those that have changed.”          Zahra Shankar, APRN.

## 2023-05-11 NOTE — PROGRESS NOTES
Nursing Home Follow Up Note      Andrew Jacobo DO []   EAGLE Waddell [x]  [address] [place] [address]  Phone: [phone]  Fax: [phone] Seema Blount MD []    Kurt Tavarez DO []   [address] [place] [address]  Phone: [phone]  Fax: [phone]     PATIENT NAME: Darren Mccracken                                                                          YOB: 1934           DATE OF SERVICE: 05/10/2023  FACILITY:  [][place]   [] [facility]   [] [facility]   [x] [facility]   [] Other ______________________________________________________________________      CHIEF COMPLAINT:      Medication review for changes.       HISTORY OF PRESENT ILLNESS:     Nursing reports that wife has requested that patient's antibiotic be changed to something less expensive because she can not afford it. He is private pay and the Invanz he is on is going to cost $600. He was started on Invanz Monday for coverage of possible pneumonia and UTI due to mental status changes and leukocytosis. His CXR was negative for pneumonia and UA C&S is pending. Patient does appear to be doing better and wife reports he is as well. He is moving about his room in his wheelchair and is very pleasant. He is confused but his wife feels it is baseline for him. He has no signs and symptoms of distress and wife has no other concerns.       PAST MEDICAL & SURGICAL HISTORY:   Past Medical History:   Diagnosis Date   • Allergic    • Allergic rhinitis    • Arthritis     knees   • BPH (benign prostatic hyperplasia)    • Cancer    • Chronic cough    • Chronic kidney disease    • Coronary artery disease    • Coronary artery disease involving native coronary artery of native heart without angina pectoris 07/31/2018    Added automatically from request for surgery [id number]   • Dementia    • Dementia    • Excessive tear production    • Facial basal cell cancer    • GERD (gastroesophageal reflux disease)    • Glaucoma    •  History of transfusion    • Hyperlipidemia    • Hypertension    • Kidney stone    • Osteoarthritis of both knees    • Pneumonia     right upper lobe    • Pulmonary embolism    • Skin cancer    • Vertigo    • Wears glasses       Past Surgical History:   Procedure Laterality Date   • CARDIAC CATHETERIZATION N/A 6/21/2017    Procedure: Left Heart Cath;  Surgeon: Toy Landers MD;  Location:  [facility];  Service:    • CARDIAC CATHETERIZATION N/A 8/1/2018    Procedure: Left Heart Cath;  Surgeon: Toy Landers MD;  Location:  [facility];  Service: Cardiology   • CATARACT EXTRACTION W/ INTRAOCULAR LENS  IMPLANT, BILATERAL     • CHOLECYSTECTOMY  2010   • COLONOSCOPY     • CORONARY ANGIOPLASTY WITH STENT PLACEMENT     • CORONARY STENT PLACEMENT      x3   • EYE SURGERY Right    • SKIN CANCER EXCISION     • WRIST SURGERY           MEDICATIONS:  I have reviewed and reconciled the patients medication list in the patients chart at the [facility] nursing [facility] today.      ALLERGIES:    Allergies   Allergen Reactions   • Lipitor [Atorvastatin] Myalgia         SOCIAL HISTORY:    Social History     Socioeconomic History   • Marital status:    Tobacco Use   • Smoking status: Never   • Smokeless tobacco: Never   Vaping Use   • Vaping Use: Never used   Substance and Sexual Activity   • Alcohol use: No   • Drug use: No   • Sexual activity: Not Currently       FAMILY HISTORY:    Family History   Problem Relation Age of Onset   • Heart disease Mother    • Heart attack Father        REVIEW OF SYSTEMS:    Review of Systems   Constitutional: Negative for activity change, appetite change, chills, diaphoresis, fatigue, fever, unexpected weight gain and unexpected weight loss.   HENT: Negative for congestion, mouth sores, nosebleeds, rhinorrhea, sore throat and trouble swallowing.    Respiratory: Negative for cough, choking, chest tightness, shortness of breath and wheezing.    Cardiovascular:  Negative for chest pain, palpitations and leg swelling.   Gastrointestinal: Negative for abdominal pain, blood in stool, constipation, diarrhea, nausea and vomiting.   Genitourinary: Positive for urinary incontinence. Negative for decreased urine volume, difficulty urinating, dysuria, frequency and hematuria.   Musculoskeletal: Positive for arthralgias and gait problem.   Skin: Negative for color change and rash.   Neurological: Positive for weakness, memory problem and confusion. Negative for dizziness.   Psychiatric/Behavioral: Positive for behavioral problems and sleep disturbance. Negative for dysphoric mood, hallucinations, negative for hyperactivity and depressed mood. The patient is not nervous/anxious.          PHYSICAL EXAMINATION:   VITAL SIGNS:   Vitals:    05/10/23 1237   BP: 132/76   Pulse: 80   Resp: 18   Temp: 97.8 °F (36.6 °C)   SpO2: 96%   Weight: 64.4 kg (142 lb)       Physical Exam  Vitals and nursing note reviewed.   Constitutional:       Appearance: He is well-developed.   Cardiovascular:      Rate and Rhythm: Normal rate and regular rhythm.      Heart sounds: Normal heart sounds.   Pulmonary:      Effort: Pulmonary effort is normal. No respiratory distress.      Breath sounds: Normal breath sounds. No wheezing or rales.   Abdominal:      General: Bowel sounds are normal.      Palpations: Abdomen is soft.   Musculoskeletal:      Cervical back: Normal range of motion.      Comments: Generalized weakness   Skin:     General: Skin is warm and dry.   Neurological:      Mental Status: He is alert. Mental status is at baseline. He is disoriented and confused.   Psychiatric:         Mood and Affect: Mood and affect normal.         Behavior: Behavior normal.         Cognition and Memory: Cognition is impaired. Memory is impaired.         RECORDS REVIEW:   I have reviewed results in [facility]    ASSESSMENT     Diagnoses and all orders for this visit:    1. Medication therapy changed (Primary)    2.  Leukocytosis, unspecified type    3. Mental status near baseline    4. Moderate dementia with other behavioral disturbance, unspecified dementia type    5. Impaired mobility and ADLs        PLAN    Medication change/ Leukocytosis/Mental status near baseline/dementia  -Will stop Invanz due to cost and start Cefdinir 300 mg po bid while awaiting UA C&S results. Did discuss this with wife. Will follow up.    Nursing encouraged to keep me informed of any acute changes, lack of improvement, or any new concerning symptoms.    Will follow up and continue to monitor.     Continue supportive care for all ADLs.      [x]  Discussed Patient in detail with nursing/staff, addressed all needs today.     [x]  Plan of Care Reviewed   [x]  PT/OT Reviewed   [x]  Order Changes  [x]  Discharge Plans Reviewed  [x]  Advance Directive on file with Nursing Home.   [x]  POA on file with Nursing Home.   [x]  Code Status listed: [x]  Full Code   []  DNR     “I confirm accuracy of unchanged data/findings which have been carried forward from previous visit, as well as I have updated appropriately those that have changed.”          Zahra Shankar, APRN.

## 2023-05-19 ENCOUNTER — NURSING HOME (OUTPATIENT)
Dept: FAMILY MEDICINE CLINIC | Facility: CLINIC | Age: 88
End: 2023-05-19
Payer: MEDICARE

## 2023-05-19 VITALS
SYSTOLIC BLOOD PRESSURE: 112 MMHG | DIASTOLIC BLOOD PRESSURE: 60 MMHG | OXYGEN SATURATION: 93 % | WEIGHT: 138 LBS | BODY MASS INDEX: 19.25 KG/M2 | RESPIRATION RATE: 18 BRPM | TEMPERATURE: 97.8 F

## 2023-05-19 DIAGNOSIS — Z74.09 IMPAIRED MOBILITY AND ADLS: ICD-10-CM
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
## 2023-05-19 DIAGNOSIS — Z79.899 MEDICATION DOSE CHANGED: Primary | ICD-10-CM

## 2023-05-19 DIAGNOSIS — R29.6 FALLS FREQUENTLY: ICD-10-CM

## 2023-05-19 DIAGNOSIS — R26.81 GAIT INSTABILITY: ICD-10-CM

## 2023-05-19 DIAGNOSIS — Z78.9 IMPAIRED MOBILITY AND ADLS: ICD-10-CM

## 2023-05-19 NOTE — LETTER
Nursing Home Follow Up Note      Andrew Jacobo DO []   EAGLE Waddell [x]  [address] [place] [address]  Phone: [phone]  Fax: [phone] Seema Blount MD []    Kurt Tavarez DO []   [address] [place] [address]  Phone: [phone]  Fax: [phone]     PATIENT NAME: Darren Mccracken                                                                          YOB: 1934           DATE OF SERVICE: 05/19/2023  FACILITY:  [][place]   [] [place]   [] [facility]   [x] [facility]   [] Other ______________________________________________________________________      CHIEF COMPLAINT:      Medication review related to behaviors and falls.       HISTORY OF PRESENT ILLNESS:     Nursing reports that patient had another noninjury fall this morning. Around 0330 this morning nursing staff made rounds to find patient face down on the floor.  He did not have any clothing or depends on so felt most likely that patient was attempting to take himself to the restroom and fell.  No injuries noted or reported.  He is sitting up in his wheelchair today pleasantly confused with no complaints or signs and symptoms of distress.  Wife is at bedside in chair.  No reports or signs and symptoms of any distress.  Nursing reports that he becomes very restless in the evening wants his wife believes and is restless at night as well.    PAST MEDICAL & SURGICAL HISTORY:   Past Medical History:   Diagnosis Date   • Allergic    • Allergic rhinitis    • Arthritis     knees   • BPH (benign prostatic hyperplasia)    • Cancer    • Chronic cough    • Chronic kidney disease    • Coronary artery disease    • Coronary artery disease involving native coronary artery of native heart without angina pectoris 07/31/2018    Added automatically from request for surgery [id number]   • Dementia    • Dementia    • Excessive tear production    • Facial basal cell cancer    • GERD (gastroesophageal reflux disease)    •  Glaucoma    • History of transfusion    • Hyperlipidemia    • Hypertension    • Kidney stone    • Osteoarthritis of both knees    • Pneumonia     right upper lobe    • Pulmonary embolism    • Skin cancer    • Vertigo    • Wears glasses       Past Surgical History:   Procedure Laterality Date   • CARDIAC CATHETERIZATION N/A 6/21/2017    Procedure: Left Heart Cath;  Surgeon: Toy Landers MD;  Location:  [facility];  Service:    • CARDIAC CATHETERIZATION N/A 8/1/2018    Procedure: Left Heart Cath;  Surgeon: Toy Landers MD;  Location:  [facility];  Service: Cardiology   • CATARACT EXTRACTION W/ INTRAOCULAR LENS  IMPLANT, BILATERAL     • CHOLECYSTECTOMY  2010   • COLONOSCOPY     • CORONARY ANGIOPLASTY WITH STENT PLACEMENT     • CORONARY STENT PLACEMENT      x3   • EYE SURGERY Right    • SKIN CANCER EXCISION     • WRIST SURGERY           MEDICATIONS:  I have reviewed and reconciled the patients medication list in the patients chart at the [facility] nursing facility today.      ALLERGIES:    Allergies   Allergen Reactions   • Lipitor [Atorvastatin] Myalgia         SOCIAL HISTORY:    Social History     Socioeconomic History   • Marital status:    Tobacco Use   • Smoking status: Never   • Smokeless tobacco: Never   Vaping Use   • Vaping Use: Never used   Substance and Sexual Activity   • Alcohol use: No   • Drug use: No   • Sexual activity: Not Currently       FAMILY HISTORY:    Family History   Problem Relation Age of Onset   • Heart disease Mother    • Heart attack Father        REVIEW OF SYSTEMS:    Review of Systems   Reason unable to perform ROS: per nursing and patient.   Constitutional: Negative for activity change, appetite change, chills, diaphoresis, fatigue and fever.   HENT: Negative for congestion, mouth sores, nosebleeds and trouble swallowing.    Respiratory: Negative for cough, choking, shortness of breath and wheezing.    Cardiovascular: Negative for leg  swelling.   Gastrointestinal: Negative for abdominal pain, blood in stool, constipation, diarrhea, nausea and vomiting.   Genitourinary: Positive for urinary incontinence. Negative for decreased urine volume, difficulty urinating, dysuria, frequency and hematuria.   Musculoskeletal: Positive for arthralgias and gait problem.   Skin: Negative for color change and rash.   Neurological: Positive for weakness, memory problem and confusion.   Psychiatric/Behavioral: Positive for behavioral problems, sleep disturbance and positive for hyperactivity. Negative for dysphoric mood, hallucinations and depressed mood. The patient is not nervous/anxious.          PHYSICAL EXAMINATION:   VITAL SIGNS:   Vitals:    05/19/23 1314   BP: 112/60   Resp: 18   Temp: 97.8 °F (36.6 °C)   SpO2: 93%   Weight: 62.6 kg (138 lb)       Physical Exam  Vitals and nursing note reviewed.   Constitutional:       Appearance: He is well-developed.   Cardiovascular:      Rate and Rhythm: Normal rate and regular rhythm.      Heart sounds: Normal heart sounds.   Pulmonary:      Effort: Pulmonary effort is normal. No respiratory distress.      Breath sounds: Normal breath sounds. No wheezing or rales.   Abdominal:      General: Bowel sounds are normal.      Palpations: Abdomen is soft.   Musculoskeletal:      Cervical back: Normal range of motion.      Comments: Generalized weakness   Skin:     General: Skin is warm and dry.   Neurological:      Mental Status: He is alert. Mental status is at baseline. He is disoriented and confused.   Psychiatric:         Mood and Affect: Mood and affect normal.         Behavior: Behavior normal.         Cognition and Memory: Cognition is impaired. Memory is impaired.         RECORDS REVIEW:   I have reviewed results in [facility]    ASSESSMENT     Diagnoses and all orders for this visit:    1. Medication dose changed (Primary)    2. Gait instability    3. Fall, initial encounter    4. Falls frequently    5. Impaired mobility  and ADLs        PLAN    Medication review and change/Dementia with behaviors/unstable gait  -Will increase Seroquel to 50 mg and change time to 1600. Will continue to adjust medications and have Behavioral Health follow as well.     Nursing encouraged to keep me informed of any acute changes, lack of improvement, or any new concerning symptoms.    Will follow up and continue to monitor.     Continue supportive care for all ADLs.      [x]  Discussed Patient in detail with nursing/staff, addressed all needs today.     [x]  Plan of Care Reviewed   [x]  PT/OT Reviewed   [x]  Order Changes  [x]  Discharge Plans Reviewed  [x]  Advance Directive on file with Nursing Home.   [x]  POA on file with Nursing Home.   [x]  Code Status listed: [x]  Full Code   []  DNR     “I confirm accuracy of unchanged data/findings which have been carried forward from previous visit, as well as I have updated appropriately those that have changed.”      I spent 35 minutes caring for Darren on this date of service. This time includes time spent by me in the following activities:preparing for the visit, reviewing tests, obtaining and/or reviewing a separately obtained history, performing a medically appropriate examination and/or evaluation , counseling and educating the patient/family/caregiver, ordering medications, tests, or procedures, documenting information in the medical record, independently interpreting results and communicating that information with the patient/family/caregiver and care coordination    EAGLE Fernando.

## 2023-05-22 ENCOUNTER — NURSING HOME (OUTPATIENT)
Dept: FAMILY MEDICINE CLINIC | Facility: CLINIC | Age: 88
End: 2023-05-22
Payer: MEDICARE

## 2023-05-22 VITALS
BODY MASS INDEX: 19.25 KG/M2 | HEART RATE: 86 BPM | OXYGEN SATURATION: 92 % | TEMPERATURE: 98.6 F | RESPIRATION RATE: 18 BRPM | WEIGHT: 138 LBS | SYSTOLIC BLOOD PRESSURE: 121 MMHG | DIASTOLIC BLOOD PRESSURE: 79 MMHG

## 2023-05-22 DIAGNOSIS — R29.898 WEAKNESS OF BOTH LOWER EXTREMITIES: Primary | ICD-10-CM

## 2023-05-22 DIAGNOSIS — Z78.9 IMPAIRED MOBILITY AND ADLS: ICD-10-CM

## 2023-05-22 DIAGNOSIS — F03.B18 MODERATE DEMENTIA WITH OTHER BEHAVIORAL DISTURBANCE, UNSPECIFIED DEMENTIA TYPE: ICD-10-CM

## 2023-05-22 DIAGNOSIS — R29.6 FALLS FREQUENTLY: ICD-10-CM

## 2023-05-22 DIAGNOSIS — Z74.09 IMPAIRED MOBILITY AND ADLS: ICD-10-CM
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
## 2023-05-22 NOTE — PROGRESS NOTES
Nursing Home Follow Up Note      Andrew Jacobo DO []   EAGLE Waddell [x]  [address] [place] [address]  Phone: [phone]  Fax: [phone] Seema Blount MD []    Kurt Tavarez DO []   [address] [place] [address]  Phone: [phone]  Fax: [phone]     PATIENT NAME: Darren Mccracken                                                                          YOB: 1934           DATE OF SERVICE: 05/19/2023  FACILITY:  [][place]   [] [place]   [] [facility]   [x] [facility]   [] Other ______________________________________________________________________      CHIEF COMPLAINT:      Medication review related to behaviors and falls.       HISTORY OF PRESENT ILLNESS:     Nursing reports that patient had another noninjury fall this morning. Around 0330 this morning nursing staff made rounds to find patient face down on the floor.  He did not have any clothing or depends on so felt most likely that patient was attempting to take himself to the restroom and fell.  No injuries noted or reported.  He is sitting up in his wheelchair today pleasantly confused with no complaints or signs and symptoms of distress.  Wife is at bedside in chair.  No reports or signs and symptoms of any distress.  Nursing reports that he becomes very restless in the evening wants his wife believes and is restless at night as well.    PAST MEDICAL & SURGICAL HISTORY:   Past Medical History:   Diagnosis Date   • Allergic    • Allergic rhinitis    • Arthritis     knees   • BPH (benign prostatic hyperplasia)    • Cancer    • Chronic cough    • Chronic kidney disease    • Coronary artery disease    • Coronary artery disease involving native coronary artery of native heart without angina pectoris 07/31/2018    Added automatically from request for surgery [id number]   • Dementia    • Dementia    • Excessive tear production    • Facial basal cell cancer    • GERD (gastroesophageal reflux disease)    •  Glaucoma    • History of transfusion    • Hyperlipidemia    • Hypertension    • Kidney stone    • Osteoarthritis of both knees    • Pneumonia     right upper lobe    • Pulmonary embolism    • Skin cancer    • Vertigo    • Wears glasses       Past Surgical History:   Procedure Laterality Date   • CARDIAC CATHETERIZATION N/A 6/21/2017    Procedure: Left Heart Cath;  Surgeon: Toy Landers MD;  Location:  [facility];  Service:    • CARDIAC CATHETERIZATION N/A 8/1/2018    Procedure: Left Heart Cath;  Surgeon: Toy Landers MD;  Location:  [facility];  Service: Cardiology   • CATARACT EXTRACTION W/ INTRAOCULAR LENS  IMPLANT, BILATERAL     • CHOLECYSTECTOMY  2010   • COLONOSCOPY     • CORONARY ANGIOPLASTY WITH STENT PLACEMENT     • CORONARY STENT PLACEMENT      x3   • EYE SURGERY Right    • SKIN CANCER EXCISION     • WRIST SURGERY           MEDICATIONS:  I have reviewed and reconciled the patients medication list in the patients chart at the [facility] nursing facility today.      ALLERGIES:    Allergies   Allergen Reactions   • Lipitor [Atorvastatin] Myalgia         SOCIAL HISTORY:    Social History     Socioeconomic History   • Marital status:    Tobacco Use   • Smoking status: Never   • Smokeless tobacco: Never   Vaping Use   • Vaping Use: Never used   Substance and Sexual Activity   • Alcohol use: No   • Drug use: No   • Sexual activity: Not Currently       FAMILY HISTORY:    Family History   Problem Relation Age of Onset   • Heart disease Mother    • Heart attack Father        REVIEW OF SYSTEMS:    Review of Systems   Reason unable to perform ROS: per nursing and patient.   Constitutional: Negative for activity change, appetite change, chills, diaphoresis, fatigue and fever.   HENT: Negative for congestion, mouth sores, nosebleeds and trouble swallowing.    Respiratory: Negative for cough, choking, shortness of breath and wheezing.    Cardiovascular: Negative for leg  swelling.   Gastrointestinal: Negative for abdominal pain, blood in stool, constipation, diarrhea, nausea and vomiting.   Genitourinary: Positive for urinary incontinence. Negative for decreased urine volume, difficulty urinating, dysuria, frequency and hematuria.   Musculoskeletal: Positive for arthralgias and gait problem.   Skin: Negative for color change and rash.   Neurological: Positive for weakness, memory problem and confusion.   Psychiatric/Behavioral: Positive for behavioral problems, sleep disturbance and positive for hyperactivity. Negative for dysphoric mood, hallucinations and depressed mood. The patient is not nervous/anxious.          PHYSICAL EXAMINATION:   VITAL SIGNS:   Vitals:    05/19/23 1314   BP: 112/60   Resp: 18   Temp: 97.8 °F (36.6 °C)   SpO2: 93%   Weight: 62.6 kg (138 lb)       Physical Exam  Vitals and nursing note reviewed.   Constitutional:       Appearance: He is well-developed.   Cardiovascular:      Rate and Rhythm: Normal rate and regular rhythm.      Heart sounds: Normal heart sounds.   Pulmonary:      Effort: Pulmonary effort is normal. No respiratory distress.      Breath sounds: Normal breath sounds. No wheezing or rales.   Abdominal:      General: Bowel sounds are normal.      Palpations: Abdomen is soft.   Musculoskeletal:      Cervical back: Normal range of motion.      Comments: Generalized weakness   Skin:     General: Skin is warm and dry.   Neurological:      Mental Status: He is alert. Mental status is at baseline. He is disoriented and confused.   Psychiatric:         Mood and Affect: Mood and affect normal.         Behavior: Behavior normal.         Cognition and Memory: Cognition is impaired. Memory is impaired.         RECORDS REVIEW:   I have reviewed results in [facility]    ASSESSMENT     Diagnoses and all orders for this visit:    1. Medication dose changed (Primary)    2. Gait instability    3. Fall, initial encounter    4. Falls frequently    5. Impaired mobility  and ADLs        PLAN    Medication review and change/Dementia with behaviors/unstable gait  -Will increase Seroquel to 50 mg and change time to 1600. Will continue to adjust medications and have Behavioral Health follow as well.     Nursing encouraged to keep me informed of any acute changes, lack of improvement, or any new concerning symptoms.    Will follow up and continue to monitor.     Continue supportive care for all ADLs.      [x]  Discussed Patient in detail with nursing/staff, addressed all needs today.     [x]  Plan of Care Reviewed   [x]  PT/OT Reviewed   [x]  Order Changes  [x]  Discharge Plans Reviewed  [x]  Advance Directive on file with Nursing Home.   [x]  POA on file with Nursing Home.   [x]  Code Status listed: [x]  Full Code   []  DNR     “I confirm accuracy of unchanged data/findings which have been carried forward from previous visit, as well as I have updated appropriately those that have changed.”      I spent 35 minutes caring for Darren on this date of service. This time includes time spent by me in the following activities:preparing for the visit, reviewing tests, obtaining and/or reviewing a separately obtained history, performing a medically appropriate examination and/or evaluation , counseling and educating the patient/family/caregiver, ordering medications, tests, or procedures, documenting information in the medical record, independently interpreting results and communicating that information with the patient/family/caregiver and care coordination    EAGLE Fernando.

## 2023-05-22 NOTE — LETTER
Nursing Home Follow Up Note      Andrew Jacobo DO []   EAGLE Waddell [x]  [address] [place] [address]  Phone: [phone]  Fax: [phone] Seema Blount MD []    Kurt Tavarez DO []   [address] [place] [address]  Phone: [phone]  Fax: [phone]     PATIENT NAME: Darren Mccracken                                                                          YOB: 1934           DATE OF SERVICE: 05/22/2023  FACILITY:  [][place]   [] [place]   [] [facility]   [x] [facility]   [] Other ______________________________________________________________________      CHIEF COMPLAINT:     Visit to follow up on fall.       HISTORY OF PRESENT ILLNESS:     Nursing reports that patient had another noninjury fall yesterday morning.  He was found sitting in the floor beside of his bed. It appeared he had sit on side of bed and slid out.  He has no noted or reported injuries at the time and does not today.   He is sitting up in his Marlys chair today pleasantly confused with no complaints or signs and symptoms of distress.  No reports or signs and symptoms of any distress.  Medications were adjusted and just started on 5/20.    PAST MEDICAL & SURGICAL HISTORY:   Past Medical History:   Diagnosis Date   • Allergic    • Allergic rhinitis    • Arthritis     knees   • BPH (benign prostatic hyperplasia)    • Cancer    • Chronic cough    • Chronic kidney disease    • Coronary artery disease    • Coronary artery disease involving native coronary artery of native heart without angina pectoris 07/31/2018    Added automatically from request for surgery [id number]   • Dementia    • Dementia    • Excessive tear production    • Facial basal cell cancer    • GERD (gastroesophageal reflux disease)    • Glaucoma    • History of transfusion    • Hyperlipidemia    • Hypertension    • Kidney stone    • Osteoarthritis of both knees    • Pneumonia     right upper lobe    • Pulmonary embolism    • Skin  cancer    • Vertigo    • Wears glasses       Past Surgical History:   Procedure Laterality Date   • CARDIAC CATHETERIZATION N/A 6/21/2017    Procedure: Left Heart Cath;  Surgeon: Toy Landers MD;  Location:  [facility];  Service:    • CARDIAC CATHETERIZATION N/A 8/1/2018    Procedure: Left Heart Cath;  Surgeon: Toy Landers MD;  Location:  [facility];  Service: Cardiology   • CATARACT EXTRACTION W/ INTRAOCULAR LENS  IMPLANT, BILATERAL     • CHOLECYSTECTOMY  2010   • COLONOSCOPY     • CORONARY ANGIOPLASTY WITH STENT PLACEMENT     • CORONARY STENT PLACEMENT      x3   • EYE SURGERY Right    • SKIN CANCER EXCISION     • WRIST SURGERY           MEDICATIONS:  I have reviewed and reconciled the patients medication list in the patients chart at the [facility] nursing facility today.      ALLERGIES:    Allergies   Allergen Reactions   • Lipitor [Atorvastatin] Myalgia         SOCIAL HISTORY:    Social History     Socioeconomic History   • Marital status:    Tobacco Use   • Smoking status: Never   • Smokeless tobacco: Never   Vaping Use   • Vaping Use: Never used   Substance and Sexual Activity   • Alcohol use: No   • Drug use: No   • Sexual activity: Not Currently       FAMILY HISTORY:    Family History   Problem Relation Age of Onset   • Heart disease Mother    • Heart attack Father        REVIEW OF SYSTEMS:    Review of Systems   Reason unable to perform ROS: per nursing and patient.   Constitutional: Negative for activity change, appetite change, chills, diaphoresis, fatigue and fever.   HENT: Negative for congestion, mouth sores, nosebleeds and trouble swallowing.    Respiratory: Negative for cough, choking, shortness of breath and wheezing.    Cardiovascular: Negative for leg swelling.   Gastrointestinal: Negative for abdominal pain, blood in stool, constipation, diarrhea, nausea and vomiting.   Genitourinary: Positive for urinary incontinence. Negative for decreased urine  volume, difficulty urinating, dysuria, frequency and hematuria.   Musculoskeletal: Positive for arthralgias and gait problem.   Skin: Negative for color change and rash.   Neurological: Positive for weakness, memory problem and confusion.   Psychiatric/Behavioral: Positive for behavioral problems, sleep disturbance and positive for hyperactivity. Negative for dysphoric mood, hallucinations and depressed mood. The patient is not nervous/anxious.          PHYSICAL EXAMINATION:   VITAL SIGNS:   Vitals:    05/22/23 1052   BP: 121/79   Pulse: 86   Resp: 18   Temp: 98.6 °F (37 °C)   SpO2: 92%   Weight: 62.6 kg (138 lb)       Physical Exam  Vitals and nursing note reviewed.   Constitutional:       Appearance: He is well-developed.   Cardiovascular:      Rate and Rhythm: Normal rate and regular rhythm.      Heart sounds: Normal heart sounds.   Pulmonary:      Effort: Pulmonary effort is normal. No respiratory distress.      Breath sounds: Normal breath sounds. No wheezing or rales.   Abdominal:      General: Bowel sounds are normal.      Palpations: Abdomen is soft.   Musculoskeletal:      Cervical back: Normal range of motion.      Comments: Generalized weakness   Skin:     General: Skin is warm and dry.   Neurological:      Mental Status: He is alert. Mental status is at baseline. He is disoriented and confused.   Psychiatric:         Mood and Affect: Mood and affect normal.         Behavior: Behavior normal.         Cognition and Memory: Cognition is impaired. Memory is impaired.         RECORDS REVIEW:   I have reviewed results in [facility]    ASSESSMENT     Diagnoses and all orders for this visit:    1. Weakness of both lower extremities (Primary)    2. Fall, initial encounter    3. Falls frequently    4. Moderate dementia with other behavioral disturbance, unspecified dementia type    5. Impaired mobility and ADLs        PLAN    LE weakness/Fall/falls frequently/dementia  -Medication just adjusted on 5/20 so will continue  to monitor. Nursing to continue to monitor for any injuries.     Nursing encouraged to keep me informed of any acute changes, lack of improvement, or any new concerning symptoms.    Will follow up and continue to monitor.     Continue supportive care for all ADLs.      [x]  Discussed Patient in detail with nursing/staff, addressed all needs today.     [x]  Plan of Care Reviewed   [x]  PT/OT Reviewed   [x]  Order Changes  [x]  Discharge Plans Reviewed  [x]  Advance Directive on file with Nursing Home.   [x]  POA on file with Nursing Home.   [x]  Code Status listed: [x]  Full Code   []  DNR     “I confirm accuracy of unchanged data/findings which have been carried forward from previous visit, as well as I have updated appropriately those that have changed.”          Zahra Shankar, APRN.

## 2023-05-24 ENCOUNTER — NURSING HOME (OUTPATIENT)
Dept: FAMILY MEDICINE CLINIC | Facility: CLINIC | Age: 88
End: 2023-05-24
Payer: MEDICARE

## 2023-05-24 VITALS
RESPIRATION RATE: 16 BRPM | WEIGHT: 138 LBS | OXYGEN SATURATION: 94 % | TEMPERATURE: 97.7 F | BODY MASS INDEX: 19.25 KG/M2 | HEART RATE: 74 BPM | SYSTOLIC BLOOD PRESSURE: 124 MMHG | DIASTOLIC BLOOD PRESSURE: 78 MMHG

## 2023-05-24 DIAGNOSIS — Z74.09 IMPAIRED MOBILITY AND ADLS: Primary | ICD-10-CM

## 2023-05-24 DIAGNOSIS — N39.42 URINARY INCONTINENCE WITHOUT SENSORY AWARENESS: ICD-10-CM

## 2023-05-24 DIAGNOSIS — Z78.9 IMPAIRED MOBILITY AND ADLS: Primary | ICD-10-CM

## 2023-05-24 DIAGNOSIS — F01.B0 MODERATE VASCULAR DEMENTIA WITHOUT BEHAVIORAL DISTURBANCE, PSYCHOTIC DISTURBANCE, MOOD DISTURBANCE, OR ANXIETY: Chronic | ICD-10-CM

## 2023-05-24 DIAGNOSIS — R29.6 FALLS FREQUENTLY: ICD-10-CM

## 2023-05-24 DIAGNOSIS — K21.9 GASTROESOPHAGEAL REFLUX DISEASE WITHOUT ESOPHAGITIS: Chronic | ICD-10-CM

## 2023-05-24 DIAGNOSIS — I10 ESSENTIAL HYPERTENSION: Chronic | ICD-10-CM

## 2023-05-24 DIAGNOSIS — R54 AGE-RELATED PHYSICAL DEBILITY: ICD-10-CM

## 2023-05-24 NOTE — LETTER
Nursing Home Progress Note        Andrew Jacobo DO [x]  EAGLE Waddell []  [address] [place] [address]  Phone: [phone]  Fax: [phone] Seema Blount MD []  Kurt Tavarez DO []  [address] [place] [address]  Phone: [phone]  Fax: [phone]     PATIENT NAME: Darren Mccracken                                                                          YOB: 1934           DATE OF SERVICE: 05/24/2023  FACILITY: []  [place]  [] [place]  []  [facility]  [x] [facility]  []  Other ______________________________________________________________________     CHIEF COMPLAINT:  Impaired mobility and ADLs/age-related physical debility/advanced vascular dementia/hypertension/GERD/frequent falls      HISTORY OF PRESENT ILLNESS:   [x]  Follow Up visit for coordination of long term care issues and chronic medical management of Diagnoses and all orders for this visit:    1. Impaired mobility and ADLs (Primary)    2. Age-related physical debility    3. 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 Falls frequently    5. Essential hypertension    6. Urinary incontinence without sensory awareness    7. Gastroesophageal reflux disease without esophagitis    Nursing/staff reports the patient has been receptive to supportive care for mobilization/transfer assistance, as well as ADLs if needed.  No reports of any nutritional/hydration deficits.    No acute behavioral changes, continues to have findings consistent with advanced vascular dementia.  No neurological deficits reported.    Continues to diurese.    Vital signs have been stable.    No reports of any focal aspiration.      PAST MEDICAL & SURGICAL HISTORY:   Past Medical History:   Diagnosis Date    Allergic     Allergic rhinitis     Arthritis     knees    BPH (benign prostatic hyperplasia)     Cancer     Chronic cough     Chronic kidney disease      Coronary artery disease     Coronary artery disease involving native coronary artery of native heart without angina pectoris 07/31/2018    Added automatically from request for surgery [id number]    Dementia     Dementia     Excessive tear production     Facial basal cell cancer     GERD (gastroesophageal reflux disease)     Glaucoma     History of transfusion     Hyperlipidemia     Hypertension     Kidney stone     Osteoarthritis of both knees     Pneumonia     right upper lobe     Pulmonary embolism     Skin cancer     Vertigo     Wears glasses       Past Surgical History:   Procedure Laterality Date    CARDIAC CATHETERIZATION N/A 6/21/2017    Procedure: Left Heart Cath;  Surgeon: Toy Landers MD;  Location:  [facility];  Service:     CARDIAC CATHETERIZATION N/A 8/1/2018    Procedure: Left Heart Cath;  Surgeon: Toy Landers MD;  Location:  [facility];  Service: Cardiology    CATARACT EXTRACTION W/ INTRAOCULAR LENS  IMPLANT, BILATERAL      CHOLECYSTECTOMY  2010    COLONOSCOPY      CORONARY ANGIOPLASTY WITH STENT PLACEMENT      CORONARY STENT PLACEMENT      x3    EYE SURGERY Right     SKIN CANCER EXCISION      WRIST SURGERY           MEDICATIONS:  I have reviewed and reconciled the patients medication list in the patients chart at the skilled nursing facility today.      ALLERGIES:    Allergies   Allergen Reactions    Lipitor [Atorvastatin] Myalgia         SOCIAL HISTORY:    Social History     Socioeconomic History    Marital status:    Tobacco Use    Smoking status: Never    Smokeless tobacco: Never   Vaping Use    Vaping Use: Never used   Substance and Sexual Activity    Alcohol use: No    Drug use: No    Sexual activity: Not Currently       FAMILY HISTORY:    Family History   Problem Relation Age of Onset    Heart disease Mother     Heart attack Father        REVIEW OF SYSTEMS:    Review of Systems  Appetite: Fair [x]   Good []   Poor []   Weight Loss []  [x]   Weight Stable   Unavoidable Weight Loss []  Tolerating Tube Feeding []    Supplements Provided []   Patient is a poor historian, review of systems obtained on discussion with patient's treating nurse/staff, as well as review of records.    PHYSICAL EXAMINATION:   VITAL SIGNS:   Vitals:    05/24/23 0844   BP: 124/78   Pulse: 74   Resp: 16   Temp: 97.7 øF (36.5 øC)   SpO2: 94%       Physical Exam    General Appearance:  [x]  Alert   [x]  Oriented x person  [x]  No acute distress     [x]  Confused  []  Disoriented   []  Comatose   Head:  Atraumatic and normocephalic, without obvious abnormality.   Eyes:         PERRLA, conjunctivae and sclerae normal, no Icterus. No pallor. Extra-occular movements are within normal limits.   Ears:  Ears appear intact with no abnormalities noted.   Throat: No oral lesions, no thrush, oral mucosa moist.   Neck: Supple, trachea midline, no thyromegaly, no carotid bruit.   Back:   No kyphoscoliosis. No tenderness to palpation.   Lungs:   Chest shape is normal.  Air exchange noted to all lung fields.  No wheezing.    Heart:  Normal S1 and S2, no murmur, no gallop, no rub. No JVD.   Abdomen:   Normal bowel sounds, no masses, no organomegaly. Soft, non-tender, non-distended, no guarding    Extremities: Moves all extremities.  Without edema, cyanosis or clubbing.  Frail build.  Poor core strength and stability.   Pulses: Pulses palpable and equal bilaterally.   Skin: No bleeding or rash.  Generalized dry skin noted.  Age-related atrophy of skin.   Neurologic: [x] Normal speech []  Normal mental status    [x] Cranial nerves II through XII intact   [x]  No anosmia [x]  DTR 2+ [x]  Proprioception intact  [x] age-related motor/sensory deficits      Psych/Mood:                    [x]  No acute changes []  Depressed  Urinary:                            []  Continent  [x]  Incontinent []  Retention  []  F/C      []  UTI w/treatment in progress         ASSESSMENT     Diagnoses and all orders for this  visit:    1. Impaired mobility and ADLs (Primary)    2. Age-related physical debility    3. 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 Falls frequently    5. Essential hypertension    6. Urinary incontinence without sensory awareness    7. Gastroesophageal reflux disease without esophagitis          PLAN  Continue supportive care for mobilization/transfer assistance.  Assist ADLs as needed.  Continue to follow overall nutritional/hydration status.  Monitor closely for any findings consistent with aspiration.    No acute behavioral changes, does sleep well.  He does try independently to transfer and ambulate at times.  Fall precautions in place.    Vital signs demonstrate hemodynamic stability.    Continue dietary/lifestyle modification status and management of chronic GERD.    Surveillance labs when needed.    [x]  Discussed Patient in detail with nursing/staff, addressed all needs today.     [x]  Plan of Care Reviewed   []  PT/OT Reviewed   []  Order Changes  []  Discharge Plans Reviewed   []  Code Status Changes    I spent 30 minutes caring for Darren on this date of service. This time includes time spent by me in the following activities:preparing for the visit, performing a medically appropriate examination and/or evaluation , counseling and educating the patient/family/caregiver, ordering medications, tests, or procedures, documenting information in the medical record, and care coordination       Andrew Jacobo DO  05/24/2023

## 2023-05-24 NOTE — PROGRESS NOTES
Nursing Home Follow Up Note      Andrew Jacobo DO []   EAGLE Waddell [x]  [address] [place] [address]  Phone: [phone]  Fax: [phone] Seema Blount MD []    Kurt Tavarez DO []   [address] [place] [address]  Phone: [phone]  Fax: [phone]     PATIENT NAME: Darren Mccracken                                                                          YOB: 1934           DATE OF SERVICE: 05/22/2023  FACILITY:  [][facility]   [] [place]   [] [facility]   [x] [facility]   [] Other ______________________________________________________________________      CHIEF COMPLAINT:     Visit to follow up on fall.       HISTORY OF PRESENT ILLNESS:     Nursing reports that patient had another noninjury fall yesterday morning.  He was found sitting in the floor beside of his bed. It appeared he had sit on side of bed and slid out.  He has no noted or reported injuries at the time and does not today.   He is sitting up in his Marlys chair today pleasantly confused with no complaints or signs and symptoms of distress.  No reports or signs and symptoms of any distress.  Medications were adjusted and just started on 5/20.    PAST MEDICAL & SURGICAL HISTORY:   Past Medical History:   Diagnosis Date   • Allergic    • Allergic rhinitis    • Arthritis     knees   • BPH (benign prostatic hyperplasia)    • Cancer    • Chronic cough    • Chronic kidney disease    • Coronary artery disease    • Coronary artery disease involving native coronary artery of native heart without angina pectoris 07/31/2018    Added automatically from request for surgery [id number]   • Dementia    • Dementia    • Excessive tear production    • Facial basal cell cancer    • GERD (gastroesophageal reflux disease)    • Glaucoma    • History of transfusion    • Hyperlipidemia    • Hypertension    • Kidney stone    • Osteoarthritis of both knees    • Pneumonia     right upper lobe    • Pulmonary embolism    • Skin  cancer    • Vertigo    • Wears glasses       Past Surgical History:   Procedure Laterality Date   • CARDIAC CATHETERIZATION N/A 6/21/2017    Procedure: Left Heart Cath;  Surgeon: Toy Landers MD;  [facility];  Service:    • CARDIAC CATHETERIZATION N/A 8/1/2018    Procedure: Left Heart Cath;  Surgeon: Toy Landers MD;  Location:  [facility];  Service: Cardiology   • CATARACT EXTRACTION W/ INTRAOCULAR LENS  IMPLANT, BILATERAL     • CHOLECYSTECTOMY  2010   • COLONOSCOPY     • CORONARY ANGIOPLASTY WITH STENT PLACEMENT     • CORONARY STENT PLACEMENT      x3   • EYE SURGERY Right    • SKIN CANCER EXCISION     • WRIST SURGERY           MEDICATIONS:  I have reviewed and reconciled the patients medication list in the patients chart at the [facility] nursing facility today.      ALLERGIES:    Allergies   Allergen Reactions   • Lipitor [Atorvastatin] Myalgia         SOCIAL HISTORY:    Social History     Socioeconomic History   • Marital status:    Tobacco Use   • Smoking status: Never   • Smokeless tobacco: Never   Vaping Use   • Vaping Use: Never used   Substance and Sexual Activity   • Alcohol use: No   • Drug use: No   • Sexual activity: Not Currently       FAMILY HISTORY:    Family History   Problem Relation Age of Onset   • Heart disease Mother    • Heart attack Father        REVIEW OF SYSTEMS:    Review of Systems   Reason unable to perform ROS: per nursing and patient.   Constitutional: Negative for activity change, appetite change, chills, diaphoresis, fatigue and fever.   HENT: Negative for congestion, mouth sores, nosebleeds and trouble swallowing.    Respiratory: Negative for cough, choking, shortness of breath and wheezing.    Cardiovascular: Negative for leg swelling.   Gastrointestinal: Negative for abdominal pain, blood in stool, constipation, diarrhea, nausea and vomiting.   Genitourinary: Positive for urinary incontinence. Negative for decreased urine  volume, difficulty urinating, dysuria, frequency and hematuria.   Musculoskeletal: Positive for arthralgias and gait problem.   Skin: Negative for color change and rash.   Neurological: Positive for weakness, memory problem and confusion.   Psychiatric/Behavioral: Positive for behavioral problems, sleep disturbance and positive for hyperactivity. Negative for dysphoric mood, hallucinations and depressed mood. The patient is not nervous/anxious.          PHYSICAL EXAMINATION:   VITAL SIGNS:   Vitals:    05/22/23 1052   BP: 121/79   Pulse: 86   Resp: 18   Temp: 98.6 °F (37 °C)   SpO2: 92%   Weight: 62.6 kg (138 lb)       Physical Exam  Vitals and nursing note reviewed.   Constitutional:       Appearance: He is well-developed.   Cardiovascular:      Rate and Rhythm: Normal rate and regular rhythm.      Heart sounds: Normal heart sounds.   Pulmonary:      Effort: Pulmonary effort is normal. No respiratory distress.      Breath sounds: Normal breath sounds. No wheezing or rales.   Abdominal:      General: Bowel sounds are normal.      Palpations: Abdomen is soft.   Musculoskeletal:      Cervical back: Normal range of motion.      Comments: Generalized weakness   Skin:     General: Skin is warm and dry.   Neurological:      Mental Status: He is alert. Mental status is at baseline. He is disoriented and confused.   Psychiatric:         Mood and Affect: Mood and affect normal.         Behavior: Behavior normal.         Cognition and Memory: Cognition is impaired. Memory is impaired.         RECORDS REVIEW:   I have reviewed results in [facility]    ASSESSMENT     Diagnoses and all orders for this visit:    1. Weakness of both lower extremities (Primary)    2. Fall, initial encounter    3. Falls frequently    4. Moderate dementia with other behavioral disturbance, unspecified dementia type    5. Impaired mobility and ADLs        PLAN    LE weakness/Fall/falls frequently/dementia  -Medication just adjusted on 5/20 so will continue  to monitor. Nursing to continue to monitor for any injuries.     Nursing encouraged to keep me informed of any acute changes, lack of improvement, or any new concerning symptoms.    Will follow up and continue to monitor.     Continue supportive care for all ADLs.      [x]  Discussed Patient in detail with nursing/staff, addressed all needs today.     [x]  Plan of Care Reviewed   [x]  PT/OT Reviewed   [x]  Order Changes  [x]  Discharge Plans Reviewed  [x]  Advance Directive on file with Nursing Home.   [x]  POA on file with Nursing Home.   [x]  Code Status listed: [x]  Full Code   []  DNR     “I confirm accuracy of unchanged data/findings which have been carried forward from previous visit, as well as I have updated appropriately those that have changed.”          Zahra Shankar, APRN.

## 2023-06-13 ENCOUNTER — NURSING HOME (OUTPATIENT)
Dept: FAMILY MEDICINE CLINIC | Facility: CLINIC | Age: 88
End: 2023-06-13
Payer: MEDICARE

## 2023-06-13 VITALS
RESPIRATION RATE: 16 BRPM | WEIGHT: 134 LBS | TEMPERATURE: 97.6 F | OXYGEN SATURATION: 95 % | BODY MASS INDEX: 18.69 KG/M2 | DIASTOLIC BLOOD PRESSURE: 74 MMHG | HEART RATE: 78 BPM | SYSTOLIC BLOOD PRESSURE: 124 MMHG

## 2023-06-13 DIAGNOSIS — Z79.899 MEDICATION THERAPY CHANGED: ICD-10-CM

## 2023-06-13 DIAGNOSIS — F02.B18 MODERATE ALZHEIMER'S DEMENTIA WITH OTHER BEHAVIORAL DISTURBANCE, UNSPECIFIED TIMING OF DEMENTIA ONSET: ICD-10-CM

## 2023-06-13 DIAGNOSIS — Z74.09 IMPAIRED MOBILITY AND ADLS: ICD-10-CM

## 2023-06-13 DIAGNOSIS — G30.9 MODERATE ALZHEIMER'S DEMENTIA WITH OTHER BEHAVIORAL DISTURBANCE, UNSPECIFIED TIMING OF DEMENTIA ONSET: ICD-10-CM

## 2023-06-13 DIAGNOSIS — Z78.9 IMPAIRED MOBILITY AND ADLS: ICD-10-CM

## 2023-06-13 DIAGNOSIS — Z71.89 ENCOUNTER FOR MEDICATION REVIEW AND COUNSELING: Primary | ICD-10-CM

## 2023-06-13 DIAGNOSIS — R29.6 FALLS FREQUENTLY: ICD-10-CM

## 2023-06-13 NOTE — LETTER
"Nursing Home Follow Up Note      Andrew Jacobo DO []   EAGLE Waddell [x]  [address] [facility], [place] [address]  Phone: [phone]  Fax: [phone] Seema Blount MD []    Kurt Tavarez DO []   [address] [place] [address]  Phone: [phone]  Fax: [phone]     PATIENT NAME: Darren Mccracken                                                                          YOB: 1934           DATE OF SERVICE: 06/13/2023  FACILITY:  [][place]   [] [place]   [] [facility]   [x] [facility]   [] Other ______________________________________________________________________      CHIEF COMPLAINT:      Medication review for changes.       HISTORY OF PRESENT ILLNESS:     Nursing reports that wife was here visiting patient this morning and voiced concerns that he has been sleeping too much during the day.  Per nursing he has not been sleeping any more than usual and typically just takes naps through the day.  They report that just prior to wife visiting patient was attempting to climb out of bed and then out of his chair.  His bed is at lowest level because patient will climb out of bed if awake so typically has to stay up in his Marlys chair during the day.  This was discussed with wife but she still requested that he is medications be reviewed and Seroquel changed to any other medication, \" because she has heard bad things about Seroquel\". Patient is resting in bed today during visit, awake. He is slightly restless but pleasantly confused. He talked about a  mission during visit, \"reported they had did good on the mission and did not lose anyone\".      PAST MEDICAL & SURGICAL HISTORY:   Past Medical History:   Diagnosis Date    Allergic     Allergic rhinitis     Arthritis     knees    BPH (benign prostatic hyperplasia)     Cancer     Chronic cough     Chronic kidney disease     Coronary artery disease     Coronary artery disease involving native coronary artery of native heart " without angina pectoris 07/31/2018    Added automatically from request for surgery [id number]    Dementia     Dementia     Excessive tear production     Facial basal cell cancer     GERD (gastroesophageal reflux disease)     Glaucoma     History of transfusion     Hyperlipidemia     Hypertension     Kidney stone     Osteoarthritis of both knees     Pneumonia     right upper lobe     Pulmonary embolism     Skin cancer     Vertigo     Wears glasses       Past Surgical History:   Procedure Laterality Date    CARDIAC CATHETERIZATION N/A 6/21/2017    Procedure: Left Heart Cath;  Surgeon: Toy Landers MD;  [facility];  Service:     CARDIAC CATHETERIZATION N/A 8/1/2018    Procedure: Left Heart Cath;  Surgeon: Toy Landers MD;  [facility];  Service: Cardiology    CATARACT EXTRACTION W/ INTRAOCULAR LENS  IMPLANT, BILATERAL      CHOLECYSTECTOMY  2010    COLONOSCOPY      CORONARY ANGIOPLASTY WITH STENT PLACEMENT      CORONARY STENT PLACEMENT      x3    EYE SURGERY Right     SKIN CANCER EXCISION      WRIST SURGERY           MEDICATIONS:  I have reviewed and reconciled the patients medication list in the patients chart at the skilled nursing facility today.      ALLERGIES:    Allergies   Allergen Reactions    Lipitor [Atorvastatin] Myalgia         SOCIAL HISTORY:    Social History     Socioeconomic History    Marital status:    Tobacco Use    Smoking status: Never    Smokeless tobacco: Never   Vaping Use    Vaping Use: Never used   Substance and Sexual Activity    Alcohol use: No    Drug use: No    Sexual activity: Not Currently       FAMILY HISTORY:    Family History   Problem Relation Age of Onset    Heart disease Mother     Heart attack Father        REVIEW OF SYSTEMS:    Review of Systems   Constitutional:  Positive for activity change. Negative for appetite change, chills, diaphoresis, fatigue, fever, unexpected weight gain and unexpected weight loss.    HENT:  Negative for congestion, mouth sores, nosebleeds, rhinorrhea, sore throat and trouble swallowing.    Respiratory:  Negative for cough, choking, chest tightness, shortness of breath and wheezing.    Cardiovascular:  Negative for chest pain, palpitations and leg swelling.   Gastrointestinal:  Negative for abdominal pain, blood in stool, constipation, diarrhea, nausea and vomiting.   Genitourinary:  Positive for urinary incontinence. Negative for decreased urine volume, difficulty urinating, dysuria, frequency and hematuria.   Musculoskeletal:  Positive for arthralgias and gait problem.   Skin:  Negative for color change and rash.   Neurological:  Positive for weakness, memory problem and confusion. Negative for dizziness.   Psychiatric/Behavioral:  Positive for behavioral problems, hallucinations, sleep disturbance and positive for hyperactivity.        PHYSICAL EXAMINATION:   VITAL SIGNS:   Vitals:    06/13/23 1413   BP: 124/74   Pulse: 78   Resp: 16   Temp: 97.6 °F (36.4 °C)   SpO2: 95%   Weight: 60.8 kg (134 lb)       Physical Exam  Vitals and nursing note reviewed.   Constitutional:       Appearance: He is well-developed.   Cardiovascular:      Rate and Rhythm: Normal rate and regular rhythm.      Heart sounds: Normal heart sounds.   Pulmonary:      Effort: Pulmonary effort is normal. No respiratory distress.      Breath sounds: Normal breath sounds. No wheezing or rales.   Abdominal:      General: Bowel sounds are normal.      Palpations: Abdomen is soft.   Musculoskeletal:      Cervical back: Normal range of motion.      Comments: Generalized weakness   Skin:     General: Skin is warm and dry.   Neurological:      Mental Status: He is alert. Mental status is at baseline. He is disoriented and confused.   Psychiatric:         Mood and Affect: Mood and affect normal.         Behavior: Behavior normal.         Thought Content: Thought content is delusional.         Cognition and Memory: Cognition is  impaired. Memory is impaired.       RECORDS REVIEW:   I have reviewed results in [facility]    ASSESSMENT     Diagnoses and all orders for this visit:    1. Encounter for medication review and counseling (Primary)    2. Medication therapy changed    3. 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 Falls frequently    5. Impaired mobility and ADLs        PLAN    Medication review/medication therapy changed/dementia/falls  -Per request of POA will decrease Seroquel to 25 mg daily for 1 week and then stop.  Will start Rexulti 0.5 mg daily x7 days then increase to 1 mg daily.  Will check CBC and CMP.  Will follow-up and continue to monitor.    Nursing encouraged to keep me informed of any acute changes, lack of improvement, or any new concerning symptoms.    Will follow up and continue to monitor.     Continue supportive care for all ADLs.      [x]  Discussed Patient in detail with nursing/staff, addressed all needs today.     [x]  Plan of Care Reviewed   [x]  PT/OT Reviewed   [x]  Order Changes  [x]  Discharge Plans Reviewed  [x]  Advance Directive on file with Nursing Home.   [x]  POA on file with Nursing Home.   [x]  Code Status listed: [x]  Full Code   []  DNR       “I confirm accuracy of unchanged data/findings which have been carried forward from previous visit, as well as I have updated appropriately those that have changed.”     I spent 30 minutes caring for Darren on this date of service. This time includes time spent by me in the following activities:preparing for the visit, reviewing tests, obtaining and/or reviewing a separately obtained history, performing a medically appropriate examination and/or evaluation , counseling and educating the patient/family/caregiver, ordering medications, tests, or procedures, referring and communicating with other health care professionals , documenting information in the medical record, independently interpreting  results and communicating that information with the patient/family/caregiver, and care coordination           EAGLE Fernando.

## 2023-06-14 NOTE — PROGRESS NOTES
"Nursing Home Follow Up Note      Andrew Jacobo DO []   EAGLE Waddell [x]  [address] [facility], [place] [address]  Phone: [phone]  Fax: [phone] Seema Blount MD []    Kurt Tavarez DO []   [address] [place] [address]  Phone: [phone]  Fax: [phone]     PATIENT NAME: Darren Mccracken                                                                          YOB: 1934           DATE OF SERVICE: 06/13/2023  FACILITY:  [][place]   [] [place]   [] [facility]   [x] [facility]   [] Other ______________________________________________________________________      CHIEF COMPLAINT:      Medication review for changes.       HISTORY OF PRESENT ILLNESS:     Nursing reports that wife was here visiting patient this morning and voiced concerns that he has been sleeping too much during the day.  Per nursing he has not been sleeping any more than usual and typically just takes naps through the day.  They report that just prior to wife visiting patient was attempting to climb out of bed and then out of his chair.  His bed is at lowest level because patient will climb out of bed if awake so typically has to stay up in his Marlys chair during the day.  This was discussed with wife but she still requested that he is medications be reviewed and Seroquel changed to any other medication, \" because she has heard bad things about Seroquel\". Patient is resting in bed today during visit, awake. He is slightly restless but pleasantly confused. He talked about a  mission during visit, \"reported they had did good on the mission and did not lose anyone\".      PAST MEDICAL & SURGICAL HISTORY:   Past Medical History:   Diagnosis Date    Allergic     Allergic rhinitis     Arthritis     knees    BPH (benign prostatic hyperplasia)     Cancer     Chronic cough     Chronic kidney disease     Coronary artery disease     Coronary artery disease involving native coronary artery of native heart " without angina pectoris 07/31/2018    Added automatically from request for surgery [id number]    Dementia     Dementia     Excessive tear production     Facial basal cell cancer     GERD (gastroesophageal reflux disease)     Glaucoma     History of transfusion     Hyperlipidemia     Hypertension     Kidney stone     Osteoarthritis of both knees     Pneumonia     right upper lobe     Pulmonary embolism     Skin cancer     Vertigo     Wears glasses       Past Surgical History:   Procedure Laterality Date    CARDIAC CATHETERIZATION N/A 6/21/2017    Procedure: Left Heart Cath;  Surgeon: Toy Landers MD;  Location:  [facility];  Service:     CARDIAC CATHETERIZATION N/A 8/1/2018    Procedure: Left Heart Cath;  Surgeon: Toy Landers MD;  [facility];  Service: Cardiology    CATARACT EXTRACTION W/ INTRAOCULAR LENS  IMPLANT, BILATERAL      CHOLECYSTECTOMY  2010    COLONOSCOPY      CORONARY ANGIOPLASTY WITH STENT PLACEMENT      CORONARY STENT PLACEMENT      x3    EYE SURGERY Right     SKIN CANCER EXCISION      WRIST SURGERY           MEDICATIONS:  I have reviewed and reconciled the patients medication list in the patients chart at the skilled nursing facility today.      ALLERGIES:    Allergies   Allergen Reactions    Lipitor [Atorvastatin] Myalgia         SOCIAL HISTORY:    Social History     Socioeconomic History    Marital status:    Tobacco Use    Smoking status: Never    Smokeless tobacco: Never   Vaping Use    Vaping Use: Never used   Substance and Sexual Activity    Alcohol use: No    Drug use: No    Sexual activity: Not Currently       FAMILY HISTORY:    Family History   Problem Relation Age of Onset    Heart disease Mother     Heart attack Father        REVIEW OF SYSTEMS:    Review of Systems   Constitutional:  Positive for activity change. Negative for appetite change, chills, diaphoresis, fatigue, fever, unexpected weight gain and unexpected weight loss.    HENT:  Negative for congestion, mouth sores, nosebleeds, rhinorrhea, sore throat and trouble swallowing.    Respiratory:  Negative for cough, choking, chest tightness, shortness of breath and wheezing.    Cardiovascular:  Negative for chest pain, palpitations and leg swelling.   Gastrointestinal:  Negative for abdominal pain, blood in stool, constipation, diarrhea, nausea and vomiting.   Genitourinary:  Positive for urinary incontinence. Negative for decreased urine volume, difficulty urinating, dysuria, frequency and hematuria.   Musculoskeletal:  Positive for arthralgias and gait problem.   Skin:  Negative for color change and rash.   Neurological:  Positive for weakness, memory problem and confusion. Negative for dizziness.   Psychiatric/Behavioral:  Positive for behavioral problems, hallucinations, sleep disturbance and positive for hyperactivity.        PHYSICAL EXAMINATION:   VITAL SIGNS:   Vitals:    06/13/23 1413   BP: 124/74   Pulse: 78   Resp: 16   Temp: 97.6 °F (36.4 °C)   SpO2: 95%   Weight: 60.8 kg (134 lb)       Physical Exam  Vitals and nursing note reviewed.   Constitutional:       Appearance: He is well-developed.   Cardiovascular:      Rate and Rhythm: Normal rate and regular rhythm.      Heart sounds: Normal heart sounds.   Pulmonary:      Effort: Pulmonary effort is normal. No respiratory distress.      Breath sounds: Normal breath sounds. No wheezing or rales.   Abdominal:      General: Bowel sounds are normal.      Palpations: Abdomen is soft.   Musculoskeletal:      Cervical back: Normal range of motion.      Comments: Generalized weakness   Skin:     General: Skin is warm and dry.   Neurological:      Mental Status: He is alert. Mental status is at baseline. He is disoriented and confused.   Psychiatric:         Mood and Affect: Mood and affect normal.         Behavior: Behavior normal.         Thought Content: Thought content is delusional.         Cognition and Memory: Cognition is  impaired. Memory is impaired.       RECORDS REVIEW:   I have reviewed results in [facility]    ASSESSMENT     Diagnoses and all orders for this visit:    1. Encounter for medication review and counseling (Primary)    2. Medication therapy changed    3. 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 Falls frequently    5. Impaired mobility and ADLs        PLAN    Medication review/medication therapy changed/dementia/falls  -Per request of POA will decrease Seroquel to 25 mg daily for 1 week and then stop.  Will start Rexulti 0.5 mg daily x7 days then increase to 1 mg daily.  Will check CBC and CMP.  Will follow-up and continue to monitor.    Nursing encouraged to keep me informed of any acute changes, lack of improvement, or any new concerning symptoms.    Will follow up and continue to monitor.     Continue supportive care for all ADLs.      [x]  Discussed Patient in detail with nursing/staff, addressed all needs today.     [x]  Plan of Care Reviewed   [x]  PT/OT Reviewed   [x]  Order Changes  [x]  Discharge Plans Reviewed  [x]  Advance Directive on file with Nursing Home.   [x]  POA on file with Nursing Home.   [x]  Code Status listed: [x]  Full Code   []  DNR       “I confirm accuracy of unchanged data/findings which have been carried forward from previous visit, as well as I have updated appropriately those that have changed.”     I spent 30 minutes caring for Darren on this date of service. This time includes time spent by me in the following activities:preparing for the visit, reviewing tests, obtaining and/or reviewing a separately obtained history, performing a medically appropriate examination and/or evaluation , counseling and educating the patient/family/caregiver, ordering medications, tests, or procedures, referring and communicating with other health care professionals , documenting information in the medical record, independently interpreting  results and communicating that information with the patient/family/caregiver, and care coordination           EAGLE Fernando.

## 2023-06-26 PROBLEM — R41.82 ALTERED MENTAL STATUS, UNSPECIFIED ALTERED MENTAL STATUS TYPE: Status: RESOLVED | Noted: 2023-01-01 | Resolved: 2023-01-01

## 2023-06-26 PROBLEM — J18.9 CAP (COMMUNITY ACQUIRED PNEUMONIA): Status: RESOLVED | Noted: 2023-03-24 | Resolved: 2023-06-26

## 2023-07-24 ENCOUNTER — NURSING HOME (OUTPATIENT)
Dept: FAMILY MEDICINE CLINIC | Facility: CLINIC | Age: 88
End: 2023-07-24
Payer: MEDICARE

## 2023-07-24 DIAGNOSIS — Z74.09 IMPAIRED MOBILITY AND ADLS: ICD-10-CM

## 2023-07-24 DIAGNOSIS — N30.00 ACUTE CYSTITIS WITHOUT HEMATURIA: ICD-10-CM

## 2023-07-24 DIAGNOSIS — Z78.9 IMPAIRED MOBILITY AND ADLS: ICD-10-CM

## 2023-07-24 DIAGNOSIS — R41.82 ACUTE ON CHRONIC ALTERATION IN MENTAL STATUS: ICD-10-CM

## 2023-07-24 DIAGNOSIS — F02.B18 MODERATE ALZHEIMER'S DEMENTIA WITH OTHER BEHAVIORAL DISTURBANCE, UNSPECIFIED TIMING OF DEMENTIA ONSET: ICD-10-CM

## 2023-07-24 DIAGNOSIS — G30.9 MODERATE ALZHEIMER'S DEMENTIA WITH OTHER BEHAVIORAL DISTURBANCE, UNSPECIFIED TIMING OF DEMENTIA ONSET: ICD-10-CM

## 2023-07-24 DIAGNOSIS — Z79.899 ENCOUNTER FOR MEDICATION REVIEW: ICD-10-CM

## 2023-07-24 DIAGNOSIS — R21 RASH AND NONSPECIFIC SKIN ERUPTION: ICD-10-CM

## 2023-07-24 PROCEDURE — 99309 SBSQ NF CARE MODERATE MDM 30: CPT | Performed by: NURSE PRACTITIONER

## 2023-07-24 NOTE — LETTER
Nursing Home Follow Up Note      Andrew Jacobo DO []   EAGLE Waddell [x]  [address] [place] [address]  Phone: [phone]  Fax: [phone] Seema Blount MD []    Kurt Tavarez DO []   [address] [place] [address]  Phone: [phone]  Fax: [phone]     PATIENT NAME: Darren Mccracken                                                                          YOB: 1934           DATE OF SERVICE: 07/24/2023  FACILITY:  [][place]   [] [place]   [] [facility]   [x] [facility]   [] Other ______________________________________________________________________      CHIEF COMPLAINT:     Follow up UTI.    Follow up on rash.       HISTORY OF PRESENT ILLNESS:     Patient with increased behaviors last week so UA C&S obtained.  Results positive for E. coli UTI.  He was started on doxycycline yesterday, 100 mg p.o. twice daily for 10 days.  He is sitting up in his Marlys chair today, continues to be very restless when he is not occupied or family at bedside. Behaviors have improved in the past when UTI resolves.     Nursing reports that patient continues to have rash, despite treatment with steroids. Medications reviewed and he is on Simvastatin, and has an allergy to Atorvastatin.       PAST MEDICAL & SURGICAL HISTORY:   Past Medical History:   Diagnosis Date    Allergic     Allergic rhinitis     Arthritis     knees    BPH (benign prostatic hyperplasia)     Cancer     Chronic cough     Chronic kidney disease     Coronary artery disease     Coronary artery disease involving native coronary artery of native heart without angina pectoris 07/31/2018    Added automatically from request for surgery [id number]    Dementia     Dementia     Excessive tear production     Facial basal cell cancer     GERD (gastroesophageal reflux disease)     Glaucoma     History of transfusion     Hyperlipidemia     Hypertension     Kidney stone     Osteoarthritis of both knees     Pneumonia     right  upper lobe     Pulmonary embolism     Skin cancer     Vertigo     Wears glasses       Past Surgical History:   Procedure Laterality Date    CARDIAC CATHETERIZATION N/A 6/21/2017    Procedure: Left Heart Cath;  Surgeon: Toy Landers MD;  [facility]:  [facility];  Service:     CARDIAC CATHETERIZATION N/A 8/1/2018    Procedure: Left Heart Cath;  Surgeon: Toy Landers MD;  [facility];  Service: Cardiology    CATARACT EXTRACTION W/ INTRAOCULAR LENS  IMPLANT, BILATERAL      CHOLECYSTECTOMY  2010    COLONOSCOPY      CORONARY ANGIOPLASTY WITH STENT PLACEMENT      CORONARY STENT PLACEMENT      x3    EYE SURGERY Right     SKIN CANCER EXCISION      WRIST SURGERY           MEDICATIONS:  I have reviewed and reconciled the patients medication list in the patients chart at the [facility] today.      ALLERGIES:    Allergies   Allergen Reactions    Lipitor [Atorvastatin] Myalgia         SOCIAL HISTORY:    Social History     Socioeconomic History    Marital status:    Tobacco Use    Smoking status: Never    Smokeless tobacco: Never   Vaping Use    Vaping Use: Never used   Substance and Sexual Activity    Alcohol use: No    Drug use: No    Sexual activity: Not Currently       FAMILY HISTORY:    Family History   Problem Relation Age of Onset    Heart disease Mother     Heart attack Father        REVIEW OF SYSTEMS:    Review of Systems   Reason unable to perform ROS: per nursing and patient.   Constitutional:  Negative for activity change, appetite change, chills, diaphoresis, fatigue, fever, unexpected weight gain and unexpected weight loss.   HENT:  Negative for congestion, mouth sores, nosebleeds, rhinorrhea and trouble swallowing.    Respiratory:  Negative for cough, choking, shortness of breath and wheezing.    Cardiovascular:  Negative for chest pain, palpitations and leg swelling.   Gastrointestinal:  Negative for abdominal pain, blood in stool,  constipation, diarrhea, nausea and vomiting.   Genitourinary:  Positive for urinary incontinence. Negative for decreased urine volume, difficulty urinating, dysuria, frequency and hematuria.   Musculoskeletal:  Positive for arthralgias (chronic).   Skin:  Positive for rash (upper extremities). Negative for color change.   Neurological:  Positive for weakness, memory problem and confusion (increased). Negative for dizziness.   Psychiatric/Behavioral:  Positive for agitation, behavioral problems, hallucinations and positive for hyperactivity. Negative for dysphoric mood, sleep disturbance and depressed mood. The patient is nervous/anxious.        PHYSICAL EXAMINATION:   VITAL SIGNS:   Vitals:    07/24/23 0829   BP: 102/50   Pulse: 85   Resp: 18   Temp: 97.4 °F (36.3 °C)   SpO2: 98%   Weight: 59.4 kg (131 lb)       Physical Exam  Vitals and nursing note reviewed.   Constitutional:       Appearance: He is well-developed.   Cardiovascular:      Rate and Rhythm: Normal rate and regular rhythm.      Heart sounds: Normal heart sounds.   Pulmonary:      Effort: Pulmonary effort is normal. No respiratory distress.      Breath sounds: Normal breath sounds. No wheezing or rales.   Abdominal:      General: Bowel sounds are normal.      Palpations: Abdomen is soft.   Musculoskeletal:      Cervical back: Normal range of motion.      Comments: Generalized weakness   Skin:     General: Skin is warm and dry.      Findings: Rash present.      Comments: Pin point papular rash to upper extremities and trunk   Neurological:      Mental Status: He is alert. Mental status is at baseline. He is disoriented and confused.   Psychiatric:         Mood and Affect: Mood is anxious.         Speech: Speech is tangential.         Behavior: Behavior is hyperactive.         Cognition and Memory: Cognition is impaired. Memory is impaired.       RECORDS REVIEW:   I have reviewed results in [facility]    ASSESSMENT     Diagnoses and all orders for this  visit:    1. Acute cystitis without hematuria    2. Acute on chronic alteration in mental status    3. Encounter for medication review    4. Rash and nonspecific skin eruption    5. 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 Impaired mobility and ADLs        PLAN    Mental status changes/behaviors/UTI  -Continue Doxy as directed until completion.     Medication review/rash  -Will stop Simvastatin since he is allergic to Atorvastatin. Will start Claritin 10 mg qhs.     Will follow up and continue to monitor.    Nursing encouraged to keep me informed of any acute changes, lack of improvement, or any new concerning symptoms.    Continue supportive care for all ADLs.      [x]  Discussed Patient in detail with nursing/staff, addressed all needs today.     [x]  Plan of Care Reviewed   [x]  PT/OT Reviewed   [x]  Order Changes  [x]  Discharge Plans Reviewed  [x]  Advance Directive on file with Nursing Home.   [x]  POA on file with Nursing Home.   [x]  Code Status listed: [x]  Full Code   []  DNR       “I confirm accuracy of unchanged data/findings which have been carried forward from previous visit, as well as I have updated appropriately those that have changed.”     I spent 30 minutes caring for Darren on this date of service. This time includes time spent by me in the following activities:preparing for the visit, obtaining and/or reviewing a separately obtained history, performing a medically appropriate examination and/or evaluation , counseling and educating the patient/family/caregiver, ordering medications, tests, or procedures, referring and communicating with other health care professionals , documenting information in the medical record, independently interpreting results and communicating that information with the patient/family/caregiver, and care coordination         EAGLE Fernando.

## 2023-07-25 VITALS
DIASTOLIC BLOOD PRESSURE: 50 MMHG | TEMPERATURE: 97.4 F | RESPIRATION RATE: 18 BRPM | WEIGHT: 131 LBS | BODY MASS INDEX: 18.27 KG/M2 | OXYGEN SATURATION: 98 % | SYSTOLIC BLOOD PRESSURE: 102 MMHG | HEART RATE: 85 BPM

## 2023-07-25 NOTE — PROGRESS NOTES
Nursing Home Follow Up Note      Andrew Jacobo DO []   EAGLE Waddell [x]  [address] [place] [address]  Phone: [phone]  Fax: [phone] Seema Blount MD []    Kurt Tavarez DO []   [address] [place] [address]  Phone: [phone]  Fax: [phone]     PATIENT NAME: Darren Mccracken                                                                          YOB: 1934           DATE OF SERVICE: 07/24/2023  FACILITY:  [][facility]   [] [facility]   [] [facility]   [x] [facility]   [] Other ______________________________________________________________________      CHIEF COMPLAINT:     Follow up UTI.    Follow up on rash.       HISTORY OF PRESENT ILLNESS:     Patient with increased behaviors last week so UA C&S obtained.  Results positive for E. coli UTI.  He was started on doxycycline yesterday, 100 mg p.o. twice daily for 10 days.  He is sitting up in his Marlys chair today, continues to be very restless when he is not occupied or family at bedside. Behaviors have improved in the past when UTI resolves.     Nursing reports that patient continues to have rash, despite treatment with steroids. Medications reviewed and he is on Simvastatin, and has an allergy to Atorvastatin.       PAST MEDICAL & SURGICAL HISTORY:   Past Medical History:   Diagnosis Date    Allergic     Allergic rhinitis     Arthritis     knees    BPH (benign prostatic hyperplasia)     Cancer     Chronic cough     Chronic kidney disease     Coronary artery disease     Coronary artery disease involving native coronary artery of native heart without angina pectoris 07/31/2018    Added automatically from request for surgery [id number]    Dementia     Dementia     Excessive tear production     Facial basal cell cancer     GERD (gastroesophageal reflux disease)     Glaucoma     History of transfusion     Hyperlipidemia     Hypertension     Kidney stone     Osteoarthritis of both knees     Pneumonia     right  upper lobe     Pulmonary embolism     Skin cancer     Vertigo     Wears glasses       Past Surgical History:   Procedure Laterality Date    CARDIAC CATHETERIZATION N/A 6/21/2017    Procedure: Left Heart Cath;  Surgeon: Toy Landers MD;  [facility];  Service:     CARDIAC CATHETERIZATION N/A 8/1/2018    Procedure: Left Heart Cath;  Surgeon: Toy Landers MD;  Location:  [facility];  Service: Cardiology    CATARACT EXTRACTION W/ INTRAOCULAR LENS  IMPLANT, BILATERAL      CHOLECYSTECTOMY  2010    COLONOSCOPY      CORONARY ANGIOPLASTY WITH STENT PLACEMENT      CORONARY STENT PLACEMENT      x3    EYE SURGERY Right     SKIN CANCER EXCISION      WRIST SURGERY           MEDICATIONS:  I have reviewed and reconciled the patients medication list in the patients chart at the [facility] nursing [facility] today.      ALLERGIES:    Allergies   Allergen Reactions    Lipitor [Atorvastatin] Myalgia         SOCIAL HISTORY:    Social History     Socioeconomic History    Marital status:    Tobacco Use    Smoking status: Never    Smokeless tobacco: Never   Vaping Use    Vaping Use: Never used   Substance and Sexual Activity    Alcohol use: No    Drug use: No    Sexual activity: Not Currently       FAMILY HISTORY:    Family History   Problem Relation Age of Onset    Heart disease Mother     Heart attack Father        REVIEW OF SYSTEMS:    Review of Systems   Reason unable to perform ROS: per nursing and patient.   Constitutional:  Negative for activity change, appetite change, chills, diaphoresis, fatigue, fever, unexpected weight gain and unexpected weight loss.   HENT:  Negative for congestion, mouth sores, nosebleeds, rhinorrhea and trouble swallowing.    Respiratory:  Negative for cough, choking, shortness of breath and wheezing.    Cardiovascular:  Negative for chest pain, palpitations and leg swelling.   Gastrointestinal:  Negative for abdominal pain, blood in stool,  constipation, diarrhea, nausea and vomiting.   Genitourinary:  Positive for urinary incontinence. Negative for decreased urine volume, difficulty urinating, dysuria, frequency and hematuria.   Musculoskeletal:  Positive for arthralgias (chronic).   Skin:  Positive for rash (upper extremities). Negative for color change.   Neurological:  Positive for weakness, memory problem and confusion (increased). Negative for dizziness.   Psychiatric/Behavioral:  Positive for agitation, behavioral problems, hallucinations and positive for hyperactivity. Negative for dysphoric mood, sleep disturbance and depressed mood. The patient is nervous/anxious.        PHYSICAL EXAMINATION:   VITAL SIGNS:   Vitals:    07/24/23 0829   BP: 102/50   Pulse: 85   Resp: 18   Temp: 97.4 °F (36.3 °C)   SpO2: 98%   Weight: 59.4 kg (131 lb)       Physical Exam  Vitals and nursing note reviewed.   Constitutional:       Appearance: He is well-developed.   Cardiovascular:      Rate and Rhythm: Normal rate and regular rhythm.      Heart sounds: Normal heart sounds.   Pulmonary:      Effort: Pulmonary effort is normal. No respiratory distress.      Breath sounds: Normal breath sounds. No wheezing or rales.   Abdominal:      General: Bowel sounds are normal.      Palpations: Abdomen is soft.   Musculoskeletal:      Cervical back: Normal range of motion.      Comments: Generalized weakness   Skin:     General: Skin is warm and dry.      Findings: Rash present.      Comments: Pin point papular rash to upper extremities and trunk   Neurological:      Mental Status: He is alert. Mental status is at baseline. He is disoriented and confused.   Psychiatric:         Mood and Affect: Mood is anxious.         Speech: Speech is tangential.         Behavior: Behavior is hyperactive.         Cognition and Memory: Cognition is impaired. Memory is impaired.       RECORDS REVIEW:   I have reviewed results in [facility]    ASSESSMENT     Diagnoses and all orders for this  visit:    1. Acute cystitis without hematuria    2. Acute on chronic alteration in mental status    3. Encounter for medication review    4. Rash and nonspecific skin eruption    5. 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 Impaired mobility and ADLs        PLAN    Mental status changes/behaviors/UTI  -Continue Doxy as directed until completion.     Medication review/rash  -Will stop Simvastatin since he is allergic to Atorvastatin. Will start Claritin 10 mg qhs.     Will follow up and continue to monitor.    Nursing encouraged to keep me informed of any acute changes, lack of improvement, or any new concerning symptoms.    Continue supportive care for all ADLs.      [x]  Discussed Patient in detail with nursing/staff, addressed all needs today.     [x]  Plan of Care Reviewed   [x]  PT/OT Reviewed   [x]  Order Changes  [x]  Discharge Plans Reviewed  [x]  Advance Directive on file with Nursing Home.   [x]  POA on file with Nursing Home.   [x]  Code Status listed: [x]  Full Code   []  DNR       “I confirm accuracy of unchanged data/findings which have been carried forward from previous visit, as well as I have updated appropriately those that have changed.”     I spent 30 minutes caring for Darren on this date of service. This time includes time spent by me in the following activities:preparing for the visit, obtaining and/or reviewing a separately obtained history, performing a medically appropriate examination and/or evaluation , counseling and educating the patient/family/caregiver, ordering medications, tests, or procedures, referring and communicating with other health care professionals , documenting information in the medical record, independently interpreting results and communicating that information with the patient/family/caregiver, and care coordination         EAGLE Fernando.

## 2023-08-03 ENCOUNTER — NURSING HOME (OUTPATIENT)
Dept: FAMILY MEDICINE CLINIC | Facility: CLINIC | Age: 88
End: 2023-08-03
Payer: MEDICARE

## 2023-08-03 VITALS
SYSTOLIC BLOOD PRESSURE: 117 MMHG | WEIGHT: 126 LBS | RESPIRATION RATE: 18 BRPM | OXYGEN SATURATION: 96 % | TEMPERATURE: 97.8 F | BODY MASS INDEX: 17.57 KG/M2 | DIASTOLIC BLOOD PRESSURE: 75 MMHG | HEART RATE: 81 BPM

## 2023-08-03 DIAGNOSIS — R29.6 FALLS FREQUENTLY: ICD-10-CM

## 2023-08-03 DIAGNOSIS — G30.9 MODERATE ALZHEIMER'S DEMENTIA WITH OTHER BEHAVIORAL DISTURBANCE, UNSPECIFIED TIMING OF DEMENTIA ONSET: ICD-10-CM

## 2023-08-03 DIAGNOSIS — F02.B18 MODERATE ALZHEIMER'S DEMENTIA WITH OTHER BEHAVIORAL DISTURBANCE, UNSPECIFIED TIMING OF DEMENTIA ONSET: ICD-10-CM

## 2023-08-03 DIAGNOSIS — Z74.09 IMPAIRED MOBILITY AND ADLS: ICD-10-CM

## 2023-08-03 DIAGNOSIS — Z78.9 IMPAIRED MOBILITY AND ADLS: ICD-10-CM

## 2023-08-03 DIAGNOSIS — R21 RASH AND NONSPECIFIC SKIN ERUPTION: Primary | ICD-10-CM
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
## 2023-08-03 DIAGNOSIS — Z79.899 MEDICATION CHANGED TO THERAPEUTIC EQUIVALENT: ICD-10-CM

## 2023-08-09 ENCOUNTER — NURSING HOME (OUTPATIENT)
Dept: FAMILY MEDICINE CLINIC | Facility: CLINIC | Age: 88
End: 2023-08-09
Payer: MEDICARE

## 2023-08-09 VITALS
WEIGHT: 121 LBS | HEART RATE: 63 BPM | RESPIRATION RATE: 12 BRPM | SYSTOLIC BLOOD PRESSURE: 117 MMHG | TEMPERATURE: 97.3 F | BODY MASS INDEX: 16.88 KG/M2 | OXYGEN SATURATION: 95 % | DIASTOLIC BLOOD PRESSURE: 63 MMHG

## 2023-08-09 DIAGNOSIS — Z78.9 IMPAIRED MOBILITY AND ADLS: Primary | ICD-10-CM

## 2023-08-09 DIAGNOSIS — K21.9 GASTROESOPHAGEAL REFLUX DISEASE WITHOUT ESOPHAGITIS: Chronic | ICD-10-CM

## 2023-08-09 DIAGNOSIS — R29.6 FALLS FREQUENTLY: ICD-10-CM

## 2023-08-09 DIAGNOSIS — B35.4 DERMATOPHYTOSIS OF BODY: ICD-10-CM

## 2023-08-09 DIAGNOSIS — Z74.09 IMPAIRED MOBILITY AND ADLS: Primary | ICD-10-CM

## 2023-08-09 DIAGNOSIS — R54 AGE-RELATED PHYSICAL DEBILITY: ICD-10-CM

## 2023-08-09 DIAGNOSIS — F01.B0 MODERATE VASCULAR DEMENTIA WITHOUT BEHAVIORAL DISTURBANCE, PSYCHOTIC DISTURBANCE, MOOD DISTURBANCE, OR ANXIETY: Chronic | ICD-10-CM

## 2023-08-09 DIAGNOSIS — I10 ESSENTIAL HYPERTENSION: Chronic | ICD-10-CM

## 2023-08-09 DIAGNOSIS — N39.42 URINARY INCONTINENCE WITHOUT SENSORY AWARENESS: ICD-10-CM

## 2023-08-09 PROBLEM — N52.9 ERECTILE DYSFUNCTION: Status: RESOLVED | Noted: 2018-03-28 | Resolved: 2023-08-09

## 2023-08-09 PROBLEM — N28.9 RENAL INSUFFICIENCY: Status: RESOLVED | Noted: 2017-03-22 | Resolved: 2023-01-01

## 2023-08-09 NOTE — PROGRESS NOTES
Nursing Home Progress Note        Andrew Jacobo DO [x]  EAG[facility] Waddell []  [address] [place] [address]  Phone: [phone]  Fax: [phone] Seema Blount MD []  Kurt Tavarez DO []  [address] [place] [address]  Phone: [phone]  Fax: [phone]     PATIENT NAME: Darren Mccracken                                                                          YOB: 1934           DATE OF SERVICE: 8/9/2023  FACILITY: []  [facility]  [] [facility]  []  [facility]  [x] [facility]  []  Other ______________________________________________________________________     CHIEF COMPLAINT:  Impaired mobility and ADLs/age-related physical debility/advanced vascular dementia/hypertension/GERD/frequent falls      HISTORY OF PRESENT ILLNESS:   [x]  Follow Up visit for coordination of long term care issues and chronic medical management of Diagnoses and all orders for this visit:    1. Impaired mobility and ADLs (Primary)    2. Age-related physical debility    3. Falls frequently    4. Moderate vascular dementia without behavioral disturbance, psychotic disturbance, mood disturbance, or anxiety    5. Gastroesophageal reflux disease without esophagitis    6. Essential hypertension    7. Dermatophytosis of body    8. Urinary incontinence without sensory awareness    Nursing/staff reports the patient is at her resistant dermatitis located to the torso, maculopapular/erythematous in quality.  No contact irritants noted.  There were recent medication changes made in an effort to improve his symptoms.      No reports of any fever, chills or night sweats.  Vital signs been stable.    No reports of focal aspiration.          PAST MEDICAL & SURGICAL HISTORY:   Past Medical History:   Diagnosis Date    Allergic     Allergic rhinitis     Arthritis     knees    BPH (benign prostatic hyperplasia)     Cancer     Chronic cough     Chronic kidney disease     Coronary artery disease     Coronary artery  disease involving native coronary artery of native heart without angina pectoris 07/31/2018    Added automatically from request for surgery [id number]    Dementia     Dementia     Excessive tear production     Facial basal cell cancer     GERD (gastroesophageal reflux disease)     Glaucoma     History of transfusion     Hyperlipidemia     Hypertension     Kidney stone     Osteoarthritis of both knees     Pneumonia     right upper lobe     Pulmonary embolism     Skin cancer     Vertigo     Wears glasses       Past Surgical History:   Procedure Laterality Date    CARDIAC CATHETERIZATION N/A 6/21/2017    Procedure: Left Heart Cath;  Surgeon: Toy Landers MD;  Location:  [facility];  Service:     CARDIAC CATHETERIZATION N/A 8/1/2018    Procedure: Left Heart Cath;  Surgeon: Toy Landers MD;  [facility];  Service: Cardiology    CATARACT EXTRACTION W/ INTRAOCULAR LENS  IMPLANT, BILATERAL      CHOLECYSTECTOMY  2010    COLONOSCOPY      CORONARY ANGIOPLASTY WITH STENT PLACEMENT      CORONARY STENT PLACEMENT      x3    EYE SURGERY Right     SKIN CANCER EXCISION      WRIST SURGERY           MEDICATIONS:  I have reviewed and reconciled the patients medication list in the patients chart at the skilled nursing facility today.      ALLERGIES:    Allergies   Allergen Reactions    Lipitor [Atorvastatin] Myalgia         SOCIAL HISTORY:    Social History     Socioeconomic History    Marital status:    Tobacco Use    Smoking status: Never    Smokeless tobacco: Never   Vaping Use    Vaping Use: Never used   Substance and Sexual Activity    Alcohol use: No    Drug use: No    Sexual activity: Not Currently       FAMILY HISTORY:    Family History   Problem Relation Age of Onset    Heart disease Mother     Heart attack Father        REVIEW OF SYSTEMS:    Review of Systems  Appetite: Fair [x]   Good []   Poor []   Weight Loss []  [x]  Weight Stable   Unavoidable Weight Loss []   Tolerating Tube Feeding []    Supplements Provided []   Patient is a poor historian, review of systems obtained on discussion with patient's treating nurse/staff, as well as review of records.    PHYSICAL EXAMINATION:   VITAL SIGNS:   Vitals:    08/09/23 0849   BP: 117/63   Pulse: 63   Resp: 12   Temp: 97.3 øF (36.3 øC)   SpO2: 95%       Physical Exam    General Appearance:  [x]  Alert   [x]  Oriented x person  [x]  No acute distress     [x]  Confused  []  Disoriented   []  Comatose   Head:  Atraumatic and normocephalic, without obvious abnormality.   Eyes:         PERRLA, conjunctivae and sclerae normal, no Icterus. No pallor. Extra-occular movements are within normal limits.   Ears:  Ears appear intact with no abnormalities noted.   Throat: No oral lesions, no thrush, oral mucosa moist.   Neck: Supple, trachea midline, no thyromegaly, no carotid bruit.   Back:   No kyphoscoliosis. No tenderness to palpation.   Lungs:   Chest shape is normal.  Air exchange noted to all lung fields.  No wheezing.    Heart:  Normal S1 and S2, no murmur, no gallop, no rub. No JVD.   Abdomen:   Normal bowel sounds, no masses, no organomegaly. Soft, non-tender, non-distended, no guarding    Extremities: Moves all extremities.  Without edema, cyanosis or clubbing.  Frail build.  Poor core strength and stability.   Pulses: Pulses palpable and equal bilaterally.   Skin: No bleeding or rash.  Generalized dry skin noted.  Age-related atrophy of skin.   Neurologic: [x] Normal speech []  Normal mental status    [x] Cranial nerves II through XII intact   [x]  No anosmia [x]  DTR 2+ [x]  Proprioception intact  [x] age-related motor/sensory deficits      Psych/Mood:                    [x]  No acute changes []  Depressed  Urinary:                            []  Continent  [x]  Incontinent []  Retention  []  F/C      []  UTI w/treatment in progress         ASSESSMENT     Diagnoses and all orders for this visit:    1. Impaired mobility and ADLs  (Primary)    2. Age-related physical debility    3. Falls frequently    4. Moderate vascular dementia without behavioral disturbance, psychotic disturbance, mood disturbance, or anxiety    5. Gastroesophageal reflux disease without esophagitis    6. Essential hypertension    7. Dermatophytosis of body    8. Urinary incontinence without sensory awareness          PLAN  Dermatitis does favor dermatophytosis; plan to follow clinically after recently made medication changes in an effort to allow for resolution.  Should symptoms not improve, consider transition to once daily terbinafine for 14-day duration.    Continue supportive care as needed otherwise for mobilization/transfer needs, fall precautions in place.  Assist ADLs as needed.  Continue to follow overall nutritional/hydration status.    Vital signs demonstrate hemodynamic stability, blood pressure is at goal.  Demonstrates no findings of unstable angina.    No acute behavior changes.  Avoiding as needed anxiolytic/mood altering medications as best able.    Surveillance labs when needed.    [x]  Discussed Patient in detail with nursing/staff, addressed all needs today.     [x]  Plan of Care Reviewed   []  PT/OT Reviewed   []  Order Changes  []  Discharge Plans Reviewed   []  Code Status Changes    I spent 30 minutes caring for Darren on this date of service. This time includes time spent by me in the following activities:preparing for the visit, performing a medically appropriate examination and/or evaluation , counseling and educating the patient/family/caregiver, ordering medications, tests, or procedures, documenting information in the medical record, and care coordination    I have reviewed and updated all copied forward information, as appropriate.  I attest to the accuracy and relevance of any unchanged information.       Andrew Jacobo DO  8/9/2023

## 2023-08-09 NOTE — PROGRESS NOTES
Nursing Home History/Physical        Andrew DO Odalis [x]   EAG[facility] Waddell []  [address] [place] [address]  Phone: [phone]  Fax: [phone] Seema Blount MD []  Kurt Tavarez DO []  [address] [place] [address]  Phone: [phone]  Fax: [phone]     PATIENT NAME: Darren Mccracken                                                                          YOB: 1934           DATE OF SERVICE: 04/19/2023  FACILITY:  []  [facility]  []  [facility]   []  [facility]   [x] [facility]    [] Other ______________________________________________________________________     CHIEF COMPLAINT:  Impaired mobility and ADLs/frequent falls/age-related physical debility/GERD/hypertension/moderate vascular dementia      HISTORY OF PRESENT ILLNESS:      [x]  Initial visit for coordination of long term care issues and chronic medical management needs.    Patient is an 88-year-old male, admission here from home secondary to numerous chronic comorbid conditions; patient has a history of frequent falls, as well as age-related physical debility rendering impairment of mobility and ADLs.  Also has moderate vascular dementia, hypertension and GERD.    Since arrival to facility, no acute behavioral changes.  No new falls or injuries.  No reports of any nutritional/hydration deficits.  Vital signs have been stable.  Diuresing well.  No reports of focal aspiration.    PAST MEDICAL & SURGICAL HISTORY:   Past Medical History:   Diagnosis Date    Allergic     Allergic rhinitis     Arthritis     knees    BPH (benign prostatic hyperplasia)     Cancer     Chronic cough     Chronic kidney disease     Coronary artery disease     Coronary artery disease involving native coronary artery of native heart without angina pectoris 07/31/2018    Added automatically from request for surgery [id number]    Dementia     Dementia     Excessive tear production     Facial basal cell cancer     GERD (gastroesophageal  reflux disease)     Glaucoma     History of transfusion     Hyperlipidemia     Hypertension     Kidney stone     Osteoarthritis of both knees     Pneumonia     right upper lobe     Pulmonary embolism     Skin cancer     Vertigo     Wears glasses       Past Surgical History:   Procedure Laterality Date    CARDIAC CATHETERIZATION N/A 6/21/2017    Procedure: Left Heart Cath;  Surgeon: Toy Landers MD;  [facility];  Service:     CARDIAC CATHETERIZATION N/A 8/1/2018    Procedure: Left Heart Cath;  Surgeon: Toy Landers MD;  [facility];  Service: Cardiology    CATARACT EXTRACTION W/ INTRAOCULAR LENS  IMPLANT, BILATERAL      CHOLECYSTECTOMY  2010    COLONOSCOPY      CORONARY ANGIOPLASTY WITH STENT PLACEMENT      CORONARY STENT PLACEMENT      x3    EYE SURGERY Right     SKIN CANCER EXCISION      WRIST SURGERY           MEDICATIONS:  I have reviewed and reconciled the patients medication list in the patients chart at the [facility] nursing [facility] today.      ALLERGIES:    Allergies   Allergen Reactions    Lipitor [Atorvastatin] Myalgia         SOCIAL HISTORY:    Social History     Socioeconomic History    Marital status:    Tobacco Use    Smoking status: Never    Smokeless tobacco: Never   Vaping Use    Vaping Use: Never used   Substance and Sexual Activity    Alcohol use: No    Drug use: No    Sexual activity: Not Currently       FAMILY HISTORY:    Family History   Problem Relation Age of Onset    Heart disease Mother     Heart attack Father        REVIEW OF SYSTEMS:    Review of Systems  Appetite: Fair []   Good [x]   Poor []   Weight Loss []  [x]  Weight Stable   Unavoidable Weight Loss []  Tolerating Tube Feeding []    Supplements Provided []   Patient is a poor historian, review of systems obtained partially by patient, as well as in discussion with his treating nurse/staff, as well as review of records.    PHYSICAL EXAMINATION:   VITAL SIGNS:    Vitals:    04/19/23 0921   BP: 128/88   Pulse: 70   Resp: 18   Temp: 97.8 øF (36.6 øC)   SpO2: 96%         Physical Exam  General Appearance:  [x]  Alert   [x]  Oriented x person  [x]  No acute distress     [x]  Confused, at times []  Disoriented   []  Comatose   Head:  Atraumatic and normocephalic, without obvious abnormality.   Eyes:         PERRLA, conjunctivae and sclerae normal, no Icterus. No pallor. Extra-occular movements are within normal limits.   Ears:  Ears appear intact with no abnormalities noted.   Throat: No oral lesions, no thrush, oral mucosa moist.   Neck: Supple, trachea midline, no thyromegaly, no carotid bruit.   Back:   No kyphoscoliosis. No tenderness to palpation.   Lungs:   Chest shape is normal. Breath sounds heard bilaterally equally.  No wheezing.  Audible air exchange noted all lung fields.   Heart:  Normal S1 and S2, no murmur, no gallop, no rub. No JVD.   Abdomen:   Normal bowel sounds, no masses, no organomegaly. Soft, non-tender, non-distended, no guarding    Extremities: Moves all extremities; without edema, cyanosis or clubbing.  Frail build.  Poor core strength/stability.   Pulses: Pulses palpable and equal bilaterally.   Skin: No bleeding or rash.  Generalized dry skin noted.  Age-related atrophy of skin.   Neurologic: [x] Normal speech []  Normal mental status    [x] Cranial nerves II through XII intact   [x]  No anosmia [x]  DTR 2+ [x]  Proprioception intact  [x] age related motor/sensory deficits      Psych/Mood:                    [x]  No acute changes []  Depressed      Urinary:      [x]  Continent  [x]  Incontinent, at times []  Retention  []  F/C     []  UTI w/treatment in progress      ASSESSMENT     Diagnoses and all orders for this visit:    1. Impaired mobility and ADLs (Primary)    2. Falls frequently    3. Age-related physical debility    4. Gastroesophageal reflux disease without esophagitis    5. Essential hypertension    6. Moderate vascular dementia without  behavioral disturbance, psychotic disturbance, mood disturbance, or anxiety        PLAN  Planned utilization of skilled nursing facility services to assist with mobilization/transfer needs, as well as any ADLs of concern.  Continue to follow overall nutritional/hydration status, no deficits reported at this time.    No acute behavioral changes, although I do suspect patient will have some episodes of confusion secondary to new living arrangement and his underlying dementia.  Plan to follow clinically.    Continue dietary/lifestyle modifications to aid in symptom management of chronic GERD.    Vital signs demonstrate hemodynamic stability, blood pressure is at goal.    Surveillance labs when needed.  [x]  Discussed Patient in detail with nursing/staff, addressed all needs today.     [x]  Plan of Care Reviewed   []  PT/OT Reviewed   []  Order Changes  []  Discharge Plans Reviewed  []  Code Status Change        I spent 45 minutes caring for Darren on this date of service. This time includes time spent by me in the following activities:preparing for the visit, performing a medically appropriate examination and/or evaluation , counseling and educating the patient/family/caregiver, ordering medications, tests, or procedures, documenting information in the medical record, and care coordination    Andrew Jacobo   04/19/2023

## 2023-08-09 NOTE — PROGRESS NOTES
Nursing Home Progress Note        Andrew Jacobo DO [x]  [facility]add[facility] []  [address] [place] [address]  Phone: [phone]  Fax: [phone] Seema Blount MD []  Kurt Tavarez DO []  [address] [place] [address]  Phone: [phone]  Fax: [phone]     PATIENT NAME: Darren Mccracken                                                                          YOB: 1934           DATE OF SERVICE: 05/24/2023  FACILITY: []  [place]  [] [facility]  []  [facility]  [x] [facility]  []  Other ______________________________________________________________________     CHIEF COMPLAINT:  Impaired mobility and ADLs/age-related physical debility/advanced vascular dementia/hypertension/GERD/frequent falls      HISTORY OF PRESENT ILLNESS:   [x]  Follow Up visit for coordination of long term care issues and chronic medical management of Diagnoses and all orders for this visit:    1. Impaired mobility and ADLs (Primary)    2. Age-related physical debility    3. 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 Falls frequently    5. Essential hypertension    6. Urinary incontinence without sensory awareness    7. Gastroesophageal reflux disease without esophagitis    Nursing/staff reports the patient has been receptive to supportive care for mobilization/transfer assistance, as well as ADLs if needed.  No reports of any nutritional/hydration deficits.    No acute behavioral changes, continues to have findings consistent with advanced vascular dementia.  No neurological deficits reported.    Continues to diurese.    Vital signs have been stable.    No reports of any focal aspiration.      PAST MEDICAL & SURGICAL HISTORY:   Past Medical History:   Diagnosis Date    Allergic     Allergic rhinitis     Arthritis     knees    BPH (benign prostatic hyperplasia)     Cancer     Chronic cough     Chronic kidney disease      Coronary artery disease     Coronary artery disease involving native coronary artery of native heart without angina pectoris 07/31/2018    Added automatically from request for surgery [id number]    Dementia     Dementia     Excessive tear production     Facial basal cell cancer     GERD (gastroesophageal reflux disease)     Glaucoma     History of transfusion     Hyperlipidemia     Hypertension     Kidney stone     Osteoarthritis of both knees     Pneumonia     right upper lobe     Pulmonary embolism     Skin cancer     Vertigo     Wears glasses       Past Surgical History:   Procedure Laterality Date    CARDIAC CATHETERIZATION N/A 6/21/2017    Procedure: Left Heart Cath;  Surgeon: Toy Landers MD;  [facility];  Service:     CARDIAC CATHETERIZATION N/A 8/1/2018    Procedure: Left Heart Cath;  Surgeon: Toy Landers MD;  Location:  [facility];  Service: Cardiology    CATARACT EXTRACTION W/ INTRAOCULAR LENS  IMPLANT, BILATERAL      CHOLECYSTECTOMY  2010    COLONOSCOPY      CORONARY ANGIOPLASTY WITH STENT PLACEMENT      CORONARY STENT PLACEMENT      x3    EYE SURGERY Right     SKIN CANCER EXCISION      WRIST SURGERY           MEDICATIONS:  I have reviewed and reconciled the patients medication list in the patients chart at the skilled nursing facility today.      ALLERGIES:    Allergies   Allergen Reactions    Lipitor [Atorvastatin] Myalgia         SOCIAL HISTORY:    Social History     Socioeconomic History    Marital status:    Tobacco Use    Smoking status: Never    Smokeless tobacco: Never   Vaping Use    Vaping Use: Never used   Substance and Sexual Activity    Alcohol use: No    Drug use: No    Sexual activity: Not Currently       FAMILY HISTORY:    Family History   Problem Relation Age of Onset    Heart disease Mother     Heart attack Father        REVIEW OF SYSTEMS:    Review of Systems  Appetite: Fair [x]   Good []   Poor []   Weight Loss []  [x]   Weight Stable   Unavoidable Weight Loss []  Tolerating Tube Feeding []    Supplements Provided []   Patient is a poor historian, review of systems obtained on discussion with patient's treating nurse/staff, as well as review of records.    PHYSICAL EXAMINATION:   VITAL SIGNS:   Vitals:    05/24/23 0844   BP: 124/78   Pulse: 74   Resp: 16   Temp: 97.7 øF (36.5 øC)   SpO2: 94%       Physical Exam    General Appearance:  [x]  Alert   [x]  Oriented x person  [x]  No acute distress     [x]  Confused  []  Disoriented   []  Comatose   Head:  Atraumatic and normocephalic, without obvious abnormality.   Eyes:         PERRLA, conjunctivae and sclerae normal, no Icterus. No pallor. Extra-occular movements are within normal limits.   Ears:  Ears appear intact with no abnormalities noted.   Throat: No oral lesions, no thrush, oral mucosa moist.   Neck: Supple, trachea midline, no thyromegaly, no carotid bruit.   Back:   No kyphoscoliosis. No tenderness to palpation.   Lungs:   Chest shape is normal.  Air exchange noted to all lung fields.  No wheezing.    Heart:  Normal S1 and S2, no murmur, no gallop, no rub. No JVD.   Abdomen:   Normal bowel sounds, no masses, no organomegaly. Soft, non-tender, non-distended, no guarding    Extremities: Moves all extremities.  Without edema, cyanosis or clubbing.  Frail build.  Poor core strength and stability.   Pulses: Pulses palpable and equal bilaterally.   Skin: No bleeding or rash.  Generalized dry skin noted.  Age-related atrophy of skin.   Neurologic: [x] Normal speech []  Normal mental status    [x] Cranial nerves II through XII intact   [x]  No anosmia [x]  DTR 2+ [x]  Proprioception intact  [x] age-related motor/sensory deficits      Psych/Mood:                    [x]  No acute changes []  Depressed  Urinary:                            []  Continent  [x]  Incontinent []  Retention  []  F/C      []  UTI w/treatment in progress         ASSESSMENT     Diagnoses and all orders for this  visit:    1. Impaired mobility and ADLs (Primary)    2. Age-related physical debility    3. 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 Falls frequently    5. Essential hypertension    6. Urinary incontinence without sensory awareness    7. Gastroesophageal reflux disease without esophagitis          PLAN  Continue supportive care for mobilization/transfer assistance.  Assist ADLs as needed.  Continue to follow overall nutritional/hydration status.  Monitor closely for any findings consistent with aspiration.    No acute behavioral changes, does sleep well.  He does try independently to transfer and ambulate at times.  Fall precautions in place.    Vital signs demonstrate hemodynamic stability.    Continue dietary/lifestyle modification status and management of chronic GERD.    Surveillance labs when needed.    [x]  Discussed Patient in detail with nursing/staff, addressed all needs today.     [x]  Plan of Care Reviewed   []  PT/OT Reviewed   []  Order Changes  []  Discharge Plans Reviewed   []  Code Status Changes    I spent 30 minutes caring for Darren on this date of service. This time includes time spent by me in the following activities:preparing for the visit, performing a medically appropriate examination and/or evaluation , counseling and educating the patient/family/caregiver, ordering medications, tests, or procedures, documenting information in the medical record, and care coordination       Andrew Jacobo DO  05/24/2023

## 2023-08-09 NOTE — LETTER
Nursing Home Progress Note        Andrew Jacobo DO [x]  [facility] []  [address] [place] [address]  Phone: [phone]  Fax: [phone] Seema Blount MD []  Kurt Tavarez DO []  [address] [place] [address]  Phone: [phone]  Fax: [phone]     PATIENT NAME: Darren Mccracken                                                                          YOB: 1934           DATE OF SERVICE: 8/9/2023  FACILITY: []  [facility]  [] [facility]  []  [facility]  [x] [facility]  []  Other ______________________________________________________________________     CHIEF COMPLAINT:  Impaired mobility and ADLs/age-related physical debility/advanced vascular dementia/hypertension/GERD/frequent falls      HISTORY OF PRESENT ILLNESS:   [x]  Follow Up visit for coordination of long term care issues and chronic medical management of Diagnoses and all orders for this visit:    1. Impaired mobility and ADLs (Primary)    2. Age-related physical debility    3. Falls frequently    4. Moderate vascular dementia without behavioral disturbance, psychotic disturbance, mood disturbance, or anxiety    5. Gastroesophageal reflux disease without esophagitis    6. Essential hypertension    7. Dermatophytosis of body    8. Urinary incontinence without sensory awareness    Nursing/staff reports the patient is at her resistant dermatitis located to the torso, maculopapular/erythematous in quality.  No contact irritants noted.  There were recent medication changes made in an effort to improve his symptoms.      No reports of any fever, chills or night sweats.  Vital signs been stable.    No reports of focal aspiration.          PAST MEDICAL & SURGICAL HISTORY:   Past Medical History:   Diagnosis Date    Allergic     Allergic rhinitis     Arthritis     knees    BPH (benign prostatic hyperplasia)     Cancer     Chronic cough     Chronic kidney disease     Coronary artery disease     Coronary artery  disease involving native coronary artery of native heart without angina pectoris 07/31/2018    Added automatically from request for surgery [id number]    Dementia     Dementia     Excessive tear production     Facial basal cell cancer     GERD (gastroesophageal reflux disease)     Glaucoma     History of transfusion     Hyperlipidemia     Hypertension     Kidney stone     Osteoarthritis of both knees     Pneumonia     right upper lobe     Pulmonary embolism     Skin cancer     Vertigo     Wears glasses       Past Surgical History:   Procedure Laterality Date    CARDIAC CATHETERIZATION N/A 6/21/2017    Procedure: Left Heart Cath;  Surgeon: Toy Landers MD;  [facility];  Service:     CARDIAC CATHETERIZATION N/A 8/1/2018    Procedure: Left Heart Cath;  Surgeon: Toy Landers MD;  [facility];  Service: Cardiology    CATARACT EXTRACTION W/ INTRAOCULAR LENS  IMPLANT, BILATERAL      CHOLECYSTECTOMY  2010    COLONOSCOPY      CORONARY ANGIOPLASTY WITH STENT PLACEMENT      CORONARY STENT PLACEMENT      x3    EYE SURGERY Right     SKIN CANCER EXCISION      WRIST SURGERY           MEDICATIONS:  I have reviewed and reconciled the patients medication list in the patients chart at the skilled nursing facility today.      ALLERGIES:    Allergies   Allergen Reactions    Lipitor [Atorvastatin] Myalgia         SOCIAL HISTORY:    Social History     Socioeconomic History    Marital status:    Tobacco Use    Smoking status: Never    Smokeless tobacco: Never   Vaping Use    Vaping Use: Never used   Substance and Sexual Activity    Alcohol use: No    Drug use: No    Sexual activity: Not Currently       FAMILY HISTORY:    Family History   Problem Relation Age of Onset    Heart disease Mother     Heart attack Father        REVIEW OF SYSTEMS:    Review of Systems  Appetite: Fair [x]   Good []   Poor []   Weight Loss []  [x]  Weight Stable   Unavoidable Weight Loss []   Tolerating Tube Feeding []    Supplements Provided []   Patient is a poor historian, review of systems obtained on discussion with patient's treating nurse/staff, as well as review of records.    PHYSICAL EXAMINATION:   VITAL SIGNS:   Vitals:    08/09/23 0849   BP: 117/63   Pulse: 63   Resp: 12   Temp: 97.3 øF (36.3 øC)   SpO2: 95%       Physical Exam    General Appearance:  [x]  Alert   [x]  Oriented x person  [x]  No acute distress     [x]  Confused  []  Disoriented   []  Comatose   Head:  Atraumatic and normocephalic, without obvious abnormality.   Eyes:         PERRLA, conjunctivae and sclerae normal, no Icterus. No pallor. Extra-occular movements are within normal limits.   Ears:  Ears appear intact with no abnormalities noted.   Throat: No oral lesions, no thrush, oral mucosa moist.   Neck: Supple, trachea midline, no thyromegaly, no carotid bruit.   Back:   No kyphoscoliosis. No tenderness to palpation.   Lungs:   Chest shape is normal.  Air exchange noted to all lung fields.  No wheezing.    Heart:  Normal S1 and S2, no murmur, no gallop, no rub. No JVD.   Abdomen:   Normal bowel sounds, no masses, no organomegaly. Soft, non-tender, non-distended, no guarding    Extremities: Moves all extremities.  Without edema, cyanosis or clubbing.  Frail build.  Poor core strength and stability.   Pulses: Pulses palpable and equal bilaterally.   Skin: No bleeding or rash.  Generalized dry skin noted.  Age-related atrophy of skin.   Neurologic: [x] Normal speech []  Normal mental status    [x] Cranial nerves II through XII intact   [x]  No anosmia [x]  DTR 2+ [x]  Proprioception intact  [x] age-related motor/sensory deficits      Psych/Mood:                    [x]  No acute changes []  Depressed  Urinary:                            []  Continent  [x]  Incontinent []  Retention  []  F/C      []  UTI w/treatment in progress         ASSESSMENT     Diagnoses and all orders for this visit:    1. Impaired mobility and ADLs  (Primary)    2. Age-related physical debility    3. Falls frequently    4. Moderate vascular dementia without behavioral disturbance, psychotic disturbance, mood disturbance, or anxiety    5. Gastroesophageal reflux disease without esophagitis    6. Essential hypertension    7. Dermatophytosis of body    8. Urinary incontinence without sensory awareness          PLAN  Dermatitis does favor dermatophytosis; plan to follow clinically after recently made medication changes in an effort to allow for resolution.  Should symptoms not improve, consider transition to once daily terbinafine for 14-day duration.    Continue supportive care as needed otherwise for mobilization/transfer needs, fall precautions in place.  Assist ADLs as needed.  Continue to follow overall nutritional/hydration status.    Vital signs demonstrate hemodynamic stability, blood pressure is at goal.  Demonstrates no findings of unstable angina.    No acute behavior changes.  Avoiding as needed anxiolytic/mood altering medications as best able.    Surveillance labs when needed.    [x]  Discussed Patient in detail with nursing/staff, addressed all needs today.     [x]  Plan of Care Reviewed   []  PT/OT Reviewed   []  Order Changes  []  Discharge Plans Reviewed   []  Code Status Changes    I spent 30 minutes caring for Darren on this date of service. This time includes time spent by me in the following activities:preparing for the visit, performing a medically appropriate examination and/or evaluation , counseling and educating the patient/family/caregiver, ordering medications, tests, or procedures, documenting information in the medical record, and care coordination    I have reviewed and updated all copied forward information, as appropriate.  I attest to the accuracy and relevance of any unchanged information.       Andrew Jacobo DO  8/9/2023

## 2023-08-14 ENCOUNTER — NURSING HOME (OUTPATIENT)
Dept: FAMILY MEDICINE CLINIC | Facility: CLINIC | Age: 88
End: 2023-08-14
Payer: MEDICARE

## 2023-08-14 VITALS
BODY MASS INDEX: 16.88 KG/M2 | HEART RATE: 72 BPM | WEIGHT: 121 LBS | DIASTOLIC BLOOD PRESSURE: 60 MMHG | SYSTOLIC BLOOD PRESSURE: 96 MMHG | RESPIRATION RATE: 20 BRPM | TEMPERATURE: 97.8 F | OXYGEN SATURATION: 92 %

## 2023-08-14 DIAGNOSIS — R21 RASH AND NONSPECIFIC SKIN ERUPTION: ICD-10-CM

## 2023-08-14 DIAGNOSIS — R09.89 CHOKING EPISODE: ICD-10-CM

## 2023-08-14 DIAGNOSIS — F01.B0 MODERATE VASCULAR DEMENTIA WITHOUT BEHAVIORAL DISTURBANCE, PSYCHOTIC DISTURBANCE, MOOD DISTURBANCE, OR ANXIETY: Chronic | ICD-10-CM

## 2023-08-14 DIAGNOSIS — Z91.89 AT RISK FOR ASPIRATION: Primary | ICD-10-CM

## 2023-08-14 DIAGNOSIS — Z78.9 IMPAIRED MOBILITY AND ADLS: ICD-10-CM

## 2023-08-14 DIAGNOSIS — Z74.09 IMPAIRED MOBILITY AND ADLS: ICD-10-CM

## 2023-08-14 NOTE — LETTER
Nursing Home Follow Up Note      Andrew Jacobo DO []   [facility] [x]  [address] [place] [address]  Phone: [phone]  Fax: [phone] Seema Blount MD []    Kurt Tavarez DO []   [address] [place] [address]  Phone: [phone]  Fax: [phone]     PATIENT NAME: Darren Mccracken                                                                          YOB: 1934           DATE OF SERVICE: 08/14/2023  FACILITY:  [][facility]   [] [place]   [] [facility]   [x] [facility]   [] Other ______________________________________________________________________      CHIEF COMPLAINT:    Possible aspiration pneumonia.     Recurrent rash.       HISTORY OF PRESENT ILLNESS:     Nursing reports that earlier this morning while eating breakfast patient had a episode of coughing while eating, so there are concerns for aspiration.  He has not had any fever or hypoxia, and no shortness of breath.  Nursing reports that he does not have any abnormal lung sounds either.  He does not have history of aspiration but does have advanced dementia.  He is resting in bed during visit today with no signs and symptoms of any distress.  No coughing or abnormal vital signs.    Nursing reports the patient has rash to his trunk and arms again.  The rash does improve with steroids but as soon as he is off of these for a few days the rash starts again.  He is unable to voice whether any itching or discomfort related to his dementia.      PAST MEDICAL & SURGICAL HISTORY:   Past Medical History:   Diagnosis Date    Allergic     Allergic rhinitis     Arthritis     knees    BPH (benign prostatic hyperplasia)     Cancer     Chronic cough     Chronic kidney disease     Coronary artery disease     Coronary artery disease involving native coronary artery of native heart without angina pectoris 07/31/2018    Added automatically from request for surgery [id number]    Dementia     Dementia     Excessive tear  production     Facial basal cell cancer     GERD (gastroesophageal reflux disease)     Glaucoma     History of transfusion     Hyperlipidemia     Hypertension     Kidney stone     Osteoarthritis of both knees     Pneumonia     right upper lobe     Pulmonary embolism     Skin cancer     Vertigo     Wears glasses       Past Surgical History:   Procedure Laterality Date    CARDIAC CATHETERIZATION N/A 6/21/2017    Procedure: Left Heart Cath;  Surgeon: Toy Landers MD;  [facility];  Service:     CARDIAC CATHETERIZATION N/A 8/1/2018    Procedure: Left Heart Cath;  Surgeon: Toy Landers MD;  [facility];  Service: Cardiology    CATARACT EXTRACTION W/ INTRAOCULAR LENS  IMPLANT, BILATERAL      CHOLECYSTECTOMY  2010    COLONOSCOPY      CORONARY ANGIOPLASTY WITH STENT PLACEMENT      CORONARY STENT PLACEMENT      x3    EYE SURGERY Right     SKIN CANCER EXCISION      WRIST SURGERY           MEDICATIONS:  I have reviewed and reconciled the patients medication list in the patients chart at the skilled nursing facility today.      ALLERGIES:    Allergies   Allergen Reactions    Lipitor [Atorvastatin] Myalgia         SOCIAL HISTORY:    Social History     Socioeconomic History    Marital status:    Tobacco Use    Smoking status: Never    Smokeless tobacco: Never   Vaping Use    Vaping Use: Never used   Substance and Sexual Activity    Alcohol use: No    Drug use: No    Sexual activity: Not Currently       FAMILY HISTORY:    Family History   Problem Relation Age of Onset    Heart disease Mother     Heart attack Father        REVIEW OF SYSTEMS:    Review of Systems   Reason unable to perform ROS: per nuring.   Constitutional:  Negative for activity change, appetite change, chills, diaphoresis, fatigue, fever, unexpected weight gain and unexpected weight loss.   HENT:  Negative for congestion, mouth sores and nosebleeds.    Respiratory:  Positive for cough (episode  during eating) and choking. Negative for shortness of breath and wheezing.    Gastrointestinal:  Negative for blood in stool, constipation, diarrhea and vomiting.   Genitourinary:  Positive for urinary incontinence. Negative for decreased urine volume, difficulty urinating and hematuria.   Musculoskeletal:  Positive for arthralgias and gait problem.   Skin:  Positive for rash (upper extremities and anterior trunk). Negative for color change.   Neurological:  Positive for weakness, memory problem and confusion.   Psychiatric/Behavioral:  Positive for behavioral problems, sleep disturbance and positive for hyperactivity. Negative for dysphoric mood, hallucinations and depressed mood. The patient is not nervous/anxious.        PHYSICAL EXAMINATION:   VITAL SIGNS:   Vitals:    08/14/23 1121   BP: 96/60   Pulse: 72   Resp: 20   Temp: 97.8 øF (36.6 øC)   SpO2: 92%   Weight: 54.9 kg (121 lb)       Physical Exam  Vitals and nursing note reviewed.   Constitutional:       Appearance: He is well-developed.   Cardiovascular:      Rate and Rhythm: Normal rate and regular rhythm.      Heart sounds: Normal heart sounds.   Pulmonary:      Effort: Pulmonary effort is normal. No respiratory distress.      Breath sounds: Normal breath sounds. No wheezing or rales.   Abdominal:      General: Bowel sounds are normal.      Palpations: Abdomen is soft.   Skin:     General: Skin is warm and dry.      Findings: Rash present.      Comments: Pink, pin point Rash to upper extremities and anterior trunk   Neurological:      Mental Status: He is alert. He is disoriented and confused.   Psychiatric:         Mood and Affect: Mood normal.         Speech: Speech is tangential.         Behavior: Behavior is hyperactive.         Cognition and Memory: Cognition is impaired. Memory is impaired.       RECORDS REVIEW:   I have reviewed results in [facility]    ASSESSMENT     Diagnoses and all orders for this visit:    1. At risk for aspiration (Primary)    2.  "Choking episode    3. Rash and nonspecific skin eruption    4. Moderate vascular dementia without behavioral disturbance, psychotic disturbance, mood disturbance, or anxiety    5. Impaired mobility and ADLs        PLAN    Risk aspiration  -Will obtain CXR and CBC for further evaluation. Will follow up with results.     Rash  -Prednisone 20 mg po bid at 0800 and 1600 x 5 days. Will follow up and continue to monitor for any other rash.     Nursing encouraged to keep me informed of any acute changes, lack of improvement, or any new concerning symptoms.     Will follow up and continue to monitor.     Continue supportive care for all ADLs.      [x]  Discussed Patient in detail with nursing/staff, addressed all needs today.     [x]  Plan of Care Reviewed   [x]  PT/OT Reviewed   [x]  Order Changes  [x]  Discharge Plans Reviewed  [x]  Advance Directive on file with Nursing Home.   [x]  POA on file with Nursing Home.   [x]  Code Status listed: [x]  Full Code   []  DNR     "I confirm accuracy of unchanged data/findings which have been carried forward from previous visit, as well as I have updated appropriately those that have changed."     I spent 30 minutes caring for Darren on this date of service. This time includes time spent by me in the following activities:preparing for the visit, obtaining and/or reviewing a separately obtained history, performing a medically appropriate examination and/or evaluation , counseling and educating the patient/family/caregiver, ordering medications, tests, or procedures, referring and communicating with other health care professionals , documenting information in the medical record, independently interpreting results and communicating that information with the patient/family/caregiver, and care coordination       EAGLE Fernando.     "

## 2023-08-16 NOTE — PROGRESS NOTES
Nursing Home Follow Up Note      Andrew Jacobo DO []   EAGLE Waddell [x]  [address] [place] [address]  Phone: [phone]  Fax: [phone] Seema Blount MD []    Kurt Tavarez DO []   [address] [place] [address]  Phone: [phone]  Fax: [phone]     PATIENT NAME: Darren Mccracken                                                                          YOB: 1934           DATE OF SERVICE: 08/14/2023  FACILITY:  [][facility]   [] [facility]   [] [facility]   [x] [facility]   [] Other ______________________________________________________________________      CHIEF COMPLAINT:    Possible aspiration pneumonia.     Recurrent rash.       HISTORY OF PRESENT ILLNESS:     Nursing reports that earlier this morning while eating breakfast patient had a episode of coughing while eating, so there are concerns for aspiration.  He has not had any fever or hypoxia, and no shortness of breath.  Nursing reports that he does not have any abnormal lung sounds either.  He does not have history of aspiration but does have advanced dementia.  He is resting in bed during visit today with no signs and symptoms of any distress.  No coughing or abnormal vital signs.    Nursing reports the patient has rash to his trunk and arms again.  The rash does improve with steroids but as soon as he is off of these for a few days the rash starts again.  He is unable to voice whether any itching or discomfort related to his dementia.      PAST MEDICAL & SURGICAL HISTORY:   Past Medical History:   Diagnosis Date    Allergic     Allergic rhinitis     Arthritis     knees    BPH (benign prostatic hyperplasia)     Cancer     Chronic cough     Chronic kidney disease     Coronary artery disease     Coronary artery disease involving native coronary artery of native heart without angina pectoris 07/31/2018    Added automatically from request for surgery [id number]    Dementia     Dementia     Excessive tear  production     Facial basal cell cancer     GERD (gastroesophageal reflux disease)     Glaucoma     History of transfusion     Hyperlipidemia     Hypertension     Kidney stone     Osteoarthritis of both knees     Pneumonia     right upper lobe     Pulmonary embolism     Skin cancer     Vertigo     Wears glasses       Past Surgical History:   Procedure Laterality Date    CARDIAC CATHETERIZATION N/A 6/21/2017    Procedure: Left Heart Cath;  Surgeon: Toy Landers MD;  [facility];  Service:     CARDIAC CATHETERIZATION N/A 8/1/2018    Procedure: Left Heart Cath;  Surgeon: Toy Landers MD;  [facility];  Service: Cardiology    CATARACT EXTRACTION W/ INTRAOCULAR LENS  IMPLANT, BILATERAL      CHOLECYSTECTOMY  2010    COLONOSCOPY      CORONARY ANGIOPLASTY WITH STENT PLACEMENT      CORONARY STENT PLACEMENT      x3    EYE SURGERY Right     SKIN CANCER EXCISION      WRIST SURGERY           MEDICATIONS:  I have reviewed and reconciled the patients medication list in the patients chart at the skilled nursing facility today.      ALLERGIES:    Allergies   Allergen Reactions    Lipitor [Atorvastatin] Myalgia         SOCIAL HISTORY:    Social History     Socioeconomic History    Marital status:    Tobacco Use    Smoking status: Never    Smokeless tobacco: Never   Vaping Use    Vaping Use: Never used   Substance and Sexual Activity    Alcohol use: No    Drug use: No    Sexual activity: Not Currently       FAMILY HISTORY:    Family History   Problem Relation Age of Onset    Heart disease Mother     Heart attack Father        REVIEW OF SYSTEMS:    Review of Systems   Reason unable to perform ROS: per nuring.   Constitutional:  Negative for activity change, appetite change, chills, diaphoresis, fatigue, fever, unexpected weight gain and unexpected weight loss.   HENT:  Negative for congestion, mouth sores and nosebleeds.    Respiratory:  Positive for cough (episode  during eating) and choking. Negative for shortness of breath and wheezing.    Gastrointestinal:  Negative for blood in stool, constipation, diarrhea and vomiting.   Genitourinary:  Positive for urinary incontinence. Negative for decreased urine volume, difficulty urinating and hematuria.   Musculoskeletal:  Positive for arthralgias and gait problem.   Skin:  Positive for rash (upper extremities and anterior trunk). Negative for color change.   Neurological:  Positive for weakness, memory problem and confusion.   Psychiatric/Behavioral:  Positive for behavioral problems, sleep disturbance and positive for hyperactivity. Negative for dysphoric mood, hallucinations and depressed mood. The patient is not nervous/anxious.        PHYSICAL EXAMINATION:   VITAL SIGNS:   Vitals:    08/14/23 1121   BP: 96/60   Pulse: 72   Resp: 20   Temp: 97.8 øF (36.6 øC)   SpO2: 92%   Weight: 54.9 kg (121 lb)       Physical Exam  Vitals and nursing note reviewed.   Constitutional:       Appearance: He is well-developed.   Cardiovascular:      Rate and Rhythm: Normal rate and regular rhythm.      Heart sounds: Normal heart sounds.   Pulmonary:      Effort: Pulmonary effort is normal. No respiratory distress.      Breath sounds: Normal breath sounds. No wheezing or rales.   Abdominal:      General: Bowel sounds are normal.      Palpations: Abdomen is soft.   Skin:     General: Skin is warm and dry.      Findings: Rash present.      Comments: Pink, pin point Rash to upper extremities and anterior trunk   Neurological:      Mental Status: He is alert. He is disoriented and confused.   Psychiatric:         Mood and Affect: Mood normal.         Speech: Speech is tangential.         Behavior: Behavior is hyperactive.         Cognition and Memory: Cognition is impaired. Memory is impaired.       RECORDS REVIEW:   I have reviewed results in [facility]    ASSESSMENT     Diagnoses and all orders for this visit:    1. At risk for aspiration (Primary)    2.  "Choking episode    3. Rash and nonspecific skin eruption    4. Moderate vascular dementia without behavioral disturbance, psychotic disturbance, mood disturbance, or anxiety    5. Impaired mobility and ADLs        PLAN    Risk aspiration  -Will obtain CXR and CBC for further evaluation. Will follow up with results.     Rash  -Prednisone 20 mg po bid at 0800 and 1600 x 5 days. Will follow up and continue to monitor for any other rash.     Nursing encouraged to keep me informed of any acute changes, lack of improvement, or any new concerning symptoms.     Will follow up and continue to monitor.     Continue supportive care for all ADLs.      [x]  Discussed Patient in detail with nursing/staff, addressed all needs today.     [x]  Plan of Care Reviewed   [x]  PT/OT Reviewed   [x]  Order Changes  [x]  Discharge Plans Reviewed  [x]  Advance Directive on file with Nursing Home.   [x]  POA on file with Nursing Home.   [x]  Code Status listed: [x]  Full Code   []  DNR     "I confirm accuracy of unchanged data/findings which have been carried forward from previous visit, as well as I have updated appropriately those that have changed."     I spent 30 minutes caring for Darren on this date of service. This time includes time spent by me in the following activities:preparing for the visit, obtaining and/or reviewing a separately obtained history, performing a medically appropriate examination and/or evaluation , counseling and educating the patient/family/caregiver, ordering medications, tests, or procedures, referring and communicating with other health care professionals , documenting information in the medical record, independently interpreting results and communicating that information with the patient/family/caregiver, and care coordination       EAGLE Fernando.     "

## 2023-08-22 NOTE — LETTER
Nursing Home Follow Up Note      Andrew Jacobo DO []   [facility]addell [x]  [address] [place] [address]  Phone: [phone]  Fax: [phone] Seema Blount MD []    Kurt Tavarez DO []   [address] [place] [address]  Phone: [phone]  Fax: [phone]     PATIENT NAME: Darren Mccracken                                                                          YOB: 1934           DATE OF SERVICE: 08/22/2023  FACILITY:  [][facility]   [] [place]   [] [facility]   [x] [facility]   [] Other ______________________________________________________________________      CHIEF COMPLAINT:    Worsening rash to BUE, chest and abdomen.       HISTORY OF PRESENT ILLNESS:     Nursing reports that patient continues to have rash to bilateral upper extremities, chest and abdomen. He does not have rash to lower extremities or face.  Rash did improve some with steroids but is now back and is worse.  He has not had any itching or distress in the past but has been scratching at his rash a lot today per nursing. He was scratching at rash during visit today. He can not report if rash is itching or if it is uncomfortable due to his dementia. He is also on daily antihistamine and prn Benadryl which do not seem to be helping. Nursing has no other concerns today, appears to be doing well otherwise.    PAST MEDICAL & SURGICAL HISTORY:   Past Medical History:   Diagnosis Date    Allergic     Allergic rhinitis     Arthritis     knees    BPH (benign prostatic hyperplasia)     Cancer     Chronic cough     Chronic kidney disease     Coronary artery disease     Coronary artery disease involving native coronary artery of native heart without angina pectoris 07/31/2018    Added automatically from request for surgery [id number]    Dementia     Dementia     Excessive tear production     Facial basal cell cancer     GERD (gastroesophageal reflux disease)     Glaucoma     History of transfusion      Hyperlipidemia     Hypertension     Kidney stone     Osteoarthritis of both knees     Pneumonia     right upper lobe     Pulmonary embolism     Skin cancer     Vertigo     Wears glasses       Past Surgical History:   Procedure Laterality Date    CARDIAC CATHETERIZATION N/A 6/21/2017    Procedure: Left Heart Cath;  Surgeon: Toy Landers MD;  [facility]:  [facility];  Service:     CARDIAC CATHETERIZATION N/A 8/1/2018    Procedure: Left Heart Cath;  Surgeon: Toy Landers MD;  [facility];  Service: Cardiology    CATARACT EXTRACTION W/ INTRAOCULAR LENS  IMPLANT, BILATERAL      CHOLECYSTECTOMY  2010    COLONOSCOPY      CORONARY ANGIOPLASTY WITH STENT PLACEMENT      CORONARY STENT PLACEMENT      x3    EYE SURGERY Right     SKIN CANCER EXCISION      WRIST SURGERY           MEDICATIONS:  I have reviewed and reconciled the patients medication list in the patients chart at the skilled nursing facility today.      ALLERGIES:    Allergies   Allergen Reactions    Lipitor [Atorvastatin] Myalgia         SOCIAL HISTORY:    Social History     Socioeconomic History    Marital status:    Tobacco Use    Smoking status: Never    Smokeless tobacco: Never   Vaping Use    Vaping Use: Never used   Substance and Sexual Activity    Alcohol use: No    Drug use: No    Sexual activity: Not Currently       FAMILY HISTORY:    Family History   Problem Relation Age of Onset    Heart disease Mother     Heart attack Father        REVIEW OF SYSTEMS:    Review of Systems   Reason unable to perform ROS: per nuring and patient.   Constitutional:  Negative for activity change, appetite change, chills, diaphoresis, fatigue, fever, unexpected weight gain and unexpected weight loss.   HENT:  Negative for congestion, mouth sores, nosebleeds and trouble swallowing.    Respiratory:  Negative for cough, choking, chest tightness, shortness of breath and wheezing.    Cardiovascular:  Negative for chest  pain, palpitations and leg swelling.   Gastrointestinal:  Negative for blood in stool, constipation, diarrhea and vomiting.   Genitourinary:  Positive for urinary incontinence. Negative for decreased urine volume, difficulty urinating, frequency and hematuria.   Musculoskeletal:  Positive for arthralgias and gait problem.   Skin:  Positive for rash (upper extremities and trunk).   Neurological:  Positive for weakness, memory problem and confusion.   Psychiatric/Behavioral:  Positive for behavioral problems and positive for hyperactivity. Negative for dysphoric mood, hallucinations, sleep disturbance and depressed mood. The patient is not nervous/anxious.         Behaviors stable       PHYSICAL EXAMINATION:   VITAL SIGNS:   Vitals:    08/22/23 1006   BP: 132/78   Pulse: 87   Resp: 18   Temp: 97.9 øF (36.6 øC)   SpO2: 94%   Weight: 55.3 kg (122 lb)       Physical Exam  Vitals and nursing note reviewed.   Constitutional:       Appearance: He is well-developed.   Cardiovascular:      Rate and Rhythm: Normal rate and regular rhythm.      Heart sounds: Normal heart sounds.   Pulmonary:      Effort: Pulmonary effort is normal. No respiratory distress.      Breath sounds: Normal breath sounds. No wheezing or rales.   Abdominal:      General: Bowel sounds are normal.      Palpations: Abdomen is soft.   Musculoskeletal:      Cervical back: Normal range of motion.      Comments: NROM all major joints   Skin:     General: Skin is warm and dry.      Findings: Rash present. Rash is macular.      Comments: Red, pin point, macular Rash to upper extremities and trunk   Neurological:      Mental Status: He is alert. He is disoriented and confused.   Psychiatric:         Mood and Affect: Mood normal.         Speech: Speech is tangential.         Behavior: Behavior is hyperactive.         Cognition and Memory: Cognition is impaired. Memory is impaired.       RECORDS REVIEW:   I have reviewed results in [facility]    ASSESSMENT     Diagnoses  "and all orders for this visit:    1. Dermatophytosis of body    2. Moderate vascular dementia without behavioral disturbance, psychotic disturbance, mood disturbance, or anxiety    3. Impaired mobility and ADLs        PLAN    Dermatophytosis rash  -Prednisone 20 mg po bid at 0800 and 1600 x 14 days then 20 mg daily x 7 days. Start in am. Give Solu Medrol 125 mg x 1 now. Triamcinolone 0.1 % bid x 10 days. Will check Vitamin B 3 and 12 to assess for P[place]agra.  Continue prn Benadryl.  Will follow up and continue to monitor    Nursing encouraged to keep me informed of 7 acute changes, lack of improvement, or any new concerning symptoms.    Will follow up and continue to monitor.     Continue supportive care for all ADLs.      [x]  Discussed Patient in detail with nursing/staff, addressed all needs today.     [x]  Plan of Care Reviewed   [x]  PT/OT Reviewed   [x]  Order Changes  [x]  Discharge Plans Reviewed  [x]  Advance Directive on file with Nursing Home.   [x]  POA on file with Nursing Home.   [x]  Code Status listed: [x]  Full Code   []  DNR       I spent 25 minutes caring for Darren on this date of service. This time includes time spent by me in the following activities:preparing for the visit, reviewing tests, obtaining and/or reviewing a separately obtained history, performing a medically appropriate examination and/or evaluation , counseling and educating the patient/family/caregiver, ordering medications, tests, or procedures, referring and communicating with other health care professionals , documenting information in the medical record, independently interpreting results and communicating that information with the patient/family/caregiver, and care coordination     "I confirm accuracy of unchanged data/findings which have been carried forward from previous visit, as well as I have updated appropriately those that have changed."       Zahra Shankar, APRN.       "

## 2023-08-23 NOTE — PROGRESS NOTES
Nursing Home Follow Up Note      Andrew Jacobo DO []   [facility] [x]  [address] [place] [address]  Phone: [phone]  Fax: [phone] Seema Blount MD []    Kurt Tavarez DO []   [address] [place] [address]  Phone: [phone]  Fax: [phone]     PATIENT NAME: Darren Mccracken                                                                          YOB: 1934           DATE OF SERVICE: 08/22/2023  FACILITY:  [][facility]   [] [facility]   [] [facility]   [x] [facility]   [] Other ______________________________________________________________________      CHIEF COMPLAINT:    Worsening rash to BUE, chest and abdomen.       HISTORY OF PRESENT ILLNESS:     Nursing reports that patient continues to have rash to bilateral upper extremities, chest and abdomen. He does not have rash to lower extremities or face.  Rash did improve some with steroids but is now back and is worse.  He has not had any itching or distress in the past but has been scratching at his rash a lot today per nursing. He was scratching at rash during visit today. He can not report if rash is itching or if it is uncomfortable due to his dementia. He is also on daily antihistamine and prn Benadryl which do not seem to be helping. Nursing has no other concerns today, appears to be doing well otherwise.    PAST MEDICAL & SURGICAL HISTORY:   Past Medical History:   Diagnosis Date    Allergic     Allergic rhinitis     Arthritis     knees    BPH (benign prostatic hyperplasia)     Cancer     Chronic cough     Chronic kidney disease     Coronary artery disease     Coronary artery disease involving native coronary artery of native heart without angina pectoris 07/31/2018    Added automatically from request for surgery [id number]    Dementia     Dementia     Excessive tear production     Facial basal cell cancer     GERD (gastroesophageal reflux disease)     Glaucoma     History of transfusion      Hyperlipidemia     Hypertension     Kidney stone     Osteoarthritis of both knees     Pneumonia     right upper lobe     Pulmonary embolism     Skin cancer     Vertigo     Wears glasses       Past Surgical History:   Procedure Laterality Date    CARDIAC CATHETERIZATION N/A 6/21/2017    Procedure: Left Heart Cath;  Surgeon: Toy Landers MD;  [facility];  Service:     CARDIAC CATHETERIZATION N/A 8/1/2018    Procedure: Left Heart Cath;  Surgeon: Toy Landers MD;  [facility];  Service: Cardiology    CATARACT EXTRACTION W/ INTRAOCULAR LENS  IMPLANT, BILATERAL      CHOLECYSTECTOMY  2010    COLONOSCOPY      CORONARY ANGIOPLASTY WITH STENT PLACEMENT      CORONARY STENT PLACEMENT      x3    EYE SURGERY Right     SKIN CANCER EXCISION      WRIST SURGERY           MEDICATIONS:  I have reviewed and reconciled the patients medication list in the patients chart at the skilled nursing facility today.      ALLERGIES:    Allergies   Allergen Reactions    Lipitor [Atorvastatin] Myalgia         SOCIAL HISTORY:    Social History     Socioeconomic History    Marital status:    Tobacco Use    Smoking status: Never    Smokeless tobacco: Never   Vaping Use    Vaping Use: Never used   Substance and Sexual Activity    Alcohol use: No    Drug use: No    Sexual activity: Not Currently       FAMILY HISTORY:    Family History   Problem Relation Age of Onset    Heart disease Mother     Heart attack Father        REVIEW OF SYSTEMS:    Review of Systems   Reason unable to perform ROS: per nuring and patient.   Constitutional:  Negative for activity change, appetite change, chills, diaphoresis, fatigue, fever, unexpected weight gain and unexpected weight loss.   HENT:  Negative for congestion, mouth sores, nosebleeds and trouble swallowing.    Respiratory:  Negative for cough, choking, chest tightness, shortness of breath and wheezing.    Cardiovascular:  Negative for chest  pain, palpitations and leg swelling.   Gastrointestinal:  Negative for blood in stool, constipation, diarrhea and vomiting.   Genitourinary:  Positive for urinary incontinence. Negative for decreased urine volume, difficulty urinating, frequency and hematuria.   Musculoskeletal:  Positive for arthralgias and gait problem.   Skin:  Positive for rash (upper extremities and trunk).   Neurological:  Positive for weakness, memory problem and confusion.   Psychiatric/Behavioral:  Positive for behavioral problems and positive for hyperactivity. Negative for dysphoric mood, hallucinations, sleep disturbance and depressed mood. The patient is not nervous/anxious.         Behaviors stable       PHYSICAL EXAMINATION:   VITAL SIGNS:   Vitals:    08/22/23 1006   BP: 132/78   Pulse: 87   Resp: 18   Temp: 97.9 øF (36.6 øC)   SpO2: 94%   Weight: 55.3 kg (122 lb)       Physical Exam  Vitals and nursing note reviewed.   Constitutional:       Appearance: He is well-developed.   Cardiovascular:      Rate and Rhythm: Normal rate and regular rhythm.      Heart sounds: Normal heart sounds.   Pulmonary:      Effort: Pulmonary effort is normal. No respiratory distress.      Breath sounds: Normal breath sounds. No wheezing or rales.   Abdominal:      General: Bowel sounds are normal.      Palpations: Abdomen is soft.   Musculoskeletal:      Cervical back: Normal range of motion.      Comments: NROM all major joints   Skin:     General: Skin is warm and dry.      Findings: Rash present. Rash is macular.      Comments: Red, pin point, macular Rash to upper extremities and trunk   Neurological:      Mental Status: He is alert. He is disoriented and confused.   Psychiatric:         Mood and Affect: Mood normal.         Speech: Speech is tangential.         Behavior: Behavior is hyperactive.         Cognition and Memory: Cognition is impaired. Memory is impaired.       RECORDS REVIEW:   I have reviewed results in [facility]    ASSESSMENT     Diagnoses  "and all orders for this visit:    1. Dermatophytosis of body    2. Moderate vascular dementia without behavioral disturbance, psychotic disturbance, mood disturbance, or anxiety    3. Impaired mobility and ADLs        PLAN    Dermatophytosis rash  -Prednisone 20 mg po bid at 0800 and 1600 x 14 days then 20 mg daily x 7 days. Start in am. Give Solu Medrol 125 mg x 1 now. Triamcinolone 0.1 % bid x 10 days. Will check Vitamin B 3 and 12 to assess for [place].  Continue prn Benadryl.  Will follow up and continue to monitor    Nursing encouraged to keep me informed of 7 acute changes, lack of improvement, or any new concerning symptoms.    Will follow up and continue to monitor.     Continue supportive care for all ADLs.      [x]  Discussed Patient in detail with nursing/staff, addressed all needs today.     [x]  Plan of Care Reviewed   [x]  PT/OT Reviewed   [x]  Order Changes  [x]  Discharge Plans Reviewed  [x]  Advance Directive on file with Nursing Home.   [x]  POA on file with Nursing Home.   [x]  Code Status listed: [x]  Full Code   []  DNR       I spent 25 minutes caring for Darren on this date of service. This time includes time spent by me in the following activities:preparing for the visit, reviewing tests, obtaining and/or reviewing a separately obtained history, performing a medically appropriate examination and/or evaluation , counseling and educating the patient/family/caregiver, ordering medications, tests, or procedures, referring and communicating with other health care professionals , documenting information in the medical record, independently interpreting results and communicating that information with the patient/family/caregiver, and care coordination     "I confirm accuracy of unchanged data/findings which have been carried forward from previous visit, as well as I have updated appropriately those that have changed."       Zahra Shankar, APRN.     "

## 2023-08-24 PROBLEM — E86.0 SEVERE DEHYDRATION: Status: ACTIVE | Noted: 2023-01-01

## 2023-08-24 PROBLEM — E87.0 HYPERNATREMIA: Status: ACTIVE | Noted: 2023-01-01

## 2023-08-24 PROBLEM — N18.30 CHRONIC KIDNEY DISEASE, STAGE III (MODERATE): Status: ACTIVE | Noted: 2023-01-01

## 2023-08-24 PROBLEM — J96.01 ACUTE RESPIRATORY FAILURE WITH HYPOXIA: Status: ACTIVE | Noted: 2023-01-01

## 2023-08-24 PROBLEM — J69.0 ASPIRATION PNEUMONITIS: Status: ACTIVE | Noted: 2023-01-01

## 2023-08-24 NOTE — ED NOTES
Multiple attempts made by RN, Charge RN, and Clinical Manager to place NG/OG tube. Unable to advance tube past ETT. Dr. Hernandez notified.

## 2023-08-24 NOTE — ED PROVIDER NOTES
LVM ABOUT SLEEP STUDY RESULTS 3/23/2018   Subjective:  History of Present Illness:    Patient is an 89-year-old male with history of BPH, CAD, dementia advanced, GERD, hypertension, hyperlipidemia, recurrent pneumonias full code per paperwork and nursing home report who presents today after an aspiration episode.  Was eating this morning and had an aspiration episode.  Had good respiratory status prior to that.  Presents on nonrebreather.  Normally not on oxygen.  Coarse breath sounds throughout.  Patient is at his baseline mental status per EMS.  He is unable to provide history given his respiratory status and also was unreactive due to dementia.  Further history is known to be obtained secondary to this      Nurses Notes reviewed and agree, including vitals, allergies, social history and prior medical history.     REVIEW OF SYSTEMS: All systems reviewed and not pertinent unless noted.  Review of Systems   Unable to perform ROS: Dementia     Past Medical History:   Diagnosis Date    Allergic     Allergic rhinitis     Arthritis     knees    BPH (benign prostatic hyperplasia)     Cancer     Chronic cough     Chronic kidney disease     Coronary artery disease     Coronary artery disease involving native coronary artery of native heart without angina pectoris 07/31/2018    Added automatically from request for surgery [id number]    Dementia     Dementia     Excessive tear production     Facial basal cell cancer     GERD (gastroesophageal reflux disease)     Glaucoma     History of transfusion     Hyperlipidemia     Hypertension     Kidney stone     Osteoarthritis of both knees     Pneumonia     right upper lobe     Pulmonary embolism     Skin cancer     Vertigo     Wears glasses        Allergies:    Lipitor [atorvastatin]      Past Surgical History:   Procedure Laterality Date    CARDIAC CATHETERIZATION N/A 6/21/2017    Procedure: Left Heart Cath;  Surgeon: Toy Landers MD;  Location: [facility] CATH INVASIVE LOCATION;  Service:     CARDIAC CATHETERIZATION N/A  "8/1/2018    Procedure: Left Heart Cath;  Surgeon: Toy Landers MD;  Location: [facility] CATH INVASIVE LOCATION;  Service: Cardiology    CATARACT EXTRACTION W/ INTRAOCULAR LENS  IMPLANT, BILATERAL      CHOLECYSTECTOMY  2010    COLONOSCOPY      CORONARY ANGIOPLASTY WITH STENT PLACEMENT      CORONARY STENT PLACEMENT      x3    EYE SURGERY Right     SKIN CANCER EXCISION      WRIST SURGERY           Social History     Socioeconomic History    Marital status:    Tobacco Use    Smoking status: Never    Smokeless tobacco: Never   Vaping Use    Vaping Use: Never used   Substance and Sexual Activity    Alcohol use: No    Drug use: No    Sexual activity: Not Currently         Family History   Problem Relation Age of Onset    Heart disease Mother     Heart attack Father        Objective  Physical Exam:  /58   Pulse (!) 123   Temp 99.9 °F (37.7 °C) (Axillary)   Resp 16   Ht 177.8 cm (70\")   Wt 54.4 kg (120 lb)   SpO2 96%   BMI 17.22 kg/m²      Physical Exam  Constitutional:       General: He is in acute distress.      Appearance: He is normal weight. He is ill-appearing and toxic-appearing.   HENT:      Head: Normocephalic and atraumatic.      Nose: Nose normal. Congestion and rhinorrhea present.      Mouth/Throat:      Mouth: Mucous membranes are dry.      Pharynx: Oropharynx is clear.   Eyes:      Extraocular Movements: Extraocular movements intact.      Conjunctiva/sclera: Conjunctivae normal.      Pupils: Pupils are equal, round, and reactive to light.   Cardiovascular:      Rate and Rhythm: Normal rate and regular rhythm.      Pulses: Normal pulses.   Pulmonary:      Effort: Pulmonary effort is normal. Respiratory distress present.      Breath sounds: Rales present.      Comments: Rales throughout bilateral lobes  Abdominal:      General: Abdomen is flat. Bowel sounds are normal. There is no distension.      Palpations: Abdomen is soft.      Tenderness: There is no abdominal tenderness. There is no " guarding or rebound.   Musculoskeletal:         General: No swelling or tenderness. Normal range of motion.      Cervical back: Normal range of motion and neck supple. No rigidity or tenderness.   Skin:     General: Skin is warm and dry.      Capillary Refill: Capillary refill takes less than 2 seconds.   Neurological:      General: No focal deficit present.      Mental Status: Mental status is at baseline. He is disoriented.      Cranial Nerves: No cranial nerve deficit.      Sensory: No sensory deficit.      Motor: No weakness.      Coordination: Coordination normal.      Comments: Patient at his baseline mental status given advanced dementia   Psychiatric:      Comments: Unable to assess given respiratory status patient's altered mental status       Central Line At Bedside    Date/Time: 8/24/2023 12:52 PM  Performed by: Timothy Hernandez MD  Authorized by: Timothy Hernandez MD     Consent:     Consent obtained:  Emergent situation  Universal protocol:     Patient identity confirmed:  Arm band and provided demographic data  Pre-procedure details:     Indication(s): central venous access and insufficient peripheral access      Hand hygiene: Hand hygiene performed prior to insertion      Sterile barrier technique: All elements of maximal sterile technique followed      Skin preparation:  Chlorhexidine    Skin preparation agent: Skin preparation agent completely dried prior to procedure    Sedation:     Sedation type:  Deep  Anesthesia:     Anesthesia method:  Local infiltration    Local anesthetic:  Lidocaine 1% w/o epi  Procedure details:     Location:  L femoral    Patient position:  Supine    Procedural supplies:  Triple lumen    Catheter size:  7 Fr    Landmarks identified: yes      Ultrasound guidance: yes      Ultrasound guidance timing: real time      Sterile ultrasound techniques: Sterile gel and sterile probe covers were used      Number of attempts:  1    Successful placement: yes    Post-procedure  details:     Post-procedure:  Dressing applied and line sutured    Assessment:  Blood return through all ports    Procedure completion:  Tolerated well, no immediate complications  Critical Care  Performed by: Timothy Hernandez MD  Authorized by: Timothy Hernandez MD     Critical care provider statement:     Critical care time (minutes):  45    Critical care time was exclusive of:  Separately billable procedures and treating other patients    Critical care was necessary to treat or prevent imminent or life-threatening deterioration of the following conditions:  Respiratory failure    Critical care was time spent personally by me on the following activities:  Blood draw for specimens, development of treatment plan with patient or surrogate, discussions with consultants, discussions with primary provider, evaluation of patient's response to treatment, examination of patient, obtaining history from patient or surrogate, ordering and performing treatments and interventions, ordering and review of laboratory studies, ordering and review of radiographic studies, pulse oximetry, re-evaluation of patient's condition and review of old charts    Care discussed with: admitting provider    Intubation    Date/Time: 8/24/2023 12:54 PM  Performed by: Timothy Hernandez MD  Authorized by: Timothy Hernandez MD     Consent:     Consent obtained:  Emergent situation  Universal protocol:     Patient identity confirmed:  Arm band and provided demographic data  Pre-procedure details:     Indication: failure to oxygenate, failure to ventilate and predicted clinical deterioration      Patient status:  Altered mental status    Look externally: no concerns    Procedure details:     Preoxygenation:  Bag valve mask    CPR in progress: no      Intubation method:  Oral    Intubation technique: video assisted      Laryngoscope blade:  Mac 4    Bougie used: no      Grade view: I      Tube size (mm):  7.5    Tube type:  Cuffed    Number  of attempts:  1    Ventilation between attempts: no      Tube visualized through cords: yes    Placement assessment:     ETT at teeth/gumline (cm):  24    Tube secured with:  ETT richmond    Breath sounds:  Equal    Placement verification: chest rise, colorimetric ETCO2 and CXR verification      CXR findings:  Appropriate position  Post-procedure details:     Procedure completion:  Tolerated well, no immediate complications    ED Course:    ED Course as of 08/24/23 1258   Thu Aug 24, 2023   1050 EKG interpreted by me, normal sinus rhythm with frequent PVCs, rate of 85, no concerning ST changes noted, abnormal EKG [JE]      ED Course User Index  [JE] Timothy Hernandez MD       Lab Results (last 24 hours)       Procedure Component Value Units Date/Time    Respiratory Panel PCR w/COVID-19(SARS-CoV-2) LUANNE/JOSE/BASILIA/PAD/COR/MAD/ELIAZAR In-House, NP Swab in UTM/VTM, 3-4 HR TAT - Swab, Nasopharynx [[id number]]  (Normal) Collected: 08/24/23 1020    Specimen: Swab from Nasopharynx Updated: 08/24/23 1248     ADENOVIRUS, PCR Not Detected     Coronavirus 229E Not Detected     Coronavirus HKU1 Not Detected     Coronavirus NL63 Not Detected     Coronavirus OC43 Not Detected     COVID19 Not Detected     Human Metapneumovirus Not Detected     Human Rhinovirus/Enterovirus Not Detected     Influenza A PCR Not Detected     Influenza B PCR Not Detected     Parainfluenza Virus 1 Not Detected     Parainfluenza Virus 2 Not Detected     Parainfluenza Virus 3 Not Detected     Parainfluenza Virus 4 Not Detected     RSV, PCR Not Detected     Bordetella pertussis pcr Not Detected     Bordetella parapertussis PCR Not Detected     Chlamydophila pneumoniae PCR Not Detected     Mycoplasma pneumo by PCR Not Detected    Narrative:      In the setting of a positive respiratory panel with a viral infection PLUS a negative procalcitonin without other underlying concern for bacterial infection, consider observing off antibiotics or discontinuation of  antibiotics and continue supportive care. If the respiratory panel is positive for atypical bacterial infection (Bordetella pertussis, Chlamydophila pneumoniae, or Mycoplasma pneumoniae), consider antibiotic de-escalation to target atypical bacterial infection.    Blood Gas, Arterial With Co-Ox [[id number]]  (Abnormal) Collected: 08/24/23 1051    Specimen: Arterial Blood Updated: 08/24/23 1051     Site Right Radial     Mal's Test N/A     pH, Arterial 7.380 pH units      pCO2, Arterial 40.5 mm Hg      pO2, Arterial 126.0 mm Hg      Comment: 83 Value above reference range        HCO3, Arterial 23.9 mmol/L      Base Excess, Arterial -1.2 mmol/L      Comment: 84 Value below reference range        O2 Saturation, Arterial 99.2 %      Comment: 83 Value above reference range        Hematocrit, Blood Gas 46.0 %      Oxyhemoglobin 97.9 %      Methemoglobin 0.30 %      Carboxyhemoglobin 1.0 %      A-a DO2 522.0 mmHg      Barometric Pressure for Blood Gas 736 mmHg      Modality Ventilator     FIO2 100 %      Ventilator Mode NA     Collected by [id number]     Comment: Meter: [id number]     :  [id number]        pH, Temp Corrected --     pCO2, Temperature Corrected --     pO2, Temperature Corrected --    CBC & Differential [[id number]]  (Abnormal) Collected: 08/24/23 1110    Specimen: Blood Updated: 08/24/23 1151    Narrative:      The following orders were created for panel order CBC & Differential.  Procedure                               Abnormality         Status                     ---------                               -----------         ------                     CBC Auto Differential[[id number]]        Abnormal            Final result                 Please view results for these tests on the individual orders.    Comprehensive Metabolic Panel [[id number]]  (Abnormal) Collected: 08/24/23 1110    Specimen: Blood Updated: 08/24/23 1216     Glucose 106 mg/dL      BUN 50 mg/dL      Creatinine 1.39 mg/dL      Sodium 168  "mmol/L      Potassium 3.8 mmol/L      Chloride 134 mmol/L      CO2 22.4 mmol/L      Calcium 9.5 mg/dL      Total Protein 6.2 g/dL      Albumin 2.7 g/dL      ALT (SGPT) 15 U/L      AST (SGOT) 23 U/L      Alkaline Phosphatase 93 U/L      Total Bilirubin 0.5 mg/dL      Globulin 3.5 gm/dL      A/G Ratio 0.8 g/dL      BUN/Creatinine Ratio 36.0     Anion Gap 11.6 mmol/L      eGFR 48.5 mL/min/1.73     Narrative:      GFR Normal >60  Chronic Kidney Disease <60  Kidney Failure <15    The GFR formula is only valid for adults with stable renal function between ages 18 and 70.    Lactic Acid, Plasma [[id number]]  (Normal) Collected: 08/24/23 1110    Specimen: Blood Updated: 08/24/23 1158     Lactate 1.8 mmol/L     Procalcitonin [[id number]]  (Normal) Collected: 08/24/23 1110    Specimen: Blood Updated: 08/24/23 1214     Procalcitonin 0.23 ng/mL     Narrative:      As a Marker for Sepsis (Non-Neonates):    1. <0.5 ng/mL represents a low risk of severe sepsis and/or septic shock.  2. >2 ng/mL represents a high risk of severe sepsis and/or septic shock.    As a Marker for Lower Respiratory Tract Infections that require antibiotic therapy:    PCT on Admission    Antibiotic Therapy       6-12 Hrs later    >0.5                Strongly Recommended  >0.25 - <0.5        Recommended   0.1 - 0.25          Discouraged              Remeasure/reassess PCT  <0.1                Strongly Discouraged     Remeasure/reassess PCT    As 28 day mortality risk marker: \"Change in Procalcitonin Result\" (>80% or <=80%) if Day 0 (or Day 1) and Day 4 values are available. Refer to http://www.[employer]-pct-calculator.com    Change in PCT <=80%  A decrease of PCT levels below or equal to 80% defines a positive change in PCT test result representing a higher risk for 28-day all-cause mortality of patients diagnosed with severe sepsis for septic shock.    Change in PCT >80%  A decrease of PCT levels of more than 80% defines a negative change in PCT result " representing a lower risk for 28-day all-cause mortality of patients diagnosed with severe sepsis or septic shock.       C-reactive Protein [[id number]]  (Abnormal) Collected: 08/24/23 1110    Specimen: Blood Updated: 08/24/23 1205     C-Reactive Protein 9.04 mg/dL     Magnesium [[id number]]  (Normal) Collected: 08/24/23 1110    Specimen: Blood Updated: 08/24/23 1210     Magnesium 2.3 mg/dL     CBC Auto Differential [[id number]]  (Abnormal) Collected: 08/24/23 1110    Specimen: Blood Updated: 08/24/23 1151     WBC 12.53 10*3/mm3      RBC 4.77 10*6/mm3      Hemoglobin 14.8 g/dL      Hematocrit 46.3 %      MCV 97.1 fL      MCH 31.0 pg      MCHC 32.0 g/dL      RDW 17.5 %      RDW-SD 62.7 fl      MPV 11.6 fL      Platelets 124 10*3/mm3      Neutrophil % 80.1 %      Lymphocyte % 15.5 %      Monocyte % 3.8 %      Eosinophil % 0.2 %      Basophil % 0.2 %      Immature Grans % 0.2 %      Neutrophils, Absolute 10.03 10*3/mm3      Lymphocytes, Absolute 1.94 10*3/mm3      Monocytes, Absolute 0.48 10*3/mm3      Eosinophils, Absolute 0.03 10*3/mm3      Basophils, Absolute 0.02 10*3/mm3      Immature Grans, Absolute 0.03 10*3/mm3      nRBC 0.0 /100 WBC     Blood Culture With MALOU - Blood, Blood, Venous Line [[id number]] Collected: 08/24/23 1110    Specimen: Blood, Venous Line Updated: 08/24/23 1230    Blood Culture With MALOU - Blood, Arm, Right [[id number]] Collected: 08/24/23 1120    Specimen: Blood from Arm, Right Updated: 08/24/23 1233             CT Head Without Contrast    Result Date: 8/24/2023  PROCEDURE: CT HEAD WO CONTRAST-  INDICATION: Mental status change, unknown cause  TECHNIQUE: Multiple axial CT images were performed from the foramen magnum to the vertex without contrast.   Coronal reconstruction images were obtained from the axial data.  COMPARISON: 3/23/2023.  FINDINGS: There is no mass effect or midline shift.  No hydrocephalus or intracranial hemorrhage. There is global atrophy with ex vacuo dilatation of the  ventricles. Periventricular hypodensity is unchanged. Focal left frontal encephalomalacia is likely related to a prior infarct. The posterior fossa is without acute abnormality. The basilar cisterns are preserved.. No acute soft tissue abnormality. No acute osseous abnormalities are present.      Impression: No mass effect, midline shift, or intracranial hemorrhage. Atrophy and chronic findings, similar to prior exam.           This study was performed with techniques to keep radiation doses as low as reasonably achievable (ALARA). Individualized dose reduction techniques using automated exposure control or adjustment of mA and/or kV according to the patient size were employed.   This report was signed and finalized on 8/24/2023 12:28 PM by Brittany Hunter MD.      CT Chest Without Contrast Diagnostic    Result Date: 8/24/2023  PROCEDURE: CT CHEST WITHOUT CONTRAST  INDICATION: Pneumonia, complication suspected, xray done  PROCEDURE:  Thin section axial images were obtained from the lung apices to below the diaphragm without contrast administration. Coronal reconstruction images were obtained from the axial data.  COMPARISON: 3/23/2023.  FINDINGS: The tip of an endotracheal tube is in the midthoracic trachea. There is no axillary lymphadenopathy. Small mediastinal lymph nodes are noted. No convincing hilar lymphadenopathy. There are no pleural or pericardial effusions.  There are patchy nodular and reticulonodular opacities in the right upper lobe, right middle lobe, and right lower lobe which are new from the prior exam. Airspace disease in the posterior left upper lobe is also new. There is near complete consolidation of the left lower lobe. Findings are most consistent with bilateral pneumonia.  Limited imaging of the upper abdomen reveals an atrophic left kidney with a small, stable, hypodense exophytic lesion. There is an old fracture of the body of the sternum with callus formation.      Impression: Bilateral  pneumonia. Recommend follow-up chest CT in 3 months.  Healing sternal fracture.           This study was performed with techniques to keep radiation doses as low as reasonably achievable (ALARA). Individualized dose reduction techniques using automated exposure control or adjustment of mA and/or kV according to the patient size were employed.   This report was signed and finalized on 8/24/2023 12:32 PM by Brittany Hunter MD.      XR Chest 1 View    Result Date: 8/24/2023  PROCEDURE: XR CHEST 1 VW-  INDICATION:  SOB, s/p intubation  FINDINGS:  A portable view of the chest was obtained.  Comparison is made to a prior exam dated 4/5/2023.   The tip of an ET tube is in the midthoracic trachea. Heart is normal in size. There are new patchy opacities in the right lung. Favor pneumonia. There is no pleural effusion or pneumothorax.      Impression: New patchy right lung opacities. Favor pneumonia. Recommend radiographic follow-up to resolution.   This report was signed and finalized on 8/24/2023 10:31 AM by Brittany Hunter MD.          MDM      Initial impression of presenting illness: Aspiration    DDX: includes but is not limited to: Aspiration pneumonitis, pneumonia, COPD exacerbation    Patient arrives critical with vitals interpreted by myself.     Pertinent features from physical exam: Patient with increased work of breathing, hypoxic while on nonrebreather.    Initial diagnostic plan: CBC, CMP, UA, CRP, lactic acid, Pro-Danie, mag, troponin, BNP, ABG, ECG, chest x-ray, CT head, CT chest    Results from initial plan were reviewed and interpreted by me revealing no concern for sepsis at this time, feel as though patient's presentation is secondary to aspiration pneumonitis.  Given this, withhold antibiotics at this time, found to be severely hyponatremic on lab however, fluid is running and my concern is for possible lab draw error.  We will repeat and discussed with hospitalist    Diagnostic information from other  sources: Discussed with family at bedside    Interventions / Re-evaluation: Intubated, central line started as above, propofol for sedation    Results/clinical rationale were discussed with family bedside    Consultations/Discussion of results with other physicians: Given my concern for aspiration pneumonitis requiring intervention, discussed with hospitalist and admitted for further management    Disposition plan: Admit  -----        Final diagnoses:   Aspiration pneumonitis          Timothy Hernandez MD  08/24/23 3901

## 2023-08-24 NOTE — PHARMACY RECOMMENDATION
"Pharmacy Consult - Enoxaparin Dosing  Darren Mccracken is a 89 y.o. male who has been consulted to dose enoxaparin for VTE prophylaxis.     Allergies  Lipitor [atorvastatin]    Relevant clinical data and objective history reviewed:   [Ht: 177.8 cm (70\"); Wt: 54.4 kg (120 lb)]  Body mass index is 17.22 kg/m².    Estimated Creatinine Clearance: 27.7 mL/min (A) (by C-G formula based on SCr of 1.39 mg/dL (H)).  Results from last 7 days   Lab Units 08/24/23  1110   HEMOGLOBIN g/dL 14.8   HEMATOCRIT % 46.3   PLATELETS 10*3/mm3 124*   CREATININE mg/dL 1.39*       Asessment/Plan  Initiate enoxaparin 30 mg subq q 24 hrs.  Pharmacy will monitor Mr. Mccracken's renal function and clinical status and adjust the enoxaparin dose and/or frequency as needed.      Thank you for the opportunity to consult on this patient.    Tal Renae [facility], Pharm.D.  08/24/23  15:15 EDT    "

## 2023-08-24 NOTE — PLAN OF CARE
Problem: Communication Impairment (Mechanical Ventilation, Invasive)  Goal: Effective Communication  Outcome: Ongoing, Progressing     Problem: Device-Related Complication Risk (Mechanical Ventilation, Invasive)  Goal: Optimal Device Function  Outcome: Ongoing, Progressing     Problem: Inability to Wean (Mechanical Ventilation, Invasive)  Goal: Mechanical Ventilation Liberation  Outcome: Ongoing, Progressing     Problem: Skin and Tissue Injury (Mechanical Ventilation, Invasive)  Goal: Absence of Device-Related Skin and Tissue Injury  Outcome: Ongoing, Progressing     Problem: Ventilator-Induced Lung Injury (Mechanical Ventilation, Invasive)  Goal: Absence of Ventilator-Induced Lung Injury  Outcome: Ongoing, Progressing   Goal Outcome Evaluation:

## 2023-08-24 NOTE — PROGRESS NOTES
"Adult Nutrition  Assessment/PES    Patient Name:  Darren Mccracken  YOB: 1934  MRN: [id number]  Admit Date:  8/24/2023    Assessment Date:  8/24/2023    Comments:      Pt currently sedated/vented receiving propofol. Plan is to keep patient NPO at this time per H&P. Will continue to follow-up.      Reason for Assessment       Row Name 08/24/23 1610          Reason for Assessment    Reason For Assessment identified at risk by screening criteria;per organizational policy     Diagnosis pulmonary disease;cardiac disease;renal disease     Identified At Risk by Screening Criteria BMI;difficulty chewing/swallowing                      Labs/Tests/Procedures/Meds       Row Name 08/24/23 1612          Labs/Procedures/Meds    Lab Results Reviewed reviewed, pertinent     Lab Results Comments High: BUN, Cr, Na, CRP        Medications    Pertinent Medications Reviewed reviewed, pertinent     Pertinent Medications Comments lovenox, propofol                    Physical Findings       Row Name 08/24/23 1614          Physical Findings    Overall Physical Appearance sedated/intubated                    Estimated/Assessed Needs - Anthropometrics       Row Name 08/24/23 1614 08/24/23 0942       Anthropometrics    Height -- 177.8 cm (70\")    Weight -- 54.4 kg (120 lb)    Height for Calculation 1.778 m (5' 10\") --    Weight for Calculation 54.4 kg (120 lb) --       Estimated/Assessed Needs    Additional Documentation Estimated Calorie Needs (Group);KCAL/KG (Group);Protein Requirements (Group);Fluid Requirements (Group) --       Estimated Calorie Needs    Estimated Calorie Requirement (kcal/day) 1500 --       KCAL/KG    KCAL/KG 25 Kcal/Kg (kcal);30 Kcal/Kg (kcal) --    25 Kcal/Kg (kcal) 1360.8 --    30 Kcal/Kg (kcal) 1632.96 --       Protein Requirements    Weight Used For Protein Calculations 54.4 kg (120 lb) --    Est Protein Requirement Amount (gms/kg) 1.0 gm protein --    Estimated Protein Requirements (gms/day) 54.43 -- "       Fluid Requirements    Fluid Requirements (mL/day) 1500  1mL/kcal --    RDA Method (mL) 1500 --                   Nutrition Prescription Ordered       Row Name 08/24/23 1617          Nutrition Prescription PO    Current PO Diet NPO                    Evaluation of Received Nutrient/Fluid Intake       Row Name 08/24/23 1617          Intake Assessment    Energy/Calorie Requirement Assessment not meeting needs     Protein Requirement Assessment not meeting needs                       Problem/Interventions:   Problem 1       Row Name 08/24/23 1617          Nutrition Diagnoses Problem 1    Problem 1 Needs Alternate     Etiology (related to) --  pt ashlie/monica/intubated     Signs/Symptoms (evidenced by) NPO                          Intervention Goal       Row Name 08/24/23 1618          Intervention Goal    General Maintain nutrition;Nutrition support treatment;Improved nutrition related lab(s);Reduce/improve symptoms;Meet nutritional needs for age/condition;Disease management/therapy     PO Advance diet     TF/PN Establish TF tolerance;Inititiate TF/PN;Maintain TF/PN;Tolerate TF at goal     Weight Maintain weight                    Nutrition Intervention       Row Name 08/24/23 1618          Nutrition Intervention    RD/Tech Action Await begin PO;Encourage intake;Follow Tx progress;Care plan reviewd                    Nutrition Prescription       Row Name 08/24/23 1618          Nutrition Prescription PO    New PO Prescription Ordered? No, recommended                    Education/Evaluation       Row Name 08/24/23 1618          Education    Education Education not appropriate at this time     Please explain Patient sedation status        Monitor/Evaluation    Monitor Per protocol;Supplement intake;Pertinent labs;TF delivery/tolerance;Weight;Skin status;Symptoms                     Electronically signed by:  Johan Ford RD  08/24/23 16:19 EDT

## 2023-08-24 NOTE — H&P
[facility]   HISTORY AND PHYSICAL      Name:  Darren Mccracken   Age:  89 y.o.  Sex:  male  :  1934  MRN:  [id number]   Visit Number:  [id number]  Admission Date:  2023  Date Of Service:  23  Primary Care Physician:  Angeles Huynh DO    Chief Complaint:     Shortness of breath following aspiration episode.    History Of Presenting Illness:      Mr. Mccracken is an 88 year old male, nursing home resident with a past health history significant of dementia, hypertension, hyperlipidemia, coronary artery disease status post stents, BPH was brought to the emergency room by EMS with symptoms of shortness of breath and aspiration.  He was last admitted here in 2023 for aspiration pneumonia.  According to the wife, patient has been a long-term care facility resident at [facility] since 2023.  Patient apparently has had dementia with intermittent agitation and has had trouble swallowing.  According to the wife, apparently patient has been on nectar thick liquids at the nursing home facility.  As far as the wife knows, patient does not have any prior history of COPD and he was not a smoker.    In the emergency room, his initial temperature was 99.9, pulse 81, respiratory rate 48, blood pressure 148/85 and pulse oxygen saturation of 90% on nonrebreather mask.  Initial blood work revealed a sodium of 168, BUN 50, creatinine 1.39 (baseline), C-reactive protein 9, white count 12.5, platelets 124.  Lactic acid and procalcitonin levels were within normal range.  Due to significant respiratory distress, patient was intubated in the emergency room by the ED provider.  ABG showed a pH of 7.38, PCO2 40, PO2 126, bicarb 24 on 100% FiO2 on mechanical ventilation.  Chest x-ray showed new patchy right lung opacities suggestive of pneumonia.  CT of the head was negative for any acute abnormalities but showed atrophy.  CT of the chest without contrast showed bilateral  pneumonia and healing sternal fracture.  Patient received a liter of normal saline bolus in the emergency room.  He was initiated on propofol drip for sedation.  A left femoral central venous line was placed in the emergency room.  Patient was subsequently admitted to the medical ICU.    Review Of Systems:    All systems were reviewed and negative except as mentioned in history of presenting illness, assessment and plan.    Past Medical History: Patient  has a past medical history of Allergic, Allergic rhinitis, Arthritis, BPH (benign prostatic hyperplasia), Cancer, Chronic cough, Chronic kidney disease, Coronary artery disease, Coronary artery disease involving native coronary artery of native heart without angina pectoris (07/31/2018), Dementia, Dementia, Excessive tear production, Facial basal cell cancer, GERD (gastroesophageal reflux disease), Glaucoma, History of transfusion, Hyperlipidemia, Hypertension, Kidney stone, Osteoarthritis of both knees, Pneumonia, Pulmonary embolism, Skin cancer, Vertigo, and Wears glasses.    Past Surgical History: Patient  has a past surgical history that includes Eye surgery (Right); Cholecystectomy (2010); Coronary angioplasty with stent; Coronary stent placement; Wrist surgery; Cardiac catheterization (N/A, 6/21/2017); Skin cancer excision; Cataract extraction w/ intraocular lens  implant, bilateral; Colonoscopy; and Cardiac catheterization (N/A, 8/1/2018).    Social History: Patient  reports that he has never smoked. He has never used smokeless tobacco. He reports that he does not drink alcohol and does not use drugs.    Family History:  Patient family history includes Heart attack in his father; Heart disease in his mother.     Allergies:      Lipitor [atorvastatin]    Home Medications:    Prior to Admission Medications       Prescriptions Last Dose Informant Patient Reported? Taking?    donepezil (ARICEPT) 10 MG tablet   No No    Take 1 tablet by mouth Every Night for 14  "days.    donepezil (ARICEPT) 5 MG tablet   Yes No    Take 1 tablet by mouth Daily.    dorzolamide (TRUSOPT) 2 % ophthalmic solution  Self Yes No    Administer 1 drop to both eyes 2 (Two) Times a Day.    meclizine (ANTIVERT) 12.5 MG tablet   Yes No    Take 1 tablet by mouth 3 (Three) Times a Day As Needed for Dizziness.    simvastatin (ZOCOR) 80 MG tablet   No No    Take 1 tablet by mouth every night at bedtime.    traMADol (ULTRAM) 50 MG tablet   No No    Take 1 tablet by mouth Every 6 (Six) Hours As Needed for Moderate Pain.    traMADol (ULTRAM) 50 MG tablet   Yes No    Take 1 tablet by mouth Every 6 (Six) Hours As Needed for Moderate Pain.    vitamin D (ERGOCALCIFEROL) 1.25 MG (07579 UT) capsule capsule   Yes No    Take 1 capsule by mouth Every 7 (Seven) Days.          ED Medications:    Medications   propofol (DIPRIVAN) infusion 10 mg/mL 100 mL (10 mcg/kg/min × 54.4 kg Intravenous Rate/Dose Change 8/24/23 1224)   sodium chloride 0.9 % bolus 1,000 mL (has no administration in time range)   etomidate (AMIDATE) injection 20 mg (20 mg Intravenous Given 8/24/23 0957)   rocuronium (ZEMURON) injection 80 mg (80 mg Intravenous Given 8/24/23 1000)     Vital Signs:  Temp:  [99.9 °F (37.7 °C)] 99.9 °F (37.7 °C)  Heart Rate:  [] 123  Resp:  [16-48] 16  BP: (111-148)/(58-95) 111/58  FiO2 (%):  [80 %-100 %] 80 %        08/24/23  0942   Weight: 54.4 kg (120 lb)     Body mass index is 17.22 kg/m².    Physical Exam:     Most recent vital Signs: /58   Pulse (!) 123   Temp 99.9 °F (37.7 °C) (Axillary)   Resp 16   Ht 177.8 cm (70\")   Wt 54.4 kg (120 lb)   SpO2 96%   BMI 17.22 kg/m²     Physical Exam  Constitutional:       Appearance: He is ill-appearing.      Comments: Poorly nourished with poor muscle mass.  Sedated on mechanical ventilation.   HENT:      Head: Normocephalic and atraumatic.      Right Ear: External ear normal.      Left Ear: External ear normal.      Nose: Nose normal.      Mouth/Throat:      " Mouth: Mucous membranes are moist.      Comments: Endotracheal tube is in place.  Eyes:      Conjunctiva/sclera: Conjunctivae normal.   Cardiovascular:      Rate and Rhythm: Tachycardia present. Rhythm irregular.      Pulses: Normal pulses.      Heart sounds: Normal heart sounds. No murmur heard.  Pulmonary:      Breath sounds: Rales present. No wheezing.      Comments: Endotracheal tube is in place.  Barrel-shaped chest noted.  Occasional basal crackles heard bilaterally.  Abdominal:      General: Bowel sounds are normal. There is no distension.      Palpations: Abdomen is soft.      Tenderness: There is no guarding.      Comments: Linares catheter is in place.   Musculoskeletal:      Cervical back: Neck supple.      Right lower leg: No edema.      Left lower leg: No edema.      Comments: Left femoral central venous line in place.   Skin:     General: Skin is dry.      Findings: Rash present. No bruising.      Comments: Diffuse erythematous rash noted especially in the abdominal wall and chest.  Dry skin noted.   Neurological:      Comments: Sedated on mechanical ventilation.   Psychiatric:      Comments: Sedated on mechanical ventilation.     Laboratory data:    I have reviewed the labs done in the emergency room.    Results from last 7 days   Lab Units 08/24/23  1110   SODIUM mmol/L 168*   POTASSIUM mmol/L 3.8   CHLORIDE mmol/L 134*   CO2 mmol/L 22.4   BUN mg/dL 50*   CREATININE mg/dL 1.39*   CALCIUM mg/dL 9.5   BILIRUBIN mg/dL 0.5   ALK PHOS U/L 93   ALT (SGPT) U/L 15   AST (SGOT) U/L 23   GLUCOSE mg/dL 106*     Results from last 7 days   Lab Units 08/24/23  1110   WBC 10*3/mm3 12.53*   HEMOGLOBIN g/dL 14.8   HEMATOCRIT % 46.3   PLATELETS 10*3/mm3 124*       Results from last 7 days   Lab Units 08/24/23  1051   PH, ARTERIAL pH units 7.380   PO2 ART mm Hg 126.0*   PCO2, ARTERIAL mm Hg 40.5   HCO3 ART mmol/L 23.9     EKG:      EKG done in the emergency room was reviewed by me.  It shows sinus rhythm at a rate of 85  bpm.  Frequent PACs noted.  Left axis deviation noted with nonspecific ST-T changes.  Poor R wave progression noted on the chest leads.    Radiology:    CT Head Without Contrast    Result Date: 8/24/2023  PROCEDURE: CT HEAD WO CONTRAST-  INDICATION: Mental status change, unknown cause  TECHNIQUE: Multiple axial CT images were performed from the foramen magnum to the vertex without contrast.   Coronal reconstruction images were obtained from the axial data.  COMPARISON: 3/23/2023.  FINDINGS: There is no mass effect or midline shift.  No hydrocephalus or intracranial hemorrhage. There is global atrophy with ex vacuo dilatation of the ventricles. Periventricular hypodensity is unchanged. Focal left frontal encephalomalacia is likely related to a prior infarct. The posterior fossa is without acute abnormality. The basilar cisterns are preserved.. No acute soft tissue abnormality. No acute osseous abnormalities are present.      No mass effect, midline shift, or intracranial hemorrhage. Atrophy and chronic findings, similar to prior exam.           This study was performed with techniques to keep radiation doses as low as reasonably achievable (ALARA). Individualized dose reduction techniques using automated exposure control or adjustment of mA and/or kV according to the patient size were employed.   This report was signed and finalized on 8/24/2023 12:28 PM by Brittany Hunter MD.      CT Chest Without Contrast Diagnostic    Result Date: 8/24/2023  PROCEDURE: CT CHEST WITHOUT CONTRAST  INDICATION: Pneumonia, complication suspected, xray done  PROCEDURE:  Thin section axial images were obtained from the lung apices to below the diaphragm without contrast administration. Coronal reconstruction images were obtained from the axial data.  COMPARISON: 3/23/2023.  FINDINGS: The tip of an endotracheal tube is in the midthoracic trachea. There is no axillary lymphadenopathy. Small mediastinal lymph nodes are noted. No convincing  hilar lymphadenopathy. There are no pleural or pericardial effusions.  There are patchy nodular and reticulonodular opacities in the right upper lobe, right middle lobe, and right lower lobe which are new from the prior exam. Airspace disease in the posterior left upper lobe is also new. There is near complete consolidation of the left lower lobe. Findings are most consistent with bilateral pneumonia.  Limited imaging of the upper abdomen reveals an atrophic left kidney with a small, stable, hypodense exophytic lesion. There is an old fracture of the body of the sternum with callus formation.      Bilateral pneumonia. Recommend follow-up chest CT in 3 months.  Healing sternal fracture.           This study was performed with techniques to keep radiation doses as low as reasonably achievable (ALARA). Individualized dose reduction techniques using automated exposure control or adjustment of mA and/or kV according to the patient size were employed.   This report was signed and finalized on 8/24/2023 12:32 PM by Brittany Hunter MD.      XR Chest 1 View    Result Date: 8/24/2023  PROCEDURE: XR CHEST 1 VW-  INDICATION:  SOB, s/p intubation  FINDINGS:  A portable view of the chest was obtained.  Comparison is made to a prior exam dated 4/5/2023.   The tip of an ET tube is in the midthoracic trachea. Heart is normal in size. There are new patchy opacities in the right lung. Favor pneumonia. There is no pleural effusion or pneumothorax.      New patchy right lung opacities. Favor pneumonia. Recommend radiographic follow-up to resolution.   This report was signed and finalized on 8/24/2023 10:31 AM by Brittany Hunter MD.       Assessment:    Acute respiratory failure with hypoxia, POA.  Right lower lobe aspiration pneumonia, POA.  Severe dehydration and hypernatremia, POA.  Chronic kidney disease stage III.  Coronary artery disease status post previous stents.  Alzheimer's dementia.  Essential  hypertension.    Plan:    Respiratory failure/aspiration pneumonia.  - Patient is currently on mechanical ventilation and continue propofol sedation.  - We will obtain respiratory culture.  - He will be started on IV antibiotics therapy with ceftriaxone.  - We will keep him n.p.o. at this time.  - Continue IV fluids.    Hypotension.  - Likely secondary to propofol.  - We will give him normal saline bolus and continue him on dextrose infusion.  - If persistent hypotension, we will start him on Levophed drip.    Hypernatremia.  - Likely secondary to poor oral intake and dehydration.  - He has been given 1.5 L of normal saline bolus and we will continue him on dextrose infusion at 100 mm/h.  - We will repeat BMP.    Dementia and dysphagia.  - Unfortunately, patient does seem to have significant dysphagia and the prognosis is very poor.  - We will hold off on NG tube feeds for now until the pneumonia improves.  - He may benefit from palliative care consultation.    I have discussed the patient's condition and treatment plan with his wife Karma and son Ashutosh who are at the bedside.  They understand the patient's poor prognosis and wants him to be DNR.  Discussed with nursing staff at the bedside.    Risk Assessment: High  DVT Prophylaxis: Enoxaparin  Code Status: DNR  Diet: N.p.o.    Advance Care Planning   ACP discussion was held with the patient during this visit. Patient does not have an advance directive, information provided.         Samson Andrews MD  08/24/23  12:47 EDT    Dictated utilizing Dragon dictation.

## 2023-08-24 NOTE — PLAN OF CARE
Goal Outcome Evaluation:  Plan of Care Reviewed With: spouse        Progress: no change  Outcome Evaluation: Patient admitted to hospital, care plan initiated

## 2023-08-24 NOTE — H&P
[facility]   HISTORY AND PHYSICAL      Name:  Darren Mccracken   Age:  89 y.o.  Sex:  male  :  1934  MRN:  [id number]   Visit Number:  [id number]  Admission Date:  2023  Date Of Service:  23  Primary Care Physician:  Angeles Huynh DO    Chief Complaint:     Aspiration    History Of Presenting Illness:      Patient was seen resting and intubated in the ICU with nurses at bedside. Patient was unable to respond or follow commands due to sedation, additional information was obtained from nurses. Patient appears to be very ill and possibly malnourished, but tolerating intubation well. Patient appeared to be in some discomfort, as seen by twitching of hands and feet.    Patient is a 88 y/o male with history of dementia, recurrent pneumonia, CAD, hypertension, and CKD admitted to the ICU on  for suspected aspiration pneumonitis. Patient presented to the Emergency department following an episode of aspiration earlier in the day. Patient was afebrile, tachycardic, and normotensive on presentation to the ED. Emergent central line placement and intubation were performed in the ED with no immediate complications. Labs were notable for elevated sodium (166), elevated chloride (134), elevated BUN (50) and creatinine (1.39), elevated CRP (9.04), elevated WBC (12.53), and normal procalcitonin. Blood cultures were ordered, awaiting results. Abdomen XR showed tip of NG tube in stomach with side holes near GE junction. Chest CT without contrast showed bilateral pneumonia. Head CT without contrast showed no acute events.     Review Of Systems:    Unable to review due to dementia and intubation.    Past Medical History: Patient  has a past medical history of Allergic, Allergic rhinitis, Arthritis, BPH (benign prostatic hyperplasia), Cancer, Chronic cough, Chronic kidney disease, Coronary artery disease, Coronary artery disease involving native coronary artery of native heart  without angina pectoris (07/31/2018), Dementia, Dementia, Excessive tear production, Facial basal cell cancer, GERD (gastroesophageal reflux disease), Glaucoma, History of transfusion, Hyperlipidemia, Hypertension, Kidney stone, Osteoarthritis of both knees, Pneumonia, Pulmonary embolism, Skin cancer, Vertigo, and Wears glasses.    Past Surgical History: Patient  has a past surgical history that includes Eye surgery (Right); Cholecystectomy (2010); Coronary angioplasty with stent; Coronary stent placement; Wrist surgery; Cardiac catheterization (N/A, 6/21/2017); Skin cancer excision; Cataract extraction w/ intraocular lens  implant, bilateral; Colonoscopy; and Cardiac catheterization (N/A, 8/1/2018).    Social History: Patient  reports that he has never smoked. He has never used smokeless tobacco. He reports that he does not drink alcohol and does not use drugs.    Family History:  Patient's family history has been reviewed and found to be noncontributory.     Allergies:      Lipitor [atorvastatin]    Home Medications:    Prior to Admission Medications       Prescriptions Last Dose Informant Patient Reported? Taking?    cefdinir (OMNICEF) 300 MG capsule   Yes Yes    mirtazapine (REMERON) 15 MG tablet   Yes Yes    predniSONE (DELTASONE) 20 MG tablet   Yes Yes    QUEtiapine (SEROquel) 50 MG tablet   Yes Yes    Rexulti 1 MG tablet   Yes Yes    risperiDONE (risperDAL) 0.5 MG tablet   Yes Yes    donepezil (ARICEPT) 10 MG tablet   No No    Take 1 tablet by mouth Every Night for 14 days.    donepezil (ARICEPT) 5 MG tablet   Yes No    Take 1 tablet by mouth Daily.    dorzolamide (TRUSOPT) 2 % ophthalmic solution  Self Yes No    Administer 1 drop to both eyes 2 (Two) Times a Day.    haloperidol lactate (HALDOL) 5 MG/ML injection   Yes No    haloperidol lactate 5 mg/mL injection solution    hydrOXYzine pamoate (VISTARIL) 25 MG capsule   Yes No    hydroxyzine pamoate 25 mg capsule    meclizine (ANTIVERT) 12.5 MG tablet   Yes  "No    Take 1 tablet by mouth 3 (Three) Times a Day As Needed for Dizziness.    metoprolol succinate XL (TOPROL-XL) 25 MG 24 hr tablet   Yes No    metoprolol succinate ER 25 mg tablet,extended release 24 hr    simvastatin (ZOCOR) 80 MG tablet   No No    Take 1 tablet by mouth every night at bedtime.    traMADol (ULTRAM) 50 MG tablet   No No    Take 1 tablet by mouth Every 6 (Six) Hours As Needed for Moderate Pain.    traMADol (ULTRAM) 50 MG tablet   Yes No    Take 1 tablet by mouth Every 6 (Six) Hours As Needed for Moderate Pain.    vitamin D (ERGOCALCIFEROL) 1.25 MG (51132 UT) capsule capsule   Yes No    Take 1 capsule by mouth Every 7 (Seven) Days.          ED Medications:    Medications   propofol (DIPRIVAN) infusion 10 mg/mL 100 mL (5 mcg/kg/min × 54.4 kg Intravenous New Bag 8/24/23 1258)   cefTRIAXone (ROCEPHIN) IVPB 1000 mg/50ml dextrose (premix) (1,000 mg Intravenous New Bag 8/24/23 1316)   dextrose (D5W) 5 % infusion (100 mL/hr Intravenous New Bag 8/24/23 1316)   lactobacillus acidophilus (RISAQUAD) capsule 1 capsule (has no administration in time range)   etomidate (AMIDATE) injection 20 mg (20 mg Intravenous Given 8/24/23 0957)   rocuronium (ZEMURON) injection 80 mg (80 mg Intravenous Given 8/24/23 1000)   sodium chloride 0.9 % bolus 500 mL (500 mL Intravenous New Bag 8/24/23 1420)     Vital Signs:  Temp:  [99.9 °F (37.7 °C)] 99.9 °F (37.7 °C)  Heart Rate:  [] 92  Resp:  [16-48] 16  BP: ()/() 97/63  FiO2 (%):  [80 %-100 %] 80 %        08/24/23  0942   Weight: 54.4 kg (120 lb)     Body mass index is 17.22 kg/m².    Physical Exam:     Most recent vital Signs: BP 97/63   Pulse 92   Temp 99.9 °F (37.7 °C) (Axillary)   Resp 16   Ht 177.8 cm (70\")   Wt 54.4 kg (120 lb)   SpO2 90%   BMI 17.22 kg/m²     Physical Exam  Gen: Weak, thin, ill-appearing  Heart: RRR, no murmurs.  Lungs: equal breath sounds heard bilaterally  Neuro: PERRLA  Skin: Diffuse rash noted across chest, abdomen, " thighs  Extremities: movement in all four extremities. Pedal pulses felt B/L.    Laboratory data:    I have reviewed the labs done in the emergency room.    Results from last 7 days   Lab Units 08/24/23  1110   SODIUM mmol/L 168*   POTASSIUM mmol/L 3.8   CHLORIDE mmol/L 134*   CO2 mmol/L 22.4   BUN mg/dL 50*   CREATININE mg/dL 1.39*   CALCIUM mg/dL 9.5   BILIRUBIN mg/dL 0.5   ALK PHOS U/L 93   ALT (SGPT) U/L 15   AST (SGOT) U/L 23   GLUCOSE mg/dL 106*     Results from last 7 days   Lab Units 08/24/23  1110   WBC 10*3/mm3 12.53*   HEMOGLOBIN g/dL 14.8   HEMATOCRIT % 46.3   PLATELETS 10*3/mm3 124*                         Results from last 7 days   Lab Units 08/24/23  1051   PH, ARTERIAL pH units 7.380   PO2 ART mm Hg 126.0*   PCO2, ARTERIAL mm Hg 40.5   HCO3 ART mmol/L 23.9           Invalid input(s): USDES,  BLOODU, NITRITITE, BACT, EP    Pain Management Panel           No data to display                EKG:      Normal sinus rhythm with multiple premature ventricular contractions.    Radiology:    CT Head Without Contrast    Result Date: 8/24/2023  PROCEDURE: CT HEAD WO CONTRAST-  INDICATION: Mental status change, unknown cause  TECHNIQUE: Multiple axial CT images were performed from the foramen magnum to the vertex without contrast.   Coronal reconstruction images were obtained from the axial data.  COMPARISON: 3/23/2023.  FINDINGS: There is no mass effect or midline shift.  No hydrocephalus or intracranial hemorrhage. There is global atrophy with ex vacuo dilatation of the ventricles. Periventricular hypodensity is unchanged. Focal left frontal encephalomalacia is likely related to a prior infarct. The posterior fossa is without acute abnormality. The basilar cisterns are preserved.. No acute soft tissue abnormality. No acute osseous abnormalities are present.      No mass effect, midline shift, or intracranial hemorrhage. Atrophy and chronic findings, similar to prior exam.           This study was performed with  techniques to keep radiation doses as low as reasonably achievable (ALARA). Individualized dose reduction techniques using automated exposure control or adjustment of mA and/or kV according to the patient size were employed.   This report was signed and finalized on 8/24/2023 12:28 PM by Brittany Hunter MD.      CT Chest Without Contrast Diagnostic    Result Date: 8/24/2023  PROCEDURE: CT CHEST WITHOUT CONTRAST  INDICATION: Pneumonia, complication suspected, xray done  PROCEDURE:  Thin section axial images were obtained from the lung apices to below the diaphragm without contrast administration. Coronal reconstruction images were obtained from the axial data.  COMPARISON: 3/23/2023.  FINDINGS: The tip of an endotracheal tube is in the midthoracic trachea. There is no axillary lymphadenopathy. Small mediastinal lymph nodes are noted. No convincing hilar lymphadenopathy. There are no pleural or pericardial effusions.  There are patchy nodular and reticulonodular opacities in the right upper lobe, right middle lobe, and right lower lobe which are new from the prior exam. Airspace disease in the posterior left upper lobe is also new. There is near complete consolidation of the left lower lobe. Findings are most consistent with bilateral pneumonia.  Limited imaging of the upper abdomen reveals an atrophic left kidney with a small, stable, hypodense exophytic lesion. There is an old fracture of the body of the sternum with callus formation.      Bilateral pneumonia. Recommend follow-up chest CT in 3 months.  Healing sternal fracture.           This study was performed with techniques to keep radiation doses as low as reasonably achievable (ALARA). Individualized dose reduction techniques using automated exposure control or adjustment of mA and/or kV according to the patient size were employed.   This report was signed and finalized on 8/24/2023 12:32 PM by Brittany Hunter MD.      XR Chest 1 View    Result Date:  8/24/2023  PROCEDURE: XR CHEST 1 VW-  INDICATION:  SOB, s/p intubation  FINDINGS:  A portable view of the chest was obtained.  Comparison is made to a prior exam dated 4/5/2023.   The tip of an ET tube is in the midthoracic trachea. Heart is normal in size. There are new patchy opacities in the right lung. Favor pneumonia. There is no pleural effusion or pneumothorax.      New patchy right lung opacities. Favor pneumonia. Recommend radiographic follow-up to resolution.   This report was signed and finalized on 8/24/2023 10:31 AM by Brittany Hunter MD.      XR Abdomen KUB    Result Date: 8/24/2023  PROCEDURE: XR ABDOMEN KUB-  INDICATION:  NG tube placement; J69.0-Pneumonitis due to inhalation of food and vomit  COMPARISON:  None.  FINDINGS:  A supine view of the abdomen was obtained. The tip of an NG tube is in the stomach. The sidehole is in the region of the GE junction. Limited evaluation of the bowel gas pattern is nonspecific but nonobstructive.       NG tube tip in the stomach with the side holes near the GE junction.   This report was signed and finalized on 8/24/2023 3:05 PM by Brittany Hunter MD.       Assessment:    Acute respiratory failure with hypoxia, POA  Aspiration pneumonia, POA  Dehydration and Hypernatremia, POA  Chronic Kidney Disease  Coronary artery disease  Dementia  Hypertension    Plan:    Acute Respiratory Failure with Hypoxia/Aspiration Pneumonia  Patient is on mechanical ventilation with no current complications noted. Continue Propofol for sedation. Plan to obtain cultures. Begin IV antibiotic therapy with Ceftriaxone on 8/24, re-evaluate if indicated by culture. Patient is currently hypotensive, most likely related to sedation. Continue IV fluids with NS and dextrose infusion. Consider Levophed if hypotension persists.    Hypernatremia  Likely related to malnutrition and dehydration. Continue IV fluids with NS and dextrose infusion. Continue to monitor.     Dementia  Patient's dysphagia is  likely secondary to dementia. NPO. Prognosis is poor, may benefit from palliative care.    Risk Assessment: High  DVT Prophylaxis: Enoxaparin  Code Status: DNR  Diet: NPO    Advance Care Planning   ACP discussion was held with the patient during this visit. Patient has an advance directive in EMR which is still valid.            Ifeoma Butler, Medical Student  08/24/23  15:08 EDT    Dictated utilizing Dragon dictation.

## 2023-08-25 PROBLEM — E43 SEVERE MALNUTRITION: Status: ACTIVE | Noted: 2023-01-01

## 2023-08-25 NOTE — PROGRESS NOTES
Malnutrition Severity Assessment    Patient Name:  Darren Mccracken  YOB: 1934  MRN: [id number]  Admit Date:  8/24/2023    Patient meets criteria for : Severe Malnutrition    Comments:     Pt w/ severe malnutrition based on 16% wt loss since May 2023 and NFPE findings. Pt currently hemodynamically unstable to be started on enteral nutrition - will continue to assess pt's status. RD available PRN.     Malnutrition Severity Assessment  Malnutrition Type: Starvation - Related Malnutrition  Malnutrition Type (last 8 hours)       Malnutrition Severity Assessment       Row Name 08/25/23 1338       Malnutrition Severity Assessment    Malnutrition Type Starvation - Related Malnutrition      Row Name 08/25/23 1338       Unintentional Weight Loss     Unintentional Weight Loss  Weight loss greater than 7.5% in three months      Row Name 08/25/23 1338       Muscle Loss    [place] Severe - deep hollowing/scooping, lack of muscle to touch, facial bones well defined    Clavicle Bone Region Severe - protruding prominent bone    Acromion Bone Region Severe - squared shoulders, bones, and acromion process protrusion prominent    Scapular Bone Region Severe - prominent bones, depressions easily visible between ribs, scapula, spine, shoulders    Dorsal Hand Region Moderate - slight depression    Patellar Region Severe - prominent bone, square looking, very little muscle definition    Anterior Thigh Region Moderate - mild depression on inner thigh    Posterior Calf Region --  [place]      Row Name 08/25/23 1338       Fat Loss    Orbital Region  Severe - pronounced hollowness/depression, dark circles, loose saggy skin    Upper Arm Region Severe - mostly skin, very little space between folds, fingers touch      Row Name 08/25/23 1338       Criteria Met (Must meet criteria for severity in at least 2 of these categories: M Wasting, Fat Loss, Fluid, Secondary Signs, Wt. Status, Intake)    Patient meets criteria for  Severe  Malnutrition                    Electronically signed by:  Johan Ford RD  08/25/23 13:41 EDT

## 2023-08-25 NOTE — PLAN OF CARE
"Goal Outcome Evaluation:      PC MD consult for Goals of Care Discussion/ACP/Support for Pt/Family  Dr Blount notified this am.  She reported would be able to meet with family after 2pm today.    Pt's primary nurse, Patt RN, was updated.    Pt is currently CODE STATUS of No CPR, Limited Support--No Cardioversion    He does have a Durable POA (March 14, 2023)  with HC decision authority in the EMR.    Pt admitted [facility] from [facility] (since April 2023) with report of SOA.  O2 sat 90% on NRM   Temp 99.9.  Pt was in respiratory distress and intubated.  CXR revealed \"new patchy right lung opacities suggestive of PN\"  CT chest revealed Bilat PN and healing sternal Fx.    Admission Assessment:  Septic Shock probably secondary to PN, Acute Resp Failure with Hypoxia, RLL Aspiration PN, Severe Dehydration and Hypernatremia, CAD s/p previous stent placement, Alzheimer's Dementia, Essential HTN.    PMHx:  Arthritis, BPH, Cancer, Chronic cough, CAD, Dementia, Facial basal cell cancer, GERD, Glaucoma, HTN, Renal stones, OA both knees, PN, PE, Vertigo    Pt on ventilator--appeared comfortable.  No family present at time of visit.  Requested pt's primary nurse, Bridgett BANUELOS, contact family for meeting with Dr Blount today after 2pm.     1415   Dr Blount called to request --if family OK with it, to meet with them tomorrow around 12 noon.    Family members were present in room.  I explained Dr Blount called to request the meeting be post-poned until tomorrow at 12 noon if they were agreeable to the change.   One family member mentioned it might be better to meet tomorrow since pt's son will be able to attend meeting.  They agreed to meet tomorrow at 12 noon.    Updated pt's primary nurse, Patt BANUELOS.        7820 Updated Dr Andrews of meeting change to tomorrow at 12 noon.       "

## 2023-08-25 NOTE — PHARMACY RECOMMENDATION
Pharmacy Consult-Vancomycin Dosing    Darren Mccracken is a  89 y.o. male receiving vancomycin therapy.     Indication: pneumonia  Consulting Provider: Dr. B. Kerley    Goal AUC: 400 to 600 (mg/L)×hr    Current Antimicrobial Therapy  Anti-Infectives (From admission, onward)      Ordered     Dose/Rate Route Frequency Start Stop    08/25/23 1812  vancomycin (VANCOCIN) IVPB 500 mg in 100 mL NS        Ordering Provider: Kerley, Brian Joseph, DO    500 mg  200 mL/hr over 30 Minutes Intravenous Every 18 Hours 08/25/23 2100 09/01/23 1459    08/25/23 0737  cefTRIAXone (ROCEPHIN) IVPB 1000 mg/50ml dextrose (premix)        Ordering Provider: Samson Andrews MD    1,000 mg  100 mL/hr over 30 Minutes Intravenous Every 24 Hours 08/25/23 0900 08/29/23 0859    08/25/23 0506  vancomycin 1000 mg/250 mL 0.9% NS (vial-mate)        Ordering Provider: Kerley, Brian Joseph, DO    1,000 mg  over 60 Minutes Intravenous Once 08/25/23 0600 08/25/23 0618    08/25/23 0442  Pharmacy to dose vancomycin        Ordering Provider: Kerley, Brian Joseph, DO     Does not apply Continuous PRN 08/25/23 0441 09/01/23 0440            Labs  Results from last 7 days   Lab Units 08/25/23  1656 08/25/23  0412 08/25/23  0016 08/24/23  1110   WBC 10*3/mm3  --  14.15*  --  12.53*   CREATININE mg/dL 1.51*  1.47*  --  1.77* 1.39*      Estimated Creatinine Clearance: 25.5 mL/min (A) (by C-G formula based on SCr of 1.47 mg/dL (H)).  Temp Readings from Last 1 Encounters:   08/25/23 98.8 °F (37.1 °C) (Bladder)       Microbiology Culture results  Microbiology Results (last 10 days)       Procedure Component Value - Date/Time    VRE Culture - Swab, Per Rectum [[id number]]  (Normal) Collected: 08/24/23 1811    Lab Status: Preliminary result Specimen: Swab from Per Rectum Updated: 08/25/23 1313     VRE Screen CX No Vancomycin Resistant Enterococcus Isolated    Acinetobacter Screen - Swab, Axilla, Right [[id number]]  (Normal) Collected: 08/24/23 0294    Lab Status:  Preliminary result Specimen: Swab from Axilla, Right Updated: 08/25/23 1314     Acinetobacter Screen CX No Acinetobacter isolated    MRSA Screen, PCR (Inpatient) - Swab, Nares [[id number]]  (Abnormal) Collected: 08/24/23 1811    Lab Status: Final result Specimen: Swab from Nares Updated: 08/25/23 0313     MRSA PCR MRSA Detected    Narrative:      The negative predictive value of this diagnostic test is high and should only be used to consider de-escalating anti-MRSA therapy. A positive result may indicate colonization with MRSA and must be correlated clinically.    Respiratory Culture - Aspirate, ET Suction [[id number]] Collected: 08/24/23 1617    Lab Status: Preliminary result Specimen: Aspirate from ET Suction Updated: 08/25/23 1014     Respiratory Culture Moderate growth (3+) The culture consists of normal respiratory reg. This is a preliminary report; final report to follow.     Gram Stain Many (4+) WBCs per low power field      Rare (1+) Epithelial cells per low power field      Moderate (3+) Gram positive cocci in pairs      Few (2+) Budding yeast    Blood Culture With MALOU - Blood, Arm, Right [[id number]]  (Normal) Collected: 08/24/23 1120    Lab Status: Preliminary result Specimen: Blood from Arm, Right Updated: 08/25/23 1245     Blood Culture No growth at 24 hours    Blood Culture With MALOU - Blood, Blood, Venous Line [[id number]]  (Normal) Collected: 08/24/23 1110    Lab Status: Preliminary result Specimen: Blood, Venous Line Updated: 08/25/23 1245     Blood Culture No growth at 24 hours    Respiratory Panel PCR w/COVID-19(SARS-CoV-2) LUANNE/JOSE/BASILIA/PAD/COR/MAD/ELIAZAR In-House, NP Swab in UTM/VTM, 3-4 HR TAT - Swab, Nasopharynx [[id number]]  (Normal) Collected: 08/24/23 1020    Lab Status: Final result Specimen: Swab from Nasopharynx Updated: 08/24/23 1248     ADENOVIRUS, PCR Not Detected     Coronavirus 229E Not Detected     Coronavirus HKU1 Not Detected     Coronavirus NL63 Not Detected     Coronavirus OC43 Not  "Detected     COVID19 Not Detected     Human Metapneumovirus Not Detected     Human Rhinovirus/Enterovirus Not Detected     Influenza A PCR Not Detected     Influenza B PCR Not Detected     Parainfluenza Virus 1 Not Detected     Parainfluenza Virus 2 Not Detected     Parainfluenza Virus 3 Not Detected     Parainfluenza Virus 4 Not Detected     RSV, PCR Not Detected     Bordetella pertussis pcr Not Detected     Bordetella parapertussis PCR Not Detected     Chlamydophila pneumoniae PCR Not Detected     Mycoplasma pneumo by PCR Not Detected    Narrative:      In the setting of a positive respiratory panel with a viral infection PLUS a negative procalcitonin without other underlying concern for bacterial infection, consider observing off antibiotics or discontinuation of antibiotics and continue supportive care. If the respiratory panel is positive for atypical bacterial infection (Bordetella pertussis, Chlamydophila pneumoniae, or Mycoplasma pneumoniae), consider antibiotic de-escalation to target atypical bacterial infection.            Evaluation of Dosing          Ht - 177.8 cm (70\")  Wt - 53 kg (116 lb 13.5 oz)    Evaluation of Level    Results from last 7 days   Lab Units 08/25/23  1656   VANCOMYCIN RM mcg/mL 10.30                   [employer] AUC Calculation     Current dose/frequency: 500 mg q 24 hrs     Current AUC:   377 (mg/L)×hr     Current C(trough): 12.5 mcg/mL  _________________________________     New dose/frequency: 500 mg q 18 hrs     New AUC:   490 (mg/L)×hr     New C(trough): 17.1 mcg/mL         Assessment/Plan    Pharmacy to dose vancomycin for pna. Goal  to 600 (mg/L)×hr.  Increasing dose of vancomycin to 750 mg IV q 18 hrs.  Assess clearance by vancomycin random level on 08/27/2023 0600.  Pharmacy will continue to monitor renal function, cultures and sensitivities, and clinical status to adjust regimen as necessary.      Thank you for the opportunity to consult on this patient.    Tal" SUMI Renae, Pharm.D.  08/25/23  18:14 EDT

## 2023-08-25 NOTE — PROGRESS NOTES
[facility]    Name:  Darren Mccracken   Age:  89 y.o.  Sex:  male  :  1934  MRN:  [id number]   Visit Number:  [id number]  Admission Date:  2023  Date Of Service:  23  Primary Care Physician:  Angeles Huynh DO     LOS: 1 day :    Chief Complaint:      Respiratory failure, aspiration pneumonia.    Subjective:    Mr. Mccracken was seen and examined this morning.  He is currently sedated on propofol but he is able to wake up on call and does move all 4 limbs on sternal rub.  Unfortunately, he did have episodes of fever last night and did require second vasopressor therapy with vasopressin along with Levophed.  He received ceftriaxone yesterday but his nasal swab came back positive for MRSA and he was started on vancomycin through the night.  His FiO2 has been decreased to 30% on his ventilator and is currently saturating in the low 90s.  Continues to have frequent PACs with intermittent tachycardia suggestive of multifocal atrial tachycardia.    Hospital Course:    Mr. Mccracken is an 88-year-old male, nursing home resident with a history of dementia, hypertension, hyperlipidemia, coronary artery disease status post stents, BPH was brought to the emergency room by EMS with symptoms of shortness of breath and aspiration.  He was last admitted here in 2023 for aspiration pneumonia.  Patient has been a long-term care facility resident at [facility] since 2023.     In the emergency room, his initial temperature was 99.9, pulse 81, respiratory rate 48, blood pressure 148/85 and pulse oxygen saturation of 90% on nonrebreather mask.  Initial blood work revealed a sodium of 168, BUN 50, creatinine 1.39 (baseline), C-reactive protein 9, white count 12.5, platelets 124.  Lactic acid and procalcitonin levels were within normal range.  Due to significant respiratory distress, patient was intubated in the emergency room by the ED provider.  ABG  showed a pH of 7.38, PCO2 40, PO2 126, bicarb 24 on 100% FiO2 on mechanical ventilation.  Chest x-ray showed new patchy right lung opacities suggestive of pneumonia.  CT of the head was negative for any acute abnormalities but showed atrophy.  CT of the chest without contrast showed bilateral pneumonia and healing sternal fracture.  Patient received a liter of normal saline bolus in the emergency room.  He was initiated on propofol drip for sedation.  A left femoral central venous line was placed in the emergency room.  Patient was subsequently admitted to the medical ICU.    Patient continued to have hypotension despite being on Levophed drip and had to be started on vasopressin as well.  His nasal swab for MRSA came back positive and he was started on vancomycin along with continuation of ceftriaxone.  As per the family's request, patient's CODE STATUS was changed to DNR.  Palliative services were consulted.    Review of Systems:     All systems were reviewed and negative except as mentioned in subjective, assessment and plan.    Vital Signs:    Temp:  [97.6 °F (36.4 °C)-101.5 °F (38.6 °C)] 97.6 °F (36.4 °C)  Heart Rate:  [] 91  Resp:  [16-48] 24  BP: ()/() 113/56  FiO2 (%):  [30 %-100 %] 30 %    Intake and output:    I/O last 3 completed shifts:  In: -   Out: 250 [Urine:250]  No intake/output data recorded.    Physical Examination:    General Appearance:  Sedated on mechanical ventilation.  Opens eyes on sternal rub.   Head:  Atraumatic and normocephalic.   Eyes: Conjunctivae and sclerae normal, no icterus. No pallor.   Throat: No oral lesions, no thrush, oral mucosa moist.  Endotracheal tube and NG tube are in place.   Neck: Supple, trachea midline, no thyromegaly.   Lungs:   Barrel-shaped chest.  Breath sounds decreased bilaterally in the bases.  No wheezing.  Occasional basal crackles heard. No Pleural rub or bronchial breathing.   Heart:  Normal S1 and S2, no murmur, no gallop, no rub. No  JVD.   Abdomen:   Normal bowel sounds, no masses, no organomegaly. Soft, nontender, nondistended, no rebound tenderness.  Linares catheter is in place.   Extremities: Supple, no edema, no cyanosis, no clubbing.  Poor muscle mass.  Left femoral central venous line in place.   Skin: No bleeding.  Diffuse erythematous rash noted especially in the abdominal wall and chest.  Dry skin noted.   Neurologic: Sedated on mechanical ventilation. No facial asymmetry. Moves all four limbs when agitated.      Laboratory results:    Results from last 7 days   Lab Units 08/25/23  0016 08/24/23  1423 08/24/23  1110   SODIUM mmol/L 160* 166* 168*   POTASSIUM mmol/L 3.8  --  3.8   CHLORIDE mmol/L 127*  --  134*   CO2 mmol/L 21.4*  --  22.4   BUN mg/dL 55*  --  50*   CREATININE mg/dL 1.77*  --  1.39*   CALCIUM mg/dL 8.8  --  9.5   BILIRUBIN mg/dL  --   --  0.5   ALK PHOS U/L  --   --  93   ALT (SGPT) U/L  --   --  15   AST (SGOT) U/L  --   --  23   GLUCOSE mg/dL 188*  --  106*     Results from last 7 days   Lab Units 08/25/23  0412 08/24/23  1110   WBC 10*3/mm3 14.15* 12.53*   HEMOGLOBIN g/dL 11.8* 14.8   HEMATOCRIT % 39.0 46.3   PLATELETS 10*3/mm3 109* 124*             Results from last 7 days   Lab Units 08/24/23  1120 08/24/23  1110   BLOODCX  No growth at less than 24 hours No growth at less than 24 hours     Recent Labs     08/24/23  1051   PHART 7.380   CQN3NLW 40.5   PO2ART 126.0*   [id number] 23.9   BASEEXCESS -1.2*      I have reviewed the patient's laboratory results.    Radiology results:    CT Head Without Contrast    Result Date: 8/24/2023  PROCEDURE: CT HEAD WO CONTRAST-  INDICATION: Mental status change, unknown cause  TECHNIQUE: Multiple axial CT images were performed from the foramen magnum to the vertex without contrast.   Coronal reconstruction images were obtained from the axial data.  COMPARISON: 3/23/2023.  FINDINGS: There is no mass effect or midline shift.  No hydrocephalus or intracranial hemorrhage. There is global  atrophy with ex vacuo dilatation of the ventricles. Periventricular hypodensity is unchanged. Focal left frontal encephalomalacia is likely related to a prior infarct. The posterior fossa is without acute abnormality. The basilar cisterns are preserved.. No acute soft tissue abnormality. No acute osseous abnormalities are present.      Impression: No mass effect, midline shift, or intracranial hemorrhage. Atrophy and chronic findings, similar to prior exam.           This study was performed with techniques to keep radiation doses as low as reasonably achievable (ALARA). Individualized dose reduction techniques using automated exposure control or adjustment of mA and/or kV according to the patient size were employed.   This report was signed and finalized on 8/24/2023 12:28 PM by Brittany Hunter MD.      CT Chest Without Contrast Diagnostic    Result Date: 8/24/2023  PROCEDURE: CT CHEST WITHOUT CONTRAST  INDICATION: Pneumonia, complication suspected, xray done  PROCEDURE:  Thin section axial images were obtained from the lung apices to below the diaphragm without contrast administration. Coronal reconstruction images were obtained from the axial data.  COMPARISON: 3/23/2023.  FINDINGS: The tip of an endotracheal tube is in the midthoracic trachea. There is no axillary lymphadenopathy. Small mediastinal lymph nodes are noted. No convincing hilar lymphadenopathy. There are no pleural or pericardial effusions.  There are patchy nodular and reticulonodular opacities in the right upper lobe, right middle lobe, and right lower lobe which are new from the prior exam. Airspace disease in the posterior left upper lobe is also new. There is near complete consolidation of the left lower lobe. Findings are most consistent with bilateral pneumonia.  Limited imaging of the upper abdomen reveals an atrophic left kidney with a small, stable, hypodense exophytic lesion. There is an old fracture of the body of the sternum with callus  formation.      Impression: Bilateral pneumonia. Recommend follow-up chest CT in 3 months.  Healing sternal fracture.           This study was performed with techniques to keep radiation doses as low as reasonably achievable (ALARA). Individualized dose reduction techniques using automated exposure control or adjustment of mA and/or kV according to the patient size were employed.   This report was signed and finalized on 8/24/2023 12:32 PM by Brittany Hunter MD.      XR Chest 1 View    Result Date: 8/24/2023  PROCEDURE: XR CHEST 1 VW-  INDICATION:  SOB, s/p intubation  FINDINGS:  A portable view of the chest was obtained.  Comparison is made to a prior exam dated 4/5/2023.   The tip of an ET tube is in the midthoracic trachea. Heart is normal in size. There are new patchy opacities in the right lung. Favor pneumonia. There is no pleural effusion or pneumothorax.      Impression: New patchy right lung opacities. Favor pneumonia. Recommend radiographic follow-up to resolution.   This report was signed and finalized on 8/24/2023 10:31 AM by Brittayn Hunter MD.      XR Abdomen KUB    Result Date: 8/24/2023  PROCEDURE: XR ABDOMEN KUB-  INDICATION:  NG tube placement; J69.0-Pneumonitis due to inhalation of food and vomit  COMPARISON:  None.  FINDINGS:  A supine view of the abdomen was obtained. The tip of an NG tube is in the stomach. The sidehole is in the region of the GE junction. Limited evaluation of the bowel gas pattern is nonspecific but nonobstructive.       Impression: NG tube tip in the stomach with the side holes near the GE junction.   This report was signed and finalized on 8/24/2023 3:05 PM by Brittany Hunter MD.     I have reviewed the patient's radiology reports.    Medication Review:     I have reviewed the patient's active and prn medications.     Problem List:      Aspiration pneumonitis    CAD (coronary artery disease)    Impaired mobility and ADLs    Acute respiratory failure with hypoxia    Severe  dehydration    Hypernatremia    Chronic kidney disease, stage III (moderate)    Assessment:    Septic shock secondary to pneumonia, POA.  Acute respiratory failure with hypoxia, POA.  Right lower lobe aspiration pneumonia, POA.  Severe dehydration and hypernatremia, POA.  Chronic kidney disease stage III.  Coronary artery disease status post previous stents.  Alzheimer's dementia.  Essential hypertension.    Plan:    Septic shock.  - Likely secondary to aspiration pneumonia.  - Continue Levophed and vasopressin.  - Continue IV fluids with dextrose water 100 mL/h.  - Continue antibiotics therapy with Rocephin and vancomycin.    Respiratory failure/aspiration pneumonia.  - Patient is currently on mechanical ventilation and propofol sedation.  - Continue antibiotics therapy with ceftriaxone and vancomycin.  - Blood and respiratory cultures are currently pending.  - Lactobacillus supplements.  - We will consult Dr. Melendez from pulmonology for management of respiratory failure.     Hypernatremia.  - Likely secondary to poor oral intake and dehydration.  - Hypernatremia is slowly improving and we will continue dextrose water infusion.  - We will also consult nutrition for tube feed initiation and free water flushes.     Dementia and dysphagia.  - Unfortunately, patient does seem to have significant dysphagia and the prognosis is very poor.  - We will consult palliative care services for goals of care discussion.    I have discussed the patient's condition and treatment plan with his son Ashutosh who is at the bedside.  He tells me that the patient did not want to be on life support and he is open to terminal weaning if the patient cannot be extubated within a reasonable time.    Discussed with nursing staff at the bedside.    DVT Prophylaxis: Enoxaparin  Code Status: DNR  Diet: Tube feeds  Discharge Plan: Pending-will need several more days in the hospital.    Samson Andrews MD  08/25/23  09:30 EDT    Dictated utilizing Dragon  dictation.

## 2023-08-25 NOTE — PLAN OF CARE
Goal Outcome Evaluation:  Plan of Care Reviewed With: spouse, son        Progress: no change  Outcome Evaluation: Patient intubated and sedated. FiO2 @ 30%. Phenylephrine and vasopressin administering for BP support. Afib on the monitor. Family present throughout shift and very understanding of the situation. This RN provided therapeutic responses to all questions family had. No complaints at this time.

## 2023-08-25 NOTE — NURSING NOTE
NG tube is at [facility], Juliana notified, verbal instruction to advance tube 10cm completed, air auscultation positive.

## 2023-08-25 NOTE — CONSULTS
Adult Nutrition  Assessment/PES    Patient Name:  Darren Mccracken  YOB: 1934  MRN: [id number]  Admit Date:  8/24/2023    Assessment Date:  8/25/2023    Comments:      Pt sedated/intubated w/ NG-tube receiving Propofol @ 3.26mL/hr x 24hrs providing 86kcals/day. Pt most recent MAP recorded @ 61mmHg, lactate WNL @ 1.8mmol/L. Pt is receiving D5W @ 100mL/hr continuously d/t elevated Na+ and receiving 2400mL/day. Pt on two pressors at this time - German-synephrine (0.6 mcg/kg/min) and vasopressin (0.03 units/min). Pt is currently hemodynamically unstable to be started on enteral nutrition and is at increased risk for gut ischemia and refeeding syndrome. Will follow-up tomorrow to re-evaluate patient's status.    Reason for Assessment       Row Name 08/25/23 1216          Reason for Assessment    Reason For Assessment identified at risk by screening criteria;per organizational policy;follow-up protocol     Diagnosis pulmonary disease;cardiac disease;renal disease     Identified At Risk by Screening Criteria BMI;difficulty chewing/swallowing                      Labs/Tests/Procedures/Meds       Row Name 08/25/23 1217          Labs/Procedures/Meds    Lab Results Reviewed reviewed, pertinent     Lab Results Comments High: Na, BUN, Cr, Cl, glucose, CRP        Medications    Pertinent Medications Reviewed reviewed, pertinent     Pertinent Medications Comments propofol, vasopressin, phenylephrine                    Physical Findings       Row Name 08/25/23 1218          Physical Findings    Overall Physical Appearance sedated/intubated, severely underwt     Enteral Access Devices nasogastric tube                      Nutrition Prescription Ordered       Row Name 08/25/23 1219          Nutrition Prescription PO    Current PO Diet NPO                    Evaluation of Received Nutrient/Fluid Intake       Row Name 08/25/23 1219          Intake Assessment    Energy/Calorie Requirement Assessment not meeting needs      Protein Requirement Assessment not meeting needs                       Problem/Interventions:   Problem 1       Row Name 08/25/23 1219          Nutrition Diagnoses Problem 1    Problem 1 Needs Alternate     Etiology (related to) --  pt ashlie/monica/intubated     Signs/Symptoms (evidenced by) NPO                          Intervention Goal       Row Name 08/25/23 1219          Intervention Goal    General Maintain nutrition;Improved nutrition related lab(s);Reduce/improve symptoms;Nutrition support treatment;Meet nutritional needs for age/condition;Palliative Care;Disease management/therapy     TF/PN Establish TF tolerance;Inititiate TF/PN     Weight Maintain weight                    Nutrition Intervention       Row Name 08/25/23 1220          Nutrition Intervention    RD/Tech Action Care plan reviewd;Follow Tx progress                    Nutrition Prescription       Row Name 08/25/23 1220          Nutrition Prescription PO    New PO Prescription Ordered? No, recommended        Other Orders    Labs Mg++;Na+;K+;Phos;Lactic Acid                    Education/Evaluation       Row Name 08/25/23 1220          Education    Education Education not appropriate at this time     Please explain Patient sedation status        Monitor/Evaluation    Monitor Per protocol;Pertinent labs;Weight;Symptoms;Skin status                     Electronically signed by:  Johan Ford RD  08/25/23 12:21 EDT

## 2023-08-25 NOTE — CASE MANAGEMENT/SOCIAL WORK
Discharge Planning Assessment   Cl     Patient Name: Darren Mccracken  MRN: [id number]  Today's Date: 8/25/2023    Admit Date: 8/24/2023    Plan: Plans to return to [facility] via EMS.   Discharge Needs Assessment       Row Name 08/25/23 1323       Living Environment    People in Home other (see comments)    Name(s) of People in Home LTC facility/[facility]    Current Living Arrangements extended care facility    Duration at Residence 4 months    Potentially Unsafe Housing Conditions unable to assess    Primary Care Provided by other (see comments)  LTC facility.    Provides Primary Care For no one    Family Caregiver if Needed other (see comments)  Pt. intubated and not responding.. LTC facility per wife.    Able to Return to Prior Arrangements yes       Resource/Environmental Concerns    Resource/Environmental Concerns none    Transportation Concerns none       Food Insecurity    Within the past 12 months, you worried that your food would run out before you got the money to buy more. Never true    Within the past 12 months, the food you bought just didn't last and you didn't have money to get more. Never true       Transition Planning    Patient/Family Anticipates Transition to long-term care facility    Patient/Family Anticipated Services at Transition other (see comments)  LTC facility/ [facility]    Transportation Anticipated --  EMS       Discharge Needs Assessment    Equipment Currently Used at Home none  LTC resident at [facility]    Concerns to be Addressed no discharge needs identified    Concerns Comments LTC resident at [facility].    Anticipated Changes Related to Illness inability to care for self    Equipment Needed After Discharge none  LTC resident at [facility]    Discharge Facility/Level of Care Needs nursing facility, intermediate    Current Discharge Risk dependent with mobility/activities of daily living    Discharge Coordination/Progress LTC resident  at [facility]                   Discharge Plan       Row Name 23 0612       Plan    Plan Plans to return to [facility] via EMS.    Plan Comments CM spoke with wife at bedside to discuss DCP. Pt is intubated and non responsive to verbal stimuli. Wife confirmed name,,address,insurance and phone numbers. Has LW and POA on file per wife. PCP confirmed as SUZANNE Huynh, last seen 2023. Pharmacy used at LT facility for him. Wife agreed to meds to bed. Fx'l status (D) for all his ADL's. Has not walk since 2023. Plans at this time is to return to [facility]. IMM given to wife at bedside. Verbalized Understanding.    Final Discharge Disposition Code 04 - intermediate care facility                  Continued Care and Services - Admitted Since 2023    Coordination has not been started for this encounter.       Expected Discharge Date and Time       Expected Discharge Date Expected Discharge Time    Aug 28, 2023            Demographic Summary       Row Name 23 1318       General Information    Admission Type inpatient    Arrived From emergency department    Required Notices Provided Important Message from Medicare    Referral Source admission list    Reason for Consult discharge planning    Preferred Language English       Contact Information    Permission Granted to Share Info With     Contact Information Obtained for                    Functional Status       Row Name 23 1320       Functional Status    Usual Activity Tolerance poor    Current Activity Tolerance poor    Functional Status Comments Wife states (D) in all activity.       Physical Activity    On average, how many days per week do you engage in moderate to strenuous exercise (like a brisk walk)? 0 days    On average, how many minutes do you engage in exercise at this level? 0 min    Number of minutes of exercise per week 0       Assessment of Health Literacy    How often do you have someone  help you read hospital materials? Always    How often do you have problems learning about your medical condition because of difficulty understanding written information? Always    How often do you have a problem understanding what is told to you about your medical condition? Always    How confident are you filling out medical forms by yourself? Not at all    Health Literacy Low       Functional Status, IADL    Medications completely dependent    Meal Preparation completely dependent    Housekeeping completely dependent    Laundry completely dependent    Shopping completely dependent    IADL Comments Resides in a long term care facility/[facility].       Mental Status    General Appearance WDL --  Pt. intubated and not responding.       Mental Status Summary    Recent Changes in Mental Status/Cognitive Functioning unable to assess    Mental Status Comments Pt. intubated and not responding.       Employment/    Employment Status retired                   Psychosocial       Row Name 08/25/23 1323       Developmental Stage (Eriksson's)    Developmental Stage Stage 7 (35-65 years/Middle Adulthood) Generativity vs. Stagnation                   Abuse/Neglect    No documentation.                  Legal    No documentation.                  Substance Abuse    No documentation.                  Patient Forms    No documentation.                     Chantell Orlando RN

## 2023-08-25 NOTE — SIGNIFICANT NOTE
Order received, but pt. not yet appropriate for po trials due to recently extubated and unable to participate effectively and safely at this time per RN.  Will f/u to eval as pt. Improves for safety with po trials.

## 2023-08-25 NOTE — CASE MANAGEMENT/SOCIAL WORK
1530: Winifred/Laurie spoke with CM regarding Dr. Blount consult. Dr. Blount will schedule to met with family for tomorrow.

## 2023-08-25 NOTE — PLAN OF CARE
Goal Outcome Evaluation:  Plan of Care Reviewed With: spouse        Progress: no change  Interventions to support blood pressure and respiratory function ongoing.

## 2023-08-26 NOTE — PLAN OF CARE
Goal Outcome Evaluation:  Plan of Care Reviewed With: spouse        Progress: no change  Outcome Evaluation: PT remains intubated and sedated. FIO2 @30%. German gtt infusing at 1.3mcg/kg/min. minimally sedated with propofol. amio gtt infusing, remains afib on monitor with rates . Meeting with palliative team this afternoon.

## 2023-08-26 NOTE — PROGRESS NOTES
[facility]    Name:  Darren Mccracken   Age:  89 y.o.  Sex:  male  :  1934  MRN:  [id number]   Visit Number:  [id number]  Admission Date:  2023  Date Of Service:  23  Primary Care Physician:  Angeles Huynh DO     LOS: 2 days :    Chief Complaint:      Respiratory failure, aspiration pneumonia.    Subjective:    Mr. Mccracken was seen and examined this morning.  Is currently sedated on mechanical ventilation but opens eyes on call and on sternal rub.  He did have atrial fibrillation with rapid ventricular response yesterday and had to be started on amiodarone drip and has significantly improved.  He was also able to be weaned off vasopressin drip and is currently only on German-Synephrine drip to maintain blood pressures.  No fevers overnight.  He was seen by palliative/hospice care services and they are going to discuss with family members today for goals of care discussion.    Hospital Course:    Mr. Mccracken is an 88-year-old male, nursing home resident with a history of dementia, hypertension, hyperlipidemia, coronary artery disease status post stents, BPH was brought to the emergency room by EMS with symptoms of shortness of breath and aspiration.  He was last admitted here in 2023 for aspiration pneumonia.  Patient has been a long-term care facility resident at [facility] since 2023.     In the emergency room, his initial temperature was 99.9, pulse 81, respiratory rate 48, blood pressure 148/85 and pulse oxygen saturation of 90% on nonrebreather mask.  Initial blood work revealed a sodium of 168, BUN 50, creatinine 1.39 (baseline), C-reactive protein 9, white count 12.5, platelets 124.  Lactic acid and procalcitonin levels were within normal range.  Due to significant respiratory distress, patient was intubated in the emergency room by the ED provider.  ABG showed a pH of 7.38, PCO2 40, PO2 126, bicarb 24 on 100% FiO2 on  mechanical ventilation.  Chest x-ray showed new patchy right lung opacities suggestive of pneumonia.  CT of the head was negative for any acute abnormalities but showed atrophy.  CT of the chest without contrast showed bilateral pneumonia and healing sternal fracture.  Patient received a liter of normal saline bolus in the emergency room.  He was initiated on propofol drip for sedation.  A left femoral central venous line was placed in the emergency room.  Patient was subsequently admitted to the medical ICU.    Patient continued to have hypotension despite being on Levophed drip and had to be started on vasopressin as well.  His nasal swab for MRSA came back positive and he was started on vancomycin along with continuation of ceftriaxone.  As per the family's request, patient's CODE STATUS was changed to DNR.  Palliative services were consulted.  Patient did develop atrial fibrillation with rapid ventricular response and was started on amiodarone drip due to hypotension.  His vasopressin was subsequently discontinued.    Review of Systems:     All systems were reviewed and negative except as mentioned in subjective, assessment and plan.    Vital Signs:    Temp:  [97.4 °F (36.3 °C)-99.9 °F (37.7 °C)] 98.1 °F (36.7 °C)  Heart Rate:  [] 84  Resp:  [16-23] 20  BP: ()/(44-97) 113/82  FiO2 (%):  [30 %] 30 %    Intake and output:    I/O last 3 completed shifts:  In: 5561.3 [I.V.:5561.3]  Out: 750 [Urine:750]  No intake/output data recorded.    Physical Examination:    General Appearance:  Sedated on mechanical ventilation.  Opens eyes on sternal rub.   Head:  Atraumatic and normocephalic.   Eyes: Conjunctivae and sclerae normal, no icterus. No pallor.   Throat: No oral lesions, no thrush, oral mucosa moist.  Endotracheal tube and NG tube are in place.   Neck: Supple, trachea midline, no thyromegaly.   Lungs:   Barrel-shaped chest.  Breath sounds decreased bilaterally in the bases.  No wheezing.  Occasional  basal crackles heard. No Pleural rub or bronchial breathing.   Heart:  Normal S1 and S2, no murmur, no gallop, no rub. No JVD.   Abdomen:   Normal bowel sounds, no masses, no organomegaly. Soft, nontender, nondistended, no rebound tenderness.  Linares catheter is in place.   Extremities: Supple, no edema, no cyanosis, no clubbing.  Poor muscle mass.  Left femoral central venous line in place.   Skin: No bleeding.  Diffuse erythematous rash noted especially in the abdominal wall and chest.  Dry skin noted.   Neurologic: Sedated on mechanical ventilation. No facial asymmetry. Moves all four limbs when agitated.      Laboratory results:    Results from last 7 days   Lab Units 08/26/23  0432 08/25/23  1656 08/25/23  0016 08/24/23  1423 08/24/23  1110   SODIUM mmol/L 151* 154* 160*   < > 168*   POTASSIUM mmol/L 3.1* 2.8* 3.8  --  3.8   CHLORIDE mmol/L 121* 124* 127*  --  134*   CO2 mmol/L 19.0* 21.9* 21.4*  --  22.4   BUN mg/dL 47* 53* 55*  --  50*   CREATININE mg/dL 1.34* 1.51*  1.47* 1.77*  --  1.39*   CALCIUM mg/dL 8.4* 8.4* 8.8  --  9.5   BILIRUBIN mg/dL 0.2  --   --   --  0.5   ALK PHOS U/L 82  --   --   --  93   ALT (SGPT) U/L 17  --   --   --  15   AST (SGOT) U/L 17  --   --   --  23   GLUCOSE mg/dL 101* 152* 188*  --  106*    < > = values in this interval not displayed.       Results from last 7 days   Lab Units 08/26/23  0432 08/25/23  0412 08/24/23  1110   WBC 10*3/mm3 16.92* 14.15* 12.53*   HEMOGLOBIN g/dL 12.3* 11.8* 14.8   HEMATOCRIT % 40.6 39.0 46.3   PLATELETS 10*3/mm3 87* 109* 124*               Results from last 7 days   Lab Units 08/24/23  1120 08/24/23  1110   BLOODCX  No growth at 24 hours No growth at 24 hours       Recent Labs     08/24/23  1051   PHART 7.380   [id number] 40.5   PO2ART 126.0*   [id number] 23.9   BASEEXCESS -1.2*        I have reviewed the patient's laboratory results.    Radiology results:    XR Abdomen 1 View    Result Date: 8/26/2023  FINAL REPORT TECHNIQUE: null CLINICAL HISTORY: Ng  placement COMPARISON: null FINDINGS: Abdominal radiograph Comparison: 8/24/23 Findings: The nasogastric tube terminates in the upper abdomen. The side-port is beyond the gastroesophageal junction. There is a nonobstructive bowel gas pattern. No pneumoperitoneum or pneumatosis. No acute osseous or soft tissue abnormality.     Impression: Impression: Properly positioned nasogastric tube. Authenticated and Electronically Signed by Samira Hagan MD on 08/26/2023 03:19:29 AM    CT Head Without Contrast    Result Date: 8/24/2023  PROCEDURE: CT HEAD WO CONTRAST-  INDICATION: Mental status change, unknown cause  TECHNIQUE: Multiple axial CT images were performed from the foramen magnum to the vertex without contrast.   Coronal reconstruction images were obtained from the axial data.  COMPARISON: 3/23/2023.  FINDINGS: There is no mass effect or midline shift.  No hydrocephalus or intracranial hemorrhage. There is global atrophy with ex vacuo dilatation of the ventricles. Periventricular hypodensity is unchanged. Focal left frontal encephalomalacia is likely related to a prior infarct. The posterior fossa is without acute abnormality. The basilar cisterns are preserved.. No acute soft tissue abnormality. No acute osseous abnormalities are present.      Impression: No mass effect, midline shift, or intracranial hemorrhage. Atrophy and chronic findings, similar to prior exam.           This study was performed with techniques to keep radiation doses as low as reasonably achievable (ALARA). Individualized dose reduction techniques using automated exposure control or adjustment of mA and/or kV according to the patient size were employed.   This report was signed and finalized on 8/24/2023 12:28 PM by Brittany Hunter MD.      CT Chest Without Contrast Diagnostic    Result Date: 8/24/2023  PROCEDURE: CT CHEST WITHOUT CONTRAST  INDICATION: Pneumonia, complication suspected, xray done  PROCEDURE:  Thin section axial images were  obtained from the lung apices to below the diaphragm without contrast administration. Coronal reconstruction images were obtained from the axial data.  COMPARISON: 3/23/2023.  FINDINGS: The tip of an endotracheal tube is in the midthoracic trachea. There is no axillary lymphadenopathy. Small mediastinal lymph nodes are noted. No convincing hilar lymphadenopathy. There are no pleural or pericardial effusions.  There are patchy nodular and reticulonodular opacities in the right upper lobe, right middle lobe, and right lower lobe which are new from the prior exam. Airspace disease in the posterior left upper lobe is also new. There is near complete consolidation of the left lower lobe. Findings are most consistent with bilateral pneumonia.  Limited imaging of the upper abdomen reveals an atrophic left kidney with a small, stable, hypodense exophytic lesion. There is an old fracture of the body of the sternum with callus formation.      Impression: Bilateral pneumonia. Recommend follow-up chest CT in 3 months.  Healing sternal fracture.           This study was performed with techniques to keep radiation doses as low as reasonably achievable (ALARA). Individualized dose reduction techniques using automated exposure control or adjustment of mA and/or kV according to the patient size were employed.   This report was signed and finalized on 8/24/2023 12:32 PM by Brittany Hunter MD.      XR Chest 1 View    Result Date: 8/24/2023  PROCEDURE: XR CHEST 1 VW-  INDICATION:  SOB, s/p intubation  FINDINGS:  A portable view of the chest was obtained.  Comparison is made to a prior exam dated 4/5/2023.   The tip of an ET tube is in the midthoracic trachea. Heart is normal in size. There are new patchy opacities in the right lung. Favor pneumonia. There is no pleural effusion or pneumothorax.      Impression: New patchy right lung opacities. Favor pneumonia. Recommend radiographic follow-up to resolution.   This report was signed and  finalized on 8/24/2023 10:31 AM by Brittany Hunter MD.      XR Abdomen KUB    Result Date: 8/24/2023  PROCEDURE: XR ABDOMEN KUB-  INDICATION:  NG tube placement; J69.0-Pneumonitis due to inhalation of food and vomit  COMPARISON:  None.  FINDINGS:  A supine view of the abdomen was obtained. The tip of an NG tube is in the stomach. The sidehole is in the region of the GE junction. Limited evaluation of the bowel gas pattern is nonspecific but nonobstructive.       Impression: NG tube tip in the stomach with the side holes near the GE junction.   This report was signed and finalized on 8/24/2023 3:05 PM by Brittany Hunter MD.     I have reviewed the patient's radiology reports.    Medication Review:     I have reviewed the patient's active and prn medications.     Problem List:      Aspiration pneumonitis    CAD (coronary artery disease)    Impaired mobility and ADLs    Acute respiratory failure with hypoxia    Severe dehydration    Hypernatremia    Chronic kidney disease, stage III (moderate)    Severe malnutrition    Assessment:    Septic shock secondary to pneumonia, POA.  Acute respiratory failure with hypoxia, POA.  Right lower lobe aspiration pneumonia, POA.  Severe dehydration and hypernatremia, POA.  Atrial fibrillation with rapid ventricular response, POA.  Chronic kidney disease stage III.  Coronary artery disease status post previous stents.  Alzheimer's dementia.  Essential hypertension.    Plan:    Septic shock.  - Likely secondary to aspiration pneumonia.  - Continue to down titrate Levophed.  Vasopressin has been discontinued.  - Continue IV fluids with dextrose water 100 mL/h.  - Continue antibiotics therapy with Rocephin and vancomycin (day 2/7).    Respiratory failure/aspiration pneumonia.  - Patient is currently on mechanical ventilation and propofol sedation.  - Continue antibiotics therapy with ceftriaxone and vancomycin.  - Blood and respiratory cultures are currently pending.  - Nasal swab for MRSA  was positive.  - Lactobacillus supplements.     Hypernatremia.  - Likely secondary to poor oral intake and dehydration.  - Hypernatremia is slowly improving with dextrose water infusion.     Dementia and dysphagia.  - Unfortunately, patient does seem to have significant dysphagia and the prognosis is very poor.  - Palliative care service is going to meet with patient family for goals of care discussion today.    Overall, patient's prognosis seems to be extremely poor.  He also has baseline poor quality of life.  If the patient is unable to be extubated from a medical standpoint any reasonable time, he may benefit from terminal weaning and comfort measures.    Discussed with nursing staff at the bedside.    DVT Prophylaxis: Enoxaparin  Code Status: DNR  Diet: Tube feeds  Discharge Plan: Pending-will need several more days in the hospital.    Samson Andrews MD  08/26/23  09:56 EDT    Dictated utilizing Dragon dictation.

## 2023-08-26 NOTE — CONSULTS
[facility]    GOALS OF CARE DISCUSSION CONSULT      Name:  Darren Mccracken   Age:  89 y.o.  Sex:  male  :  1934  MRN:  [id number]   Visit Number:  [id number]  Date of Service:  2023  Date of Admission:  2023  Primary Care Physician:  Angeles Huynh DO      Consulting Physician:    Dr. Samson Andrews    Reason For Consult:    Addressing goals of care in the setting of critical clinical course and progressive decline    Hospital Diagnosis:    Right lower lobe aspiration pneumonia  Septic shock secondary to above  Acute hypoxic respiratory failure secondary to above  Hyponatremia secondary to dehydration  Atrial fibrillation with rapid ventricular response  Coronary artery disease  Alzheimer's dementia    Brief Summary:    I reviewed in detail patient's clinical course on admission and sequence of events since then as reflected by history and physical, progress notes, consults, staff input and work-up.  In summary, Mr. Mccracken is an 88 years old gentleman with history of dementia, hypertension, coronary artery disease and BPH who was transferred to the emergency room on  from the [facility] due to worsening shortness of breath and aspiration.  Imaging revealed bilateral pneumonia and a healing sternal fracture.  In the setting of worsening respiratory distress, patient was intubated/mechanical ventilation management initiated as well as IV antibiotics, pressors and supportive care.  Subsequently, patient was transferred to the intensive care unit for critical care management.  Earlier today, pressors were tapered and discontinued, however patient developed atrial fibrillation with rapid ventricular response.  Per family's request, CODE STATUS was maintained at DO NOT RESUSCITATE.  Palliative care consultation was requested to address goals of care given above presentation.  Advance Care Planning   1.  Determination of decisional capacity:    Patient is unable to  participate in any discussion due to his critical condition, being intubated/on mechanical ventilation and sedated    2.  Advanced Directives:    SonSilvio is POA, document is per record  DO NOT RESUSCITATE, DO NOT INTUBATE status    3.  Patient & Family Meeting    Meeting attendees: Son/POA and his spouse, spouse, spouse's son and her daughter-in-law  Meeting location: ICU counseling room    Summary of the discussion:    After initial introductions and presenting the role of palliative/supportive care team, I apologize for not meeting with them yesterday; they actually stated that they were thankful that the meeting was delayed till today which allowed for patient's son to be present in person versus yesterday when he was planning to participate by FaceTime only.  I encouraged them to describe patient's personality, preferences and clinical status during the past few months.  Today gave a detailed account of patient's progressive decline since last December setting with gradually increasing weakness, recurrent falls, decreased appetite and requirement for hospitalizations.  Prior to his decline, patient was continued on active and proud of his independence. They are well aware of patient's current condition sequence of events since presentation/admission, multiple diagnoses and concerns regarding guarded prognosis in the setting of pre-existing functional decline.  In response to my question regarding [based on patient's previous expressed wishes, what is your expectation of disposition should he be aware of his current status?].  He stated that he would most definitely elect to withdraw all life prolonging measures including intubation/mechanical ventilation.  In fact, they felt that patient would have elected to defer intubation that was done upon current presentation.  I presented all possible options and scenarios including continuing current management for another 1 to 2 days while observing  patient's progress versus proceeding with withdrawal of all life prolonging measures.  Son/POA and family.  Fairly confident regarding their decision and commitment to honor patient's wishes by proceeding with process of withdrawal today.  For confirmation, I allowed private family time and space with the intention of final reflections and confirmation of their decision.  After about 20 minutes, family had questions regarding the process of extubation and plans with patient's supplies.  I explained the exact sequence of process of extubation was repeated assurances that patient's comfort and dignity will be maintained; I also explained that extubation and support will be ensured by ICU staff, respiratory therapist and myself.  Prognosis unpredictable, life expectancy would be in the order of few hours to days.  More accurate prognostication will be contingent with continuous clinical assessment and observation of patient's process.  A complete family the intention of withdrawal of all current medical management including NG tube, labs, antibiotics, pressors, etc.  Above was communicated to ICU staff and primary attending/Dr. Andrews who concurred and will follow on further plans    Withdrawal of life prolonging measures:  Prior to extubation, I reassessed patient, he continued to be responsive to painful stimuli, per staff, he continues to maintain his cough and gag reflexes  Family were invited to say their final goodbyes, time was allowed for the same  Meanwhile, Rapranol 0.4 mg every 4 hours scheduled administered.  Orders for comfort medications including morphine, Ativan and Haldol ordered with morphine dose at the bedside on standby during doses of extubation.  Attempts to decrease propofol with the least effective dose was done, patient appeared slightly restless during the process, therefore further tapering was held  Once prepared, while son, stepson, respiratory therapist, ICU staff and myself present,  compassionate extubation was done uneventfully, his vital signs remained stable.  Patient appeared slightly tachypneic, therefore 1 dose of morphine sulfate was administered.  Once extubated, rest of the family were invited back to the room.  I presented an overview regarding patient's condition, with reassurances regarding his comfort.  Subsequently, patient appeared slightly restless, therefore propofol was discontinued and instead Precedex was initiated.  On repeat follow-up, patient remained peaceful, comfortable with no signs of distress, family at the bedside, appropriately sad but content with her decision.  In response to my question regarding their perception of his comfort level, they confirmed their satisfaction with the same.  I provided him with contact information, they were encouraged to contact me anytime should they have any questions or concerns       I appreciate the opportunity to participate in Mr. Mccracken' care.  Please feel free to contact me for any upcoming questions or concerns as need arises.     Total time spent in counseling and advanced care planning discussion per above documentation 120 min.     Part of this note may be an electronic transcription/translation of spoken language to printed text using the Dragon Dictation System.

## 2023-08-26 NOTE — PHARMACY RECOMMENDATION
"Pharmacy Consult - Vancomycin Dosing    Pharmacy was consulted to dose vancomycin for  Darren Mccracken, a 89 y.o. male  177.8 cm (70\") 53 kg (116 lb 13.5 oz)    Indication: pneumonia  Consulting Provider: Dr. Andrews    Goal AUC: 400-600 mg/L*hr.    Labs  Results from last 7 days   Lab Units 08/26/23  0432 08/25/23  1656 08/25/23  0412 08/25/23  0016 08/24/23  1110   WBC 10*3/mm3 16.92*  --  14.15*  --  12.53*   CREATININE mg/dL 1.34* 1.51*  1.47*  --  1.77* 1.39*      Estimated Creatinine Clearance: 28 mL/min (A) (by C-G formula based on SCr of 1.34 mg/dL (H)).  Temp Readings from Last 1 Encounters:   08/26/23 98.1 °F (36.7 °C)       Results from last 7 days   Lab Units 08/25/23  1656   VANCOMYCIN RM mcg/mL 10.30               Other Antimicrobials    Ceftriaxone 1 g IV every 24 hours    InsightRX AUC Calculation    Current dose: 500 mg IV every 18 hours   Predicted Steady State AUC on Current Dose: 462 mg/L*hr    New dose: 750 mg IV every 24 hours   Predicted Steady State AUC on New Dose: 517 mg/L*hr    Assessment/Plan      Patient currently receiving vancomycin 500 mg IV every 18 hours which results in expected steady state .  Will adjust vancomycin dose to 750 mg IV every 24 hours to attain a target AUC within goal range.  Assess clearance by vancomycin level on 8/27 @ 0600.  Pharmacy will continue to monitor renal function, cultures and sensitivities, and clinical status to adjust regimen as necessary.        Thank you,  Denisha Contreras, PharmD, BCPS  08/26/23 09:29 EDT    "

## 2023-08-26 NOTE — PLAN OF CARE
Goal Outcome Evaluation:  Plan of Care Reviewed With: patient        Progress: declining   Family met with Dr. Blount around 1200 to discuss pt plan of care. Family elected for pt to be extubated and placed on comfort measures. Pt titrated off Propofol and started on Precedex for agitation and comfort. Patient currently resting comfortably on 0.6 mcg/kg/hr with family at bedside.

## 2023-08-26 NOTE — PROGRESS NOTES
Adult Nutrition  Assessment/PES    Patient Name:  Darren Mccracken  YOB: 1934  MRN: [id number]  Admit Date:  8/24/2023    Assessment Date:  8/26/2023    Comments:      Pt sedated/intubated w/ NG-tube receiving Propofol @ 4.9mL/hr x 24hrs providing 129kcals/day. Pt most recent MAP recorded @ 77mmHg, lactate WNL @ 1.8mmol/L. D5W @ 100mL/hr continuously d/t elevated Na+ and receiving 2400mL/day. Na+ has improved. Pt now on one pressor at this time - German-synephrine (1 mcg/kg/min). Pt more hemodynamically stable to cautiously initiate enteral nutrition at a low rate.     Rec#1: Initiate Isosource 1.5 @ 10mL/hr and advance 10 mL q8hr to goal rate of 20mL/hr x 22hrs.     Rec#2: Free water flush 20mL qhrs.    Total TF regimen to provide: 789kcals, 30g protein, and 456mL fluid/day.      Will follow-up to re-evaluate patient's status.       Reason for Assessment       Row Name 08/26/23 1058          Reason for Assessment    Reason For Assessment identified at risk by screening criteria;per organizational policy;follow-up protocol     Diagnosis pulmonary disease;cardiac disease;renal disease     Identified At Risk by Screening Criteria BMI;difficulty chewing/swallowing                      Labs/Tests/Procedures/Meds       Row Name 08/26/23 1059          Labs/Procedures/Meds    Lab Results Reviewed reviewed, pertinent     Lab Results Comments High: Na, BUN, Cr, CRP        Medications    Pertinent Medications Reviewed reviewed, pertinent     Pertinent Medications Comments propofol, phenlyephrine                    Physical Findings       Row Name 08/26/23 1058          Physical Findings    Overall Physical Appearance sedated/intubated, severe malnutrition     Enteral Access Devices nasogastric tube                      Nutrition Prescription Ordered       Row Name 08/26/23 1059          Nutrition Prescription PO    Current PO Diet NPO        Propofol Considerations    Propofol (mL/hr) 4.9 mL/hr     Propofol  (Kcal/day) 129 Kcal/day                    Evaluation of Received Nutrient/Fluid Intake       Row Name 08/26/23 1059          Intake Assessment    Energy/Calorie Requirement Assessment not meeting needs     Protein Requirement Assessment not meeting needs                       Problem/Interventions:   Problem 1       Row Name 08/26/23 1059          Nutrition Diagnoses Problem 1    Problem 1 Needs Alternate     Etiology (related to) --  pt ashlie/monica/intubated     Signs/Symptoms (evidenced by) NPO                    Problem 2       Row Name 08/26/23 1059          Nutrition Diagnoses Problem 2    Problem 2 Malnutrition     Etiology (related to) Functional Diagnosis;Factors Affecting Nutrition     Functional Diagnosis Chewing deficit     Appetite Poor     Signs/Symptoms (evidenced by) Other (comment)  16% wt loss in 3 months and NFPE findings                        Intervention Goal       Row Name 08/26/23 1059          Intervention Goal    General Maintain nutrition;Nutrition support treatment;Improved nutrition related lab(s);Reduce/improve symptoms;Meet nutritional needs for age/condition;Disease management/therapy     TF/PN Establish TF tolerance;Inititiate TF/PN;Maintain TF/PN;Tolerate TF at goal     Weight Maintain weight                    Nutrition Intervention       Row Name 08/26/23 1100          Nutrition Intervention    RD/Tech Action Recommend/ordered;Care plan reviewd;Follow Tx progress     Recommended/Ordered EN                    Nutrition Prescription       Row Name 08/26/23 1100          Nutrition Prescription PO    New PO Prescription Ordered? No, recommended        Nutrition Prescription EN    Enteral Prescription Enteral begin/change     Enteral Route NG     Product Isosource 1.5 harpreet     TF Delivery Method Continuous     Continuous TF Goal Rate (mL/hr) 20 mL/hr     Continuous TF Starting Rate (mL/hr) 10 mL/hr     Continuous TF Goal Volume (mL) 440 mL     Continuous TF Starting Volume (mL) 220 mL      Water flush (mL)  20 mL     Water Flush Frequency Every 4 hours        Other Orders    Obtain Weight Daily     Labs Mg++;Na+;Phos;K+;Lactic Acid     Labs Ordered? Yes                    Education/Evaluation       Row Name 08/26/23 1102          Education    Education Education not appropriate at this time     Please explain Patient sedation status        Monitor/Evaluation    Monitor Per protocol;Pertinent labs;TF delivery/tolerance;Weight;Skin status;Symptoms                     Electronically signed by:  Johan Ford RD  08/26/23 11:02 EDT

## 2023-08-27 NOTE — PLAN OF CARE
Goal Outcome Evaluation:      Comfort care continued. PRN medications given. Patient appears comfortable with no s/s of distress. Family to remain at bedside

## 2023-08-27 NOTE — PROGRESS NOTES
[facility]    Name:  Darren Mccracken   Age:  89 y.o.  Sex:  male  :  1934  MRN:  [id number]   Visit Number:  [id number]  Admission Date:  2023  Date Of Service:  23  Primary Care Physician:  Angeles Huynh DO     LOS: 3 days :    Chief Complaint:      Respiratory failure, aspiration pneumonia.    Subjective:    Mr. Mccracken was seen and examined this morning.  He was terminally extubated yesterday as per the family's wishes.  He is currently on comfort care and is on Precedex drip along with other comfort medications including morphine, Ativan, Haldol and Robinul as needed.  Is currently unresponsive with low blood pressures and saturating in the 80s.  Patient's son at the bedside.    Hospital Course:    Mr. Mccracken is an 88-year-old male, nursing home resident with a history of dementia, hypertension, hyperlipidemia, coronary artery disease status post stents, BPH was brought to the emergency room by EMS with symptoms of shortness of breath and aspiration.  He was last admitted here in 2023 for aspiration pneumonia.  Patient has been a long-term care facility resident at [facility] since 2023.     In the emergency room, his initial temperature was 99.9, pulse 81, respiratory rate 48, blood pressure 148/85 and pulse oxygen saturation of 90% on nonrebreather mask.  Initial blood work revealed a sodium of 168, BUN 50, creatinine 1.39 (baseline), C-reactive protein 9, white count 12.5, platelets 124.  Lactic acid and procalcitonin levels were within normal range.  Due to significant respiratory distress, patient was intubated in the emergency room by the ED provider.  ABG showed a pH of 7.38, PCO2 40, PO2 126, bicarb 24 on 100% FiO2 on mechanical ventilation.  Chest x-ray showed new patchy right lung opacities suggestive of pneumonia.  CT of the head was negative for any acute abnormalities but showed atrophy.  CT of the chest  without contrast showed bilateral pneumonia and healing sternal fracture.  Patient received a liter of normal saline bolus in the emergency room.  He was initiated on propofol drip for sedation.  A left femoral central venous line was placed in the emergency room.  Patient was subsequently admitted to the medical ICU.    Patient continued to have hypotension despite being on Levophed drip and had to be started on vasopressin as well.  His nasal swab for MRSA came back positive and he was started on vancomycin along with continuation of ceftriaxone.  As per the family's request, patient's CODE STATUS was changed to DNR.  Palliative services were consulted.  Patient did develop atrial fibrillation with rapid ventricular response and was started on amiodarone drip due to hypotension.  His vasopressin was subsequently discontinued.    Patient was seen by Dr. Blount from palliative care services and after discussion with the family, patient was terminally extubated on 8/26/2023 as per the family's wishes.  He was made comfort care and was placed on morphine, Ativan, Haldol and Robinul as needed for comfort measures.  He was also placed on Precedex postextubation which was subsequently discontinued.  Patient was transferred to the regular medical floor.    Review of Systems:     All systems were reviewed and negative except as mentioned in subjective, assessment and plan.    Vital Signs:    Temp:  [96.4 °F (35.8 °C)-98.1 °F (36.7 °C)] 97.5 °F (36.4 °C)  Heart Rate:  [64-86] 73  Resp:  [19-34] 34  BP: ()/(28-82) 74/42  FiO2 (%):  [30 %] 30 %    Intake and output:    I/O last 3 completed shifts:  In: 920.3 [I.V.:890.3; Other:30]  Out: 525 [Urine:525]  No intake/output data recorded.    Physical Examination:    General Appearance:  Unresponsive to sternal stimuli.  Currently on Precedex drip.   Head:  Atraumatic and normocephalic.   Eyes: Conjunctivae and sclerae normal, no icterus.   Throat: No oral lesions, no  thrush.     Neck: Supple, trachea midline, no thyromegaly.   Lungs:   Barrel-shaped chest.  Breath sounds decreased bilaterally in the bases.  No wheezing.  Occasional basal crackles heard. No Pleural rub or bronchial breathing.   Heart:  Normal S1 and S2, no murmur, no gallop, no rub. No JVD.   Abdomen:   Normal bowel sounds, no masses, no organomegaly. Soft, nondistended.  Linares catheter is in place.   Extremities: Supple, no edema, no cyanosis, no clubbing.  Poor muscle mass.  Left femoral central venous line in place.   Skin: No bleeding.  Diffuse erythematous rash noted especially in the abdominal wall and chest.  Dry skin noted.   Neurologic: Unresponsive to sternal rub.  No facial asymmetry.     Laboratory results:    Results from last 7 days   Lab Units 08/26/23  1136 08/26/23  0432 08/25/23  1656 08/25/23  0016 08/24/23  1423 08/24/23  1110   SODIUM mmol/L 148* 151* 154* 160*   < > 168*   POTASSIUM mmol/L  --  3.1* 2.8* 3.8  --  3.8   CHLORIDE mmol/L  --  121* 124* 127*  --  134*   CO2 mmol/L  --  19.0* 21.9* 21.4*  --  22.4   BUN mg/dL  --  47* 53* 55*  --  50*   CREATININE mg/dL  --  1.34* 1.51*  1.47* 1.77*  --  1.39*   CALCIUM mg/dL  --  8.4* 8.4* 8.8  --  9.5   BILIRUBIN mg/dL  --  0.2  --   --   --  0.5   ALK PHOS U/L  --  82  --   --   --  93   ALT (SGPT) U/L  --  17  --   --   --  15   AST (SGOT) U/L  --  17  --   --   --  23   GLUCOSE mg/dL  --  101* 152* 188*  --  106*    < > = values in this interval not displayed.       Results from last 7 days   Lab Units 08/26/23  0432 08/25/23  0412 08/24/23  1110   WBC 10*3/mm3 16.92* 14.15* 12.53*   HEMOGLOBIN g/dL 12.3* 11.8* 14.8   HEMATOCRIT % 40.6 39.0 46.3   PLATELETS 10*3/mm3 87* 109* 124*         Results from last 7 days   Lab Units 08/24/23  1120 08/24/23  1110   BLOODCX  No growth at 2 days No growth at 2 days       Recent Labs     08/24/23  1051   PHART 7.380   BMP1RSR 40.5   PO2ART 126.0*   PSQ0OBB 23.9   BASEEXCESS -1.2*        I have reviewed  the patient's laboratory results.    Radiology results:    XR Abdomen 1 View    Result Date: 8/26/2023  FINAL REPORT TECHNIQUE: null CLINICAL HISTORY: Ng placement COMPARISON: null FINDINGS: Abdominal radiograph Comparison: 8/24/23 Findings: The nasogastric tube terminates in the upper abdomen. The side-port is beyond the gastroesophageal junction. There is a nonobstructive bowel gas pattern. No pneumoperitoneum or pneumatosis. No acute osseous or soft tissue abnormality.     Impression: Impression: Properly positioned nasogastric tube. Authenticated and Electronically Signed by Samira Hagan MD on 08/26/2023 03:19:29 AM   I have reviewed the patient's radiology reports.    Medication Review:     I have reviewed the patient's active and prn medications.     Problem List:      Aspiration pneumonitis    CAD (coronary artery disease)    Impaired mobility and ADLs    Acute respiratory failure with hypoxia    Severe dehydration    Hypernatremia    Chronic kidney disease, stage III (moderate)    Severe malnutrition    Assessment:    Septic shock secondary to pneumonia, POA.  Acute respiratory failure with hypoxia, POA.  Right lower lobe aspiration pneumonia, POA.  Severe dehydration and hypernatremia, POA.  Atrial fibrillation with rapid ventricular response, POA.  Chronic kidney disease stage III.  Coronary artery disease status post previous stents.  Alzheimer's dementia.  Essential hypertension.    Plan:    Septic shock/respiratory failure.  - Likely secondary to aspiration pneumonia.  - Patient currently on comfort measures.  - Vasopressors and antibiotics have been discontinued as per family's wishes.    Hypernatremia.  - No further lab work as the patient is on comfort measures.     Overall, patient's prognosis seems to be extremely poor with impending mortality.  I have discussed the patient's condition and treatment plan with his son Ashutosh who is at the bedside.  Patient will be transferred to a larger room on  the medical floor for ease of access and family comfort.    Discussed with nursing staff at the bedside.    DVT Prophylaxis: None  Code Status: Comfort measures  Diet: N.p.o.  Discharge Plan: Pending-progressive hemodynamic instability with impending mortality.    Samson Andrews MD  08/27/23  08:11 EDT    Dictated utilizing Dragon dictation.

## 2023-08-27 NOTE — PLAN OF CARE
Goal Outcome Evaluation:  Plan of Care Reviewed With: son        Progress: no change  Outcome Evaluation: Patient remains somnolent throughout shift. Precedex infusing. PRN medications administered. Patient is comfort care at this time. Son bedside through shift. No complaints.

## 2023-08-28 NOTE — NURSING NOTE
Patient belongings sent home with family. A shirt, socks and bed sheet.  home picked up patient at 1250 am.

## 2023-08-28 NOTE — PROGRESS NOTES
Notified by nursing patient on comfort care had stopped respirations.    No pulse, no breath sounds.    Son at bedside, offered condolences.  Moment of silence.    Time of death 2312 on 8/27/2023.

## 2023-08-29 LAB
BACTERIA SPEC AEROBE CULT: NORMAL
BACTERIA SPEC AEROBE CULT: NORMAL

## 2023-09-05 NOTE — DISCHARGE SUMMARY
[facility]   DEATH SUMMARY      Name:  Darren Mccracken   Age:  89 y.o.  Sex:  male  :  1934  MRN:  [id number]   Visit Number:  [id number]    Admission Date:  2023  Date and Time of Death:  Date and Time of Death: 2023 at 2312  Primary Care Physician:  Angeles Huynh DO    Final Diagnoses:     Septic shock secondary to pneumonia, POA.  Acute respiratory failure with hypoxia, POA.  Right lower lobe aspiration pneumonia, POA.  Severe dehydration and hypernatremia, POA.  Atrial fibrillation with rapid ventricular response, POA.  Chronic kidney disease stage III.  Coronary artery disease status post previous stents.  Alzheimer's dementia.  Essential hypertension.    Problem List:     Active Hospital Problems    Diagnosis  POA    **Aspiration pneumonitis [J69.0]  Yes    Severe malnutrition [E43]  Yes    Acute respiratory failure with hypoxia [J96.01]  Yes    Severe dehydration [E86.0]  Yes    Hypernatremia [E87.0]  Yes    Chronic kidney disease, stage III (moderate) [N18.30]  Yes    Impaired mobility and ADLs [Z74.09, Z78.9]  Yes    CAD (coronary artery disease) [I25.10]  Yes      Resolved Hospital Problems   No resolved problems to display.     Presenting Problem:    Chief Complaint   Patient presents with    Aspiration      Consults:     Palliative care services: Seema Blount MD    Procedures Performed:    1.  Mechanical ventilation from 2023 to 2023.  2.  Placement of left femoral central venous line and subsequent removal.    History of presenting illness/Hospital Course:    Mr. Mccracken is an 88-year-old male, nursing home resident with a history of dementia, hypertension, hyperlipidemia, coronary artery disease status post stents, BPH was brought to the emergency room by EMS with symptoms of shortness of breath and aspiration.  He was last admitted here in 2023 for aspiration pneumonia.  Patient has been a long-term care facility resident at  [facility] in [place] since April 2023.     In the emergency room, his initial temperature was 99.9, pulse 81, respiratory rate 48, blood pressure 148/85 and pulse oxygen saturation of 90% on nonrebreather mask.  Initial blood work revealed a sodium of 168, BUN 50, creatinine 1.39 (baseline), C-reactive protein 9, white count 12.5, platelets 124.  Lactic acid and procalcitonin levels were within normal range.  Due to significant respiratory distress, patient was intubated in the emergency room by the ED provider.  ABG showed a pH of 7.38, PCO2 40, PO2 126, bicarb 24 on 100% FiO2 on mechanical ventilation.  Chest x-ray showed new patchy right lung opacities suggestive of pneumonia.  CT of the head was negative for any acute abnormalities but showed atrophy.  CT of the chest without contrast showed bilateral pneumonia and healing sternal fracture.  Patient received a liter of normal saline bolus in the emergency room.  He was initiated on propofol drip for sedation.  A left femoral central venous line was placed in the emergency room.  Patient was subsequently admitted to the medical ICU.     Patient continued to have hypotension despite being on Levophed drip and had to be started on vasopressin as well.  His nasal swab for MRSA came back positive and he was started on vancomycin along with continuation of ceftriaxone.  As per the family's request, patient's CODE STATUS was changed to DNR.  Palliative services were consulted.  Patient did develop atrial fibrillation with rapid ventricular response and was started on amiodarone drip due to hypotension.  His vasopressin was subsequently discontinued.     Patient was seen by Dr. Blount from palliative care services and after discussion with the family, patient was terminally extubated on 8/26/2023 as per the family's wishes.  He was made comfort care and was placed on morphine, Ativan, Haldol and Robinul as needed for comfort measures.  He was also placed on Precedex  postextubation which was subsequently discontinued.  Patient was transferred to the regular medical floor.  Patient was kept comfortable with pain medications as needed.  Patient subsequently developed asystole and was pronounced dead at 2312 on 8/27/2023.    Laboratory results:    Results from last 7 days   Lab Units 08/26/23  1136 08/26/23  0432 08/25/23  1656 08/25/23  0016 08/24/23  1423 08/24/23  1110   SODIUM mmol/L 148* 151* 154* 160*   < > 168*   POTASSIUM mmol/L  --  3.1* 2.8* 3.8  --  3.8   CHLORIDE mmol/L  --  121* 124* 127*  --  134*   CO2 mmol/L  --  19.0* 21.9* 21.4*  --  22.4   BUN mg/dL  --  47* 53* 55*  --  50*   CREATININE mg/dL  --  1.34* 1.51*  1.47* 1.77*  --  1.39*   CALCIUM mg/dL  --  8.4* 8.4* 8.8  --  9.5   BILIRUBIN mg/dL  --  0.2  --   --   --  0.5   ALK PHOS U/L  --  82  --   --   --  93   ALT (SGPT) U/L  --  17  --   --   --  15   AST (SGOT) U/L  --  17  --   --   --  23   GLUCOSE mg/dL  --  101* 152* 188*  --  106*    < > = values in this interval not displayed.     Pertinent Radiology Results:    Imaging Results (All)       Procedure Component Value Units Date/Time    XR Abdomen 1 View [[id number]] Collected: 08/26/23 0319     Updated: 08/26/23 0321    Narrative:      FINAL REPORT    TECHNIQUE:  null    CLINICAL HISTORY:  Ng placement    COMPARISON:  null    FINDINGS:  Abdominal radiograph    Comparison: 8/24/23    Findings:    The nasogastric tube terminates in the upper abdomen. The side-port is beyond the gastroesophageal junction. There is a nonobstructive bowel gas pattern. No pneumoperitoneum or pneumatosis. No acute osseous or soft tissue abnormality.      Impression:      Impression:    Properly positioned nasogastric tube.    Authenticated and Electronically Signed by Samira Hagan MD  on 08/26/2023 03:19:29 AM    XR Abdomen KUB [[id number]] Collected: 08/24/23 1503     Updated: 08/24/23 1507    Narrative:      PROCEDURE: XR ABDOMEN KUB-     INDICATION:  NG tube placement;  J69.0-Pneumonitis due to inhalation of  food and vomit     COMPARISON:  None.     FINDINGS:  A supine view of the abdomen was obtained. The tip of an NG  tube is in the stomach. The sidehole is in the region of the GE  junction. Limited evaluation of the bowel gas pattern is nonspecific but  nonobstructive.         Impression:      NG tube tip in the stomach with the side holes near the GE  junction.        This report was signed and finalized on 8/24/2023 3:05 PM by Brittany Hunter MD.       CT Chest Without Contrast Diagnostic [[id number]] Collected: 08/24/23 1228     Updated: 08/24/23 1234    Narrative:      PROCEDURE: CT CHEST WITHOUT CONTRAST     INDICATION: Pneumonia, complication suspected, xray done     PROCEDURE:  Thin section axial images were obtained from the lung apices  to below the diaphragm without contrast administration. Coronal  reconstruction images were obtained from the axial data.     COMPARISON: 3/23/2023.     FINDINGS: The tip of an endotracheal tube is in the midthoracic trachea.  There is no axillary lymphadenopathy. Small mediastinal lymph nodes are  noted. No convincing hilar lymphadenopathy. There are no pleural or  pericardial effusions.     There are patchy nodular and reticulonodular opacities in the right  upper lobe, right middle lobe, and right lower lobe which are new from  the prior exam. Airspace disease in the posterior left upper lobe is  also new. There is near complete consolidation of the left lower lobe.  Findings are most consistent with bilateral pneumonia.     Limited imaging of the upper abdomen reveals an atrophic left kidney  with a small, stable, hypodense exophytic lesion. There is an old  fracture of the body of the sternum with callus formation.       Impression:      Bilateral pneumonia. Recommend follow-up chest CT in 3  months.     Healing sternal fracture.      This study was performed with techniques to keep radiation doses as low  as reasonably achievable  (ALARA). Individualized dose reduction  techniques using automated exposure control or adjustment of mA and/or  kV according to the patient size were employed.         This report was signed and finalized on 8/24/2023 12:32 PM by Brittany Hunter MD.       CT Head Without Contrast [[id number]] Collected: 08/24/23 1225     Updated: 08/24/23 1230    Narrative:      PROCEDURE: CT HEAD WO CONTRAST-     INDICATION: Mental status change, unknown cause     TECHNIQUE: Multiple axial CT images were performed from the foramen  magnum to the vertex without contrast.   Coronal reconstruction images  were obtained from the axial data.     COMPARISON: 3/23/2023.     FINDINGS: There is no mass effect or midline shift.  No hydrocephalus or  intracranial hemorrhage. There is global atrophy with ex vacuo  dilatation of the ventricles. Periventricular hypodensity is unchanged.  Focal left frontal encephalomalacia is likely related to a prior  infarct. The posterior fossa is without acute abnormality. The basilar  cisterns are preserved.. No acute soft tissue abnormality. No acute  osseous abnormalities are present.       Impression:      No mass effect, midline shift, or intracranial hemorrhage.  Atrophy and chronic findings, similar to prior exam.     This study was performed with techniques to keep radiation doses as low  as reasonably achievable (ALARA). Individualized dose reduction  techniques using automated exposure control or adjustment of mA and/or  kV according to the patient size were employed.         This report was signed and finalized on 8/24/2023 12:28 PM by Brittany Hunter MD.       XR Chest 1 View [[id number]] Collected: 08/24/23 1030     Updated: 08/24/23 1033    Narrative:      PROCEDURE: XR CHEST 1 VW-     INDICATION:  SOB, s/p intubation     FINDINGS:  A portable view of the chest was obtained.  Comparison is  made to a prior exam dated 4/5/2023.   The tip of an ET tube is in the  midthoracic trachea. Heart is  normal in size. There are new patchy  opacities in the right lung. Favor pneumonia. There is no pleural  effusion or pneumothorax.       Impression:      New patchy right lung opacities. Favor pneumonia. Recommend  radiographic follow-up to resolution.        This report was signed and finalized on 2023 10:31 AM by Brittany Hunter MD.        Echo:    Results for orders placed during the hospital encounter of 18    Adult Transthoracic Echo Complete W/ Cont if Necessary Per Protocol    Interpretation Summary  · The left atrium and right ventricle are dilated.  · Global and segmental LV systolic function is normal.  · The estimated PA pressure is normal.  · There is aortic valve sclerosis with mild aortic insufficiency.  · Mitral annular calcification with moderate mitral regurgitation is present.  · No other abnormalities are present on this study.    Code status during the hospital stay:    Code Status and Medical Interventions:   Ordered at: 23 1343     Level Of Support Discussed With:    Next of Kin (If No Surrogate)     Code Status (Patient has no pulse and is not breathing):    No CPR (Do Not Attempt to Resuscitate)     Medical Interventions (Patient has pulse or is breathing):    Comfort Measures     Discharge Disposition:        Test Results Pending at Discharge:    None.       Samson Andrews MD  23  07:19 EDT    Time: I spent 20 minutes on this discharge activity which included: face-to-face encounter with the patient and/or family, reviewing the data in the system, coordination of the care with the nursing staff as well as consultants, documentation, and entering orders.     Dictated utilizing Dragon dictation.

## 2023-10-18 NOTE — PROGRESS NOTES
Nursing Home Progress Note        Andrew Jacobo DO [x]  [facility] []  [address] [place] [address]  Phone: [phone]  Fax: [phone] Seema Blount MD []  Kurt Tavarez DO []  [address] [place] [address]  Phone: [phone]  Fax: [phone]     PATIENT NAME: Darren Mccracken                                                                          YOB: 1934           DATE OF SERVICE: 6/28/2023  FACILITY: []  [facility]  [] [facility]  []  [facility]  [x] [facility]  []  Other ______________________________________________________________________     CHIEF COMPLAINT:  Impaired mobility and ADLs/age-related physical debility/advanced vascular dementia/hypertension/GERD/frequent falls      HISTORY OF PRESENT ILLNESS:   [x]  Follow Up visit for coordination of long term care issues and chronic medical management of Diagnoses and all orders for this visit:    1. Impaired mobility and ADLs (Primary)    2. Age-related physical debility    3. 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 Gastroesophageal reflux disease without esophagitis    5. Essential hypertension    Pt unfortunately suffered non-injury fall a few days ago; has hx of freq falls.    Nursing/staff report patient has been receptive to supportive care for mob/transfers and ADLs.    No acute behavioral changes.    New rash to arm, being treated with hydrocortisone cream.    VSS      PAST MEDICAL & SURGICAL HISTORY:   Past Medical History:   Diagnosis Date    Allergic     Allergic rhinitis     Arthritis     knees    BPH (benign prostatic hyperplasia)     Cancer     Chronic cough     Chronic kidney disease     Coronary artery disease     Coronary artery disease involving native coronary artery of native heart without angina pectoris 07/31/2018    Added automatically from request for surgery [id number]    Dementia     Dementia      Excessive tear production     Facial basal cell cancer     GERD (gastroesophageal reflux disease)     Glaucoma     History of transfusion     Hyperlipidemia     Hypertension     Kidney stone     Osteoarthritis of both knees     Pneumonia     right upper lobe     Pulmonary embolism     Skin cancer     Vertigo     Wears glasses       Past Surgical History:   Procedure Laterality Date    CARDIAC CATHETERIZATION N/A 6/21/2017    Procedure: Left Heart Cath;  Surgeon: Toy Landers MD;  Location:  [facility];  Service:     CARDIAC CATHETERIZATION N/A 8/1/2018    Procedure: Left Heart Cath;  Surgeon: Toy Landers MD;  Location:  [facility];  Service: Cardiology    CATARACT EXTRACTION W/ INTRAOCULAR LENS  IMPLANT, BILATERAL      CHOLECYSTECTOMY  2010    COLONOSCOPY      CORONARY ANGIOPLASTY WITH STENT PLACEMENT      CORONARY STENT PLACEMENT      x3    EYE SURGERY Right     SKIN CANCER EXCISION      WRIST SURGERY           MEDICATIONS:  I have reviewed and reconciled the patients medication list in the patients chart at the skilled nursing facility today.      ALLERGIES:    Allergies   Allergen Reactions    Lipitor [Atorvastatin] Myalgia         SOCIAL HISTORY:    Social History     Socioeconomic History    Marital status:    Tobacco Use    Smoking status: Never    Smokeless tobacco: Never   Vaping Use    Vaping Use: Never used   Substance and Sexual Activity    Alcohol use: No    Drug use: No    Sexual activity: Not Currently       FAMILY HISTORY:    Family History   Problem Relation Age of Onset    Heart disease Mother     Heart attack Father        REVIEW OF SYSTEMS:    Review of Systems  Appetite: Fair [x]   Good []   Poor []   Weight Loss []  [x]  Weight Stable   Unavoidable Weight Loss []  Tolerating Tube Feeding []    Supplements Provided []   Patient is a poor historian, review of systems obtained on discussion with patient's treating nurse/staff, as well as review  of records.    PHYSICAL EXAMINATION:   VITAL SIGNS:   Vitals:    06/28/23 1011   BP: 111/68   Pulse: 66   Resp: 20   Temp: 98.2 °F (36.8 °C)   SpO2: 95%       Physical Exam    General Appearance:  [x]  Alert   [x]  Oriented x person  [x]  No acute distress     [x]  Confused  []  Disoriented   []  Comatose   Head:  Atraumatic and normocephalic, without obvious abnormality.   Eyes:         PERRLA, conjunctivae and sclerae normal, no Icterus. No pallor. Extra-occular movements are within normal limits.   Ears:  Ears appear intact with no abnormalities noted.   Throat: No oral lesions, no thrush, oral mucosa moist.   Neck: Supple, trachea midline, no thyromegaly, no carotid bruit.   Back:   No kyphoscoliosis. No tenderness to palpation.   Lungs:   Chest shape is normal.  Air exchange noted to all lung fields.  No wheezing.    Heart:  Normal S1 and S2, no murmur, no gallop, no rub. No JVD.   Abdomen:   Normal bowel sounds, no masses, no organomegaly. Soft, non-tender, non-distended, no guarding    Extremities: Moves all extremities.  Without edema, cyanosis or clubbing.  Frail build.  Poor core strength and stability.   Pulses: Pulses palpable and equal bilaterally.   Skin: No bleeding or rash.  Generalized dry skin noted.  Age-related atrophy of skin.   Neurologic: [x] Normal speech []  Normal mental status    [x] Cranial nerves II through XII intact   [x]  No anosmia [x]  DTR 2+ [x]  Proprioception intact  [x] age-related motor/sensory deficits      Psych/Mood:                    [x]  No acute changes []  Depressed  Urinary:                            []  Continent  [x]  Incontinent []  Retention  []  F/C      []  UTI w/treatment in progress         ASSESSMENT     Diagnoses and all orders for this visit:    1. Impaired mobility and ADLs (Primary)    2. Age-related physical debility    3. 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 Gastroesophageal  reflux disease without esophagitis    5. Essential hypertension          PLAN  Continue supportive care, assist mob/ADLs/transfers as needed; fall precautions in place.    Monitor nutritional/hydration status, as well as continue dietary/lifestyle mods for treatment of GERD; aspiration monitoring given adv age and dementia.    VSS, appears HD asymptomatic.    Surveillance labs when needed.    [x]  Discussed Patient in detail with nursing/staff, addressed all needs today.     [x]  Plan of Care Reviewed   []  PT/OT Reviewed   []  Order Changes  []  Discharge Plans Reviewed   []  Code Status Changes    I spent 30 minutes caring for Darren on this date of service. This time includes time spent by me in the following activities:preparing for the visit, performing a medically appropriate examination and/or evaluation , counseling and educating the patient/family/caregiver, ordering medications, tests, or procedures, documenting information in the medical record, and care coordination    I have reviewed and updated all copied forward information, as appropriate.  I attest to the accuracy and relevance of any unchanged information.       Andrew Jacobo DO  6/28/2023

## (undated) DEVICE — PK CATH CARD 10

## (undated) DEVICE — ST INF PRI SMRTSTE 20DRP 2VLV 24ML 117

## (undated) DEVICE — GUIDE CATHETER: Brand: MACH1™

## (undated) DEVICE — GW J TP FIX CORE .035 150

## (undated) DEVICE — CATH DIAG EXPO M/ PK 6FR FL4/FR4 PIG 3PK

## (undated) DEVICE — KT MANIFOLD CATHLAB CUST

## (undated) DEVICE — CANNULA,ADULT,SOFT-TOUCH,7'TUBE,UC: Brand: PENDING

## (undated) DEVICE — ANGIO-SEAL VIP VASCULAR CLOSURE DEVICE: Brand: ANGIO-SEAL

## (undated) DEVICE — INTRO SHEATH ART/FEM ENGAGE .038 6F12CM

## (undated) DEVICE — GW LUGE .014 182 CM

## (undated) DEVICE — BALN SPRINTER LEGEND RX 1.25X15MM 142CM

## (undated) DEVICE — HI-TORQUE WHISPER MS GUIDE WIRE .014 STRAIGHT TIP 3.0 CM X 190 CM: Brand: HI-TORQUE WHISPER

## (undated) DEVICE — DEV INFL MONARCH 25W

## (undated) DEVICE — KT VLV HEMO MAP ACC PLS LG/BORE MTL/INTRO W/TORQ/DEV

## (undated) DEVICE — BALN EUPHORA 2X15MM

## (undated) DEVICE — GW PRESS VERRATA STR 185CM

## (undated) DEVICE — GW FIX CORE J .063